# Patient Record
Sex: FEMALE | Race: WHITE | Employment: OTHER | URBAN - METROPOLITAN AREA
[De-identification: names, ages, dates, MRNs, and addresses within clinical notes are randomized per-mention and may not be internally consistent; named-entity substitution may affect disease eponyms.]

---

## 2018-01-29 PROBLEM — R07.9 CHEST PAIN: Status: ACTIVE | Noted: 2018-01-29

## 2018-02-01 PROBLEM — R07.9 CHEST PAIN: Status: ACTIVE | Noted: 2018-02-01

## 2018-02-01 PROBLEM — R07.89 OTHER CHEST PAIN: Status: ACTIVE | Noted: 2018-01-29

## 2018-02-05 ENCOUNTER — HOSPITAL ENCOUNTER (OUTPATIENT)
Dept: ENDOSCOPY | Age: 63
Discharge: HOME | End: 2018-02-05
Attending: INTERNAL MEDICINE | Admitting: INTERNAL MEDICINE

## 2018-02-05 VITALS
TEMPERATURE: 97.8 F | DIASTOLIC BLOOD PRESSURE: 62 MMHG | HEART RATE: 89 BPM | RESPIRATION RATE: 20 BRPM | OXYGEN SATURATION: 100 % | SYSTOLIC BLOOD PRESSURE: 142 MMHG

## 2018-02-05 LAB
GLUCOSE BLD-MCNC: 126 MG/DL (ref 70–99)
PERFORMED ON: ABNORMAL

## 2018-02-05 NOTE — ANESTHESIA POST-OP
Anesthesia Post-op Note    Patient: Katherine Carrasco  MRN: [id number]  YOB: 1955  Date of evaluation: 2/5/2018  Time:  11:58 AM     Procedure(s) Performed:     Last Vitals: BP (!) 142/62   Pulse 89   Temp 97.8 °F (36.6 °C)   Resp 20   SpO2 100%     Narciso Phase I:      Narciso Phase II:      Anesthesia Post Evaluation    Final anesthesia type: MAC  Patient location during evaluation: PACU  Patient participation: complete - patient participated  Level of consciousness: awake and alert  Pain score: 0  Airway patency: patent  Nausea & Vomiting: no nausea and no vomiting  Complications: no  Cardiovascular status: blood pressure returned to baseline  Respiratory status: acceptable  Hydration status: euvolemic        Stiven Pineda MD  11:58 AM

## 2018-02-05 NOTE — ANESTHESIA PRE-OP
Department of Anesthesiology  Preprocedure Note       Name:  Cristel Huynh   Age:  58 y.o.  :  1955                                          MRN:  [id number]         Date:  2018      Surgeon: Yessi Love    Procedure: colonoscopy    Medications prior to admission:   Prior to Admission medications    Medication Sig Start Date End Date Taking? Authorizing Provider   metoprolol tartrate (LOPRESSOR) 25 MG tablet Take 0.5 tablets by mouth 2 times daily 2/3/18   Lupis Tamayo MD   levalbuterol Sapphire Lopezald HFA) 45 MCG/ACT inhaler Inhale 1 puff into the lungs every 4 hours as needed for Wheezing 18  Lupis Tamayo MD   levalbuterol Sapphire Farnsworth) 1.25 MG/3ML nebulizer solution Take 3 mLs by nebulization every 4 hours as needed for Wheezing 18   Lupis Tamayo MD   insulin detemir (LEVEMIR FLEXPEN) 100 UNIT/ML injection pen Inject 110 Units into the skin nightly    Historical Provider, MD   nystatin (MYCOSTATIN) 682366 UNIT/GM cream Apply topically 2 times daily Apply topically 2 times daily.     Historical Provider, MD   insulin lispro (HUMALOG KWIKPEN) 100 UNIT/ML pen Inject 33 Units into the skin daily (before lunch) 18u am 33u lunch 27u dinner  3 units for every 25 above 200    Historical Provider, MD   lactulose encephalopathy 10 GM/15ML SOLN solution Take 20 g by mouth 3 times daily  18  Historical Provider, MD   levothyroxine (SYNTHROID) 112 MCG tablet Take 112 mcg by mouth Daily    Historical Provider, MD   OXYGEN Inhale 3 L/min into the lungs continuous    Historical Provider, MD   CPAP Machine MISC by Does not apply route    Historical Provider, MD   lamoTRIgine (LAMICTAL) 200 MG tablet Take 200 mg by mouth 2 times daily     Historical Provider, MD   tacrolimus (PROGRAF) 1 MG capsule Take 0.5 mg by mouth 2 times daily     Historical Provider, MD   insulin detemir (LEVEMIR) 100 UNIT/ML injection vial Inject 90 Units into the skin every morning     Historical Provider, MD hypertension:,                ROS comment: Denies CP, SOA   No Hx of CAD  Hx of PSVT s/p ablation last week     Neuro/Psych:   (+) neuromuscular disease:, psychiatric history:             ROS comment: Hx of Sz's (distant Hx) GI/Hepatic/Renal:   (+) GERD:, liver disease:,          ROS comment: S/P Liver Tx (2008). Endo/Other:    (+) Type I DM, using insulin, hypothyroidism::., .                 Abdominal:           Vascular: negative vascular ROS. Anesthesia Plan      MAC     ASA 4       Induction: intravenous. Anesthetic plan and risks discussed with patient.                       Tinnie Hamman, MD   2/5/2018

## 2018-02-12 NOTE — OP NOTE
[address] [place] [address]  Endoscopy Note    Patient: Blossom Ireland  : 1955  Acct#:     Procedure: Colonoscopy     Date:  2018    Surgeon:  Khadijah Israel MD    Referring Physician:  Freya Romberg    Preoperative Diagnosis:  High risk screening    Postoperative Diagnosis:  Exam is normal to hepatic flexure limited by looping of scope    Anesthesia:  Diprivan    EBL: <50 mL    Indications: This is a 58y.o. year old female who presents today with family history of colon cancer. Procedure: An informed consent was obtained from the patient after explanation of indications, benefits, possible risks and complications of the procedure. The patient was then taken to the endoscopy suite, placed in the left lateral decubitus position, and the above IV anesthesia was administered. A digital rectal examination was performed and revealed negative without mass, lesions or tenderness. The Olympus CFQ-180-AL video colonoscope was placed in the patient's rectum under digital direction and advanced to the cecum. The cecum was identified by characteristic anatomy and ballottment. The prep was fair. The ileocecal valve was identified and intubated     Visualization of the terminal ileum demonstrated normal mucosa. The scope was then withdrawn back through the transverse, descending and sigmoid colons. Carefull circumferential examination of the mucosa in these areas demonstrated normal colonic mucosa throughout. The scope was then withdrawn into the rectum and retroflexed. The retroflexed view of the anal verge and rectum demonstrates no abnormalities. The scope was straightened, the colon was decompressed and the scope was withdrawn from the patient. The patient tolerated the procedure well and was taken to the PACU in good condition.     Impression:  Normal colonoscopy to the hepatic flexure limited by looping with no evidence of neoplasia, diverticular disease or mucosal

## 2018-05-03 ENCOUNTER — HOSPITAL ENCOUNTER (OUTPATIENT)
Dept: OTHER | Age: 63
Discharge: HOME | End: 2018-05-03
Attending: INTERNAL MEDICINE | Admitting: INTERNAL MEDICINE

## 2018-05-03 DIAGNOSIS — R52 PAIN: ICD-10-CM

## 2018-05-16 PROBLEM — R07.9 CHEST PAIN: Status: ACTIVE | Noted: 2018-05-16

## 2018-05-16 PROBLEM — E03.9 HYPOTHYROID: Status: ACTIVE | Noted: 2018-05-16

## 2018-05-16 PROBLEM — K21.9 GERD (GASTROESOPHAGEAL REFLUX DISEASE): Status: ACTIVE | Noted: 2018-05-16

## 2018-10-12 ENCOUNTER — HOSPITAL ENCOUNTER (OUTPATIENT)
Age: 63
Setting detail: OBSERVATION
Discharge: NURSING, REHAB OR LONG-TERM CARE | End: 2018-10-13
Attending: EMERGENCY MEDICINE | Admitting: INTERNAL MEDICINE
Payer: MEDICARE

## 2018-10-12 ENCOUNTER — APPOINTMENT (OUTPATIENT)
Dept: GENERAL RADIOLOGY | Age: 63
End: 2018-10-12
Payer: MEDICARE

## 2018-10-12 DIAGNOSIS — M25.562 ACUTE PAIN OF LEFT KNEE: ICD-10-CM
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
## 2018-10-12 DIAGNOSIS — S80.02XA CONTUSION OF LEFT KNEE, INITIAL ENCOUNTER: ICD-10-CM

## 2018-10-12 DIAGNOSIS — R26.2 IMPAIRED AMBULATION: ICD-10-CM

## 2018-10-12 PROBLEM — R62.51 FAILURE TO THRIVE (0-17): Status: ACTIVE | Noted: 2018-10-12

## 2018-10-12 LAB
GLUCOSE BLD-MCNC: 268 MG/DL (ref 70–99)
GLUCOSE BLD-MCNC: 274 MG/DL (ref 70–99)
GLUCOSE BLD-MCNC: 307 MG/DL (ref 70–99)
PERFORMED ON: ABNORMAL

## 2018-10-12 PROCEDURE — 96372 THER/PROPH/DIAG INJ SC/IM: CPT

## 2018-10-12 PROCEDURE — 6370000000 HC RX 637 (ALT 250 FOR IP): Performed by: EMERGENCY MEDICINE

## 2018-10-12 PROCEDURE — G0378 HOSPITAL OBSERVATION PER HR: HCPCS

## 2018-10-12 PROCEDURE — 2580000003 HC RX 258: Performed by: INTERNAL MEDICINE

## 2018-10-12 PROCEDURE — 99285 EMERGENCY DEPT VISIT HI MDM: CPT

## 2018-10-12 PROCEDURE — 97165 OT EVAL LOW COMPLEX 30 MIN: CPT

## 2018-10-12 PROCEDURE — G8988 SELF CARE GOAL STATUS: HCPCS

## 2018-10-12 PROCEDURE — G8987 SELF CARE CURRENT STATUS: HCPCS

## 2018-10-12 PROCEDURE — 97530 THERAPEUTIC ACTIVITIES: CPT

## 2018-10-12 PROCEDURE — 73560 X-RAY EXAM OF KNEE 1 OR 2: CPT

## 2018-10-12 PROCEDURE — 6370000000 HC RX 637 (ALT 250 FOR IP): Performed by: NURSE PRACTITIONER

## 2018-10-12 PROCEDURE — 6360000002 HC RX W HCPCS: Performed by: INTERNAL MEDICINE

## 2018-10-12 PROCEDURE — 6370000000 HC RX 637 (ALT 250 FOR IP): Performed by: INTERNAL MEDICINE

## 2018-10-12 RX ORDER — NYSTATIN 100000 U/G
CREAM TOPICAL 2 TIMES DAILY
Status: DISCONTINUED | OUTPATIENT
Start: 2018-10-12 | End: 2018-10-13 | Stop reason: HOSPADM

## 2018-10-12 RX ORDER — DEXTROSE MONOHYDRATE 50 MG/ML
100 INJECTION, SOLUTION INTRAVENOUS PRN
Status: DISCONTINUED | OUTPATIENT
Start: 2018-10-12 | End: 2018-10-13 | Stop reason: HOSPADM

## 2018-10-12 RX ORDER — TACROLIMUS 0.5 MG/1
0.5 CAPSULE ORAL 2 TIMES DAILY
Status: DISCONTINUED | OUTPATIENT
Start: 2018-10-12 | End: 2018-10-13 | Stop reason: HOSPADM

## 2018-10-12 RX ORDER — NICOTINE POLACRILEX 4 MG
15 LOZENGE BUCCAL PRN
Status: DISCONTINUED | OUTPATIENT
Start: 2018-10-12 | End: 2018-10-13 | Stop reason: HOSPADM

## 2018-10-12 RX ORDER — ONDANSETRON 8 MG/1
8 TABLET, ORALLY DISINTEGRATING ORAL EVERY 8 HOURS PRN
Status: DISCONTINUED | OUTPATIENT
Start: 2018-10-12 | End: 2018-10-13 | Stop reason: HOSPADM

## 2018-10-12 RX ORDER — PANTOPRAZOLE SODIUM 40 MG/1
40 TABLET, DELAYED RELEASE ORAL
Status: DISCONTINUED | OUTPATIENT
Start: 2018-10-13 | End: 2018-10-13 | Stop reason: HOSPADM

## 2018-10-12 RX ORDER — FUROSEMIDE 20 MG/1
20 TABLET ORAL DAILY
Status: DISCONTINUED | OUTPATIENT
Start: 2018-10-12 | End: 2018-10-13 | Stop reason: HOSPADM

## 2018-10-12 RX ORDER — LACTULOSE 10 G/15ML
20 SOLUTION ORAL 3 TIMES DAILY
Status: DISCONTINUED | OUTPATIENT
Start: 2018-10-12 | End: 2018-10-13 | Stop reason: HOSPADM

## 2018-10-12 RX ORDER — OXYCODONE HYDROCHLORIDE 5 MG/1
5 TABLET ORAL ONCE
Status: COMPLETED | OUTPATIENT
Start: 2018-10-12 | End: 2018-10-12

## 2018-10-12 RX ORDER — HYDROCODONE BITARTRATE AND ACETAMINOPHEN 5; 325 MG/1; MG/1
1 TABLET ORAL EVERY 6 HOURS PRN
Status: DISCONTINUED | OUTPATIENT
Start: 2018-10-12 | End: 2018-10-13

## 2018-10-12 RX ORDER — OLANZAPINE 5 MG/1
2.5 TABLET ORAL NIGHTLY
Status: DISCONTINUED | OUTPATIENT
Start: 2018-10-12 | End: 2018-10-13 | Stop reason: HOSPADM

## 2018-10-12 RX ORDER — LEVOTHYROXINE SODIUM 112 UG/1
112 TABLET ORAL DAILY
Status: DISCONTINUED | OUTPATIENT
Start: 2018-10-13 | End: 2018-10-13 | Stop reason: HOSPADM

## 2018-10-12 RX ORDER — ACETAMINOPHEN 80 MG
TABLET,CHEWABLE ORAL
Status: COMPLETED
Start: 2018-10-12 | End: 2018-10-12

## 2018-10-12 RX ORDER — HYDROXYZINE PAMOATE 25 MG/1
50 CAPSULE ORAL EVERY 8 HOURS PRN
Status: DISCONTINUED | OUTPATIENT
Start: 2018-10-12 | End: 2018-10-13 | Stop reason: HOSPADM

## 2018-10-12 RX ORDER — FAMOTIDINE 20 MG/1
20 TABLET, FILM COATED ORAL 2 TIMES DAILY PRN
Status: DISCONTINUED | OUTPATIENT
Start: 2018-10-12 | End: 2018-10-13 | Stop reason: HOSPADM

## 2018-10-12 RX ORDER — OLANZAPINE 15 MG/1
15 TABLET ORAL NIGHTLY
Status: ON HOLD | COMMUNITY
End: 2020-01-01 | Stop reason: SDUPTHER

## 2018-10-12 RX ORDER — LAMOTRIGINE 100 MG/1
200 TABLET ORAL 2 TIMES DAILY
Status: DISCONTINUED | OUTPATIENT
Start: 2018-10-12 | End: 2018-10-13 | Stop reason: HOSPADM

## 2018-10-12 RX ORDER — SODIUM CHLORIDE 0.9 % (FLUSH) 0.9 %
10 SYRINGE (ML) INJECTION EVERY 12 HOURS SCHEDULED
Status: DISCONTINUED | OUTPATIENT
Start: 2018-10-12 | End: 2018-10-13 | Stop reason: HOSPADM

## 2018-10-12 RX ORDER — CYCLOBENZAPRINE HCL 10 MG
5 TABLET ORAL NIGHTLY
Status: DISCONTINUED | OUTPATIENT
Start: 2018-10-12 | End: 2018-10-13 | Stop reason: HOSPADM

## 2018-10-12 RX ORDER — SODIUM CHLORIDE 9 MG/ML
INJECTION, SOLUTION INTRAVENOUS CONTINUOUS
Status: DISCONTINUED | OUTPATIENT
Start: 2018-10-12 | End: 2018-10-13 | Stop reason: HOSPADM

## 2018-10-12 RX ORDER — PROMETHAZINE HYDROCHLORIDE 25 MG/1
25 TABLET ORAL EVERY 6 HOURS PRN
Status: DISCONTINUED | OUTPATIENT
Start: 2018-10-12 | End: 2018-10-13 | Stop reason: HOSPADM

## 2018-10-12 RX ORDER — POTASSIUM CHLORIDE 7.45 MG/ML
10 INJECTION INTRAVENOUS PRN
Status: DISCONTINUED | OUTPATIENT
Start: 2018-10-12 | End: 2018-10-13 | Stop reason: HOSPADM

## 2018-10-12 RX ORDER — SODIUM CHLORIDE 0.9 % (FLUSH) 0.9 %
10 SYRINGE (ML) INJECTION PRN
Status: DISCONTINUED | OUTPATIENT
Start: 2018-10-12 | End: 2018-10-13 | Stop reason: HOSPADM

## 2018-10-12 RX ORDER — DEXTROSE MONOHYDRATE 25 G/50ML
12.5 INJECTION, SOLUTION INTRAVENOUS PRN
Status: DISCONTINUED | OUTPATIENT
Start: 2018-10-12 | End: 2018-10-13 | Stop reason: HOSPADM

## 2018-10-12 RX ORDER — POTASSIUM CHLORIDE 20 MEQ/1
40 TABLET, EXTENDED RELEASE ORAL PRN
Status: DISCONTINUED | OUTPATIENT
Start: 2018-10-12 | End: 2018-10-13 | Stop reason: HOSPADM

## 2018-10-12 RX ORDER — ONDANSETRON 2 MG/ML
4 INJECTION INTRAMUSCULAR; INTRAVENOUS EVERY 6 HOURS PRN
Status: DISCONTINUED | OUTPATIENT
Start: 2018-10-12 | End: 2018-10-13 | Stop reason: HOSPADM

## 2018-10-12 RX ADMIN — OXYCODONE HYDROCHLORIDE 5 MG: 5 TABLET ORAL at 16:45

## 2018-10-12 RX ADMIN — INSULIN GLARGINE 120 UNITS: 100 INJECTION, SOLUTION SUBCUTANEOUS at 23:10

## 2018-10-12 RX ADMIN — RIFAXIMIN 550 MG: 550 TABLET ORAL at 22:48

## 2018-10-12 RX ADMIN — CYCLOBENZAPRINE 5 MG: 10 TABLET, FILM COATED ORAL at 22:41

## 2018-10-12 RX ADMIN — Medication: at 22:48

## 2018-10-12 RX ADMIN — OXYCODONE HYDROCHLORIDE 5 MG: 5 TABLET ORAL at 20:27

## 2018-10-12 RX ADMIN — ENOXAPARIN SODIUM 40 MG: 40 INJECTION SUBCUTANEOUS at 22:39

## 2018-10-12 RX ADMIN — Medication 10 ML: at 22:48

## 2018-10-12 RX ADMIN — TACROLIMUS 0.5 MG: 0.5 CAPSULE ORAL at 22:41

## 2018-10-12 RX ADMIN — NYSTATIN: 100000 CREAM TOPICAL at 22:47

## 2018-10-12 RX ADMIN — LACTULOSE 20 G: 20 SOLUTION ORAL at 22:39

## 2018-10-12 RX ADMIN — METOPROLOL TARTRATE 25 MG: 25 TABLET, FILM COATED ORAL at 22:41

## 2018-10-12 RX ADMIN — SODIUM CHLORIDE: 9 INJECTION, SOLUTION INTRAVENOUS at 22:49

## 2018-10-12 RX ADMIN — OXYCODONE HYDROCHLORIDE 5 MG: 5 TABLET ORAL at 12:20

## 2018-10-12 RX ADMIN — INSULIN LISPRO 2 UNITS: 100 INJECTION, SOLUTION INTRAVENOUS; SUBCUTANEOUS at 23:11

## 2018-10-12 RX ADMIN — LAMOTRIGINE 200 MG: 100 TABLET ORAL at 23:37

## 2018-10-12 RX ADMIN — OLANZAPINE 2.5 MG: 5 TABLET, FILM COATED ORAL at 22:42

## 2018-10-12 RX ADMIN — MILNACIPRAN HYDROCHLORIDE 100 MG: 50 TABLET, FILM COATED ORAL at 22:47

## 2018-10-12 ASSESSMENT — PAIN SCALES - GENERAL
PAINLEVEL_OUTOF10: 10
PAINLEVEL_OUTOF10: 10
PAINLEVEL_OUTOF10: 8
PAINLEVEL_OUTOF10: 10
PAINLEVEL_OUTOF10: 8

## 2018-10-12 ASSESSMENT — PAIN DESCRIPTION - ORIENTATION
ORIENTATION: LEFT
ORIENTATION: LEFT

## 2018-10-12 ASSESSMENT — PAIN DESCRIPTION - LOCATION
LOCATION: KNEE
LOCATION: KNEE

## 2018-10-12 NOTE — CARE COORDINATION
DISCHARGE PLANNING: The [facility] liaison called back stating that  will follow patient  tomorrow morning.

## 2018-10-12 NOTE — ED PROVIDER NOTES
I personally evaluated and examined the patient in conjunction with the APC and agree with the assessment, treatment plan and disposition of the patient has recorded by the APC. I reviewed pertinent nurse's notes, triage notes, vital signs, past medical history, family and social history, medications, and allergies. Complete review of systems was conducted by the mid-level provider and/or myself. Review of systems is negative except as documented in the history of present illness. Procedures:    1) 1) Splinting: An ace wrap was placed by the ED tech. Pt was re-examined after wrap application by myself and capillary refill is less than two seconds, patient has normal color of distal extremity and sensation is intact. Wrap was in good alignment after placement. Patient tolerated the procedure well with no complications. 24-year-old female presents to emergency department after exacerbating a chronic left knee. She apparently hyperextended her left knee. She has a stump of her right lower extremity. Lives alone. Feels like she cannot live alone. General: Patient is in no acute distress   Head: Normocephalic, atraumatic, pupils are equal and reactive to light. EOMI. Neck: Neck is supple. No JVD noted. Heart: RRR no murmurs, rubs, or gallops   Lungs: CTA BL   Abdomen: soft, non-tender, non-distended   Extremities: no lower extremity edema. Capillary refill is less than 2 seconds . Slight swelling without erythema left knee. Neurovascularly intact distally. Skin: no cyanosis or pallor; no rashes noted   Neuro: CN's 2-12 are grossly intact. No focal neurologic deficit appreciated. X-ray shows chronic osteoarthritis. She cannot tolerate living at home. Discussed with  who cut her into a rehab facility however the rehab doctor is not in until tomorrow. Therefore recommended observation. FINAL IMPRESSION     1. Fall, initial encounter    2.  Contusion of left knee, initial

## 2018-10-12 NOTE — ED NOTES
Nishant Ramesh from [facility] in room to try to place pt. States she needs evaluation from PT before she can get an order from her MD. Myra Marin with [facility] in PT states she will get someone to come down ASAP.      Ayden Pacheco RN  10/12/18 5006

## 2018-10-12 NOTE — CARE COORDINATION
DISCHARGE PLANNING:  Informed by Addy Villanueva of  the patient  who came to ED today after falling and  does not feel safe going home. She  is requesting for C/M services. The writer met with patient to assist with discharge planning needs. Patient stated that  she lives alone in her home. She  uses a manual  wheel chair and a powered chair to get around . Patient stated she is active with [facility] agency for skilled Nursing only. Patient  has right BKA . She reported  that she  does not  feel safe going home without 24 hr care. Stated she cannot put weight on the left leg because of pain from the fall and also arthritis. She stated that she cannot afford to privately  Pay for the Non-medical  [facility] aid services. Skilled [facility] does not provide 24 hr care. She stated  that she in the process of  getting into [facility] living through the [place]. Requested if she could go to a facility for Rehab . The ED provider -Dilip NEGRON was updated. The writer contacted [facility] for bed availability and possible placement . The Liaison reported that they have  beds available at the [facility] location. PT/OT orders were obtained. The  Liaison from [facility] came to ED to assess the patient. She is waiting for the response from their physician . The ED provider was updated. Will continue to follow.

## 2018-10-12 NOTE — ED PROVIDER NOTES
DIAGNOSIS/MDM:   Vitals:    Vitals:    10/12/18 1151 10/12/18 1318 10/12/18 1703   BP: (!) 131/57 123/67 120/83   Pulse: 98 98 95   Resp: 16 16 14   Temp: 99 °F (37.2 °C)     TempSrc: Oral     SpO2: 98% 100% 100%   Weight: 180 lb (81.6 kg)     Height: 5' 4\" (1.626 m)         Colt Bautista was given the following medications:  Medications   oxyCODONE (ROXICODONE) immediate release tablet 5 mg (5 mg Oral Given 10/12/18 1220)   oxyCODONE (ROXICODONE) immediate release tablet 5 mg (5 mg Oral Given 10/12/18 1645)       Colt Bautista was evaluated in the emergencydepartment with concern for Evaluation after fall. There is no head injury or LOC. Therefore I do not feel head CT imaging is warranted at this time. She does complain of left knee pain. This is acute on chronic. X-ray imaging was performed. Tricompartmental osteoarthritis is demonstrated. Known and per patient's chronic pain syndrome. There is a small joint effusion, however no significant swelling on exam.  My suspicion is low for fracture, dislocation, cellulitis, septic joint, joint effusion, arterial occlusion, vascular compromise, gout, or pseudogout. Differential include sprain, strain, occult fracture, or contusion. The patient was instructed on RICE therapy. I do feel outpatient management is wanted patient home. We were able to continue OT/PT evaluation. She is candidate for further treatment at a SNF, however it is late in the day and we are unable to obtain a physician order for this. For this reason she will be admitted. Dr. Dian Arevalo was consulted and is in agreement for admission. This plan was discussed with the patient, and my attending, No att. providers found, and both are in agreement with the plan. Please see attending note. FINAL IMPRESSION      1. Fall, initial encounter    2.  Contusion of left knee, initial encounter          DISPOSITION/PLAN   DISPOSITION Decision To Admit 10/12/2018 05:04:17 PM      (Please note that portions of this note were completed with a voice recognition program.  Efforts were made to edit the dictations but occasionally wordsare mis-transcribed.)    DELORES Garza CNP (electronically signed)        DELORES Garza CNP  10/12/18 100 DELORES Ledezma CNP  10/26/18 [id number]

## 2018-10-13 VITALS
RESPIRATION RATE: 18 BRPM | BODY MASS INDEX: 32.21 KG/M2 | OXYGEN SATURATION: 96 % | WEIGHT: 188.7 LBS | DIASTOLIC BLOOD PRESSURE: 56 MMHG | HEIGHT: 64 IN | TEMPERATURE: 98.2 F | HEART RATE: 93 BPM | SYSTOLIC BLOOD PRESSURE: 128 MMHG

## 2018-10-13 PROBLEM — M25.569 KNEE PAIN: Status: ACTIVE | Noted: 2018-10-13

## 2018-10-13 PROBLEM — D64.9 ANEMIA: Status: ACTIVE | Noted: 2018-10-13

## 2018-10-13 LAB
BASOPHILS ABSOLUTE: 0 K/UL (ref 0–0.2)
BASOPHILS RELATIVE PERCENT: 0.2 %
EOSINOPHILS ABSOLUTE: 0 K/UL (ref 0–0.6)
EOSINOPHILS RELATIVE PERCENT: 0 %
GLUCOSE BLD-MCNC: 125 MG/DL (ref 70–99)
GLUCOSE BLD-MCNC: 160 MG/DL (ref 70–99)
GLUCOSE BLD-MCNC: 193 MG/DL (ref 70–99)
GLUCOSE BLD-MCNC: 195 MG/DL (ref 70–99)
HCT VFR BLD CALC: 34.1 % (ref 36–48)
HEMOGLOBIN: 11.5 G/DL (ref 12–16)
LYMPHOCYTES ABSOLUTE: 0.6 K/UL (ref 1–5.1)
LYMPHOCYTES RELATIVE PERCENT: 18.8 %
MCH RBC QN AUTO: 31.2 PG (ref 26–34)
MCHC RBC AUTO-ENTMCNC: 33.7 G/DL (ref 31–36)
MCV RBC AUTO: 92.7 FL (ref 80–100)
MONOCYTES ABSOLUTE: 0.3 K/UL (ref 0–1.3)
MONOCYTES RELATIVE PERCENT: 10.6 %
NEUTROPHILS ABSOLUTE: 2.3 K/UL (ref 1.7–7.7)
NEUTROPHILS RELATIVE PERCENT: 70.4 %
PDW BLD-RTO: 14.5 % (ref 12.4–15.4)
PERFORMED ON: ABNORMAL
PLATELET # BLD: 72 K/UL (ref 135–450)
PLATELET SLIDE REVIEW: ABNORMAL
PMV BLD AUTO: 8.9 FL (ref 5–10.5)
RBC # BLD: 3.68 M/UL (ref 4–5.2)
RBC # BLD: NORMAL 10*6/UL
SLIDE REVIEW: ABNORMAL
WBC # BLD: 3.3 K/UL (ref 4–11)

## 2018-10-13 PROCEDURE — 85025 COMPLETE CBC W/AUTO DIFF WBC: CPT

## 2018-10-13 PROCEDURE — G8978 MOBILITY CURRENT STATUS: HCPCS

## 2018-10-13 PROCEDURE — G0378 HOSPITAL OBSERVATION PER HR: HCPCS

## 2018-10-13 PROCEDURE — 96372 THER/PROPH/DIAG INJ SC/IM: CPT

## 2018-10-13 PROCEDURE — 6370000000 HC RX 637 (ALT 250 FOR IP): Performed by: INTERNAL MEDICINE

## 2018-10-13 PROCEDURE — G8979 MOBILITY GOAL STATUS: HCPCS

## 2018-10-13 PROCEDURE — 36415 COLL VENOUS BLD VENIPUNCTURE: CPT

## 2018-10-13 PROCEDURE — 94660 CPAP INITIATION&MGMT: CPT

## 2018-10-13 PROCEDURE — 94760 N-INVAS EAR/PLS OXIMETRY 1: CPT

## 2018-10-13 PROCEDURE — 6360000002 HC RX W HCPCS: Performed by: INTERNAL MEDICINE

## 2018-10-13 PROCEDURE — G8980 MOBILITY D/C STATUS: HCPCS

## 2018-10-13 PROCEDURE — 97110 THERAPEUTIC EXERCISES: CPT

## 2018-10-13 PROCEDURE — 97161 PT EVAL LOW COMPLEX 20 MIN: CPT

## 2018-10-13 RX ORDER — OXYCODONE HYDROCHLORIDE 5 MG/1
5 TABLET ORAL EVERY 6 HOURS PRN
Status: DISCONTINUED | OUTPATIENT
Start: 2018-10-13 | End: 2018-10-13 | Stop reason: HOSPADM

## 2018-10-13 RX ADMIN — OXYCODONE HYDROCHLORIDE 5 MG: 5 TABLET ORAL at 17:11

## 2018-10-13 RX ADMIN — INSULIN LISPRO 1 UNITS: 100 INJECTION, SOLUTION INTRAVENOUS; SUBCUTANEOUS at 13:20

## 2018-10-13 RX ADMIN — LACTULOSE 20 G: 20 SOLUTION ORAL at 15:46

## 2018-10-13 RX ADMIN — RIFAXIMIN 550 MG: 550 TABLET ORAL at 09:05

## 2018-10-13 RX ADMIN — OXYCODONE HYDROCHLORIDE 5 MG: 5 TABLET ORAL at 09:05

## 2018-10-13 RX ADMIN — MILNACIPRAN HYDROCHLORIDE 100 MG: 50 TABLET, FILM COATED ORAL at 19:41

## 2018-10-13 RX ADMIN — FUROSEMIDE 20 MG: 20 TABLET ORAL at 09:05

## 2018-10-13 RX ADMIN — TACROLIMUS 0.5 MG: 0.5 CAPSULE ORAL at 09:05

## 2018-10-13 RX ADMIN — INSULIN LISPRO 1 UNITS: 100 INJECTION, SOLUTION INTRAVENOUS; SUBCUTANEOUS at 17:12

## 2018-10-13 RX ADMIN — LACTULOSE 20 G: 20 SOLUTION ORAL at 09:04

## 2018-10-13 RX ADMIN — ENOXAPARIN SODIUM 40 MG: 40 INJECTION SUBCUTANEOUS at 09:05

## 2018-10-13 RX ADMIN — MILNACIPRAN HYDROCHLORIDE 100 MG: 50 TABLET, FILM COATED ORAL at 09:16

## 2018-10-13 RX ADMIN — LAMOTRIGINE 200 MG: 100 TABLET ORAL at 09:15

## 2018-10-13 RX ADMIN — NYSTATIN: 100000 CREAM TOPICAL at 09:16

## 2018-10-13 RX ADMIN — OXYCODONE HYDROCHLORIDE 5 MG: 5 TABLET ORAL at 01:06

## 2018-10-13 RX ADMIN — LEVOTHYROXINE SODIUM 112 MCG: 112 TABLET ORAL at 07:18

## 2018-10-13 RX ADMIN — METOPROLOL TARTRATE 25 MG: 25 TABLET, FILM COATED ORAL at 09:05

## 2018-10-13 RX ADMIN — PANTOPRAZOLE SODIUM 40 MG: 40 TABLET, DELAYED RELEASE ORAL at 07:18

## 2018-10-13 RX ADMIN — MILNACIPRAN HYDROCHLORIDE 100 MG: 50 TABLET, FILM COATED ORAL at 13:20

## 2018-10-13 RX ADMIN — INSULIN GLARGINE 90 UNITS: 100 INJECTION, SOLUTION SUBCUTANEOUS at 09:16

## 2018-10-13 ASSESSMENT — ENCOUNTER SYMPTOMS
BACK PAIN: 1
CHEST TIGHTNESS: 0
CONSTIPATION: 0
ABDOMINAL DISTENTION: 0
CHOKING: 0
EYE PAIN: 0
EYE ITCHING: 0

## 2018-10-13 ASSESSMENT — PAIN DESCRIPTION - PAIN TYPE
TYPE: ACUTE PAIN
TYPE: ACUTE PAIN

## 2018-10-13 ASSESSMENT — PAIN DESCRIPTION - LOCATION
LOCATION: KNEE
LOCATION: KNEE

## 2018-10-13 ASSESSMENT — PAIN DESCRIPTION - DESCRIPTORS: DESCRIPTORS: ACHING

## 2018-10-13 ASSESSMENT — PAIN SCALES - GENERAL
PAINLEVEL_OUTOF10: 9
PAINLEVEL_OUTOF10: 10
PAINLEVEL_OUTOF10: 0
PAINLEVEL_OUTOF10: 6
PAINLEVEL_OUTOF10: 9
PAINLEVEL_OUTOF10: 8

## 2018-10-13 ASSESSMENT — PAIN DESCRIPTION - FREQUENCY: FREQUENCY: CONTINUOUS

## 2018-10-13 ASSESSMENT — PAIN DESCRIPTION - ORIENTATION
ORIENTATION: LEFT
ORIENTATION: LEFT

## 2018-10-13 NOTE — CARE COORDINATION
Discharge Planning. This worker received a call from [facility] stating that patient is accepted to [facility] but the [facility] location. However patient must bring her own milnacipran/savella prescription. The patient nurse reported that the patient stated she is almost out and that she will not bring that medication with her. [facility] is unable to accept if she does not bring the medication with her. Pt nurse was notified Social work will follow. Addendum: [facility] called they reported that they will be able to order the milnacipran medication however they will not receive it until Monday. This worker notified patients nurse to see if patient will be willing to supply at least until Monday. Waiting on confirmation  Social work will follow.      Electronically signed by ANAND Kyle LSW on 10/13/18 at 12:13 PM

## 2018-10-13 NOTE — PROGRESS NOTES
tachycardia) ([facility] [address] Thyroid mass; and UTI (lower urinary tract infection). has a past surgical history that includes Liver transplant (2008); other surgical history; Cholecystectomy; Appendectomy; hernia repair; Abdomen surgery; joint replacement; joint replacement; Leg amputation below knee; Liver transplant; Leg amputation below knee; Hysterectomy; and Sleeve Gastroplasty (1985). Restrictions  Restrictions/Precautions  Restrictions/Precautions:  (in ED bed 5, no fall risk at this time)  Position Activity Restriction  Other position/activity restrictions: 10/12: Sue Martinez is a 61 y.o. female who presents emergency Department with concern for fall. The patient has a partial dictation of her right leg. She reports that her left knee requires total knee replacement, however she is not a good candidate for this. She uses a motorized scooter. Today when a taxi came to pick her up, he parked too far away from the curb. She had to get up and hop. This caused her to fall. She is reporting pain in the left knee as a result. No head injury or LOC. She denies fever, rash, headaches, dizziness, chest pain, shortness of breath, cough, congestion, abdominal pain, nausea, vomiting, diarrhea, constipation, blood in the stool, or painful urination. No family at bedside. Vision/Hearing  Vision: Impaired  Vision Exceptions: Wears glasses at all times; Wears glasses for distance;Wears glasses for reading  Hearing: Exceptions to [facility]  Hearing Exceptions: No hearing aid     Subjective  General  Chart Reviewed: Yes  Patient assessed for rehabilitation services?: Yes  Response To Previous Treatment: Not applicable  Family / Caregiver Present: No  Diagnosis: failure to thrive  Subjective  Subjective: Pt reports she was driving power chair to [employer] but once she got there it . Transfered to one of [employer] to shop then called a cab to get home.  The  told her he couldn't get closer to her house knee as pt stated hypersensitive  Strength LLE  Strength LLE: WFL  Comment: grossly 5/5, however painful with knee flex  Tone RLE  RLE Tone: Normotonic  Tone LLE  LLE Tone: Normotonic  Motor Control  Gross Motor?: WNL     Bed mobility  Supine to Sit: Minimal assistance (pt lost R UE hold and rocked backwards as she tried to sit up which resulted in her striking the side table with her head (pt reported it was not hard and did not hurt), PT assisted at R UE to reach upright )  Sit to Supine: Stand by assistance (pt able to use B UEs to pull herself up in bed)  Scooting: Stand by assistance  Transfers  Sit to Stand: Minimal Assistance (x3, improve stance time as trials progressed, VCs for hand placement on bed to begin)  Stand to sit: Contact guard assistance (x3)  Ambulation  Ambulation?: No  WB Status: pt has not amb in 10 years, transfers to power chair      Balance  Posture: Good  Sitting - Static: Good  Sitting - Dynamic: Fair;- (progressively improved as she sat EOB)  Standing - Static: Fair  Exercises  Comments: standing at RW x 3 - 5 sec, 10 sec, 25 sec, min A for all     Assessment   Body structures, Functions, Activity limitations: Decreased functional mobility ; Decreased ADL status; Decreased strength;Decreased endurance;Decreased balance;Decreased high-level IADLs  Assessment: Pt is a 60 y/o female who complains of L knee pain following a fall. She also has a BKA on the R. Pt demos decreased LE strength and endurance, pain with L knee flexion, impaired balance, and inability to amb. Patient is goig to be discharged to SNF this date where deficits will be further addressed.    Treatment Diagnosis: decreased LE strength and endurance, pain with L knee flexion, impaired balance, and inability to amb  Prognosis: Fair  Decision Making: Low Complexity  History: see above  Exam: see above  Clinical Presentation: increased motion in L knee post standing trials  Patient Education: PT role and plan  Barriers to

## 2018-10-13 NOTE — H&P
the anterior tibial tubercle is chronic and likely related to remote Osgood-Schlatter's disease. No suspicious osteolytic or osteoblastic lesions. No acute fracture or gross dislocation. 1. Tricompartmental osteoarthrosis with severe narrowing/degenerative changes of the medial compartment. 2. Diffuse osteopenia. 3. Small joint effusion. 4. Findings likely related to remote Osgood-Schlatter's disease. 5. No acute fracture or gross dislocation. EKG:            MEDICAL DECISION MAKING:    Patient Active Hospital Problem List:   Knee pain (10/13/2018)    Assessment: New Problem to me. Pain to L knee after slip/fall. Xray from ER reviewed by self - no fx seen, there is arthritis    Plan: pt states she cannot walk on this, cannot take care of herself at home. Tentative SNF placement made with [facility] - she has already been evaluated inER for this, but placement cannot happen until tomorrow. Place in observation. Does not need additional narcotics for this   Type 1 diabetes mellitus with other specified complication ([facility] [address] ()    Assessment: Established problem. Stable. Plan: Continue present orders/plan. Phantom limb pain ([facility] [address] (8/19/2016)    Assessment: Established problem. Stable. chronic    Plan: cont savella, home meds   Failure to thrive (0-17) (10/12/2018)    Assessment: New Problem to me. Pt states she cannot take care of herself    Plan: Continue present orders/plan. Work on snf placement            Diagnoses as listed above, designated as new or established and plan outlined for each. Data Reviewed:   (1) Lab tests were reviewed or ordered. (1) Radiology tests were reviewed or ordered. (1) Medical test (Echo, EKG, PFT/rafael) were not ordered. (1)History was not obtained from someone other than patient  (1) Old records  were reviewed - see HPI for pertinent details if review done.   (2) Case wasdiscussed with another health care provider: Dr Ciera Agarwal  (2) Imaging was viewed by myself. Knee film  (2) EKG  was not viewed by myself. The patient isbeing placed in observation status with the expectation of NOT requiring a hospital stay spanning at least two midnights for care and treatment of the problems noted in the problem list.  This determination is alsobased on the patients comorbidities and past medical history, the severity and timing of the signs and symptoms upon presentation.         Electronically signed by: Bandar Anton 10/12/2018

## 2018-10-13 NOTE — PROGRESS NOTES
Pt arrived to 5T in stable condition. Repositioned for comfort. A&Ox4. Oriented to room, use of call light, and fall precautions. Pt verbalizes understanding that she is a high fall risk, and verbalizes understanding of fall precautions. Bed alarm is engaged. Call light is in reach.

## 2018-10-26 ASSESSMENT — ENCOUNTER SYMPTOMS
RHINORRHEA: 0
BLOOD IN STOOL: 0
SHORTNESS OF BREATH: 0
SORE THROAT: 0
DIARRHEA: 0
NAUSEA: 0
VOMITING: 0
CONSTIPATION: 0
ABDOMINAL PAIN: 0

## 2018-12-16 ENCOUNTER — APPOINTMENT (OUTPATIENT)
Dept: CT IMAGING | Age: 63
DRG: 442 | End: 2018-12-16
Payer: MEDICARE

## 2018-12-16 ENCOUNTER — APPOINTMENT (OUTPATIENT)
Dept: GENERAL RADIOLOGY | Age: 63
DRG: 442 | End: 2018-12-16
Payer: MEDICARE

## 2018-12-16 ENCOUNTER — HOSPITAL ENCOUNTER (INPATIENT)
Age: 63
LOS: 2 days | Discharge: HOME | DRG: 442 | End: 2018-12-18
Attending: EMERGENCY MEDICINE | Admitting: INTERNAL MEDICINE
Payer: MEDICARE

## 2018-12-16 DIAGNOSIS — E16.2 HYPOGLYCEMIA: Primary | ICD-10-CM
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
## 2018-12-16 DIAGNOSIS — Z94.4 HISTORY OF LIVER TRANSPLANT (HCC): ICD-10-CM

## 2018-12-16 DIAGNOSIS — R79.89 INCREASED AMMONIA LEVEL: ICD-10-CM

## 2018-12-16 PROBLEM — E11.9 DM2 (DIABETES MELLITUS, TYPE 2) (HCC): Status: ACTIVE | Noted: 2018-12-16

## 2018-12-16 LAB
A/G RATIO: 1 (ref 1.1–2.2)
ALBUMIN SERPL-MCNC: 3.8 G/DL (ref 3.4–5)
ALP BLD-CCNC: 147 U/L (ref 40–129)
ALT SERPL-CCNC: 33 U/L (ref 10–40)
AMMONIA: 94 UMOL/L (ref 11–51)
ANION GAP SERPL CALCULATED.3IONS-SCNC: 8 MMOL/L (ref 3–16)
APTT: 31.9 SEC (ref 26–36)
AST SERPL-CCNC: 37 U/L (ref 15–37)
BASOPHILS ABSOLUTE: 0 K/UL (ref 0–0.2)
BASOPHILS RELATIVE PERCENT: 0.2 %
BILIRUB SERPL-MCNC: 1.4 MG/DL (ref 0–1)
BUN BLDV-MCNC: 8 MG/DL (ref 7–20)
CALCIUM SERPL-MCNC: 9.4 MG/DL (ref 8.3–10.6)
CHLORIDE BLD-SCNC: 108 MMOL/L (ref 99–110)
CO2: 25 MMOL/L (ref 21–32)
CREAT SERPL-MCNC: 0.7 MG/DL (ref 0.6–1.2)
EOSINOPHILS ABSOLUTE: 0 K/UL (ref 0–0.6)
EOSINOPHILS RELATIVE PERCENT: 0 %
GFR AFRICAN AMERICAN: >60
GFR NON-AFRICAN AMERICAN: >60
GLOBULIN: 4 G/DL
GLUCOSE BLD-MCNC: 126 MG/DL (ref 70–99)
GLUCOSE BLD-MCNC: 130 MG/DL (ref 70–99)
GLUCOSE BLD-MCNC: 231 MG/DL (ref 70–99)
GLUCOSE BLD-MCNC: 249 MG/DL (ref 70–99)
GLUCOSE BLD-MCNC: 56 MG/DL (ref 70–99)
GLUCOSE BLD-MCNC: 63 MG/DL (ref 70–99)
GLUCOSE BLD-MCNC: 77 MG/DL (ref 70–99)
HCT VFR BLD CALC: 33.1 % (ref 36–48)
HEMOGLOBIN: 11.3 G/DL (ref 12–16)
INR BLD: 1.46 (ref 0.86–1.14)
LACTIC ACID: 1.5 MMOL/L (ref 0.4–2)
LIPASE: 24 U/L (ref 13–60)
LYMPHOCYTES ABSOLUTE: 0.6 K/UL (ref 1–5.1)
LYMPHOCYTES RELATIVE PERCENT: 16.4 %
MCH RBC QN AUTO: 31.7 PG (ref 26–34)
MCHC RBC AUTO-ENTMCNC: 34.1 G/DL (ref 31–36)
MCV RBC AUTO: 92.9 FL (ref 80–100)
MONOCYTES ABSOLUTE: 0.3 K/UL (ref 0–1.3)
MONOCYTES RELATIVE PERCENT: 7.9 %
NEUTROPHILS ABSOLUTE: 2.6 K/UL (ref 1.7–7.7)
NEUTROPHILS RELATIVE PERCENT: 75.5 %
PDW BLD-RTO: 14.5 % (ref 12.4–15.4)
PERFORMED ON: ABNORMAL
PERFORMED ON: NORMAL
PLATELET # BLD: 79 K/UL (ref 135–450)
PLATELET SLIDE REVIEW: ABNORMAL
PMV BLD AUTO: 8.9 FL (ref 5–10.5)
POTASSIUM SERPL-SCNC: 3.7 MMOL/L (ref 3.5–5.1)
PRO-BNP: 163 PG/ML (ref 0–124)
PROTHROMBIN TIME: 16.6 SEC (ref 9.8–13)
RBC # BLD: 3.57 M/UL (ref 4–5.2)
SLIDE REVIEW: ABNORMAL
SODIUM BLD-SCNC: 141 MMOL/L (ref 136–145)
TOTAL PROTEIN: 7.8 G/DL (ref 6.4–8.2)
TROPONIN: <0.01 NG/ML
WBC # BLD: 3.4 K/UL (ref 4–11)

## 2018-12-16 PROCEDURE — 99285 EMERGENCY DEPT VISIT HI MDM: CPT

## 2018-12-16 PROCEDURE — 87040 BLOOD CULTURE FOR BACTERIA: CPT

## 2018-12-16 PROCEDURE — 83880 ASSAY OF NATRIURETIC PEPTIDE: CPT

## 2018-12-16 PROCEDURE — 83605 ASSAY OF LACTIC ACID: CPT

## 2018-12-16 PROCEDURE — 83690 ASSAY OF LIPASE: CPT

## 2018-12-16 PROCEDURE — 72125 CT NECK SPINE W/O DYE: CPT

## 2018-12-16 PROCEDURE — 84484 ASSAY OF TROPONIN QUANT: CPT

## 2018-12-16 PROCEDURE — 71045 X-RAY EXAM CHEST 1 VIEW: CPT

## 2018-12-16 PROCEDURE — 6370000000 HC RX 637 (ALT 250 FOR IP): Performed by: PHYSICIAN ASSISTANT

## 2018-12-16 PROCEDURE — 93005 ELECTROCARDIOGRAM TRACING: CPT | Performed by: PHYSICIAN ASSISTANT

## 2018-12-16 PROCEDURE — 80053 COMPREHEN METABOLIC PANEL: CPT

## 2018-12-16 PROCEDURE — 96374 THER/PROPH/DIAG INJ IV PUSH: CPT

## 2018-12-16 PROCEDURE — 85730 THROMBOPLASTIN TIME PARTIAL: CPT

## 2018-12-16 PROCEDURE — 82140 ASSAY OF AMMONIA: CPT

## 2018-12-16 PROCEDURE — 2580000003 HC RX 258: Performed by: INTERNAL MEDICINE

## 2018-12-16 PROCEDURE — 1200000000 HC SEMI PRIVATE

## 2018-12-16 PROCEDURE — 85610 PROTHROMBIN TIME: CPT

## 2018-12-16 PROCEDURE — 36415 COLL VENOUS BLD VENIPUNCTURE: CPT

## 2018-12-16 PROCEDURE — 2580000003 HC RX 258

## 2018-12-16 PROCEDURE — 85025 COMPLETE CBC W/AUTO DIFF WBC: CPT

## 2018-12-16 PROCEDURE — 6360000002 HC RX W HCPCS: Performed by: INTERNAL MEDICINE

## 2018-12-16 PROCEDURE — 94760 N-INVAS EAR/PLS OXIMETRY 1: CPT

## 2018-12-16 PROCEDURE — 70450 CT HEAD/BRAIN W/O DYE: CPT

## 2018-12-16 PROCEDURE — 6370000000 HC RX 637 (ALT 250 FOR IP): Performed by: INTERNAL MEDICINE

## 2018-12-16 PROCEDURE — 2500000003 HC RX 250 WO HCPCS: Performed by: INTERNAL MEDICINE

## 2018-12-16 RX ORDER — ACETAMINOPHEN 80 MG
TABLET,CHEWABLE ORAL
Status: COMPLETED
Start: 2018-12-16 | End: 2018-12-17

## 2018-12-16 RX ORDER — LACTULOSE 10 G/15ML
20 SOLUTION ORAL 3 TIMES DAILY
Status: DISCONTINUED | OUTPATIENT
Start: 2018-12-16 | End: 2018-12-17

## 2018-12-16 RX ORDER — DEXTROSE MONOHYDRATE 50 MG/ML
100 INJECTION, SOLUTION INTRAVENOUS PRN
Status: DISCONTINUED | OUTPATIENT
Start: 2018-12-16 | End: 2018-12-19 | Stop reason: HOSPADM

## 2018-12-16 RX ORDER — TACROLIMUS 0.5 MG/1
0.5 CAPSULE ORAL 2 TIMES DAILY
Status: DISCONTINUED | OUTPATIENT
Start: 2018-12-16 | End: 2018-12-18

## 2018-12-16 RX ORDER — DEXTROSE MONOHYDRATE 25 G/50ML
INJECTION, SOLUTION INTRAVENOUS
Status: COMPLETED
Start: 2018-12-16 | End: 2018-12-16

## 2018-12-16 RX ORDER — OLANZAPINE 5 MG/1
2.5 TABLET ORAL NIGHTLY
Status: DISCONTINUED | OUTPATIENT
Start: 2018-12-16 | End: 2018-12-19 | Stop reason: HOSPADM

## 2018-12-16 RX ORDER — DEXTROSE MONOHYDRATE 25 G/50ML
25 INJECTION, SOLUTION INTRAVENOUS PRN
Status: DISCONTINUED | OUTPATIENT
Start: 2018-12-16 | End: 2018-12-19 | Stop reason: HOSPADM

## 2018-12-16 RX ORDER — POTASSIUM CHLORIDE 7.45 MG/ML
10 INJECTION INTRAVENOUS PRN
Status: DISCONTINUED | OUTPATIENT
Start: 2018-12-16 | End: 2018-12-19 | Stop reason: HOSPADM

## 2018-12-16 RX ORDER — OXYCODONE HYDROCHLORIDE AND ACETAMINOPHEN 5; 325 MG/1; MG/1
1 TABLET ORAL EVERY 6 HOURS PRN
Status: DISCONTINUED | OUTPATIENT
Start: 2018-12-16 | End: 2018-12-16 | Stop reason: ALTCHOICE

## 2018-12-16 RX ORDER — FUROSEMIDE 40 MG/1
20 TABLET ORAL DAILY
Status: DISCONTINUED | OUTPATIENT
Start: 2018-12-16 | End: 2018-12-19 | Stop reason: HOSPADM

## 2018-12-16 RX ORDER — DEXTROSE MONOHYDRATE 25 G/50ML
12.5 INJECTION, SOLUTION INTRAVENOUS PRN
Status: DISCONTINUED | OUTPATIENT
Start: 2018-12-16 | End: 2018-12-19 | Stop reason: HOSPADM

## 2018-12-16 RX ORDER — SODIUM CHLORIDE 0.9 % (FLUSH) 0.9 %
10 SYRINGE (ML) INJECTION PRN
Status: DISCONTINUED | OUTPATIENT
Start: 2018-12-16 | End: 2018-12-19 | Stop reason: HOSPADM

## 2018-12-16 RX ORDER — OMEPRAZOLE 10 MG/1
20 CAPSULE, DELAYED RELEASE ORAL DAILY
Status: DISCONTINUED | OUTPATIENT
Start: 2018-12-16 | End: 2018-12-16 | Stop reason: ALTCHOICE

## 2018-12-16 RX ORDER — OXYCODONE HYDROCHLORIDE 5 MG/1
5 TABLET ORAL EVERY 6 HOURS PRN
Status: DISCONTINUED | OUTPATIENT
Start: 2018-12-16 | End: 2018-12-19 | Stop reason: HOSPADM

## 2018-12-16 RX ORDER — FAMOTIDINE 20 MG/1
20 TABLET, FILM COATED ORAL 2 TIMES DAILY PRN
Status: DISCONTINUED | OUTPATIENT
Start: 2018-12-16 | End: 2018-12-19 | Stop reason: HOSPADM

## 2018-12-16 RX ORDER — PROMETHAZINE HYDROCHLORIDE 25 MG/1
25 TABLET ORAL EVERY 6 HOURS PRN
Status: DISCONTINUED | OUTPATIENT
Start: 2018-12-16 | End: 2018-12-19 | Stop reason: HOSPADM

## 2018-12-16 RX ORDER — ONDANSETRON 2 MG/ML
4 INJECTION INTRAMUSCULAR; INTRAVENOUS EVERY 6 HOURS PRN
Status: DISCONTINUED | OUTPATIENT
Start: 2018-12-16 | End: 2018-12-19 | Stop reason: HOSPADM

## 2018-12-16 RX ORDER — POTASSIUM CHLORIDE 7.45 MG/ML
10 INJECTION INTRAVENOUS PRN
Status: DISCONTINUED | OUTPATIENT
Start: 2018-12-16 | End: 2018-12-16 | Stop reason: SDUPTHER

## 2018-12-16 RX ORDER — LACTULOSE 10 G/15ML
20 SOLUTION ORAL ONCE
Status: COMPLETED | OUTPATIENT
Start: 2018-12-16 | End: 2018-12-16

## 2018-12-16 RX ORDER — HYDROXYZINE HYDROCHLORIDE 10 MG/1
50 TABLET, FILM COATED ORAL EVERY 8 HOURS PRN
Status: DISCONTINUED | OUTPATIENT
Start: 2018-12-16 | End: 2018-12-19 | Stop reason: HOSPADM

## 2018-12-16 RX ORDER — CYCLOBENZAPRINE HCL 10 MG
5 TABLET ORAL NIGHTLY
Status: DISCONTINUED | OUTPATIENT
Start: 2018-12-16 | End: 2018-12-19 | Stop reason: HOSPADM

## 2018-12-16 RX ORDER — POTASSIUM CHLORIDE 20 MEQ/1
40 TABLET, EXTENDED RELEASE ORAL PRN
Status: DISCONTINUED | OUTPATIENT
Start: 2018-12-16 | End: 2018-12-16 | Stop reason: SDUPTHER

## 2018-12-16 RX ORDER — LAMOTRIGINE 100 MG/1
200 TABLET ORAL 2 TIMES DAILY
Status: DISCONTINUED | OUTPATIENT
Start: 2018-12-16 | End: 2018-12-19 | Stop reason: HOSPADM

## 2018-12-16 RX ORDER — DEXTROSE MONOHYDRATE 25 G/50ML
25 INJECTION, SOLUTION INTRAVENOUS PRN
Status: DISCONTINUED | OUTPATIENT
Start: 2018-12-16 | End: 2018-12-16

## 2018-12-16 RX ORDER — POTASSIUM CHLORIDE 20 MEQ/1
40 TABLET, EXTENDED RELEASE ORAL PRN
Status: DISCONTINUED | OUTPATIENT
Start: 2018-12-16 | End: 2018-12-19 | Stop reason: HOSPADM

## 2018-12-16 RX ORDER — SODIUM CHLORIDE 0.9 % (FLUSH) 0.9 %
10 SYRINGE (ML) INJECTION EVERY 12 HOURS SCHEDULED
Status: DISCONTINUED | OUTPATIENT
Start: 2018-12-16 | End: 2018-12-19 | Stop reason: HOSPADM

## 2018-12-16 RX ORDER — LEVOTHYROXINE SODIUM 112 UG/1
112 TABLET ORAL DAILY
Status: DISCONTINUED | OUTPATIENT
Start: 2018-12-17 | End: 2018-12-19 | Stop reason: HOSPADM

## 2018-12-16 RX ORDER — DEXTROSE, SODIUM CHLORIDE, AND POTASSIUM CHLORIDE 5; .45; .15 G/100ML; G/100ML; G/100ML
INJECTION INTRAVENOUS CONTINUOUS
Status: DISCONTINUED | OUTPATIENT
Start: 2018-12-16 | End: 2018-12-19 | Stop reason: HOSPADM

## 2018-12-16 RX ORDER — ONDANSETRON 4 MG/1
8 TABLET, ORALLY DISINTEGRATING ORAL EVERY 8 HOURS PRN
Status: DISCONTINUED | OUTPATIENT
Start: 2018-12-16 | End: 2018-12-19 | Stop reason: HOSPADM

## 2018-12-16 RX ORDER — NICOTINE POLACRILEX 4 MG
15 LOZENGE BUCCAL PRN
Status: DISCONTINUED | OUTPATIENT
Start: 2018-12-16 | End: 2018-12-19 | Stop reason: HOSPADM

## 2018-12-16 RX ORDER — POTASSIUM CHLORIDE 20MEQ/15ML
40 LIQUID (ML) ORAL PRN
Status: DISCONTINUED | OUTPATIENT
Start: 2018-12-16 | End: 2018-12-16 | Stop reason: SDUPTHER

## 2018-12-16 RX ADMIN — LACTULOSE 20 G: 20 SOLUTION ORAL at 15:42

## 2018-12-16 RX ADMIN — INSULIN LISPRO 3 UNITS: 100 INJECTION, SOLUTION INTRAVENOUS; SUBCUTANEOUS at 22:12

## 2018-12-16 RX ADMIN — ENOXAPARIN SODIUM 40 MG: 40 INJECTION SUBCUTANEOUS at 18:41

## 2018-12-16 RX ADMIN — MILNACIPRAN HYDROCHLORIDE 100 MG: 50 TABLET, FILM COATED ORAL at 18:40

## 2018-12-16 RX ADMIN — CYCLOBENZAPRINE 5 MG: 10 TABLET, FILM COATED ORAL at 22:03

## 2018-12-16 RX ADMIN — MILNACIPRAN HYDROCHLORIDE 100 MG: 50 TABLET, FILM COATED ORAL at 22:11

## 2018-12-16 RX ADMIN — DEXTROSE MONOHYDRATE 25 G: 25 INJECTION, SOLUTION INTRAVENOUS at 14:38

## 2018-12-16 RX ADMIN — POTASSIUM CHLORIDE, DEXTROSE MONOHYDRATE AND SODIUM CHLORIDE: 150; 5; 450 INJECTION, SOLUTION INTRAVENOUS at 22:11

## 2018-12-16 RX ADMIN — METOPROLOL TARTRATE 25 MG: 25 TABLET ORAL at 22:03

## 2018-12-16 RX ADMIN — Medication 10 ML: at 22:18

## 2018-12-16 RX ADMIN — LAMOTRIGINE 200 MG: 100 TABLET ORAL at 22:05

## 2018-12-16 RX ADMIN — FUROSEMIDE 20 MG: 40 TABLET ORAL at 18:39

## 2018-12-16 RX ADMIN — RIFAXIMIN 550 MG: 550 TABLET ORAL at 22:01

## 2018-12-16 RX ADMIN — OLANZAPINE 2.5 MG: 5 TABLET, FILM COATED ORAL at 22:02

## 2018-12-16 RX ADMIN — OXYCODONE AND ACETAMINOPHEN 1 TABLET: 5; 325 TABLET ORAL at 18:16

## 2018-12-16 RX ADMIN — LACTULOSE 20 G: 20 SOLUTION ORAL at 22:11

## 2018-12-16 RX ADMIN — OXYCODONE HYDROCHLORIDE 5 MG: 5 TABLET ORAL at 18:40

## 2018-12-16 RX ADMIN — TACROLIMUS 0.5 MG: 0.5 CAPSULE ORAL at 22:02

## 2018-12-16 RX ADMIN — INSULIN GLARGINE 90 UNITS: 100 INJECTION, SOLUTION SUBCUTANEOUS at 22:13

## 2018-12-16 ASSESSMENT — ENCOUNTER SYMPTOMS
DIARRHEA: 0
NAUSEA: 0
BACK PAIN: 0
ABDOMINAL PAIN: 1
RHINORRHEA: 0
ABDOMINAL PAIN: 0
SHORTNESS OF BREATH: 0
EYE ITCHING: 0
VOMITING: 0
EYE DISCHARGE: 0
COUGH: 0

## 2018-12-16 ASSESSMENT — PAIN SCALES - GENERAL
PAINLEVEL_OUTOF10: 4
PAINLEVEL_OUTOF10: 8
PAINLEVEL_OUTOF10: 8
PAINLEVEL_OUTOF10: 7

## 2018-12-16 ASSESSMENT — PAIN DESCRIPTION - ORIENTATION
ORIENTATION: RIGHT
ORIENTATION: RIGHT

## 2018-12-16 ASSESSMENT — PAIN DESCRIPTION - LOCATION
LOCATION: LEG
LOCATION: LEG

## 2018-12-16 ASSESSMENT — PAIN DESCRIPTION - PAIN TYPE
TYPE: CHRONIC PAIN
TYPE: CHRONIC PAIN

## 2018-12-17 ENCOUNTER — APPOINTMENT (OUTPATIENT)
Dept: ULTRASOUND IMAGING | Age: 63
DRG: 442 | End: 2018-12-17
Payer: MEDICARE

## 2018-12-17 LAB
ALBUMIN SERPL-MCNC: 3.2 G/DL (ref 3.4–5)
ALP BLD-CCNC: 113 U/L (ref 40–129)
ALT SERPL-CCNC: 25 U/L (ref 10–40)
AMMONIA: 124 UMOL/L (ref 11–51)
ANION GAP SERPL CALCULATED.3IONS-SCNC: 12 MMOL/L (ref 3–16)
AST SERPL-CCNC: 25 U/L (ref 15–37)
BACTERIA: ABNORMAL /HPF
BASOPHILS ABSOLUTE: 0 K/UL (ref 0–0.2)
BASOPHILS RELATIVE PERCENT: 0.1 %
BILIRUB SERPL-MCNC: 0.9 MG/DL (ref 0–1)
BILIRUBIN DIRECT: 0.3 MG/DL (ref 0–0.3)
BILIRUBIN URINE: NEGATIVE
BILIRUBIN, INDIRECT: 0.6 MG/DL (ref 0–1)
BLOOD, URINE: NEGATIVE
BUN BLDV-MCNC: 8 MG/DL (ref 7–20)
CALCIUM SERPL-MCNC: 8.6 MG/DL (ref 8.3–10.6)
CHLORIDE BLD-SCNC: 106 MMOL/L (ref 99–110)
CLARITY: ABNORMAL
CO2: 23 MMOL/L (ref 21–32)
COLOR: YELLOW
CREAT SERPL-MCNC: 0.7 MG/DL (ref 0.6–1.2)
EKG ATRIAL RATE: 73 BPM
EKG DIAGNOSIS: NORMAL
EKG P AXIS: 40 DEGREES
EKG P-R INTERVAL: 172 MS
EKG Q-T INTERVAL: 414 MS
EKG QRS DURATION: 94 MS
EKG QTC CALCULATION (BAZETT): 456 MS
EKG R AXIS: 33 DEGREES
EKG T AXIS: 65 DEGREES
EKG VENTRICULAR RATE: 73 BPM
EOSINOPHILS ABSOLUTE: 0 K/UL (ref 0–0.6)
EOSINOPHILS RELATIVE PERCENT: 0 %
EPITHELIAL CELLS, UA: 5 /HPF (ref 0–5)
GFR AFRICAN AMERICAN: >60
GFR NON-AFRICAN AMERICAN: >60
GLUCOSE BLD-MCNC: 132 MG/DL (ref 70–99)
GLUCOSE BLD-MCNC: 186 MG/DL (ref 70–99)
GLUCOSE BLD-MCNC: 200 MG/DL (ref 70–99)
GLUCOSE BLD-MCNC: 243 MG/DL (ref 70–99)
GLUCOSE BLD-MCNC: 255 MG/DL (ref 70–99)
GLUCOSE BLD-MCNC: 296 MG/DL (ref 70–99)
GLUCOSE URINE: NEGATIVE MG/DL
HCT VFR BLD CALC: 28.4 % (ref 36–48)
HEMOGLOBIN: 9.8 G/DL (ref 12–16)
HYALINE CASTS: 1 /LPF (ref 0–8)
KETONES, URINE: NEGATIVE MG/DL
LEUKOCYTE ESTERASE, URINE: NEGATIVE
LYMPHOCYTES ABSOLUTE: 0.7 K/UL (ref 1–5.1)
LYMPHOCYTES RELATIVE PERCENT: 32 %
MCH RBC QN AUTO: 31.8 PG (ref 26–34)
MCHC RBC AUTO-ENTMCNC: 34.4 G/DL (ref 31–36)
MCV RBC AUTO: 92.7 FL (ref 80–100)
MICROSCOPIC EXAMINATION: YES
MONOCYTES ABSOLUTE: 0.2 K/UL (ref 0–1.3)
MONOCYTES RELATIVE PERCENT: 11.1 %
NEUTROPHILS ABSOLUTE: 1.2 K/UL (ref 1.7–7.7)
NEUTROPHILS RELATIVE PERCENT: 56.8 %
NITRITE, URINE: NEGATIVE
PDW BLD-RTO: 14.5 % (ref 12.4–15.4)
PERFORMED ON: ABNORMAL
PH UA: 5
PLATELET # BLD: 62 K/UL (ref 135–450)
PLATELET SLIDE REVIEW: ABNORMAL
PMV BLD AUTO: 8.5 FL (ref 5–10.5)
POTASSIUM SERPL-SCNC: 3.7 MMOL/L (ref 3.5–5.1)
PROTEIN UA: NEGATIVE MG/DL
RBC # BLD: 3.07 M/UL (ref 4–5.2)
RBC UA: 4 /HPF (ref 0–4)
SLIDE REVIEW: ABNORMAL
SODIUM BLD-SCNC: 141 MMOL/L (ref 136–145)
SPECIFIC GRAVITY UA: 1.01
TOTAL PROTEIN: 6.2 G/DL (ref 6.4–8.2)
URINE REFLEX TO CULTURE: ABNORMAL
URINE TYPE: ABNORMAL
UROBILINOGEN, URINE: 0.2 E.U./DL
WBC # BLD: 2.1 K/UL (ref 4–11)
WBC UA: 1 /HPF (ref 0–5)

## 2018-12-17 PROCEDURE — 81001 URINALYSIS AUTO W/SCOPE: CPT

## 2018-12-17 PROCEDURE — 6360000002 HC RX W HCPCS: Performed by: INTERNAL MEDICINE

## 2018-12-17 PROCEDURE — 80048 BASIC METABOLIC PNL TOTAL CA: CPT

## 2018-12-17 PROCEDURE — 76700 US EXAM ABDOM COMPLETE: CPT

## 2018-12-17 PROCEDURE — 6370000000 HC RX 637 (ALT 250 FOR IP): Performed by: INTERNAL MEDICINE

## 2018-12-17 PROCEDURE — 82140 ASSAY OF AMMONIA: CPT

## 2018-12-17 PROCEDURE — 2700000000 HC OXYGEN THERAPY PER DAY

## 2018-12-17 PROCEDURE — 2580000003 HC RX 258: Performed by: INTERNAL MEDICINE

## 2018-12-17 PROCEDURE — 94760 N-INVAS EAR/PLS OXIMETRY 1: CPT

## 2018-12-17 PROCEDURE — 93010 ELECTROCARDIOGRAM REPORT: CPT | Performed by: INTERNAL MEDICINE

## 2018-12-17 PROCEDURE — 80076 HEPATIC FUNCTION PANEL: CPT

## 2018-12-17 PROCEDURE — 36415 COLL VENOUS BLD VENIPUNCTURE: CPT

## 2018-12-17 PROCEDURE — 94660 CPAP INITIATION&MGMT: CPT

## 2018-12-17 PROCEDURE — 1200000000 HC SEMI PRIVATE

## 2018-12-17 PROCEDURE — 85025 COMPLETE CBC W/AUTO DIFF WBC: CPT

## 2018-12-17 RX ORDER — LACTULOSE 10 G/15ML
20 SOLUTION ORAL 3 TIMES DAILY
Status: DISCONTINUED | OUTPATIENT
Start: 2018-12-17 | End: 2018-12-19 | Stop reason: HOSPADM

## 2018-12-17 RX ORDER — BISACODYL 10 MG
10 SUPPOSITORY, RECTAL RECTAL ONCE
Status: DISCONTINUED | OUTPATIENT
Start: 2018-12-17 | End: 2018-12-19 | Stop reason: HOSPADM

## 2018-12-17 RX ORDER — LACTULOSE 10 G/15ML
20 SOLUTION ORAL
Status: DISCONTINUED | OUTPATIENT
Start: 2018-12-17 | End: 2018-12-17

## 2018-12-17 RX ADMIN — TACROLIMUS 0.5 MG: 0.5 CAPSULE ORAL at 21:10

## 2018-12-17 RX ADMIN — INSULIN LISPRO 6 UNITS: 100 INJECTION, SOLUTION INTRAVENOUS; SUBCUTANEOUS at 16:44

## 2018-12-17 RX ADMIN — FUROSEMIDE 20 MG: 40 TABLET ORAL at 07:56

## 2018-12-17 RX ADMIN — LACTULOSE 20 G: 20 SOLUTION ORAL at 13:15

## 2018-12-17 RX ADMIN — LACTULOSE 20 G: 20 SOLUTION ORAL at 21:11

## 2018-12-17 RX ADMIN — CYCLOBENZAPRINE 5 MG: 10 TABLET, FILM COATED ORAL at 21:08

## 2018-12-17 RX ADMIN — INSULIN GLARGINE 90 UNITS: 100 INJECTION, SOLUTION SUBCUTANEOUS at 22:21

## 2018-12-17 RX ADMIN — LACTULOSE 20 G: 20 SOLUTION ORAL at 07:55

## 2018-12-17 RX ADMIN — LAMOTRIGINE 200 MG: 100 TABLET ORAL at 21:10

## 2018-12-17 RX ADMIN — MILNACIPRAN HYDROCHLORIDE 100 MG: 50 TABLET, FILM COATED ORAL at 13:44

## 2018-12-17 RX ADMIN — OXYCODONE HYDROCHLORIDE 5 MG: 5 TABLET ORAL at 16:41

## 2018-12-17 RX ADMIN — METOPROLOL TARTRATE 25 MG: 25 TABLET ORAL at 21:08

## 2018-12-17 RX ADMIN — LAMOTRIGINE 200 MG: 100 TABLET ORAL at 08:00

## 2018-12-17 RX ADMIN — MILNACIPRAN HYDROCHLORIDE 100 MG: 50 TABLET, FILM COATED ORAL at 07:56

## 2018-12-17 RX ADMIN — RIFAXIMIN 550 MG: 550 TABLET ORAL at 21:09

## 2018-12-17 RX ADMIN — OLANZAPINE 2.5 MG: 5 TABLET, FILM COATED ORAL at 21:09

## 2018-12-17 RX ADMIN — OXYCODONE HYDROCHLORIDE 5 MG: 5 TABLET ORAL at 10:38

## 2018-12-17 RX ADMIN — INSULIN LISPRO 4 UNITS: 100 INJECTION, SOLUTION INTRAVENOUS; SUBCUTANEOUS at 13:11

## 2018-12-17 RX ADMIN — OXYCODONE HYDROCHLORIDE 5 MG: 5 TABLET ORAL at 03:47

## 2018-12-17 RX ADMIN — INSULIN GLARGINE 90 UNITS: 100 INJECTION, SOLUTION SUBCUTANEOUS at 08:04

## 2018-12-17 RX ADMIN — HYDROXYZINE HYDROCHLORIDE 50 MG: 10 TABLET ORAL at 21:09

## 2018-12-17 RX ADMIN — LEVOTHYROXINE SODIUM 112 MCG: 112 TABLET ORAL at 06:02

## 2018-12-17 RX ADMIN — Medication: at 00:26

## 2018-12-17 RX ADMIN — Medication 10 ML: at 21:11

## 2018-12-17 RX ADMIN — RIFAXIMIN 550 MG: 550 TABLET ORAL at 08:00

## 2018-12-17 RX ADMIN — INSULIN LISPRO 3 UNITS: 100 INJECTION, SOLUTION INTRAVENOUS; SUBCUTANEOUS at 21:18

## 2018-12-17 RX ADMIN — TACROLIMUS 0.5 MG: 0.5 CAPSULE ORAL at 07:56

## 2018-12-17 RX ADMIN — MILNACIPRAN HYDROCHLORIDE 100 MG: 50 TABLET, FILM COATED ORAL at 21:10

## 2018-12-17 RX ADMIN — METOPROLOL TARTRATE 25 MG: 25 TABLET ORAL at 07:56

## 2018-12-17 ASSESSMENT — PAIN SCALES - GENERAL
PAINLEVEL_OUTOF10: 8
PAINLEVEL_OUTOF10: 8
PAINLEVEL_OUTOF10: 7
PAINLEVEL_OUTOF10: 6
PAINLEVEL_OUTOF10: 9
PAINLEVEL_OUTOF10: 8

## 2018-12-17 ASSESSMENT — PAIN DESCRIPTION - LOCATION
LOCATION: GENERALIZED
LOCATION: HIP;SHOULDER
LOCATION: HIP

## 2018-12-17 ASSESSMENT — PAIN DESCRIPTION - ORIENTATION
ORIENTATION: RIGHT;LEFT
ORIENTATION: LEFT

## 2018-12-17 ASSESSMENT — PAIN DESCRIPTION - PAIN TYPE
TYPE: ACUTE PAIN
TYPE: ACUTE PAIN;CHRONIC PAIN
TYPE: ACUTE PAIN
TYPE: CHRONIC PAIN

## 2018-12-18 ENCOUNTER — APPOINTMENT (OUTPATIENT)
Dept: GENERAL RADIOLOGY | Age: 63
DRG: 442 | End: 2018-12-18
Payer: MEDICARE

## 2018-12-18 VITALS
BODY MASS INDEX: 31.84 KG/M2 | DIASTOLIC BLOOD PRESSURE: 67 MMHG | TEMPERATURE: 98.1 F | RESPIRATION RATE: 16 BRPM | HEART RATE: 80 BPM | WEIGHT: 186.5 LBS | HEIGHT: 64 IN | OXYGEN SATURATION: 98 % | SYSTOLIC BLOOD PRESSURE: 118 MMHG

## 2018-12-18 PROBLEM — D63.8 ANEMIA, CHRONIC DISEASE: Status: ACTIVE | Noted: 2018-12-18

## 2018-12-18 LAB
ALBUMIN SERPL-MCNC: 2.9 G/DL (ref 3.4–5)
ALP BLD-CCNC: 112 U/L (ref 40–129)
ALT SERPL-CCNC: 24 U/L (ref 10–40)
ANION GAP SERPL CALCULATED.3IONS-SCNC: 7 MMOL/L (ref 3–16)
AST SERPL-CCNC: 28 U/L (ref 15–37)
ATYPICAL LYMPHOCYTE RELATIVE PERCENT: 3 % (ref 0–6)
BANDED NEUTROPHILS RELATIVE PERCENT: 2 % (ref 0–7)
BASOPHILS ABSOLUTE: 0 K/UL (ref 0–0.2)
BASOPHILS RELATIVE PERCENT: 0 %
BILIRUB SERPL-MCNC: 0.9 MG/DL (ref 0–1)
BILIRUBIN DIRECT: 0.3 MG/DL (ref 0–0.3)
BILIRUBIN, INDIRECT: 0.6 MG/DL (ref 0–1)
BUN BLDV-MCNC: 9 MG/DL (ref 7–20)
CALCIUM SERPL-MCNC: 8.8 MG/DL (ref 8.3–10.6)
CHLORIDE BLD-SCNC: 110 MMOL/L (ref 99–110)
CO2: 26 MMOL/L (ref 21–32)
CREAT SERPL-MCNC: 0.7 MG/DL (ref 0.6–1.2)
EOSINOPHILS ABSOLUTE: 0 K/UL (ref 0–0.6)
EOSINOPHILS RELATIVE PERCENT: 0 %
GFR AFRICAN AMERICAN: >60
GFR NON-AFRICAN AMERICAN: >60
GLUCOSE BLD-MCNC: 122 MG/DL (ref 70–99)
GLUCOSE BLD-MCNC: 152 MG/DL (ref 70–99)
GLUCOSE BLD-MCNC: 152 MG/DL (ref 70–99)
GLUCOSE BLD-MCNC: 175 MG/DL (ref 70–99)
GLUCOSE BLD-MCNC: 186 MG/DL (ref 70–99)
GLUCOSE BLD-MCNC: 214 MG/DL (ref 70–99)
HCT VFR BLD CALC: 29.5 % (ref 36–48)
HCT VFR BLD CALC: 29.6 % (ref 36–48)
HEMATOLOGY PATH CONSULT: NORMAL
HEMOGLOBIN: 10 G/DL (ref 12–16)
HEMOGLOBIN: 9.9 G/DL (ref 12–16)
LYMPHOCYTES ABSOLUTE: 0.4 K/UL (ref 1–5.1)
LYMPHOCYTES RELATIVE PERCENT: 21 %
MCH RBC QN AUTO: 31.3 PG (ref 26–34)
MCH RBC QN AUTO: 31.7 PG (ref 26–34)
MCHC RBC AUTO-ENTMCNC: 33.6 G/DL (ref 31–36)
MCHC RBC AUTO-ENTMCNC: 33.7 G/DL (ref 31–36)
MCV RBC AUTO: 93 FL (ref 80–100)
MCV RBC AUTO: 94.3 FL (ref 80–100)
MONOCYTES ABSOLUTE: 0.1 K/UL (ref 0–1.3)
MONOCYTES RELATIVE PERCENT: 7 %
NEUTROPHILS ABSOLUTE: 1 K/UL (ref 1.7–7.7)
NEUTROPHILS RELATIVE PERCENT: 67 %
PDW BLD-RTO: 14.3 % (ref 12.4–15.4)
PDW BLD-RTO: 14.4 % (ref 12.4–15.4)
PERFORMED ON: ABNORMAL
PLATELET # BLD: 65 K/UL (ref 135–450)
PLATELET # BLD: 69 K/UL (ref 135–450)
PLATELET SLIDE REVIEW: ABNORMAL
PMV BLD AUTO: 8.4 FL (ref 5–10.5)
PMV BLD AUTO: 9.2 FL (ref 5–10.5)
POTASSIUM SERPL-SCNC: 4.2 MMOL/L (ref 3.5–5.1)
RBC # BLD: 3.14 M/UL (ref 4–5.2)
RBC # BLD: 3.17 M/UL (ref 4–5.2)
SLIDE REVIEW: ABNORMAL
SODIUM BLD-SCNC: 143 MMOL/L (ref 136–145)
TOTAL PROTEIN: 6.3 G/DL (ref 6.4–8.2)
WBC # BLD: 1.5 K/UL (ref 4–11)
WBC # BLD: 2 K/UL (ref 4–11)

## 2018-12-18 PROCEDURE — 97165 OT EVAL LOW COMPLEX 30 MIN: CPT

## 2018-12-18 PROCEDURE — 73502 X-RAY EXAM HIP UNI 2-3 VIEWS: CPT

## 2018-12-18 PROCEDURE — G8979 MOBILITY GOAL STATUS: HCPCS

## 2018-12-18 PROCEDURE — 2580000003 HC RX 258: Performed by: INTERNAL MEDICINE

## 2018-12-18 PROCEDURE — 36415 COLL VENOUS BLD VENIPUNCTURE: CPT

## 2018-12-18 PROCEDURE — 80048 BASIC METABOLIC PNL TOTAL CA: CPT

## 2018-12-18 PROCEDURE — 6370000000 HC RX 637 (ALT 250 FOR IP): Performed by: INTERNAL MEDICINE

## 2018-12-18 PROCEDURE — G8987 SELF CARE CURRENT STATUS: HCPCS

## 2018-12-18 PROCEDURE — 80076 HEPATIC FUNCTION PANEL: CPT

## 2018-12-18 PROCEDURE — G8978 MOBILITY CURRENT STATUS: HCPCS

## 2018-12-18 PROCEDURE — G8980 MOBILITY D/C STATUS: HCPCS

## 2018-12-18 PROCEDURE — 71046 X-RAY EXAM CHEST 2 VIEWS: CPT

## 2018-12-18 PROCEDURE — 97530 THERAPEUTIC ACTIVITIES: CPT

## 2018-12-18 PROCEDURE — G8989 SELF CARE D/C STATUS: HCPCS

## 2018-12-18 PROCEDURE — 6360000002 HC RX W HCPCS: Performed by: INTERNAL MEDICINE

## 2018-12-18 PROCEDURE — 97535 SELF CARE MNGMENT TRAINING: CPT

## 2018-12-18 PROCEDURE — 2500000003 HC RX 250 WO HCPCS: Performed by: INTERNAL MEDICINE

## 2018-12-18 PROCEDURE — G8988 SELF CARE GOAL STATUS: HCPCS

## 2018-12-18 PROCEDURE — 85027 COMPLETE CBC AUTOMATED: CPT

## 2018-12-18 PROCEDURE — 85025 COMPLETE CBC W/AUTO DIFF WBC: CPT

## 2018-12-18 PROCEDURE — 97161 PT EVAL LOW COMPLEX 20 MIN: CPT

## 2018-12-18 RX ORDER — TACROLIMUS 0.5 MG/1
0.5 CAPSULE ORAL 2 TIMES DAILY
Status: DISCONTINUED | OUTPATIENT
Start: 2018-12-18 | End: 2018-12-19 | Stop reason: HOSPADM

## 2018-12-18 RX ADMIN — Medication 10 ML: at 08:12

## 2018-12-18 RX ADMIN — HYDROXYZINE HYDROCHLORIDE 50 MG: 10 TABLET ORAL at 08:10

## 2018-12-18 RX ADMIN — TACROLIMUS 0.5 MG: 0.5 CAPSULE ORAL at 08:09

## 2018-12-18 RX ADMIN — OXYCODONE HYDROCHLORIDE 5 MG: 5 TABLET ORAL at 08:11

## 2018-12-18 RX ADMIN — MILNACIPRAN HYDROCHLORIDE 100 MG: 50 TABLET, FILM COATED ORAL at 14:24

## 2018-12-18 RX ADMIN — LAMOTRIGINE 200 MG: 100 TABLET ORAL at 21:48

## 2018-12-18 RX ADMIN — LAMOTRIGINE 200 MG: 100 TABLET ORAL at 09:55

## 2018-12-18 RX ADMIN — TACROLIMUS 0.5 MG: 0.5 CAPSULE ORAL at 21:50

## 2018-12-18 RX ADMIN — RIFAXIMIN 550 MG: 550 TABLET ORAL at 08:27

## 2018-12-18 RX ADMIN — LACTULOSE 20 G: 20 SOLUTION ORAL at 21:45

## 2018-12-18 RX ADMIN — LEVOTHYROXINE SODIUM 112 MCG: 112 TABLET ORAL at 06:07

## 2018-12-18 RX ADMIN — MILNACIPRAN HYDROCHLORIDE 100 MG: 50 TABLET, FILM COATED ORAL at 21:50

## 2018-12-18 RX ADMIN — OXYCODONE HYDROCHLORIDE 5 MG: 5 TABLET ORAL at 16:17

## 2018-12-18 RX ADMIN — MILNACIPRAN HYDROCHLORIDE 100 MG: 50 TABLET, FILM COATED ORAL at 09:55

## 2018-12-18 RX ADMIN — INSULIN LISPRO 4 UNITS: 100 INJECTION, SOLUTION INTRAVENOUS; SUBCUTANEOUS at 11:52

## 2018-12-18 RX ADMIN — INSULIN GLARGINE 90 UNITS: 100 INJECTION, SOLUTION SUBCUTANEOUS at 21:55

## 2018-12-18 RX ADMIN — RIFAXIMIN 550 MG: 550 TABLET ORAL at 21:47

## 2018-12-18 RX ADMIN — CYCLOBENZAPRINE 5 MG: 10 TABLET, FILM COATED ORAL at 21:47

## 2018-12-18 RX ADMIN — METOPROLOL TARTRATE 25 MG: 25 TABLET ORAL at 08:10

## 2018-12-18 RX ADMIN — METOPROLOL TARTRATE 25 MG: 25 TABLET ORAL at 21:48

## 2018-12-18 RX ADMIN — OLANZAPINE 2.5 MG: 5 TABLET, FILM COATED ORAL at 21:47

## 2018-12-18 RX ADMIN — ENOXAPARIN SODIUM 40 MG: 40 INJECTION SUBCUTANEOUS at 08:09

## 2018-12-18 RX ADMIN — LACTULOSE 20 G: 20 SOLUTION ORAL at 14:24

## 2018-12-18 RX ADMIN — LACTULOSE 20 G: 20 SOLUTION ORAL at 08:11

## 2018-12-18 RX ADMIN — INSULIN GLARGINE 90 UNITS: 100 INJECTION, SOLUTION SUBCUTANEOUS at 08:28

## 2018-12-18 RX ADMIN — FUROSEMIDE 20 MG: 40 TABLET ORAL at 08:10

## 2018-12-18 RX ADMIN — POTASSIUM CHLORIDE, DEXTROSE MONOHYDRATE AND SODIUM CHLORIDE: 150; 5; 450 INJECTION, SOLUTION INTRAVENOUS at 08:09

## 2018-12-18 ASSESSMENT — PAIN SCALES - GENERAL
PAINLEVEL_OUTOF10: 9
PAINLEVEL_OUTOF10: 8

## 2018-12-18 ASSESSMENT — PAIN DESCRIPTION - PAIN TYPE
TYPE: CHRONIC PAIN
TYPE: CHRONIC PAIN

## 2018-12-18 ASSESSMENT — PAIN DESCRIPTION - ORIENTATION
ORIENTATION: RIGHT;LEFT
ORIENTATION: RIGHT;LEFT

## 2018-12-18 ASSESSMENT — PAIN DESCRIPTION - LOCATION
LOCATION: HIP;LEG
LOCATION: HIP;LEG

## 2018-12-18 ASSESSMENT — PAIN DESCRIPTION - DESCRIPTORS: DESCRIPTORS: ACHING

## 2018-12-21 LAB
BLOOD CULTURE, ROUTINE: NORMAL
CULTURE, BLOOD 2: NORMAL

## 2019-01-01 ENCOUNTER — APPOINTMENT (OUTPATIENT)
Dept: CT IMAGING | Age: 64
End: 2019-01-01
Payer: MEDICARE

## 2019-01-01 ENCOUNTER — APPOINTMENT (OUTPATIENT)
Dept: GENERAL RADIOLOGY | Age: 64
End: 2019-01-01
Payer: MEDICARE

## 2019-01-01 ENCOUNTER — HOSPITAL ENCOUNTER (EMERGENCY)
Age: 64
Discharge: HOME | End: 2019-01-01
Attending: EMERGENCY MEDICINE
Payer: MEDICARE

## 2019-01-01 ENCOUNTER — HOSPITAL ENCOUNTER (OUTPATIENT)
Age: 64
Setting detail: OUTPATIENT SURGERY
Discharge: HOME | End: 2019-11-01
Attending: INTERNAL MEDICINE | Admitting: INTERNAL MEDICINE
Payer: MEDICARE

## 2019-01-01 ENCOUNTER — ANESTHESIA EVENT (OUTPATIENT)
Dept: ENDOSCOPY | Age: 64
End: 2019-01-01
Payer: MEDICARE

## 2019-01-01 ENCOUNTER — ANESTHESIA EVENT (OUTPATIENT)
Dept: ENDOSCOPY | Age: 64
End: 2019-01-01

## 2019-01-01 ENCOUNTER — HOSPITAL ENCOUNTER (EMERGENCY)
Age: 64
Discharge: HOME | End: 2019-12-30
Attending: EMERGENCY MEDICINE
Payer: MEDICARE

## 2019-01-01 ENCOUNTER — HOSPITAL ENCOUNTER (OUTPATIENT)
Age: 64
Setting detail: OUTPATIENT SURGERY
Discharge: HOME | End: 2019-08-16
Attending: INTERNAL MEDICINE | Admitting: INTERNAL MEDICINE
Payer: MEDICARE

## 2019-01-01 ENCOUNTER — ANESTHESIA (OUTPATIENT)
Dept: ENDOSCOPY | Age: 64
End: 2019-01-01
Payer: MEDICARE

## 2019-01-01 ENCOUNTER — ANESTHESIA (OUTPATIENT)
Dept: ENDOSCOPY | Age: 64
End: 2019-01-01

## 2019-01-01 ENCOUNTER — HOSPITAL ENCOUNTER (EMERGENCY)
Age: 64
Discharge: NURSING, REHAB OR LONG-TERM CARE | End: 2019-08-16
Attending: EMERGENCY MEDICINE
Payer: MEDICARE

## 2019-01-01 VITALS
DIASTOLIC BLOOD PRESSURE: 86 MMHG | OXYGEN SATURATION: 97 % | RESPIRATION RATE: 18 BRPM | SYSTOLIC BLOOD PRESSURE: 132 MMHG

## 2019-01-01 VITALS
SYSTOLIC BLOOD PRESSURE: 122 MMHG | RESPIRATION RATE: 19 BRPM | HEART RATE: 88 BPM | OXYGEN SATURATION: 99 % | TEMPERATURE: 98.4 F | DIASTOLIC BLOOD PRESSURE: 59 MMHG

## 2019-01-01 VITALS
RESPIRATION RATE: 18 BRPM | TEMPERATURE: 98 F | OXYGEN SATURATION: 100 % | WEIGHT: 187 LBS | DIASTOLIC BLOOD PRESSURE: 52 MMHG | HEIGHT: 66 IN | HEART RATE: 93 BPM | SYSTOLIC BLOOD PRESSURE: 116 MMHG | BODY MASS INDEX: 30.05 KG/M2

## 2019-01-01 VITALS
WEIGHT: 175 LBS | HEIGHT: 64 IN | DIASTOLIC BLOOD PRESSURE: 66 MMHG | HEART RATE: 90 BPM | OXYGEN SATURATION: 99 % | BODY MASS INDEX: 29.88 KG/M2 | RESPIRATION RATE: 16 BRPM | SYSTOLIC BLOOD PRESSURE: 110 MMHG | TEMPERATURE: 97.9 F

## 2019-01-01 VITALS
HEIGHT: 64 IN | TEMPERATURE: 98.7 F | HEART RATE: 84 BPM | WEIGHT: 195.13 LBS | DIASTOLIC BLOOD PRESSURE: 61 MMHG | OXYGEN SATURATION: 99 % | RESPIRATION RATE: 16 BRPM | BODY MASS INDEX: 33.31 KG/M2 | SYSTOLIC BLOOD PRESSURE: 117 MMHG

## 2019-01-01 VITALS
WEIGHT: 175 LBS | BODY MASS INDEX: 28.25 KG/M2 | DIASTOLIC BLOOD PRESSURE: 56 MMHG | TEMPERATURE: 98 F | OXYGEN SATURATION: 100 % | HEART RATE: 98 BPM | RESPIRATION RATE: 19 BRPM | SYSTOLIC BLOOD PRESSURE: 114 MMHG

## 2019-01-01 DIAGNOSIS — R73.9 HYPERGLYCEMIA: Primary | ICD-10-CM

## 2019-01-01 DIAGNOSIS — S22.31XA CLOSED FRACTURE OF ONE RIB OF RIGHT SIDE, INITIAL ENCOUNTER: ICD-10-CM
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
## 2019-01-01 LAB
A/G RATIO: 0.9 (ref 1.1–2.2)
A/G RATIO: 0.9 (ref 1.1–2.2)
ALBUMIN SERPL-MCNC: 3.1 G/DL (ref 3.4–5)
ALBUMIN SERPL-MCNC: 3.4 G/DL (ref 3.4–5)
ALBUMIN SERPL-MCNC: 3.8 G/DL (ref 3.4–5)
ALP BLD-CCNC: 166 U/L (ref 40–129)
ALP BLD-CCNC: 231 U/L (ref 40–129)
ALT SERPL-CCNC: 21 U/L (ref 10–40)
ALT SERPL-CCNC: 26 U/L (ref 10–40)
AMMONIA: 71 UMOL/L (ref 11–51)
AMMONIA: 85 UMOL/L (ref 11–51)
ANION GAP SERPL CALCULATED.3IONS-SCNC: 12 MMOL/L (ref 3–16)
ANION GAP SERPL CALCULATED.3IONS-SCNC: 13 MMOL/L (ref 3–16)
ANION GAP SERPL CALCULATED.3IONS-SCNC: 15 MMOL/L (ref 3–16)
AST SERPL-CCNC: 17 U/L (ref 15–37)
AST SERPL-CCNC: 23 U/L (ref 15–37)
BASE EXCESS VENOUS: -15 (ref -3–3)
BASE EXCESS VENOUS: -19 (ref -3–3)
BASE EXCESS VENOUS: -2.7 MMOL/L
BASOPHILS ABSOLUTE: 0 K/UL (ref 0–0.2)
BASOPHILS RELATIVE PERCENT: 0.1 %
BASOPHILS RELATIVE PERCENT: 0.1 %
BASOPHILS RELATIVE PERCENT: 0.2 %
BETA-HYDROXYBUTYRATE: 0.07 MMOL/L (ref 0–0.27)
BETA-HYDROXYBUTYRATE: 0.08 MMOL/L (ref 0–0.27)
BILIRUB SERPL-MCNC: 1 MG/DL (ref 0–1)
BILIRUB SERPL-MCNC: 1.2 MG/DL (ref 0–1)
BILIRUBIN DIRECT: 0.5 MG/DL (ref 0–0.3)
BILIRUBIN URINE: NEGATIVE
BILIRUBIN URINE: NEGATIVE
BILIRUBIN, INDIRECT: 0.5 MG/DL (ref 0–1)
BLOOD, URINE: NEGATIVE
BLOOD, URINE: NEGATIVE
BUN BLDV-MCNC: 15 MG/DL (ref 7–20)
BUN BLDV-MCNC: 17 MG/DL (ref 7–20)
BUN BLDV-MCNC: 19 MG/DL (ref 7–20)
CALCIUM SERPL-MCNC: 8.9 MG/DL (ref 8.3–10.6)
CALCIUM SERPL-MCNC: 9 MG/DL (ref 8.3–10.6)
CALCIUM SERPL-MCNC: 9.4 MG/DL (ref 8.3–10.6)
CARBOXYHEMOGLOBIN: 2.7 %
CHLORIDE BLD-SCNC: 101 MMOL/L (ref 99–110)
CHLORIDE BLD-SCNC: 105 MMOL/L (ref 99–110)
CHLORIDE BLD-SCNC: 106 MMOL/L (ref 99–110)
CHP ED QC CHECK: 265
CHP ED QC CHECK: NORMAL
CLARITY: CLEAR
CLARITY: CLEAR
CO2: 15 MMOL/L (ref 21–32)
CO2: 21 MMOL/L (ref 21–32)
CO2: 23 MMOL/L (ref 21–32)
COLOR: YELLOW
COLOR: YELLOW
CREAT SERPL-MCNC: 0.9 MG/DL (ref 0.6–1.2)
CREAT SERPL-MCNC: 0.9 MG/DL (ref 0.6–1.2)
CREAT SERPL-MCNC: 1 MG/DL (ref 0.6–1.2)
EKG ATRIAL RATE: 95 BPM
EKG DIAGNOSIS: NORMAL
EKG P AXIS: 31 DEGREES
EKG P-R INTERVAL: 154 MS
EKG Q-T INTERVAL: 362 MS
EKG QRS DURATION: 84 MS
EKG QTC CALCULATION (BAZETT): 454 MS
EKG R AXIS: 40 DEGREES
EKG T AXIS: 62 DEGREES
EKG VENTRICULAR RATE: 95 BPM
EOSINOPHILS ABSOLUTE: 0 K/UL (ref 0–0.6)
EOSINOPHILS RELATIVE PERCENT: 0 %
EOSINOPHILS RELATIVE PERCENT: 0 %
EOSINOPHILS RELATIVE PERCENT: 0.1 %
EPITHELIAL CELLS, UA: ABNORMAL /HPF
GFR AFRICAN AMERICAN: >60
GFR NON-AFRICAN AMERICAN: 56
GFR NON-AFRICAN AMERICAN: >60
GFR NON-AFRICAN AMERICAN: >60
GLOBULIN: 3.5 G/DL
GLOBULIN: 4.2 G/DL
GLUCOSE BLD-MCNC: 152 MG/DL (ref 70–99)
GLUCOSE BLD-MCNC: 177 MG/DL (ref 70–99)
GLUCOSE BLD-MCNC: 265 MG/DL (ref 70–99)
GLUCOSE BLD-MCNC: 280 MG/DL
GLUCOSE BLD-MCNC: 280 MG/DL (ref 70–99)
GLUCOSE BLD-MCNC: 347 MG/DL
GLUCOSE BLD-MCNC: 347 MG/DL (ref 70–99)
GLUCOSE BLD-MCNC: 353 MG/DL (ref 70–99)
GLUCOSE BLD-MCNC: 360 MG/DL (ref 70–99)
GLUCOSE BLD-MCNC: 369 MG/DL (ref 70–99)
GLUCOSE BLD-MCNC: 370 MG/DL (ref 70–99)
GLUCOSE BLD-MCNC: 388 MG/DL (ref 70–99)
GLUCOSE BLD-MCNC: 400 MG/DL (ref 70–99)
GLUCOSE BLD-MCNC: 424 MG/DL (ref 70–99)
GLUCOSE URINE: 250 MG/DL
GLUCOSE URINE: NEGATIVE MG/DL
HCO3 VENOUS: 12.5 MMOL/L (ref 23–29)
HCO3 VENOUS: 23 MMOL/L (ref 23–29)
HCO3 VENOUS: 8.8 MMOL/L (ref 23–29)
HCT VFR BLD CALC: 27.3 % (ref 36–48)
HCT VFR BLD CALC: 31.8 % (ref 36–48)
HCT VFR BLD CALC: 33 % (ref 36–48)
HEMOGLOBIN: 10.7 G/DL (ref 12–16)
HEMOGLOBIN: 11 G/DL (ref 12–16)
HEMOGLOBIN: 9.2 G/DL (ref 12–16)
KETONES, URINE: NEGATIVE MG/DL
KETONES, URINE: NEGATIVE MG/DL
LACTATE: 1.84 MMOL/L (ref 0.4–2)
LEUKOCYTE ESTERASE, URINE: ABNORMAL
LEUKOCYTE ESTERASE, URINE: NEGATIVE
LYMPHOCYTES ABSOLUTE: 0.3 K/UL (ref 1–5.1)
LYMPHOCYTES ABSOLUTE: 0.4 K/UL (ref 1–5.1)
LYMPHOCYTES ABSOLUTE: 0.6 K/UL (ref 1–5.1)
LYMPHOCYTES RELATIVE PERCENT: 11.2 %
LYMPHOCYTES RELATIVE PERCENT: 12.7 %
LYMPHOCYTES RELATIVE PERCENT: 19.6 %
MCH RBC QN AUTO: 30.6 PG (ref 26–34)
MCH RBC QN AUTO: 31.3 PG (ref 26–34)
MCH RBC QN AUTO: 32.7 PG (ref 26–34)
MCHC RBC AUTO-ENTMCNC: 33.3 G/DL (ref 31–36)
MCHC RBC AUTO-ENTMCNC: 33.7 G/DL (ref 31–36)
MCHC RBC AUTO-ENTMCNC: 33.8 G/DL (ref 31–36)
MCV RBC AUTO: 90.8 FL (ref 80–100)
MCV RBC AUTO: 93.9 FL (ref 80–100)
MCV RBC AUTO: 96.7 FL (ref 80–100)
METHEMOGLOBIN VENOUS: 1.3 %
MICROSCOPIC EXAMINATION: ABNORMAL
MICROSCOPIC EXAMINATION: YES
MONOCYTES ABSOLUTE: 0.2 K/UL (ref 0–1.3)
MONOCYTES ABSOLUTE: 0.2 K/UL (ref 0–1.3)
MONOCYTES ABSOLUTE: 0.3 K/UL (ref 0–1.3)
MONOCYTES RELATIVE PERCENT: 7.2 %
MONOCYTES RELATIVE PERCENT: 9.1 %
MONOCYTES RELATIVE PERCENT: 9.9 %
NEUTROPHILS ABSOLUTE: 2.2 K/UL (ref 1.7–7.7)
NEUTROPHILS ABSOLUTE: 2.3 K/UL (ref 1.7–7.7)
NEUTROPHILS ABSOLUTE: 2.3 K/UL (ref 1.7–7.7)
NEUTROPHILS RELATIVE PERCENT: 72.9 %
NEUTROPHILS RELATIVE PERCENT: 77.3 %
NEUTROPHILS RELATIVE PERCENT: 79.6 %
NITRITE, URINE: NEGATIVE
NITRITE, URINE: NEGATIVE
O2 CONTENT, VEN: 10 ML/DL
O2 SAT, VEN: 69 %
O2 SAT, VEN: 71 %
O2 SAT, VEN: 80 %
O2 THERAPY: ABNORMAL
OSMOLALITY: 301 MOSM/KG (ref 278–305)
PCO2, VEN: 21.5 MM HG (ref 40–50)
PCO2, VEN: 31.3 MM HG (ref 40–50)
PCO2, VEN: 46.7 MMHG (ref 40–50)
PDW BLD-RTO: 14.5 % (ref 12.4–15.4)
PDW BLD-RTO: 14.8 % (ref 12.4–15.4)
PDW BLD-RTO: 14.8 % (ref 12.4–15.4)
PERFORMED ON: ABNORMAL
PH UA: 6 (ref 5–8)
PH UA: 6 (ref 5–8)
PH VENOUS: 7.21 (ref 7.35–7.45)
PH VENOUS: 7.22 (ref 7.35–7.45)
PH VENOUS: 7.31 (ref 7.35–7.45)
PHOSPHORUS: 3.5 MG/DL (ref 2.5–4.9)
PLATELET # BLD: 64 K/UL (ref 135–450)
PLATELET # BLD: 64 K/UL (ref 135–450)
PLATELET # BLD: 67 K/UL (ref 135–450)
PMV BLD AUTO: 8.3 FL (ref 5–10.5)
PMV BLD AUTO: 8.8 FL (ref 5–10.5)
PMV BLD AUTO: 9.2 FL (ref 5–10.5)
PO2, VEN: 38 MMHG
PO2, VEN: 43 MM HG
PO2, VEN: 52 MM HG
POC SAMPLE TYPE: ABNORMAL
POC SAMPLE TYPE: ABNORMAL
POTASSIUM REFLEX MAGNESIUM: 4 MMOL/L (ref 3.5–5.1)
POTASSIUM REFLEX MAGNESIUM: 5.3 MMOL/L (ref 3.5–5.1)
POTASSIUM SERPL-SCNC: 4.4 MMOL/L (ref 3.5–5.1)
PROTEIN UA: NEGATIVE MG/DL
PROTEIN UA: NEGATIVE MG/DL
RAPID INFLUENZA  B AGN: NEGATIVE
RAPID INFLUENZA A AGN: NEGATIVE
RBC # BLD: 2.82 M/UL (ref 4–5.2)
RBC # BLD: 3.5 M/UL (ref 4–5.2)
RBC # BLD: 3.52 M/UL (ref 4–5.2)
RBC UA: ABNORMAL /HPF (ref 0–2)
SODIUM BLD-SCNC: 134 MMOL/L (ref 136–145)
SODIUM BLD-SCNC: 137 MMOL/L (ref 136–145)
SODIUM BLD-SCNC: 140 MMOL/L (ref 136–145)
SPECIFIC GRAVITY UA: 1.01 (ref 1–1.03)
SPECIFIC GRAVITY UA: 1.01 (ref 1–1.03)
TCO2 CALC VENOUS: 13 MMOL/L
TCO2 CALC VENOUS: 25 MMOL/L
TCO2 CALC VENOUS: 9 MMOL/L
TOTAL CK: 205 U/L (ref 26–192)
TOTAL PROTEIN: 6.6 G/DL (ref 6.4–8.2)
TOTAL PROTEIN: 8 G/DL (ref 6.4–8.2)
TROPONIN: <0.01 NG/ML
URINE CULTURE, ROUTINE: NORMAL
URINE REFLEX TO CULTURE: ABNORMAL
URINE REFLEX TO CULTURE: YES
URINE TYPE: ABNORMAL
URINE TYPE: ABNORMAL
UROBILINOGEN, URINE: 0.2 E.U./DL
UROBILINOGEN, URINE: 0.2 E.U./DL
WBC # BLD: 2.7 K/UL (ref 4–11)
WBC # BLD: 2.9 K/UL (ref 4–11)
WBC # BLD: 3.2 K/UL (ref 4–11)
WBC UA: ABNORMAL /HPF (ref 0–5)
YEAST: PRESENT /HPF

## 2019-01-01 PROCEDURE — 87086 URINE CULTURE/COLONY COUNT: CPT

## 2019-01-01 PROCEDURE — 72100 X-RAY EXAM L-S SPINE 2/3 VWS: CPT

## 2019-01-01 PROCEDURE — 85025 COMPLETE CBC W/AUTO DIFF WBC: CPT

## 2019-01-01 PROCEDURE — 96376 TX/PRO/DX INJ SAME DRUG ADON: CPT

## 2019-01-01 PROCEDURE — 2580000003 HC RX 258: Performed by: STUDENT IN AN ORGANIZED HEALTH CARE EDUCATION/TRAINING PROGRAM

## 2019-01-01 PROCEDURE — 82140 ASSAY OF AMMONIA: CPT

## 2019-01-01 PROCEDURE — 96360 HYDRATION IV INFUSION INIT: CPT

## 2019-01-01 PROCEDURE — 3700000001 HC ADD 15 MINUTES (ANESTHESIA): Performed by: INTERNAL MEDICINE

## 2019-01-01 PROCEDURE — 87804 INFLUENZA ASSAY W/OPTIC: CPT

## 2019-01-01 PROCEDURE — 83930 ASSAY OF BLOOD OSMOLALITY: CPT

## 2019-01-01 PROCEDURE — 82010 KETONE BODYS QUAN: CPT

## 2019-01-01 PROCEDURE — 6370000000 HC RX 637 (ALT 250 FOR IP): Performed by: STUDENT IN AN ORGANIZED HEALTH CARE EDUCATION/TRAINING PROGRAM

## 2019-01-01 PROCEDURE — 73502 X-RAY EXAM HIP UNI 2-3 VIEWS: CPT

## 2019-01-01 PROCEDURE — 2580000003 HC RX 258: Performed by: EMERGENCY MEDICINE

## 2019-01-01 PROCEDURE — 99284 EMERGENCY DEPT VISIT MOD MDM: CPT

## 2019-01-01 PROCEDURE — 71046 X-RAY EXAM CHEST 2 VIEWS: CPT

## 2019-01-01 PROCEDURE — 3609017100 HC EGD: Performed by: INTERNAL MEDICINE

## 2019-01-01 PROCEDURE — 7100000010 HC PHASE II RECOVERY - FIRST 15 MIN: Performed by: INTERNAL MEDICINE

## 2019-01-01 PROCEDURE — 81001 URINALYSIS AUTO W/SCOPE: CPT

## 2019-01-01 PROCEDURE — 80053 COMPREHEN METABOLIC PANEL: CPT

## 2019-01-01 PROCEDURE — 96361 HYDRATE IV INFUSION ADD-ON: CPT

## 2019-01-01 PROCEDURE — 99285 EMERGENCY DEPT VISIT HI MDM: CPT

## 2019-01-01 PROCEDURE — 82803 BLOOD GASES ANY COMBINATION: CPT

## 2019-01-01 PROCEDURE — 6360000002 HC RX W HCPCS: Performed by: STUDENT IN AN ORGANIZED HEALTH CARE EDUCATION/TRAINING PROGRAM

## 2019-01-01 PROCEDURE — 3700000000 HC ANESTHESIA ATTENDED CARE: Performed by: INTERNAL MEDICINE

## 2019-01-01 PROCEDURE — 93005 ELECTROCARDIOGRAM TRACING: CPT | Performed by: EMERGENCY MEDICINE

## 2019-01-01 PROCEDURE — 6370000000 HC RX 637 (ALT 250 FOR IP): Performed by: EMERGENCY MEDICINE

## 2019-01-01 PROCEDURE — 6370000000 HC RX 637 (ALT 250 FOR IP): Performed by: NURSE PRACTITIONER

## 2019-01-01 PROCEDURE — 83605 ASSAY OF LACTIC ACID: CPT

## 2019-01-01 PROCEDURE — 36415 COLL VENOUS BLD VENIPUNCTURE: CPT

## 2019-01-01 PROCEDURE — 2580000003 HC RX 258: Performed by: NURSE PRACTITIONER

## 2019-01-01 PROCEDURE — 2500000003 HC RX 250 WO HCPCS: Performed by: STUDENT IN AN ORGANIZED HEALTH CARE EDUCATION/TRAINING PROGRAM

## 2019-01-01 PROCEDURE — 80069 RENAL FUNCTION PANEL: CPT

## 2019-01-01 PROCEDURE — 81003 URINALYSIS AUTO W/O SCOPE: CPT

## 2019-01-01 PROCEDURE — 6370000000 HC RX 637 (ALT 250 FOR IP)

## 2019-01-01 PROCEDURE — 74150 CT ABDOMEN W/O CONTRAST: CPT

## 2019-01-01 PROCEDURE — 96374 THER/PROPH/DIAG INJ IV PUSH: CPT

## 2019-01-01 PROCEDURE — 82550 ASSAY OF CK (CPK): CPT

## 2019-01-01 PROCEDURE — 84484 ASSAY OF TROPONIN QUANT: CPT

## 2019-01-01 PROCEDURE — 7100000011 HC PHASE II RECOVERY - ADDTL 15 MIN: Performed by: INTERNAL MEDICINE

## 2019-01-01 RX ORDER — LIDOCAINE 4 G/G
1 PATCH TOPICAL DAILY
Status: DISCONTINUED | OUTPATIENT
Start: 2019-01-01 | End: 2019-01-01 | Stop reason: HOSPADM

## 2019-01-01 RX ORDER — 0.9 % SODIUM CHLORIDE 0.9 %
1000 INTRAVENOUS SOLUTION INTRAVENOUS ONCE
Status: COMPLETED | OUTPATIENT
Start: 2019-01-01 | End: 2019-01-01

## 2019-01-01 RX ORDER — OXYCODONE AND ACETAMINOPHEN 10; 325 MG/1; MG/1
1 TABLET ORAL EVERY 4 HOURS PRN
Status: ON HOLD | COMMUNITY
End: 2020-01-01

## 2019-01-01 RX ORDER — DEXTROSE MONOHYDRATE 25 G/50ML
12.5 INJECTION, SOLUTION INTRAVENOUS PRN
Status: DISCONTINUED | OUTPATIENT
Start: 2019-01-01 | End: 2019-01-01 | Stop reason: HOSPADM

## 2019-01-01 RX ORDER — SODIUM CHLORIDE, SODIUM LACTATE, POTASSIUM CHLORIDE, CALCIUM CHLORIDE 600; 310; 30; 20 MG/100ML; MG/100ML; MG/100ML; MG/100ML
INJECTION, SOLUTION INTRAVENOUS CONTINUOUS PRN
Status: DISCONTINUED | OUTPATIENT
Start: 2019-01-01 | End: 2019-01-01 | Stop reason: SDUPTHER

## 2019-01-01 RX ORDER — SULFAMETHOXAZOLE AND TRIMETHOPRIM 800; 160 MG/1; MG/1
1 TABLET ORAL 2 TIMES DAILY
Qty: 6 TABLET | Refills: 0 | Status: SHIPPED | OUTPATIENT
Start: 2019-01-01 | End: 2019-01-01

## 2019-01-01 RX ORDER — DEXTROSE MONOHYDRATE 50 MG/ML
100 INJECTION, SOLUTION INTRAVENOUS PRN
Status: DISCONTINUED | OUTPATIENT
Start: 2019-01-01 | End: 2019-01-01 | Stop reason: HOSPADM

## 2019-01-01 RX ORDER — LIDOCAINE 50 MG/G
1 PATCH TOPICAL DAILY
Qty: 30 PATCH | Refills: 0 | Status: SHIPPED | OUTPATIENT
Start: 2019-01-01

## 2019-01-01 RX ORDER — NICOTINE POLACRILEX 4 MG
15 LOZENGE BUCCAL PRN
Status: DISCONTINUED | OUTPATIENT
Start: 2019-01-01 | End: 2019-01-01 | Stop reason: HOSPADM

## 2019-01-01 RX ORDER — LACTULOSE 10 G/15ML
20 SOLUTION ORAL ONCE
Status: COMPLETED | OUTPATIENT
Start: 2019-01-01 | End: 2019-01-01

## 2019-01-01 RX ORDER — OXYCODONE HYDROCHLORIDE 5 MG/1
5 TABLET ORAL ONCE
Status: COMPLETED | OUTPATIENT
Start: 2019-01-01 | End: 2019-01-01

## 2019-01-01 RX ORDER — TORSEMIDE 20 MG/1
20 TABLET ORAL DAILY
Status: ON HOLD | COMMUNITY
End: 2020-01-01 | Stop reason: HOSPADM

## 2019-01-01 RX ORDER — LIDOCAINE HYDROCHLORIDE 20 MG/ML
INJECTION, SOLUTION INFILTRATION; PERINEURAL PRN
Status: DISCONTINUED | OUTPATIENT
Start: 2019-01-01 | End: 2019-01-01 | Stop reason: SDUPTHER

## 2019-01-01 RX ORDER — PROPOFOL 10 MG/ML
INJECTION, EMULSION INTRAVENOUS PRN
Status: DISCONTINUED | OUTPATIENT
Start: 2019-01-01 | End: 2019-01-01 | Stop reason: SDUPTHER

## 2019-01-01 RX ORDER — 0.9 % SODIUM CHLORIDE 0.9 %
500 INTRAVENOUS SOLUTION INTRAVENOUS ONCE
Status: COMPLETED | OUTPATIENT
Start: 2019-01-01 | End: 2019-01-01

## 2019-01-01 RX ORDER — OXYCODONE HYDROCHLORIDE 10 MG/1
10 TABLET ORAL EVERY 4 HOURS PRN
Status: ON HOLD | COMMUNITY
End: 2020-01-01 | Stop reason: SDUPTHER

## 2019-01-01 RX ADMIN — INSULIN HUMAN 10 UNITS: 100 INJECTION, SOLUTION PARENTERAL at 16:26

## 2019-01-01 RX ADMIN — SODIUM CHLORIDE 1000 ML: 9 INJECTION, SOLUTION INTRAVENOUS at 14:18

## 2019-01-01 RX ADMIN — SODIUM CHLORIDE 1000 ML: 9 INJECTION, SOLUTION INTRAVENOUS at 15:44

## 2019-01-01 RX ADMIN — SODIUM CHLORIDE, SODIUM LACTATE, POTASSIUM CHLORIDE, AND CALCIUM CHLORIDE: 600; 310; 30; 20 INJECTION, SOLUTION INTRAVENOUS at 11:53

## 2019-01-01 RX ADMIN — PROPOFOL 80 MG: 10 INJECTION, EMULSION INTRAVENOUS at 12:03

## 2019-01-01 RX ADMIN — INSULIN HUMAN 10 UNITS: 100 INJECTION, SOLUTION PARENTERAL at 15:45

## 2019-01-01 RX ADMIN — PROPOFOL 50 MG: 10 INJECTION, EMULSION INTRAVENOUS at 12:07

## 2019-01-01 RX ADMIN — LIDOCAINE HYDROCHLORIDE 50 MG: 20 INJECTION, SOLUTION INFILTRATION; PERINEURAL at 12:03

## 2019-01-01 RX ADMIN — SODIUM CHLORIDE 500 ML: 9 INJECTION, SOLUTION INTRAVENOUS at 18:05

## 2019-01-01 RX ADMIN — OXYCODONE HYDROCHLORIDE 5 MG: 5 TABLET ORAL at 16:20

## 2019-01-01 RX ADMIN — LACTULOSE 20 G: 20 SOLUTION ORAL at 16:38

## 2019-01-01 RX ADMIN — INSULIN HUMAN 8 UNITS: 100 INJECTION, SOLUTION PARENTERAL at 10:59

## 2019-01-01 RX ADMIN — INSULIN HUMAN 10 UNITS: 100 INJECTION, SOLUTION PARENTERAL at 17:53

## 2019-01-01 RX ADMIN — INSULIN HUMAN 6 UNITS: 100 INJECTION, SOLUTION PARENTERAL at 11:32

## 2019-01-01 RX ADMIN — SODIUM CHLORIDE 1000 ML: 9 INJECTION, SOLUTION INTRAVENOUS at 16:30

## 2019-01-01 ASSESSMENT — PAIN DESCRIPTION - DESCRIPTORS
DESCRIPTORS: ACHING
DESCRIPTORS: SHARP;THROBBING
DESCRIPTORS: ACHING
DESCRIPTORS: THROBBING

## 2019-01-01 ASSESSMENT — PULMONARY FUNCTION TESTS
PIF_VALUE: 0

## 2019-01-01 ASSESSMENT — PAIN DESCRIPTION - LOCATION
LOCATION: OTHER (COMMENT)
LOCATION: FOOT;LEG

## 2019-01-01 ASSESSMENT — ENCOUNTER SYMPTOMS
RESPIRATORY NEGATIVE: 1
EYES NEGATIVE: 1
GASTROINTESTINAL NEGATIVE: 1
BACK PAIN: 1

## 2019-01-01 ASSESSMENT — PAIN - FUNCTIONAL ASSESSMENT
PAIN_FUNCTIONAL_ASSESSMENT: 0-10
PAIN_FUNCTIONAL_ASSESSMENT: PREVENTS OR INTERFERES SOME ACTIVE ACTIVITIES AND ADLS
PAIN_FUNCTIONAL_ASSESSMENT: PREVENTS OR INTERFERES SOME ACTIVE ACTIVITIES AND ADLS

## 2019-01-01 ASSESSMENT — PAIN DESCRIPTION - FREQUENCY
FREQUENCY: CONTINUOUS
FREQUENCY: INTERMITTENT

## 2019-01-01 ASSESSMENT — PAIN DESCRIPTION - PAIN TYPE
TYPE: CHRONIC PAIN
TYPE: CHRONIC PAIN

## 2019-01-01 ASSESSMENT — PAIN SCALES - GENERAL
PAINLEVEL_OUTOF10: 0
PAINLEVEL_OUTOF10: 9
PAINLEVEL_OUTOF10: 9
PAINLEVEL_OUTOF10: 8

## 2019-01-01 ASSESSMENT — PAIN DESCRIPTION - ORIENTATION
ORIENTATION: LEFT;RIGHT
ORIENTATION: LEFT

## 2019-01-01 ASSESSMENT — PAIN DESCRIPTION - ONSET: ONSET: ON-GOING

## 2019-01-01 ASSESSMENT — PAIN DESCRIPTION - PROGRESSION: CLINICAL_PROGRESSION: NOT CHANGED

## 2019-01-02 ENCOUNTER — APPOINTMENT (OUTPATIENT)
Dept: GENERAL RADIOLOGY | Age: 64
End: 2019-01-02
Payer: MEDICARE

## 2019-01-02 ENCOUNTER — HOSPITAL ENCOUNTER (EMERGENCY)
Age: 64
Discharge: HOME | End: 2019-01-02
Attending: EMERGENCY MEDICINE
Payer: MEDICARE

## 2019-01-02 VITALS
HEART RATE: 78 BPM | SYSTOLIC BLOOD PRESSURE: 117 MMHG | RESPIRATION RATE: 18 BRPM | DIASTOLIC BLOOD PRESSURE: 67 MMHG | TEMPERATURE: 97.9 F | OXYGEN SATURATION: 100 %

## 2019-01-02 DIAGNOSIS — R07.81 RIB PAIN ON RIGHT SIDE: Primary | ICD-10-CM

## 2019-01-02 PROCEDURE — 6370000000 HC RX 637 (ALT 250 FOR IP): Performed by: PHYSICIAN ASSISTANT

## 2019-01-02 PROCEDURE — 71045 X-RAY EXAM CHEST 1 VIEW: CPT

## 2019-01-02 PROCEDURE — 94150 VITAL CAPACITY TEST: CPT

## 2019-01-02 PROCEDURE — 6360000002 HC RX W HCPCS: Performed by: PHYSICIAN ASSISTANT

## 2019-01-02 PROCEDURE — 94760 N-INVAS EAR/PLS OXIMETRY 1: CPT

## 2019-01-02 PROCEDURE — 94664 DEMO&/EVAL PT USE INHALER: CPT

## 2019-01-02 PROCEDURE — 96374 THER/PROPH/DIAG INJ IV PUSH: CPT

## 2019-01-02 PROCEDURE — 99284 EMERGENCY DEPT VISIT MOD MDM: CPT

## 2019-01-02 RX ORDER — OXYCODONE HYDROCHLORIDE 5 MG/1
5 TABLET ORAL EVERY 6 HOURS PRN
Qty: 8 TABLET | Refills: 0 | Status: SHIPPED | OUTPATIENT
Start: 2019-01-02 | End: 2019-01-05

## 2019-01-02 RX ORDER — MORPHINE SULFATE 4 MG/ML
4 INJECTION, SOLUTION INTRAMUSCULAR; INTRAVENOUS ONCE
Status: COMPLETED | OUTPATIENT
Start: 2019-01-02 | End: 2019-01-02

## 2019-01-02 RX ORDER — LIDOCAINE 4 G/G
1 PATCH TOPICAL DAILY
Status: DISCONTINUED | OUTPATIENT
Start: 2019-01-02 | End: 2019-01-02 | Stop reason: HOSPADM

## 2019-01-02 RX ADMIN — MORPHINE SULFATE 4 MG: 4 INJECTION, SOLUTION INTRAMUSCULAR; INTRAVENOUS at 14:02

## 2019-01-02 ASSESSMENT — PAIN SCALES - GENERAL
PAINLEVEL_OUTOF10: 8
PAINLEVEL_OUTOF10: 9
PAINLEVEL_OUTOF10: 6

## 2019-01-02 ASSESSMENT — PAIN DESCRIPTION - PROGRESSION: CLINICAL_PROGRESSION: GRADUALLY IMPROVING

## 2019-01-02 ASSESSMENT — ENCOUNTER SYMPTOMS
PHOTOPHOBIA: 0
ABDOMINAL PAIN: 0
BACK PAIN: 0
VOMITING: 0
NAUSEA: 0
SHORTNESS OF BREATH: 0
DIARRHEA: 0
EYE PAIN: 0
COUGH: 0

## 2019-01-02 ASSESSMENT — PAIN DESCRIPTION - FREQUENCY: FREQUENCY: CONTINUOUS

## 2019-01-02 ASSESSMENT — PAIN DESCRIPTION - PAIN TYPE
TYPE: ACUTE PAIN
TYPE: ACUTE PAIN

## 2019-01-02 ASSESSMENT — PAIN DESCRIPTION - DESCRIPTORS: DESCRIPTORS: ACHING

## 2019-01-02 ASSESSMENT — PAIN DESCRIPTION - ORIENTATION: ORIENTATION: RIGHT

## 2019-01-02 ASSESSMENT — PAIN DESCRIPTION - LOCATION: LOCATION: RIB CAGE

## 2019-08-16 NOTE — H&P
and Visual Impairment. Not Present- Bleeding Gums and Nasal discharge. Respiratory Not Present- Coughing up blood, Persistent Cough, Shortness of breath and Wheezing. Cardiovascular Not Present- Chest Pain and Rapid or Irregular Heart. Gastrointestinal Present- Abdominal Pain, Bloody Stool, Decreased Appetite, Diarrhea, Difficulty Swallowing, Incontinence of Stool and Nausea/Vomiting. Not Present- Black, Tarry Stool, Bloating/Excess Gas, Constipation, Heartburn, Jaundice, Straining at stool and Weight Loss. Female Genitourinary Present- Blood in Urine and Frequent Urination. Musculoskeletal Present- Joint Pain, Joint Swelling and Swollen Feet. Neurological Present- Dizziness. Not Present- Frequent Headaches, Memory Loss, Muscle Weakness, Passing Out and Seizures. Psychiatric Present- Anxiety, Depression and Nervousness. Endocrine Present- Thyroid Problems. Hematology Present- Easy Bleeding. Vitals [facility]; 7/26/2019 10:29 AM)  7/26/2019 10:29 AM  Weight: 173 lb   Height: 64 in   Weight was reported by patient. Body Surface Area: 1.84 m²   Body Mass Index: 29.7 kg/m²    Pulse: 93 (Regular)     BP: 121/62 (Sitting, Left Arm, Standard)              Physical Exam [facility]; 7/26/2019 11:01 AM)  General  Mental Status - Alert. General Appearance - Cooperative, Well groomed, Consistent with stated age, Not in acute distress. Orientation - Oriented to time, Oriented to place, Oriented to purpose and Oriented to person. Build & Nutrition - Well nourished and Well developed. Hydration - Well hydrated. Note:  Right BKA      Eye  Sclera/Conjunctiva - Bilateral - No Yellow. Chest and Lung Exam  Chest and lung exam reveals  - normal excursion with symmetric chest walls, quiet, even and easy respiratory effort with no use of accessory muscles and on auscultation, normal breath sounds, no adventitious sounds and normal vocal resonance. Cardiovascular  Auscultation  Rhythm - Regular.  Heart Sounds -

## 2019-08-16 NOTE — PROGRESS NOTES
Brittany Ledezma is a 59 y.o. female patient. No current facility-administered medications for this encounter. Allergies   Allergen Reactions    Nitrofurantoin Hives    Abilify [Aripiprazole]     Acetaminophen Other (See Comments)     Avoid due to liver    Acetaminophen Other (See Comments)     Liver transplant restriction    Aspirin     Avelox [Moxifloxacin]     Carbamazepine     Contrast [Iodides] Hives     Contrast dye    Cymbalta [Duloxetine Hcl] Other (See Comments)     Pt. Starts hallucinating     Doxycycline     Elavil [Amitriptyline] Hives    Erythromycin     Flagyl [Metronidazole]     Floxin [Ofloxacin]     Ibuprofen Other (See Comments)     Liver transplant    Iv Contrast [Iodides] Hives    Keflex [Cephalexin]      Tolerates Ceftriaxone.  Ketorolac Hives    Levaquin [Levofloxacin In D5w]     Levofloxacin Hives    Lyrica [Pregabalin]     Lyrica [Pregabalin] Other (See Comments)     hallucinations    Macrobid [Nitrofurantoin Monohyd Macro]     Motrin [Ibuprofen Micronized]     Nsaids      Liver transplant, states she can't have because of liver protocol      Nucynta Er [Tapentadol Hcl Er]     Nucynta [Tapentadol]     Penicillins Hives    Primidone     Reglan [Metoclopramide]     Tricyclic Antidepressants     Clindamycin      Other reaction(s): Yeast Infection     Active Problems:    * No active hospital problems. *  Resolved Problems:    * No resolved hospital problems. *    Blood pressure (!) 116/52, pulse 93, temperature 98 °F (36.7 °C), temperature source Oral, resp. rate 18, height 5' 6\" (1.676 m), weight 187 lb (84.8 kg), SpO2 100 %, not currently breastfeeding. Subjective:  Symptoms:  Stable. Diet:  Poor intake. Activity level: Impaired due to weakness. Pain:  She complains of pain that is severe. She reports pain is worsening. Pain is poorly controlled. Objective:  General Appearance:   In no acute distress, in pain, ill-appearing and

## 2019-08-16 NOTE — ANESTHESIA PRE PROCEDURE
transplant    Iv Contrast [Iodides] Hives    Keflex [Cephalexin]      Tolerates Ceftriaxone.      Ketorolac Hives    Levaquin [Levofloxacin In D5w]     Levofloxacin Hives    Lyrica [Pregabalin]     Lyrica [Pregabalin] Other (See Comments)     hallucinations    Macrobid [Nitrofurantoin Monohyd Macro]     Motrin [Ibuprofen Micronized]     Nsaids      Liver transplant, states she can't have because of liver protocol      Nucynta Er [Tapentadol Hcl Er]     Nucynta [Tapentadol]     Penicillins Hives    Primidone     Reglan [Metoclopramide]     Toradol [Ketorolac Tromethamine]     Tricyclic Antidepressants     Clindamycin      Other reaction(s): Yeast Infection       Problem List:    Patient Active Problem List   Diagnosis Code    Hepatic encephalopathy ([facility] [facility] K72.90    Acquired hypothyroidism E03.9    Arthritis M19.90    History of liver transplant ([facility] [facility] Z94.4    Altered mental state R41.82    Partial epilepsy with impairment of consciousness ([facility] [facility] Z23.763    Metabolic encephalopathy G49.36    Chronic pain syndrome G89.4    Primary insomnia F51.01    Recurrent major depressive disorder, in remission ([facility] [facility] F33.40    Obesity E66.9    Phantom limb pain (HCC) G54.6    Other chest pain R07.89    Hx of right BKA ([facility] [facility] Z89.511    Immunosuppressed status ([facility] [facility] D89.9    Tachycardia R00.0    Type 1 diabetes mellitus with other specified complication (HCC) P54.06    Polypharmacy Z79.899    SVT (supraventricular tachycardia) (HCC) I47.1    Hyperglycemia R73.9    Acute chest pain R07.9    Nausea R11.0    S/P catheter ablation of slow pathway Z98.890, Z86.79    Chest pain R07.9    GERD (gastroesophageal reflux disease) K21.9    Hypothyroid E03.9    Failure to thrive (0-17) R62.51    Knee pain M25.569    Anemia D64.9    Hypoglycemia E16.2    DM2 (diabetes mellitus, type 2) (HCC) E11.9    Anemia, chronic disease D63.8       Past Medical History:        Diagnosis Date    Anemia     Dental:          Pulmonary:                             ROS comment: Home O2   Cardiovascular:                Echocardiogram reviewed               ROS comment: EF=65% 2018     Neuro/Psych:   (+) seizures: well controlled,              ROS comment: Phantom limb pain  Chronic pain GI/Hepatic/Renal:   (+) liver disease: hepatic encephalopathy,          ROS comment: Liver transplant. Endo/Other:    (+) Diabetespoorly controlled, using insulin, . Abdominal:           Vascular: negative vascular ROS. Anesthesia Plan      MAC     ASA 4     (Multiple Allergies  The patients blood glucose is 388 currently. The facility where the patient lives was called. The report given by the facility to our RN here at [facility] is that the patients blood glucose is \"normally that high\" (ie over 300). Discussed with Dr. Mere Blood will attempt to lower the blood glucose with sliding scale regular insulin.)  Induction: intravenous. MIPS: Postoperative opioids intended and Prophylactic antiemetics administered. Anesthetic plan and risks discussed with patient.         Attending anesthesiologist reviewed and agrees with Julio César Dan MD   8/16/2019

## 2019-08-16 NOTE — ED PROVIDER NOTES
[address]          ATTENDING PHYSICIAN NOTE       Date of evaluation: 8/16/2019    Chief Complaint     Hyperglycemia (Pt was in ENDO for a procedure, but brought to ED for uncontrolled hyperglycemia.)    History of Present Illness     Jill Myrick is a 59 y.o. female who presents with hyperglycemia from outpatient clinic appointment. Patient reports she was in her normal state of health this morning. Does report some intermittent nausea over the last several days since leaving a hospital admission at outside hospital where she was admitted at that time for hepatic encephalopathy. Does not think the nausea is anything out of the ordinary for her given her underlying cirrhosis. Otherwise has no acute symptoms today. Was at outpatient appointment for endoscopy, and was noted to have blood sugar in high 300s. Was sent to ED for further investigation by outpatient doctors. Patient has no headache, sore throat, cough, shortness of breath, nausea, vomiting, chest pain, sputum production, abdominal pain, back pain, neck pain, focal weakness/numbness/tingling, vertigo, lightheadedness, presyncopal symptoms, or other acute abnormalities today. Denies diarrhea, and reports her stool is normal in quality considering she is taking lactulose. Does report her mouth feels dry. Review of Systems     CONST:  Per HPI, otherwise negative. ENT:  Per HPI, otherwise negative. EYES:  Per HPI, otherwise negative. RESP:  Per HPI, otherwise negative. CV:  Per HPI, otherwise negative. GI:  Per HPI, otherwise negative. :  Per HPI, otherwise negative. MSK:  Per HPI, otherwise negative. NEURO:  Per HPI, otherwise negative. HEME:  Per HPI, otherwise negative. SKIN:  Per HPI, otherwise negative. PSYCH:  Per HPI, otherwise negative.     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     Urine Type Voided    Microscopic Urinalysis   Result Value Ref Range    WBC, UA 10-20 (A) 0 - 5 /HPF    RBC, UA 0-2 0 - 2 /HPF    Epi Cells 3-5 /HPF    Yeast, UA Present (A) /HPF   POCT Venous   Result Value Ref Range    pH, Cortez 7.210 (L) 7.350 - 7.450    pCO2, Cortez 31.3 (L) 40.0 - 50.0 mm Hg    pO2, Cortez 43 Not Established mm Hg    HCO3, Venous 12.5 (L) 23.0 - 29.0 mmol/L    Base Excess, Cortez -15 (L) -3 - 3    O2 Sat, Cortez 69 Not Established %    TC02 (Calc), Cortez 13 Not Established mmol/L    Lactate 1.84 0.40 - 2.00 mmol/L    Sample Type CORTEZ     Performed on SEE BELOW    POCT Glucose   Result Value Ref Range    QC OK? 265    POCT Glucose   Result Value Ref Range    POC Glucose 265 (H) 70 - 99 mg/dl    Performed on ACCU-CHEK    POCT Venous   Result Value Ref Range    pH, Cortez 7.219 (L) 7.350 - 7.450    pCO2, Cortez 21.5 (L) 40.0 - 50.0 mm Hg    pO2, Cortez 52 Not Established mm Hg    HCO3, Venous 8.8 (L) 23.0 - 29.0 mmol/L    Base Excess, Cortez -19 (L) -3 - 3    O2 Sat, Cortez 80 Not Established %    TC02 (Calc), Cortez 9 Not Established mmol/L    Sample Type CORTEZ     Performed on SEE BELOW    EKG 12 Lead   Result Value Ref Range    Ventricular Rate 95 BPM    Atrial Rate 95 BPM    P-R Interval 154 ms    QRS Duration 84 ms    Q-T Interval 362 ms    QTc Calculation (Bazett) 454 ms    P Axis 31 degrees    R Axis 40 degrees    T Axis 62 degrees    Diagnosis       EKG performed in ER and to be interpreted by ER physician. Confirmed by MD, ER ([id number]),  French Wray ([phone]) on 8/16/2019 1:46:02 PM       ED BEDSIDE ULTRASOUND:  n/a    RECENT VITALS:  BP: (!) 114/56,Temp: 98 °F (36.7 °C), Pulse: 98, Resp: 19, SpO2: 100 %     Procedures     n/a    ED Course     Nursing Notes, Past Medical Hx, Past Surgical Hx, Social Hx,Allergies, and Family Hx were reviewed.     patient was given the following medications:  Orders Placed This Encounter   Medications    0.9 % sodium chloride bolus    glucose (GLUTOSE) 40 % oral gel 15 g  dextrose 50 % IV solution    glucagon (rDNA) injection 1 mg    dextrose 5 % solution    insulin regular (HUMULIN R;NOVOLIN R) 100 Units in sodium chloride 0.9 % 100 mL infusion    0.9 % sodium chloride bolus    insulin regular (HUMULIN R;NOVOLIN R) injection 10 Units    sulfamethoxazole-trimethoprim (BACTRIM DS) 800-160 MG per tablet     Sig: Take 1 tablet by mouth 2 times daily for 3 days     Dispense:  6 tablet     Refill:  0       CONSULTS:  n/a    MEDICAL DECISIONMAKING / ASSESSMENT / Darren Asp is a 59 y.o. female presenting with asymptomatic hyperglycemia woman outpatient clinic appointment. Patient reports she felt in her normal baseline state of health today. Blood sugar in clinic found to be in 300s. Extensive infectious metabolic work-up was performed without significant abnormalities detected. Patient had slightly low pH on venous blood gas, but no anion gap, normal lactic acid, normal renal function, and no clinical signs of severe acute process. Patient had no symptoms, normal appetite, denied diarrhea, denied vomiting. Did report some intermittent nausea over the last several days, but reports this can be normal for her. Patient had recent outside hospital admission for hepatic encephalopathy, but shows no signs of hepatic encephalopathy today. Is alert oriented x3 has a nonfocal neurological exam, has good recall of recent events including hospital admission and outpatient follow-up, and has only the slightest amount of barely detectable asterixis on clinical exam.  Patient's blood glucose improved and H slightly improved after 3 L of fluid hydration. Patient was given a small dose of subcutaneous regular insulin in the ED and instructed to immediately take her long-acting insulin upon arrival home.   Patient assured this provider that she would follow-up with her primary doctor as soon as possible, though she just saw him after her recent admission, and would return to

## 2019-12-30 NOTE — ED PROVIDER NOTES
Triage note: I evaluated this patient to initiate their ED workup in an expeditious manner. Please see notes from other ED providers regarding comprehensive evaluation including full history, physical exam, interpretation of results, and medical decision making/disposition. The patient presents with concern for elevated blood sugar over 400 for the last 2 days. Diet has been normal.  She states that her insulin is not working. She is concerned for possible urinary tract infection. No current antibiotics. No abdominal pain or dysuria. Known history of cirrhosis with liver transplant and recent liver failure. She is well-appearing. She is hemodynamically stable. Heart regular rate and rhythm. Lungs clear to auscultation. Abdomen soft obese and nontender. No jaundice or scleral icterus. Neurologically intact.      Adron Episcopal, DO  12/30/19 6155

## 2019-12-30 NOTE — ED PROVIDER NOTES
[address]  [address] [address]  Dept: [phone]  Loc: [address] ENCOUNTER        This patient was seen and evaluated per myself conjunction with ED attending Dr. Georgina Rainey. CHIEF COMPLAINT    Chief Complaint   Patient presents with    Hyperglycemia     Pt in via EMS states her blood sugar has been in the 400's for her the last 2 days, she states insulin is not working. JCARLOS Goncalves is a 59 y.o. female who presents with complaints of elevation in her blood sugars over the past 2 days. She states that she has had issues before in the past with blood glucose control. She states that recently her primary care provider has increased her vial and syringe insulins across-the-board by 10% to try to decrease her blood glucose levels. She states that over the past 2 days her blood sugars have been running in the upper 400s at the [facility] facility. She has no new associated symptoms with this. The duration has been since the onset. The context is that patient is an T2DM insulin dependent diabetic. The blood sugar measured pre-hospital was \"high\", was 424 on arrival to the ED. The patient has chronic right BKA pain and the severity is 9/10. States that she came to the ED for further evaluation and treatment. REVIEW OF SYSTEMS    Neurologic: No loss of consciousness  Cardiac: No Chest Pain  Respiratory: No new cough or difficulty breathing  GI: no new abdominal pain, no Diarrhea  : No Dysuria or Hematuria  General: No Fever  All other systems reviewed and are negative.     PAST MEDICAL & SURGICAL HISTORY    Past Medical History:   Diagnosis Date    Anemia     Arthritis     Bipolar 1 disorder ([facility] [address]     Causalgia of lower limb     Cellulitis     Chronic pain syndrome     Chronic post-operative pain     Cirrhosis of liver (HCC)     Complications of transplanted liver Carbamazepine     Contrast [Iodides] Hives     Contrast dye    Cymbalta [Duloxetine Hcl] Other (See Comments)     Pt. Starts hallucinating     Doxycycline     Elavil [Amitriptyline] Hives    Erythromycin     Flagyl [Metronidazole]     Floxin [Ofloxacin]     Ibuprofen Other (See Comments)     Liver transplant    Iv Contrast [Iodides] Hives    Keflex [Cephalexin]      Tolerates Ceftriaxone.  Ketorolac Hives    Levaquin [Levofloxacin In D5w]     Levofloxacin Hives    Lyrica [Pregabalin]     Lyrica [Pregabalin] Other (See Comments)     hallucinations    Macrobid [Nitrofurantoin Monohyd Macro]     Motrin [Ibuprofen Micronized]     Nsaids      Liver transplant, states she can't have because of liver protocol      Nucynta Er [Tapentadol Hcl Er]     Nucynta [Tapentadol]     Penicillins Hives    Primidone     Reglan [Metoclopramide]     Tricyclic Antidepressants     Clindamycin      Other reaction(s): Yeast Infection       SOCIAL & FAMILY HISTORY    Social History     Socioeconomic History    Marital status:       Spouse name: None    Number of children: None    Years of education: None    Highest education level: None   Occupational History    None   Social Needs    Financial resource strain: None    Food insecurity:     Worry: None     Inability: None    Transportation needs:     Medical: None     Non-medical: None   Tobacco Use    Smoking status: Never Smoker    Smokeless tobacco: Never Used   Substance and Sexual Activity    Alcohol use: No    Drug use: No    Sexual activity: Yes     Partners: Male   Lifestyle    Physical activity:     Days per week: None     Minutes per session: None    Stress: None   Relationships    Social connections:     Talks on phone: None     Gets together: None     Attends Cheondoism service: None     Active member of club or organization: None     Attends meetings of clubs or organizations: None     Relationship status: None    Intimate partner Basophils % 0.1 %    Neutrophils Absolute 2.2 1.7 - 7.7 K/uL    Lymphocytes Absolute 0.3 (L) 1.0 - 5.1 K/uL    Monocytes Absolute 0.2 0.0 - 1.3 K/uL    Eosinophils Absolute 0.0 0.0 - 0.6 K/uL    Basophils Absolute 0.0 0.0 - 0.2 K/uL   Comprehensive Metabolic Panel w/ Reflex to MG   Result Value Ref Range    Sodium 137 136 - 145 mmol/L    Potassium reflex Magnesium 4.0 3.5 - 5.1 mmol/L    Chloride 101 99 - 110 mmol/L    CO2 21 21 - 32 mmol/L    Anion Gap 15 3 - 16    Glucose 400 (H) 70 - 99 mg/dL    BUN 15 7 - 20 mg/dL    CREATININE 1.0 0.6 - 1.2 mg/dL    GFR Non- 56 (A) >60    GFR African American >60 >60    Calcium 9.4 8.3 - 10.6 mg/dL    Total Protein 8.0 6.4 - 8.2 g/dL    Alb 3.8 3.4 - 5.0 g/dL    Albumin/Globulin Ratio 0.9 (L) 1.1 - 2.2    Total Bilirubin 1.2 (H) 0.0 - 1.0 mg/dL    Alkaline Phosphatase 231 (H) 40 - 129 U/L    ALT 26 10 - 40 U/L    AST 23 15 - 37 U/L    Globulin 4.2 g/dL   Urinalysis Reflex to Culture   Result Value Ref Range    Color, UA YELLOW Straw/Yellow    Clarity, UA Clear Clear    Glucose, Ur 250 (A) Negative mg/dL    Bilirubin Urine Negative Negative    Ketones, Urine Negative Negative mg/dL    Specific Gravity, UA 1.009 1.005 - 1.030    Blood, Urine Negative Negative    pH, UA 6.0 5.0 - 8.0    Protein, UA Negative Negative mg/dL    Urobilinogen, Urine 0.2 <2.0 E.U./dL    Nitrite, Urine Negative Negative    Leukocyte Esterase, Urine Negative Negative    Microscopic Examination Not Indicated     Urine Type NotGiven     Urine Reflex to Culture Not Indicated    Blood Gas, Venous   Result Value Ref Range    pH, Gold 7.314 (L) 7.350 - 7.450    pCO2, Gold 46.7 40.0 - 50.0 mmHg    pO2, Gold 38 Not Established mmHg    HCO3, Venous 23 23 - 29 mmol/L    Base Excess, Gold -2.7 Not Established mmol/L    O2 Sat, Gold 71 Not Established %    Carboxyhemoglobin 2.7 %    MetHgb, Gold 1.3 <1.5 %    TC02 (Calc), Gold 25 Not Established mmol/L    O2 Content, Gold 10 Not Established mL/dL    O2 Therapy Unknown    Beta-Hydroxybutyrate   Result Value Ref Range    Beta-Hydroxybutyrate 0.08 0.00 - 0.27 mmol/L   Ammonia   Result Value Ref Range    Ammonia 71 (H) 11 - 51 umol/L   POCT Glucose   Result Value Ref Range    POC Glucose 424 (H) 70 - 99 mg/dl    Performed on ACCU-CHEK    POCT Glucose   Result Value Ref Range    Glucose 280 mg/dL   POCT Glucose   Result Value Ref Range    Glucose 347 mg/dL    QC OK? y    POCT Glucose   Result Value Ref Range    POC Glucose 347 (H) 70 - 99 mg/dl    Performed on ACCU-CHEK    POCT Glucose   Result Value Ref Range    POC Glucose 280 (H) 70 - 99 mg/dl    Performed on ACCU-CHEK        ED COURSE & MEDICAL DECISION MAKING    Pertinent Labs & Imaging studies reviewed and interpreted. (See chart for details)    Accuchek upon arrival in our ED was 424    Vitals:    12/30/19 1419 12/30/19 1500 12/30/19 1900   BP: (!) 150/79 127/76 135/79   Pulse: 99  84   Resp: 17  16   Temp: 98.4 °F (36.9 °C)  98.7 °F (37.1 °C)   TempSrc: Oral  Oral   SpO2: 100% 99% 99%   Weight: 195 lb 2 oz (88.5 kg)     Height: 5' 4\" (1.626 m)         Differential Diagnosis: Diabetic ketoacidosis, nonketotic hyperosmolar coma, dehydration, acute renal failure, electrolyte abnormalities, infection, GI emergency, cardiac emergency, pulmonary emergency, other    Patient is afebrile and nontoxic in appearance. Labs reveal no leukocytosis with a stable anemia. Metabolic panel unremarkable with no severe electrolyte derangements or ALLIE, blood glucose on metabolic panel 744. Liver function test not grossly abnormal, alk phos is only elevation at 231. Ammonia level was drawn per her request, 71. Rapid influenza negative. Urinalysis unremarkable. CXR findings as above. VBG reveals and actually improved pH at 7.314. Otherwise VBG is unremarkable. Beta-hydroxybutyrate not elevated at 0.08. EKG interpreted by ED physician.      Reevaluation after 10 units of IV insulin and IV fluids: Patient is resting comfortably. Blood glucose decreased to 347. An additional 10 units of IV insulin was ordered. Repeat blood glucose after IV insulin was administered shows a blood glucose of 280. She is not in any clinical evidence of DKA I do believe that she is stable to be discharged back to the extended care facility. She remained afebrile and hemodynamically stable throughout her entire ED course and will be discharged home in stable condition. The patient was instructed to follow up as an outpatient in 2 days. The patient was instructed to return to the ED immediately for any new or worsening symptoms. The patient verbalized understanding. FINAL IMPRESSION    1.  Type 2 diabetes mellitus with hyperglycemia, with long-term current use of insulin [facility])        PLAN  Discharge with close outpatient primary care follow-up    (Please note that this note was completed with a voice recognition program.  Every attempt was made to edit the dictations, but inevitably there remain words that are mis-transcribed.)           Olga Russell, DELORES - MIGDALIA  12/30/19 1930

## 2019-12-30 NOTE — ED NOTES
Bed: B-04  Expected date:   Expected time:   Means of arrival:   Comments:  Hyperglycemia       Luz Menendez RN  12/30/19 6785

## 2019-12-31 NOTE — ED NOTES
Someone from Patient's assisted living facility is coming to pick her up. I helped patient get dressed and into a clean brief and into a wheelchair to wait for her ride. Discharge instructions and follow up care reviewed with patient. Pt verbalized understanding. Thanked patient for choosing [facility] for her care.      Riki Villarreal RN  12/30/19 9921

## 2020-01-01 ENCOUNTER — APPOINTMENT (OUTPATIENT)
Dept: CT IMAGING | Age: 65
DRG: 442 | End: 2020-01-01
Payer: MEDICARE

## 2020-01-01 ENCOUNTER — APPOINTMENT (OUTPATIENT)
Dept: GENERAL RADIOLOGY | Age: 65
DRG: 442 | End: 2020-01-01
Payer: MEDICARE

## 2020-01-01 ENCOUNTER — APPOINTMENT (OUTPATIENT)
Dept: ULTRASOUND IMAGING | Age: 65
DRG: 442 | End: 2020-01-01
Payer: MEDICARE

## 2020-01-01 ENCOUNTER — APPOINTMENT (OUTPATIENT)
Dept: CT IMAGING | Age: 65
DRG: 441 | End: 2020-01-01
Payer: MEDICARE

## 2020-01-01 ENCOUNTER — HOSPITAL ENCOUNTER (INPATIENT)
Age: 65
LOS: 4 days | Discharge: NURSING, REHAB OR LONG-TERM CARE | DRG: 442 | End: 2020-06-13
Attending: EMERGENCY MEDICINE | Admitting: INTERNAL MEDICINE
Payer: MEDICARE

## 2020-01-01 ENCOUNTER — APPOINTMENT (OUTPATIENT)
Dept: ULTRASOUND IMAGING | Age: 65
DRG: 441 | End: 2020-01-01
Payer: MEDICARE

## 2020-01-01 ENCOUNTER — APPOINTMENT (OUTPATIENT)
Dept: GENERAL RADIOLOGY | Age: 65
End: 2020-01-01
Payer: MEDICARE

## 2020-01-01 ENCOUNTER — HOSPITAL ENCOUNTER (EMERGENCY)
Age: 65
Discharge: NURSING, REHAB OR LONG-TERM CARE | End: 2020-02-23
Attending: EMERGENCY MEDICINE
Payer: MEDICARE

## 2020-01-01 ENCOUNTER — APPOINTMENT (OUTPATIENT)
Dept: GENERAL RADIOLOGY | Age: 65
DRG: 441 | End: 2020-01-01
Payer: MEDICARE

## 2020-01-01 ENCOUNTER — HOSPITAL ENCOUNTER (INPATIENT)
Age: 65
LOS: 3 days | Discharge: NURSING, REHAB OR LONG-TERM CARE | DRG: 442 | End: 2020-03-24
Attending: EMERGENCY MEDICINE | Admitting: INTERNAL MEDICINE
Payer: MEDICARE

## 2020-01-01 ENCOUNTER — HOSPITAL ENCOUNTER (INPATIENT)
Age: 65
LOS: 4 days | Discharge: HOME | DRG: 442 | End: 2020-07-20
Attending: EMERGENCY MEDICINE | Admitting: INTERNAL MEDICINE
Payer: MEDICARE

## 2020-01-01 ENCOUNTER — HOSPITAL ENCOUNTER (INPATIENT)
Age: 65
LOS: 5 days | Discharge: NURSING, REHAB OR LONG-TERM CARE | DRG: 441 | End: 2020-01-30
Attending: EMERGENCY MEDICINE | Admitting: INTERNAL MEDICINE
Payer: MEDICARE

## 2020-01-01 ENCOUNTER — HOSPITAL ENCOUNTER (INPATIENT)
Age: 65
LOS: 8 days | Discharge: NURSING, REHAB OR LONG-TERM CARE | DRG: 441 | End: 2020-02-17
Attending: EMERGENCY MEDICINE | Admitting: INTERNAL MEDICINE
Payer: MEDICARE

## 2020-01-01 ENCOUNTER — HOSPITAL ENCOUNTER (OUTPATIENT)
Dept: ULTRASOUND IMAGING | Age: 65
Discharge: HOME | End: 2020-05-15
Payer: MEDICARE

## 2020-01-01 VITALS
WEIGHT: 222.66 LBS | TEMPERATURE: 98.6 F | HEIGHT: 65 IN | SYSTOLIC BLOOD PRESSURE: 132 MMHG | BODY MASS INDEX: 37.1 KG/M2 | HEART RATE: 94 BPM | DIASTOLIC BLOOD PRESSURE: 73 MMHG | RESPIRATION RATE: 16 BRPM | OXYGEN SATURATION: 97 %

## 2020-01-01 VITALS
HEART RATE: 90 BPM | HEIGHT: 66 IN | DIASTOLIC BLOOD PRESSURE: 64 MMHG | RESPIRATION RATE: 16 BRPM | BODY MASS INDEX: 29.34 KG/M2 | OXYGEN SATURATION: 99 % | WEIGHT: 182.54 LBS | SYSTOLIC BLOOD PRESSURE: 112 MMHG | TEMPERATURE: 98.3 F

## 2020-01-01 VITALS
WEIGHT: 161.82 LBS | RESPIRATION RATE: 16 BRPM | BODY MASS INDEX: 26.96 KG/M2 | TEMPERATURE: 98.6 F | HEIGHT: 65 IN | DIASTOLIC BLOOD PRESSURE: 65 MMHG | HEART RATE: 88 BPM | OXYGEN SATURATION: 98 % | SYSTOLIC BLOOD PRESSURE: 108 MMHG

## 2020-01-01 VITALS
HEIGHT: 64 IN | DIASTOLIC BLOOD PRESSURE: 56 MMHG | WEIGHT: 163.8 LBS | BODY MASS INDEX: 27.96 KG/M2 | RESPIRATION RATE: 16 BRPM | SYSTOLIC BLOOD PRESSURE: 107 MMHG | TEMPERATURE: 97 F | OXYGEN SATURATION: 98 % | HEART RATE: 96 BPM

## 2020-01-01 VITALS
SYSTOLIC BLOOD PRESSURE: 121 MMHG | DIASTOLIC BLOOD PRESSURE: 58 MMHG | HEIGHT: 66 IN | OXYGEN SATURATION: 98 % | RESPIRATION RATE: 16 BRPM | TEMPERATURE: 98 F | WEIGHT: 175.71 LBS | BODY MASS INDEX: 28.24 KG/M2 | HEART RATE: 92 BPM

## 2020-01-01 VITALS
HEART RATE: 92 BPM | BODY MASS INDEX: 30.34 KG/M2 | DIASTOLIC BLOOD PRESSURE: 75 MMHG | WEIGHT: 177.69 LBS | OXYGEN SATURATION: 97 % | TEMPERATURE: 97.3 F | HEIGHT: 64 IN | SYSTOLIC BLOOD PRESSURE: 132 MMHG | RESPIRATION RATE: 18 BRPM

## 2020-01-01 LAB
A/G RATIO: 0.7 (ref 1.1–2.2)
A/G RATIO: 0.8 (ref 1.1–2.2)
A/G RATIO: 0.9 (ref 1.1–2.2)
A/G RATIO: 1 (ref 1.1–2.2)
ACETAMINOPHEN LEVEL: <5 UG/ML (ref 10–30)
AFP: 3.3 UG/L
ALBUMIN SERPL-MCNC: 2.6 G/DL (ref 3.4–5)
ALBUMIN SERPL-MCNC: 2.7 G/DL (ref 3.4–5)
ALBUMIN SERPL-MCNC: 2.8 G/DL (ref 3.4–5)
ALBUMIN SERPL-MCNC: 2.8 G/DL (ref 3.4–5)
ALBUMIN SERPL-MCNC: 2.9 G/DL (ref 3.4–5)
ALBUMIN SERPL-MCNC: 3 G/DL (ref 3.4–5)
ALBUMIN SERPL-MCNC: 3.1 G/DL (ref 3.4–5)
ALBUMIN SERPL-MCNC: 3.1 G/DL (ref 3.4–5)
ALBUMIN SERPL-MCNC: 3.2 G/DL (ref 3.4–5)
ALBUMIN SERPL-MCNC: 3.4 G/DL (ref 3.4–5)
ALBUMIN SERPL-MCNC: 3.5 G/DL (ref 3.4–5)
ALBUMIN SERPL-MCNC: 3.6 G/DL (ref 3.4–5)
ALBUMIN SERPL-MCNC: 3.6 G/DL (ref 3.4–5)
ALBUMIN SERPL-MCNC: 3.8 G/DL (ref 3.4–5)
ALBUMIN SERPL-MCNC: 3.9 G/DL (ref 3.4–5)
ALBUMIN SERPL-MCNC: 4 G/DL (ref 3.4–5)
ALBUMIN SERPL-MCNC: 4.1 G/DL (ref 3.4–5)
ALBUMIN SERPL-MCNC: 4.2 G/DL (ref 3.4–5)
ALP BLD-CCNC: 169 U/L (ref 40–129)
ALP BLD-CCNC: 173 U/L (ref 40–129)
ALP BLD-CCNC: 184 U/L (ref 40–129)
ALP BLD-CCNC: 185 U/L (ref 40–129)
ALP BLD-CCNC: 190 U/L (ref 40–129)
ALP BLD-CCNC: 194 U/L (ref 40–129)
ALP BLD-CCNC: 202 U/L (ref 40–129)
ALP BLD-CCNC: 204 U/L (ref 40–129)
ALP BLD-CCNC: 204 U/L (ref 40–129)
ALP BLD-CCNC: 212 U/L (ref 40–129)
ALP BLD-CCNC: 215 U/L (ref 40–129)
ALP BLD-CCNC: 217 U/L (ref 40–129)
ALP BLD-CCNC: 218 U/L (ref 40–129)
ALP BLD-CCNC: 236 U/L (ref 40–129)
ALP BLD-CCNC: 241 U/L (ref 40–129)
ALP BLD-CCNC: 251 U/L (ref 40–129)
ALP BLD-CCNC: 257 U/L (ref 40–129)
ALP BLD-CCNC: 262 U/L (ref 40–129)
ALP BLD-CCNC: 271 U/L (ref 40–129)
ALP BLD-CCNC: 301 U/L (ref 40–129)
ALP BLD-CCNC: 313 U/L (ref 40–129)
ALT SERPL-CCNC: 16 U/L (ref 10–40)
ALT SERPL-CCNC: 19 U/L (ref 10–40)
ALT SERPL-CCNC: 19 U/L (ref 10–40)
ALT SERPL-CCNC: 20 U/L (ref 10–40)
ALT SERPL-CCNC: 21 U/L (ref 10–40)
ALT SERPL-CCNC: 21 U/L (ref 10–40)
ALT SERPL-CCNC: 22 U/L (ref 10–40)
ALT SERPL-CCNC: 22 U/L (ref 10–40)
ALT SERPL-CCNC: 24 U/L (ref 10–40)
ALT SERPL-CCNC: 25 U/L (ref 10–40)
ALT SERPL-CCNC: 25 U/L (ref 10–40)
ALT SERPL-CCNC: 26 U/L (ref 10–40)
ALT SERPL-CCNC: 28 U/L (ref 10–40)
ALT SERPL-CCNC: 28 U/L (ref 10–40)
ALT SERPL-CCNC: 29 U/L (ref 10–40)
ALT SERPL-CCNC: 30 U/L (ref 10–40)
ALT SERPL-CCNC: 31 U/L (ref 10–40)
ALT SERPL-CCNC: 35 U/L (ref 10–40)
ALT SERPL-CCNC: 36 U/L (ref 10–40)
AMMONIA: 101 UMOL/L (ref 11–51)
AMMONIA: 102 UMOL/L (ref 11–51)
AMMONIA: 108 UMOL/L (ref 11–51)
AMMONIA: 109 UMOL/L (ref 11–51)
AMMONIA: 110 UMOL/L (ref 11–51)
AMMONIA: 142 UMOL/L (ref 11–51)
AMMONIA: 144 UMOL/L (ref 11–51)
AMMONIA: 199 UMOL/L (ref 11–51)
AMMONIA: 232 UMOL/L (ref 11–51)
AMMONIA: 58 UMOL/L (ref 11–51)
AMMONIA: 60 UMOL/L (ref 11–51)
AMMONIA: 65 UMOL/L (ref 11–51)
AMMONIA: 80 UMOL/L (ref 11–51)
AMMONIA: 83 UMOL/L (ref 11–51)
AMMONIA: 84 UMOL/L (ref 11–51)
AMPHETAMINE SCREEN, URINE: ABNORMAL
AMPHETAMINE SCREEN, URINE: NORMAL
ANION GAP SERPL CALCULATED.3IONS-SCNC: 10 MMOL/L (ref 3–16)
ANION GAP SERPL CALCULATED.3IONS-SCNC: 11 MMOL/L (ref 3–16)
ANION GAP SERPL CALCULATED.3IONS-SCNC: 11 MMOL/L (ref 3–16)
ANION GAP SERPL CALCULATED.3IONS-SCNC: 12 MMOL/L (ref 3–16)
ANION GAP SERPL CALCULATED.3IONS-SCNC: 13 MMOL/L (ref 3–16)
ANION GAP SERPL CALCULATED.3IONS-SCNC: 14 MMOL/L (ref 3–16)
ANION GAP SERPL CALCULATED.3IONS-SCNC: 15 MMOL/L (ref 3–16)
ANION GAP SERPL CALCULATED.3IONS-SCNC: 16 MMOL/L (ref 3–16)
ANION GAP SERPL CALCULATED.3IONS-SCNC: 17 MMOL/L (ref 3–16)
ANION GAP SERPL CALCULATED.3IONS-SCNC: 17 MMOL/L (ref 3–16)
ANION GAP SERPL CALCULATED.3IONS-SCNC: 18 MMOL/L (ref 3–16)
ANION GAP SERPL CALCULATED.3IONS-SCNC: 7 MMOL/L (ref 3–16)
ANION GAP SERPL CALCULATED.3IONS-SCNC: 9 MMOL/L (ref 3–16)
ANION GAP SERPL CALCULATED.3IONS-SCNC: 9 MMOL/L (ref 3–16)
APPEARANCE FLUID: NORMAL
AST SERPL-CCNC: 18 U/L (ref 15–37)
AST SERPL-CCNC: 19 U/L (ref 15–37)
AST SERPL-CCNC: 20 U/L (ref 15–37)
AST SERPL-CCNC: 20 U/L (ref 15–37)
AST SERPL-CCNC: 21 U/L (ref 15–37)
AST SERPL-CCNC: 22 U/L (ref 15–37)
AST SERPL-CCNC: 27 U/L (ref 15–37)
AST SERPL-CCNC: 27 U/L (ref 15–37)
AST SERPL-CCNC: 28 U/L (ref 15–37)
AST SERPL-CCNC: 28 U/L (ref 15–37)
AST SERPL-CCNC: 29 U/L (ref 15–37)
AST SERPL-CCNC: 30 U/L (ref 15–37)
AST SERPL-CCNC: 30 U/L (ref 15–37)
AST SERPL-CCNC: 31 U/L (ref 15–37)
AST SERPL-CCNC: 31 U/L (ref 15–37)
AST SERPL-CCNC: 35 U/L (ref 15–37)
AST SERPL-CCNC: 36 U/L (ref 15–37)
AST SERPL-CCNC: 41 U/L (ref 15–37)
AST SERPL-CCNC: 56 U/L (ref 15–37)
BACTERIA: ABNORMAL /HPF
BARBITURATE SCREEN URINE: ABNORMAL
BARBITURATE SCREEN URINE: NORMAL
BASE EXCESS VENOUS: -0.7 MMOL/L
BASE EXCESS VENOUS: 1.1 MMOL/L
BASOPHILS ABSOLUTE: 0 K/UL (ref 0–0.2)
BASOPHILS ABSOLUTE: ABNORMAL
BASOPHILS RELATIVE PERCENT: 0 %
BASOPHILS RELATIVE PERCENT: 0.1 %
BASOPHILS RELATIVE PERCENT: 0.2 %
BASOPHILS RELATIVE PERCENT: 0.3 %
BASOPHILS RELATIVE PERCENT: 0.3 %
BASOPHILS RELATIVE PERCENT: 0.4 %
BASOPHILS RELATIVE PERCENT: 0.7 %
BASOPHILS RELATIVE PERCENT: 0.7 %
BENZODIAZEPINE SCREEN, URINE: ABNORMAL
BENZODIAZEPINE SCREEN, URINE: NORMAL
BETA-HYDROXYBUTYRATE: 0.14 MMOL/L (ref 0–0.27)
BILIRUB SERPL-MCNC: 0.8 MG/DL (ref 0–1)
BILIRUB SERPL-MCNC: 0.9 MG/DL (ref 0–1)
BILIRUB SERPL-MCNC: 1 MG/DL (ref 0–1)
BILIRUB SERPL-MCNC: 1.1 MG/DL (ref 0–1)
BILIRUB SERPL-MCNC: 1.2 MG/DL (ref 0–1)
BILIRUB SERPL-MCNC: 1.3 MG/DL (ref 0–1)
BILIRUB SERPL-MCNC: 1.4 MG/DL (ref 0–1)
BILIRUB SERPL-MCNC: 1.5 MG/DL (ref 0–1)
BILIRUB SERPL-MCNC: 1.6 MG/DL (ref 0–1)
BILIRUB SERPL-MCNC: 1.6 MG/DL (ref 0–1)
BILIRUB SERPL-MCNC: 1.8 MG/DL (ref 0–1)
BILIRUBIN DIRECT: 0.3 MG/DL (ref 0–0.3)
BILIRUBIN DIRECT: 0.4 MG/DL (ref 0–0.3)
BILIRUBIN DIRECT: 0.4 MG/DL (ref 0–0.3)
BILIRUBIN DIRECT: 0.5 MG/DL (ref 0–0.3)
BILIRUBIN DIRECT: 0.9 MG/DL (ref 0–0.3)
BILIRUBIN URINE: NEGATIVE
BILIRUBIN, INDIRECT: 0.5 MG/DL (ref 0–1)
BILIRUBIN, INDIRECT: 0.6 MG/DL (ref 0–1)
BILIRUBIN, INDIRECT: 0.8 MG/DL (ref 0–1)
BILIRUBIN, INDIRECT: 0.8 MG/DL (ref 0–1)
BILIRUBIN, INDIRECT: 0.9 MG/DL (ref 0–1)
BLOOD CULTURE, ROUTINE: NORMAL
BLOOD, URINE: ABNORMAL
BLOOD, URINE: NEGATIVE
BODY FLUID CULTURE, STERILE: NORMAL
BUN BLDV-MCNC: 13 MG/DL (ref 7–20)
BUN BLDV-MCNC: 15 MG/DL (ref 7–20)
BUN BLDV-MCNC: 18 MG/DL (ref 7–20)
BUN BLDV-MCNC: 19 MG/DL (ref 7–20)
BUN BLDV-MCNC: 20 MG/DL (ref 7–20)
BUN BLDV-MCNC: 22 MG/DL (ref 7–20)
BUN BLDV-MCNC: 22 MG/DL (ref 7–20)
BUN BLDV-MCNC: 24 MG/DL (ref 7–20)
BUN BLDV-MCNC: 27 MG/DL (ref 7–20)
BUN BLDV-MCNC: 27 MG/DL (ref 7–20)
BUN BLDV-MCNC: 28 MG/DL (ref 7–20)
BUN BLDV-MCNC: 34 MG/DL (ref 7–20)
BUN BLDV-MCNC: 36 MG/DL (ref 7–20)
BUN BLDV-MCNC: 6 MG/DL (ref 7–20)
BUN BLDV-MCNC: 7 MG/DL (ref 7–20)
BUN BLDV-MCNC: 7 MG/DL (ref 7–20)
BUN BLDV-MCNC: 8 MG/DL
BUN BLDV-MCNC: 8 MG/DL (ref 7–20)
BUN BLDV-MCNC: 9 MG/DL (ref 7–20)
C-REACTIVE PROTEIN: 6.2
CALCIUM SERPL-MCNC: 8.1 MG/DL (ref 8.3–10.6)
CALCIUM SERPL-MCNC: 8.3 MG/DL (ref 8.3–10.6)
CALCIUM SERPL-MCNC: 8.3 MG/DL (ref 8.3–10.6)
CALCIUM SERPL-MCNC: 8.5 MG/DL (ref 8.3–10.6)
CALCIUM SERPL-MCNC: 8.6 MG/DL (ref 8.3–10.6)
CALCIUM SERPL-MCNC: 8.8 MG/DL
CALCIUM SERPL-MCNC: 8.9 MG/DL (ref 8.3–10.6)
CALCIUM SERPL-MCNC: 9 MG/DL (ref 8.3–10.6)
CALCIUM SERPL-MCNC: 9.1 MG/DL (ref 8.3–10.6)
CALCIUM SERPL-MCNC: 9.2 MG/DL (ref 8.3–10.6)
CALCIUM SERPL-MCNC: 9.3 MG/DL (ref 8.3–10.6)
CALCIUM SERPL-MCNC: 9.3 MG/DL (ref 8.3–10.6)
CALCIUM SERPL-MCNC: 9.4 MG/DL (ref 8.3–10.6)
CALCIUM SERPL-MCNC: 9.4 MG/DL (ref 8.3–10.6)
CALCIUM SERPL-MCNC: 9.5 MG/DL (ref 8.3–10.6)
CALCIUM SERPL-MCNC: 9.5 MG/DL (ref 8.3–10.6)
CALCIUM SERPL-MCNC: 9.7 MG/DL (ref 8.3–10.6)
CALCIUM SERPL-MCNC: 9.7 MG/DL (ref 8.3–10.6)
CALCIUM SERPL-MCNC: 9.8 MG/DL (ref 8.3–10.6)
CANNABINOID SCREEN URINE: ABNORMAL
CANNABINOID SCREEN URINE: NORMAL
CARBOXYHEMOGLOBIN: 0.9 %
CARBOXYHEMOGLOBIN: 1.9 %
CELL COUNT FLUID TYPE: NORMAL
CHLORIDE BLD-SCNC: 101 MMOL/L (ref 99–110)
CHLORIDE BLD-SCNC: 101 MMOL/L (ref 99–110)
CHLORIDE BLD-SCNC: 102 MMOL/L (ref 99–110)
CHLORIDE BLD-SCNC: 102 MMOL/L (ref 99–110)
CHLORIDE BLD-SCNC: 103 MMOL/L (ref 99–110)
CHLORIDE BLD-SCNC: 103 MMOL/L (ref 99–110)
CHLORIDE BLD-SCNC: 104 MMOL/L (ref 99–110)
CHLORIDE BLD-SCNC: 105 MMOL/L (ref 99–110)
CHLORIDE BLD-SCNC: 106 MMOL/L (ref 99–110)
CHLORIDE BLD-SCNC: 107 MMOL/L (ref 99–110)
CHLORIDE BLD-SCNC: 107 MMOL/L (ref 99–110)
CHLORIDE BLD-SCNC: 108 MMOL/L (ref 99–110)
CHLORIDE BLD-SCNC: 108 MMOL/L (ref 99–110)
CHLORIDE BLD-SCNC: 109 MMOL/L (ref 99–110)
CHLORIDE BLD-SCNC: 110 MMOL/L (ref 99–110)
CHLORIDE BLD-SCNC: 110 MMOL/L (ref 99–110)
CHLORIDE BLD-SCNC: 111 MMOL/L (ref 99–110)
CHLORIDE BLD-SCNC: 111 MMOL/L (ref 99–110)
CHLORIDE BLD-SCNC: 112 MMOL/L (ref 99–110)
CHLORIDE BLD-SCNC: 113 MMOL/L
CHLORIDE BLD-SCNC: 114 MMOL/L (ref 99–110)
CHLORIDE BLD-SCNC: 94 MMOL/L (ref 99–110)
CHP ED QC CHECK: NORMAL
CHP ED QC CHECK: NORMAL
CLARITY: ABNORMAL
CLARITY: ABNORMAL
CLARITY: CLEAR
CLOT EVALUATION: NORMAL
CO2: 16 MMOL/L (ref 21–32)
CO2: 18 MMOL/L (ref 21–32)
CO2: 19 MMOL/L (ref 21–32)
CO2: 20 MMOL/L (ref 21–32)
CO2: 21 MMOL/L (ref 21–32)
CO2: 22 MMOL/L
CO2: 22 MMOL/L (ref 21–32)
CO2: 22 MMOL/L (ref 21–32)
CO2: 23 MMOL/L (ref 21–32)
CO2: 24 MMOL/L (ref 21–32)
COCAINE METABOLITE SCREEN URINE: ABNORMAL
COCAINE METABOLITE SCREEN URINE: NORMAL
COLOR FLUID: YELLOW
COLOR: ABNORMAL
COLOR: YELLOW
COMMENT UA: ABNORMAL
CREAT SERPL-MCNC: 0.6 MG/DL (ref 0.6–1.2)
CREAT SERPL-MCNC: 0.6 MG/DL (ref 0.6–1.2)
CREAT SERPL-MCNC: 0.7 MG/DL (ref 0.6–1.2)
CREAT SERPL-MCNC: 0.8 MG/DL (ref 0.6–1.2)
CREAT SERPL-MCNC: 0.9 MG/DL
CREAT SERPL-MCNC: 0.9 MG/DL (ref 0.6–1.2)
CREAT SERPL-MCNC: 1 MG/DL (ref 0.6–1.2)
CREAT SERPL-MCNC: 1.1 MG/DL (ref 0.6–1.2)
CREAT SERPL-MCNC: 1.2 MG/DL (ref 0.6–1.2)
CREAT SERPL-MCNC: 1.3 MG/DL (ref 0.6–1.2)
CREAT SERPL-MCNC: 1.4 MG/DL (ref 0.6–1.2)
CREAT SERPL-MCNC: 1.7 MG/DL (ref 0.6–1.2)
CREAT SERPL-MCNC: 1.8 MG/DL (ref 0.6–1.2)
CULTURE, BLOOD 2: NORMAL
EKG ATRIAL RATE: 100 BPM
EKG ATRIAL RATE: 101 BPM
EKG ATRIAL RATE: 86 BPM
EKG ATRIAL RATE: 93 BPM
EKG ATRIAL RATE: 94 BPM
EKG ATRIAL RATE: 96 BPM
EKG DIAGNOSIS: NORMAL
EKG P AXIS: 20 DEGREES
EKG P AXIS: 34 DEGREES
EKG P AXIS: 39 DEGREES
EKG P AXIS: 40 DEGREES
EKG P AXIS: 44 DEGREES
EKG P AXIS: 64 DEGREES
EKG P-R INTERVAL: 158 MS
EKG P-R INTERVAL: 160 MS
EKG P-R INTERVAL: 164 MS
EKG P-R INTERVAL: 166 MS
EKG P-R INTERVAL: 178 MS
EKG P-R INTERVAL: 180 MS
EKG Q-T INTERVAL: 378 MS
EKG Q-T INTERVAL: 380 MS
EKG Q-T INTERVAL: 382 MS
EKG Q-T INTERVAL: 386 MS
EKG Q-T INTERVAL: 398 MS
EKG Q-T INTERVAL: 406 MS
EKG QRS DURATION: 110 MS
EKG QRS DURATION: 74 MS
EKG QRS DURATION: 74 MS
EKG QRS DURATION: 82 MS
EKG QRS DURATION: 86 MS
EKG QRS DURATION: 94 MS
EKG QTC CALCULATION (BAZETT): 472 MS
EKG QTC CALCULATION (BAZETT): 474 MS
EKG QTC CALCULATION (BAZETT): 481 MS
EKG QTC CALCULATION (BAZETT): 485 MS
EKG QTC CALCULATION (BAZETT): 500 MS
EKG QTC CALCULATION (BAZETT): 513 MS
EKG R AXIS: 17 DEGREES
EKG R AXIS: 22 DEGREES
EKG R AXIS: 22 DEGREES
EKG R AXIS: 24 DEGREES
EKG R AXIS: 37 DEGREES
EKG R AXIS: 56 DEGREES
EKG T AXIS: 51 DEGREES
EKG T AXIS: 53 DEGREES
EKG T AXIS: 63 DEGREES
EKG T AXIS: 64 DEGREES
EKG T AXIS: 65 DEGREES
EKG T AXIS: 83 DEGREES
EKG VENTRICULAR RATE: 100 BPM
EKG VENTRICULAR RATE: 101 BPM
EKG VENTRICULAR RATE: 86 BPM
EKG VENTRICULAR RATE: 93 BPM
EKG VENTRICULAR RATE: 94 BPM
EKG VENTRICULAR RATE: 96 BPM
EOSINOPHILS ABSOLUTE: 0 K/UL (ref 0–0.6)
EOSINOPHILS ABSOLUTE: ABNORMAL
EOSINOPHILS RELATIVE PERCENT: 0 %
EOSINOPHILS RELATIVE PERCENT: 0.1 %
EOSINOPHILS RELATIVE PERCENT: 0.1 %
EPITHELIAL CELLS, UA: 1 /HPF (ref 0–5)
ESTIMATED AVERAGE GLUCOSE: 145.6 MG/DL
ESTIMATED AVERAGE GLUCOSE: 148.5 MG/DL
ESTIMATED AVERAGE GLUCOSE: 174.3 MG/DL
ETHANOL: NORMAL MG/DL (ref 0–0.08)
FLUID PATH CONSULT: YES
GFR AFRICAN AMERICAN: 34
GFR AFRICAN AMERICAN: 37
GFR AFRICAN AMERICAN: 46
GFR AFRICAN AMERICAN: 50
GFR AFRICAN AMERICAN: 55
GFR AFRICAN AMERICAN: >60
GFR CALCULATED: NORMAL
GFR NON-AFRICAN AMERICAN: 28
GFR NON-AFRICAN AMERICAN: 30
GFR NON-AFRICAN AMERICAN: 38
GFR NON-AFRICAN AMERICAN: 41
GFR NON-AFRICAN AMERICAN: 45
GFR NON-AFRICAN AMERICAN: 50
GFR NON-AFRICAN AMERICAN: 56
GFR NON-AFRICAN AMERICAN: >60
GLOBULIN: 3.7 G/DL
GLOBULIN: 3.8 G/DL
GLOBULIN: 3.9 G/DL
GLOBULIN: 3.9 G/DL
GLOBULIN: 4 G/DL
GLOBULIN: 4.1 G/DL
GLOBULIN: 4.2 G/DL
GLOBULIN: 4.5 G/DL
GLOBULIN: 4.6 G/DL
GLOBULIN: 4.7 G/DL
GLUCOSE BLD-MCNC: 102 MG/DL (ref 70–99)
GLUCOSE BLD-MCNC: 118 MG/DL (ref 70–99)
GLUCOSE BLD-MCNC: 121 MG/DL (ref 70–99)
GLUCOSE BLD-MCNC: 122 MG/DL (ref 70–99)
GLUCOSE BLD-MCNC: 123 MG/DL (ref 70–99)
GLUCOSE BLD-MCNC: 131 MG/DL (ref 70–99)
GLUCOSE BLD-MCNC: 133 MG/DL (ref 70–99)
GLUCOSE BLD-MCNC: 138 MG/DL (ref 70–99)
GLUCOSE BLD-MCNC: 138 MG/DL (ref 70–99)
GLUCOSE BLD-MCNC: 142 MG/DL (ref 70–99)
GLUCOSE BLD-MCNC: 146 MG/DL (ref 70–99)
GLUCOSE BLD-MCNC: 149 MG/DL (ref 70–99)
GLUCOSE BLD-MCNC: 150 MG/DL (ref 70–99)
GLUCOSE BLD-MCNC: 150 MG/DL (ref 70–99)
GLUCOSE BLD-MCNC: 152 MG/DL (ref 70–99)
GLUCOSE BLD-MCNC: 153 MG/DL (ref 70–99)
GLUCOSE BLD-MCNC: 155 MG/DL (ref 70–99)
GLUCOSE BLD-MCNC: 156 MG/DL (ref 70–99)
GLUCOSE BLD-MCNC: 157 MG/DL (ref 70–99)
GLUCOSE BLD-MCNC: 160 MG/DL (ref 70–99)
GLUCOSE BLD-MCNC: 162 MG/DL (ref 70–99)
GLUCOSE BLD-MCNC: 163 MG/DL (ref 70–99)
GLUCOSE BLD-MCNC: 164 MG/DL (ref 70–99)
GLUCOSE BLD-MCNC: 165 MG/DL (ref 70–99)
GLUCOSE BLD-MCNC: 165 MG/DL (ref 70–99)
GLUCOSE BLD-MCNC: 167 MG/DL (ref 70–99)
GLUCOSE BLD-MCNC: 167 MG/DL (ref 70–99)
GLUCOSE BLD-MCNC: 169 MG/DL (ref 70–99)
GLUCOSE BLD-MCNC: 169 MG/DL (ref 70–99)
GLUCOSE BLD-MCNC: 172 MG/DL (ref 70–99)
GLUCOSE BLD-MCNC: 172 MG/DL (ref 70–99)
GLUCOSE BLD-MCNC: 173 MG/DL (ref 70–99)
GLUCOSE BLD-MCNC: 174 MG/DL (ref 70–99)
GLUCOSE BLD-MCNC: 176 MG/DL (ref 70–99)
GLUCOSE BLD-MCNC: 177 MG/DL (ref 70–99)
GLUCOSE BLD-MCNC: 178 MG/DL (ref 70–99)
GLUCOSE BLD-MCNC: 178 MG/DL (ref 70–99)
GLUCOSE BLD-MCNC: 179 MG/DL (ref 70–99)
GLUCOSE BLD-MCNC: 181 MG/DL (ref 70–99)
GLUCOSE BLD-MCNC: 184 MG/DL
GLUCOSE BLD-MCNC: 185 MG/DL (ref 70–99)
GLUCOSE BLD-MCNC: 188 MG/DL (ref 70–99)
GLUCOSE BLD-MCNC: 190 MG/DL (ref 70–99)
GLUCOSE BLD-MCNC: 191 MG/DL (ref 70–99)
GLUCOSE BLD-MCNC: 192 MG/DL (ref 70–99)
GLUCOSE BLD-MCNC: 194 MG/DL (ref 70–99)
GLUCOSE BLD-MCNC: 199 MG/DL (ref 70–99)
GLUCOSE BLD-MCNC: 200 MG/DL (ref 70–99)
GLUCOSE BLD-MCNC: 202 MG/DL (ref 70–99)
GLUCOSE BLD-MCNC: 210 MG/DL (ref 70–99)
GLUCOSE BLD-MCNC: 210 MG/DL (ref 70–99)
GLUCOSE BLD-MCNC: 211 MG/DL (ref 70–99)
GLUCOSE BLD-MCNC: 211 MG/DL (ref 70–99)
GLUCOSE BLD-MCNC: 212 MG/DL (ref 70–99)
GLUCOSE BLD-MCNC: 213 MG/DL (ref 70–99)
GLUCOSE BLD-MCNC: 213 MG/DL (ref 70–99)
GLUCOSE BLD-MCNC: 215 MG/DL (ref 70–99)
GLUCOSE BLD-MCNC: 219 MG/DL (ref 70–99)
GLUCOSE BLD-MCNC: 221 MG/DL (ref 70–99)
GLUCOSE BLD-MCNC: 222 MG/DL (ref 70–99)
GLUCOSE BLD-MCNC: 227 MG/DL (ref 70–99)
GLUCOSE BLD-MCNC: 229 MG/DL (ref 70–99)
GLUCOSE BLD-MCNC: 232 MG/DL (ref 70–99)
GLUCOSE BLD-MCNC: 236 MG/DL (ref 70–99)
GLUCOSE BLD-MCNC: 236 MG/DL (ref 70–99)
GLUCOSE BLD-MCNC: 237 MG/DL (ref 70–99)
GLUCOSE BLD-MCNC: 237 MG/DL (ref 70–99)
GLUCOSE BLD-MCNC: 239 MG/DL (ref 70–99)
GLUCOSE BLD-MCNC: 239 MG/DL (ref 70–99)
GLUCOSE BLD-MCNC: 240 MG/DL (ref 70–99)
GLUCOSE BLD-MCNC: 241 MG/DL (ref 70–99)
GLUCOSE BLD-MCNC: 241 MG/DL (ref 70–99)
GLUCOSE BLD-MCNC: 242 MG/DL (ref 70–99)
GLUCOSE BLD-MCNC: 243 MG/DL (ref 70–99)
GLUCOSE BLD-MCNC: 244 MG/DL (ref 70–99)
GLUCOSE BLD-MCNC: 245 MG/DL (ref 70–99)
GLUCOSE BLD-MCNC: 245 MG/DL (ref 70–99)
GLUCOSE BLD-MCNC: 247 MG/DL (ref 70–99)
GLUCOSE BLD-MCNC: 247 MG/DL (ref 70–99)
GLUCOSE BLD-MCNC: 248 MG/DL (ref 70–99)
GLUCOSE BLD-MCNC: 248 MG/DL (ref 70–99)
GLUCOSE BLD-MCNC: 252 MG/DL (ref 70–99)
GLUCOSE BLD-MCNC: 253 MG/DL (ref 70–99)
GLUCOSE BLD-MCNC: 255 MG/DL (ref 70–99)
GLUCOSE BLD-MCNC: 259 MG/DL (ref 70–99)
GLUCOSE BLD-MCNC: 263 MG/DL (ref 70–99)
GLUCOSE BLD-MCNC: 263 MG/DL (ref 70–99)
GLUCOSE BLD-MCNC: 274 MG/DL (ref 70–99)
GLUCOSE BLD-MCNC: 281 MG/DL (ref 70–99)
GLUCOSE BLD-MCNC: 281 MG/DL (ref 70–99)
GLUCOSE BLD-MCNC: 286 MG/DL (ref 70–99)
GLUCOSE BLD-MCNC: 286 MG/DL (ref 70–99)
GLUCOSE BLD-MCNC: 290 MG/DL (ref 70–99)
GLUCOSE BLD-MCNC: 292 MG/DL (ref 70–99)
GLUCOSE BLD-MCNC: 293 MG/DL (ref 70–99)
GLUCOSE BLD-MCNC: 296 MG/DL (ref 70–99)
GLUCOSE BLD-MCNC: 299 MG/DL (ref 70–99)
GLUCOSE BLD-MCNC: 300 MG/DL (ref 70–99)
GLUCOSE BLD-MCNC: 300 MG/DL (ref 70–99)
GLUCOSE BLD-MCNC: 301 MG/DL (ref 70–99)
GLUCOSE BLD-MCNC: 304 MG/DL (ref 70–99)
GLUCOSE BLD-MCNC: 306 MG/DL (ref 70–99)
GLUCOSE BLD-MCNC: 318 MG/DL (ref 70–99)
GLUCOSE BLD-MCNC: 320 MG/DL (ref 70–99)
GLUCOSE BLD-MCNC: 320 MG/DL (ref 70–99)
GLUCOSE BLD-MCNC: 323 MG/DL (ref 70–99)
GLUCOSE BLD-MCNC: 326 MG/DL (ref 70–99)
GLUCOSE BLD-MCNC: 331 MG/DL (ref 70–99)
GLUCOSE BLD-MCNC: 375 MG/DL (ref 70–99)
GLUCOSE BLD-MCNC: 376 MG/DL (ref 70–99)
GLUCOSE BLD-MCNC: 412 MG/DL (ref 70–99)
GLUCOSE BLD-MCNC: 413 MG/DL (ref 70–99)
GLUCOSE BLD-MCNC: 500 MG/DL (ref 70–99)
GLUCOSE BLD-MCNC: 508 MG/DL (ref 70–99)
GLUCOSE BLD-MCNC: 89 MG/DL (ref 70–99)
GLUCOSE BLD-MCNC: 90 MG/DL (ref 70–99)
GLUCOSE BLD-MCNC: 91 MG/DL (ref 70–99)
GLUCOSE URINE: >=1000 MG/DL
GLUCOSE URINE: NEGATIVE MG/DL
GRAM STAIN RESULT: NORMAL
HBA1C MFR BLD: 6.7 %
HBA1C MFR BLD: 6.8 %
HBA1C MFR BLD: 7.7 %
HCO3 VENOUS: 22 MMOL/L (ref 23–29)
HCO3 VENOUS: 27 MMOL/L (ref 23–29)
HCT VFR BLD CALC: 28.2 % (ref 36–48)
HCT VFR BLD CALC: 28.8 % (ref 36–48)
HCT VFR BLD CALC: 28.8 % (ref 36–48)
HCT VFR BLD CALC: 29 % (ref 36–48)
HCT VFR BLD CALC: 29.3 % (ref 36–48)
HCT VFR BLD CALC: 29.9 % (ref 36–48)
HCT VFR BLD CALC: 30 % (ref 36–48)
HCT VFR BLD CALC: 30.5 % (ref 36–48)
HCT VFR BLD CALC: 30.5 % (ref 36–48)
HCT VFR BLD CALC: 30.8 % (ref 36–46)
HCT VFR BLD CALC: 31.2 % (ref 36–48)
HCT VFR BLD CALC: 31.3 % (ref 36–48)
HCT VFR BLD CALC: 31.4 % (ref 36–48)
HCT VFR BLD CALC: 31.9 % (ref 36–48)
HCT VFR BLD CALC: 32 % (ref 36–48)
HCT VFR BLD CALC: 32 % (ref 36–48)
HCT VFR BLD CALC: 32.3 % (ref 36–48)
HCT VFR BLD CALC: 32.3 % (ref 36–48)
HCT VFR BLD CALC: 32.4 % (ref 36–48)
HCT VFR BLD CALC: 32.6 % (ref 36–48)
HCT VFR BLD CALC: 32.9 % (ref 36–48)
HCT VFR BLD CALC: 33.1 % (ref 36–48)
HCT VFR BLD CALC: 33.9 % (ref 36–48)
HCT VFR BLD CALC: 34.3 % (ref 36–48)
HCT VFR BLD CALC: 34.3 % (ref 36–48)
HCT VFR BLD CALC: 34.6 % (ref 36–48)
HEMOGLOBIN: 10 G/DL (ref 12–16)
HEMOGLOBIN: 10.2 G/DL (ref 12–16)
HEMOGLOBIN: 10.3 G/DL (ref 12–16)
HEMOGLOBIN: 10.3 G/DL (ref 12–16)
HEMOGLOBIN: 10.4 G/DL (ref 12–16)
HEMOGLOBIN: 10.4 G/DL (ref 12–16)
HEMOGLOBIN: 10.5 G/DL (ref 12–16)
HEMOGLOBIN: 10.5 G/DL (ref 12–16)
HEMOGLOBIN: 10.7 G/DL (ref 12–16)
HEMOGLOBIN: 10.8 G/DL (ref 12–16)
HEMOGLOBIN: 10.9 G/DL (ref 12–16)
HEMOGLOBIN: 10.9 G/DL (ref 12–16)
HEMOGLOBIN: 11 G/DL (ref 12–16)
HEMOGLOBIN: 11.1 G/DL (ref 12–16)
HEMOGLOBIN: 11.2 G/DL (ref 12–16)
HEMOGLOBIN: 11.3 G/DL (ref 12–16)
HEMOGLOBIN: 11.4 G/DL (ref 12–16)
HEMOGLOBIN: 11.5 G/DL (ref 12–16)
HEMOGLOBIN: 11.6 G/DL (ref 12–16)
HEMOGLOBIN: 11.8 G/DL (ref 12–16)
HEMOGLOBIN: 9.6 G/DL (ref 12–16)
HEMOGLOBIN: 9.7 G/DL (ref 12–16)
HEMOGLOBIN: 9.8 G/DL (ref 12–16)
HEMOGLOBIN: 9.9 G/DL (ref 12–16)
HYALINE CASTS: 0 /LPF (ref 0–8)
HYALINE CASTS: 1 /LPF (ref 0–8)
HYALINE CASTS: 5 /LPF (ref 0–8)
INR BLD: 1.46 (ref 0.86–1.14)
INR BLD: 1.56 (ref 0.86–1.14)
INR BLD: 1.6 (ref 0.86–1.14)
INR BLD: 1.6 (ref 0.86–1.14)
INR BLD: 1.63 (ref 0.86–1.14)
KETONES, URINE: NEGATIVE MG/DL
L. PNEUMOPHILA SEROGP 1 UR AG: NORMAL
LACTIC ACID, SEPSIS: 1.2 MMOL/L (ref 0.4–1.9)
LACTIC ACID, SEPSIS: 1.4 MMOL/L (ref 0.4–1.9)
LACTIC ACID, SEPSIS: 1.4 MMOL/L (ref 0.4–1.9)
LACTIC ACID, SEPSIS: 1.5 MMOL/L (ref 0.4–1.9)
LACTIC ACID, SEPSIS: 1.6 MMOL/L (ref 0.4–1.9)
LACTIC ACID, SEPSIS: 1.7 MMOL/L (ref 0.4–1.9)
LACTIC ACID, SEPSIS: 2 MMOL/L (ref 0.4–1.9)
LACTIC ACID, SEPSIS: 2.4 MMOL/L (ref 0.4–1.9)
LACTIC ACID: 1.6 MMOL/L (ref 0.4–2)
LACTIC ACID: 1.9 MMOL/L (ref 0.4–2)
LACTIC ACID: 2 MMOL/L (ref 0.4–2)
LACTIC ACID: 2 MMOL/L (ref 0.4–2)
LACTIC ACID: 2.4 MMOL/L (ref 0.4–2)
LEUKOCYTE ESTERASE, URINE: ABNORMAL
LEUKOCYTE ESTERASE, URINE: ABNORMAL
LEUKOCYTE ESTERASE, URINE: NEGATIVE
LIPASE: 18 U/L (ref 13–60)
LIPASE: 27 U/L (ref 13–60)
LIPASE: 43 U/L (ref 13–60)
LIPASE: 52 U/L (ref 13–60)
LYMPHOCYTES ABSOLUTE: 0.3 K/UL (ref 1–5.1)
LYMPHOCYTES ABSOLUTE: 0.4 /ΜL
LYMPHOCYTES ABSOLUTE: 0.4 K/UL (ref 1–5.1)
LYMPHOCYTES ABSOLUTE: 0.5 K/UL (ref 1–5.1)
LYMPHOCYTES ABSOLUTE: 0.6 K/UL (ref 1–5.1)
LYMPHOCYTES ABSOLUTE: 0.7 K/UL (ref 1–5.1)
LYMPHOCYTES ABSOLUTE: 0.7 K/UL (ref 1–5.1)
LYMPHOCYTES RELATIVE PERCENT: 10.7 %
LYMPHOCYTES RELATIVE PERCENT: 11.3 %
LYMPHOCYTES RELATIVE PERCENT: 11.5 %
LYMPHOCYTES RELATIVE PERCENT: 13.4 %
LYMPHOCYTES RELATIVE PERCENT: 14 %
LYMPHOCYTES RELATIVE PERCENT: 14.6 %
LYMPHOCYTES RELATIVE PERCENT: 15.3 %
LYMPHOCYTES RELATIVE PERCENT: 15.5 %
LYMPHOCYTES RELATIVE PERCENT: 15.6 %
LYMPHOCYTES RELATIVE PERCENT: 17.2 %
LYMPHOCYTES RELATIVE PERCENT: 17.6 %
LYMPHOCYTES RELATIVE PERCENT: 18.2 %
LYMPHOCYTES RELATIVE PERCENT: 18.7 %
LYMPHOCYTES RELATIVE PERCENT: 19.6 %
LYMPHOCYTES RELATIVE PERCENT: 20.6 %
LYMPHOCYTES RELATIVE PERCENT: 22.7 %
LYMPHOCYTES RELATIVE PERCENT: 23.1 %
LYMPHOCYTES RELATIVE PERCENT: 24.1 %
LYMPHOCYTES RELATIVE PERCENT: 24.2 %
LYMPHOCYTES, BODY FLUID: 16 %
Lab: ABNORMAL
Lab: NORMAL
MACROPHAGE FLUID: 57 %
MAGNESIUM: 1.9 MG/DL (ref 1.8–2.4)
MAGNESIUM: 2 MG/DL (ref 1.8–2.4)
MAGNESIUM: 2.4 MG/DL (ref 1.8–2.4)
MCH RBC QN AUTO: 30 PG (ref 26–34)
MCH RBC QN AUTO: 30.4 PG (ref 26–34)
MCH RBC QN AUTO: 30.6 PG (ref 26–34)
MCH RBC QN AUTO: 30.7 PG (ref 26–34)
MCH RBC QN AUTO: 30.9 PG (ref 26–34)
MCH RBC QN AUTO: 31 PG (ref 26–34)
MCH RBC QN AUTO: 31.1 PG (ref 26–34)
MCH RBC QN AUTO: 31.2 PG (ref 26–34)
MCH RBC QN AUTO: 31.3 PG (ref 26–34)
MCH RBC QN AUTO: 31.4 PG (ref 26–34)
MCH RBC QN AUTO: 31.5 PG (ref 26–34)
MCH RBC QN AUTO: 31.5 PG (ref 26–34)
MCH RBC QN AUTO: 31.7 PG
MCH RBC QN AUTO: 32.1 PG (ref 26–34)
MCHC RBC AUTO-ENTMCNC: 33.1 G/DL (ref 31–36)
MCHC RBC AUTO-ENTMCNC: 33.3 G/DL (ref 31–36)
MCHC RBC AUTO-ENTMCNC: 33.3 G/DL (ref 31–36)
MCHC RBC AUTO-ENTMCNC: 33.4 G/DL (ref 31–36)
MCHC RBC AUTO-ENTMCNC: 33.5 G/DL (ref 31–36)
MCHC RBC AUTO-ENTMCNC: 33.6 G/DL (ref 31–36)
MCHC RBC AUTO-ENTMCNC: 33.6 G/DL (ref 31–36)
MCHC RBC AUTO-ENTMCNC: 33.8 G/DL
MCHC RBC AUTO-ENTMCNC: 33.8 G/DL (ref 31–36)
MCHC RBC AUTO-ENTMCNC: 33.8 G/DL (ref 31–36)
MCHC RBC AUTO-ENTMCNC: 33.9 G/DL (ref 31–36)
MCHC RBC AUTO-ENTMCNC: 34 G/DL (ref 31–36)
MCHC RBC AUTO-ENTMCNC: 34.1 G/DL (ref 31–36)
MCHC RBC AUTO-ENTMCNC: 34.2 G/DL (ref 31–36)
MCHC RBC AUTO-ENTMCNC: 34.2 G/DL (ref 31–36)
MCHC RBC AUTO-ENTMCNC: 34.3 G/DL (ref 31–36)
MCHC RBC AUTO-ENTMCNC: 34.3 G/DL (ref 31–36)
MCHC RBC AUTO-ENTMCNC: 34.4 G/DL (ref 31–36)
MCHC RBC AUTO-ENTMCNC: 34.4 G/DL (ref 31–36)
MCHC RBC AUTO-ENTMCNC: 34.5 G/DL (ref 31–36)
MCV RBC AUTO: 90.1 FL (ref 80–100)
MCV RBC AUTO: 90.5 FL (ref 80–100)
MCV RBC AUTO: 90.8 FL (ref 80–100)
MCV RBC AUTO: 90.8 FL (ref 80–100)
MCV RBC AUTO: 90.9 FL (ref 80–100)
MCV RBC AUTO: 91.1 FL (ref 80–100)
MCV RBC AUTO: 91.3 FL (ref 80–100)
MCV RBC AUTO: 91.4 FL (ref 80–100)
MCV RBC AUTO: 91.6 FL (ref 80–100)
MCV RBC AUTO: 91.6 FL (ref 80–100)
MCV RBC AUTO: 91.7 FL (ref 80–100)
MCV RBC AUTO: 91.8 FL (ref 80–100)
MCV RBC AUTO: 92 FL (ref 80–100)
MCV RBC AUTO: 92 FL (ref 80–100)
MCV RBC AUTO: 92.1 FL (ref 80–100)
MCV RBC AUTO: 92.2 FL (ref 80–100)
MCV RBC AUTO: 92.3 FL (ref 80–100)
MCV RBC AUTO: 92.4 FL (ref 80–100)
MCV RBC AUTO: 92.7 FL (ref 80–100)
MCV RBC AUTO: 92.8 FL (ref 80–100)
MCV RBC AUTO: 93.2 FL (ref 80–100)
MCV RBC AUTO: 93.4 FL (ref 80–100)
MCV RBC AUTO: 93.7 FL
MCV RBC AUTO: 94 FL (ref 80–100)
METHADONE SCREEN, URINE: ABNORMAL
METHADONE SCREEN, URINE: NORMAL
METHEMOGLOBIN VENOUS: 0.3 %
METHEMOGLOBIN VENOUS: 0.5 %
MICROSCOPIC EXAMINATION: NORMAL
MICROSCOPIC EXAMINATION: YES
MONOCYTES ABSOLUTE: 0.2 /ΜL
MONOCYTES ABSOLUTE: 0.2 K/UL (ref 0–1.3)
MONOCYTES ABSOLUTE: 0.3 K/UL (ref 0–1.3)
MONOCYTES ABSOLUTE: 0.4 K/UL (ref 0–1.3)
MONOCYTES ABSOLUTE: 0.4 K/UL (ref 0–1.3)
MONOCYTES RELATIVE PERCENT: 10.2 %
MONOCYTES RELATIVE PERCENT: 10.6 %
MONOCYTES RELATIVE PERCENT: 11 %
MONOCYTES RELATIVE PERCENT: 11.7 %
MONOCYTES RELATIVE PERCENT: 5.5 %
MONOCYTES RELATIVE PERCENT: 6.9 %
MONOCYTES RELATIVE PERCENT: 7.3 %
MONOCYTES RELATIVE PERCENT: 7.5 %
MONOCYTES RELATIVE PERCENT: 7.6 %
MONOCYTES RELATIVE PERCENT: 7.8 %
MONOCYTES RELATIVE PERCENT: 8.1 %
MONOCYTES RELATIVE PERCENT: 8.1 %
MONOCYTES RELATIVE PERCENT: 8.5 %
MONOCYTES RELATIVE PERCENT: 8.8 %
MONOCYTES RELATIVE PERCENT: 8.9 %
MONOCYTES RELATIVE PERCENT: 8.9 %
MONOCYTES RELATIVE PERCENT: 9.2 %
MONOCYTES RELATIVE PERCENT: 9.4 %
MONOCYTES RELATIVE PERCENT: 9.5 %
MONONUCLEAR UNIDENTIFIED CELLS FLUID: 2 %
NEUTROPHIL, FLUID: 25 %
NEUTROPHILS ABSOLUTE: 1.3 /ΜL
NEUTROPHILS ABSOLUTE: 1.6 K/UL (ref 1.7–7.7)
NEUTROPHILS ABSOLUTE: 1.7 K/UL (ref 1.7–7.7)
NEUTROPHILS ABSOLUTE: 1.8 K/UL (ref 1.7–7.7)
NEUTROPHILS ABSOLUTE: 1.8 K/UL (ref 1.7–7.7)
NEUTROPHILS ABSOLUTE: 2.2 K/UL (ref 1.7–7.7)
NEUTROPHILS ABSOLUTE: 2.2 K/UL (ref 1.7–7.7)
NEUTROPHILS ABSOLUTE: 2.3 K/UL (ref 1.7–7.7)
NEUTROPHILS ABSOLUTE: 2.4 K/UL (ref 1.7–7.7)
NEUTROPHILS ABSOLUTE: 2.4 K/UL (ref 1.7–7.7)
NEUTROPHILS ABSOLUTE: 2.6 K/UL (ref 1.7–7.7)
NEUTROPHILS ABSOLUTE: 2.7 K/UL (ref 1.7–7.7)
NEUTROPHILS ABSOLUTE: 2.8 K/UL (ref 1.7–7.7)
NEUTROPHILS ABSOLUTE: 2.8 K/UL (ref 1.7–7.7)
NEUTROPHILS ABSOLUTE: 3 K/UL (ref 1.7–7.7)
NEUTROPHILS ABSOLUTE: 3 K/UL (ref 1.7–7.7)
NEUTROPHILS ABSOLUTE: 3.5 K/UL (ref 1.7–7.7)
NEUTROPHILS RELATIVE PERCENT: 66.1 %
NEUTROPHILS RELATIVE PERCENT: 66.8 %
NEUTROPHILS RELATIVE PERCENT: 67.7 %
NEUTROPHILS RELATIVE PERCENT: 67.8 %
NEUTROPHILS RELATIVE PERCENT: 69.3 %
NEUTROPHILS RELATIVE PERCENT: 69.9 %
NEUTROPHILS RELATIVE PERCENT: 71.8 %
NEUTROPHILS RELATIVE PERCENT: 74.1 %
NEUTROPHILS RELATIVE PERCENT: 74.2 %
NEUTROPHILS RELATIVE PERCENT: 74.2 %
NEUTROPHILS RELATIVE PERCENT: 76.4 %
NEUTROPHILS RELATIVE PERCENT: 76.7 %
NEUTROPHILS RELATIVE PERCENT: 76.7 %
NEUTROPHILS RELATIVE PERCENT: 77.3 %
NEUTROPHILS RELATIVE PERCENT: 77.5 %
NEUTROPHILS RELATIVE PERCENT: 77.5 %
NEUTROPHILS RELATIVE PERCENT: 78.2 %
NEUTROPHILS RELATIVE PERCENT: 78.3 %
NEUTROPHILS RELATIVE PERCENT: 82 %
NITRITE, URINE: NEGATIVE
NRBC FLUID: 1 %
NUCLEATED CELLS FLUID: 374 /CUMM
NUMBER OF CELLS COUNTED FLUID: 100
O2 CONTENT, VEN: 13 ML/DL
O2 CONTENT, VEN: 14 ML/DL
O2 SAT, VEN: 87 %
O2 SAT, VEN: 89 %
O2 THERAPY: ABNORMAL
O2 THERAPY: NORMAL
OPIATE SCREEN URINE: ABNORMAL
OPIATE SCREEN URINE: NORMAL
ORGANISM: ABNORMAL
OXYCODONE URINE: NORMAL
OXYCODONE URINE: POSITIVE
PCO2, VEN: 31.2 MMHG (ref 40–50)
PCO2, VEN: 46.8 MMHG (ref 40–50)
PDW BLD-RTO: 14.7 % (ref 12.4–15.4)
PDW BLD-RTO: 14.7 % (ref 12.4–15.4)
PDW BLD-RTO: 14.8 % (ref 12.4–15.4)
PDW BLD-RTO: 14.8 % (ref 12.4–15.4)
PDW BLD-RTO: 15 % (ref 12.4–15.4)
PDW BLD-RTO: 15.1 % (ref 12.4–15.4)
PDW BLD-RTO: 15.2 % (ref 12.4–15.4)
PDW BLD-RTO: 15.3 % (ref 12.4–15.4)
PDW BLD-RTO: 15.3 % (ref 12.4–15.4)
PDW BLD-RTO: 15.4 % (ref 12.4–15.4)
PDW BLD-RTO: 15.5 % (ref 12.4–15.4)
PDW BLD-RTO: 15.5 % (ref 12.4–15.4)
PDW BLD-RTO: 15.6 % (ref 12.4–15.4)
PDW BLD-RTO: 15.7 % (ref 12.4–15.4)
PDW BLD-RTO: 15.8 % (ref 12.4–15.4)
PDW BLD-RTO: 15.8 % (ref 12.4–15.4)
PDW BLD-RTO: 15.9 % (ref 12.4–15.4)
PDW BLD-RTO: 16 % (ref 12.4–15.4)
PERFORMED ON: ABNORMAL
PERFORMED ON: NORMAL
PERFORMED ON: NORMAL
PH UA: 5
PH UA: 5 (ref 5–8)
PH UA: 5.5 (ref 5–8)
PH UA: 5.5 (ref 5–8)
PH UA: 6 (ref 5–8)
PH UA: 6 (ref 5–8)
PH UA: 6.5
PH UA: 6.5 (ref 5–8)
PH VENOUS: 7.37 (ref 7.35–7.45)
PH VENOUS: 7.46 (ref 7.35–7.45)
PHENCYCLIDINE SCREEN URINE: ABNORMAL
PHENCYCLIDINE SCREEN URINE: NORMAL
PHOSPHORUS: 1.8 MG/DL (ref 2.5–4.9)
PHOSPHORUS: 2.1 MG/DL (ref 2.5–4.9)
PHOSPHORUS: 2.5 MG/DL (ref 2.5–4.9)
PHOSPHORUS: 2.5 MG/DL (ref 2.5–4.9)
PHOSPHORUS: 2.7 MG/DL (ref 2.5–4.9)
PHOSPHORUS: 2.8 MG/DL (ref 2.5–4.9)
PHOSPHORUS: 3.2 MG/DL (ref 2.5–4.9)
PHOSPHORUS: 3.3 MG/DL (ref 2.5–4.9)
PHOSPHORUS: 3.7 MG/DL (ref 2.5–4.9)
PHOSPHORUS: 4.1 MG/DL (ref 2.5–4.9)
PHOSPHORUS: 4.4 MG/DL (ref 2.5–4.9)
PLATELET # BLD: 47 K/UL (ref 135–450)
PLATELET # BLD: 47 K/UL (ref 135–450)
PLATELET # BLD: 49 K/UL (ref 135–450)
PLATELET # BLD: 50 K/UL (ref 135–450)
PLATELET # BLD: 52 K/UL (ref 135–450)
PLATELET # BLD: 52 K/UL (ref 135–450)
PLATELET # BLD: 53 K/UL (ref 135–450)
PLATELET # BLD: 54 K/UL (ref 135–450)
PLATELET # BLD: 58 K/UL (ref 135–450)
PLATELET # BLD: 58 K/UL (ref 135–450)
PLATELET # BLD: 62 K/UL (ref 135–450)
PLATELET # BLD: 64 K/UL (ref 135–450)
PLATELET # BLD: 64 K/UL (ref 135–450)
PLATELET # BLD: 66 K/UL (ref 135–450)
PLATELET # BLD: 66 K/ΜL
PLATELET # BLD: 68 K/UL (ref 135–450)
PLATELET # BLD: 69 K/UL (ref 135–450)
PLATELET # BLD: 69 K/UL (ref 135–450)
PLATELET # BLD: 71 K/UL (ref 135–450)
PLATELET # BLD: 72 K/UL (ref 135–450)
PLATELET # BLD: 72 K/UL (ref 135–450)
PLATELET # BLD: 74 K/UL (ref 135–450)
PLATELET # BLD: 75 K/UL (ref 135–450)
PLATELET # BLD: 76 K/UL (ref 135–450)
PLATELET # BLD: 77 K/UL (ref 135–450)
PLATELET # BLD: 79 K/UL (ref 135–450)
PLATELET SLIDE REVIEW: ABNORMAL
PLATELET SLIDE REVIEW: ABNORMAL
PMV BLD AUTO: 7.8 FL (ref 5–10.5)
PMV BLD AUTO: 7.9 FL (ref 5–10.5)
PMV BLD AUTO: 8 FL (ref 5–10.5)
PMV BLD AUTO: 8 FL (ref 5–10.5)
PMV BLD AUTO: 8.1 FL (ref 5–10.5)
PMV BLD AUTO: 8.1 FL (ref 5–10.5)
PMV BLD AUTO: 8.2 FL (ref 5–10.5)
PMV BLD AUTO: 8.4 FL (ref 5–10.5)
PMV BLD AUTO: 8.4 FL (ref 5–10.5)
PMV BLD AUTO: 8.5 FL (ref 5–10.5)
PMV BLD AUTO: 8.6 FL (ref 5–10.5)
PMV BLD AUTO: 8.7 FL (ref 5–10.5)
PMV BLD AUTO: 8.9 FL
PMV BLD AUTO: 8.9 FL (ref 5–10.5)
PMV BLD AUTO: 9 FL (ref 5–10.5)
PMV BLD AUTO: 9 FL (ref 5–10.5)
PMV BLD AUTO: 9.1 FL (ref 5–10.5)
PMV BLD AUTO: 9.2 FL (ref 5–10.5)
PMV BLD AUTO: 9.2 FL (ref 5–10.5)
PMV BLD AUTO: 9.4 FL (ref 5–10.5)
PMV BLD AUTO: 9.5 FL (ref 5–10.5)
PMV BLD AUTO: 9.6 FL (ref 5–10.5)
PO2, VEN: 49 MMHG
PO2, VEN: 57 MMHG
POTASSIUM REFLEX MAGNESIUM: 3.4 MMOL/L (ref 3.5–5.1)
POTASSIUM REFLEX MAGNESIUM: 3.7 MMOL/L (ref 3.5–5.1)
POTASSIUM REFLEX MAGNESIUM: 3.8 MMOL/L (ref 3.5–5.1)
POTASSIUM REFLEX MAGNESIUM: 3.8 MMOL/L (ref 3.5–5.1)
POTASSIUM REFLEX MAGNESIUM: 4 MMOL/L (ref 3.5–5.1)
POTASSIUM REFLEX MAGNESIUM: 4.1 MMOL/L (ref 3.5–5.1)
POTASSIUM REFLEX MAGNESIUM: 4.2 MMOL/L (ref 3.5–5.1)
POTASSIUM REFLEX MAGNESIUM: 4.3 MMOL/L (ref 3.5–5.1)
POTASSIUM REFLEX MAGNESIUM: 4.5 MMOL/L (ref 3.5–5.1)
POTASSIUM SERPL-SCNC: 3 MMOL/L (ref 3.5–5.1)
POTASSIUM SERPL-SCNC: 3.2 MMOL/L (ref 3.5–5.1)
POTASSIUM SERPL-SCNC: 3.5 MMOL/L (ref 3.5–5.1)
POTASSIUM SERPL-SCNC: 3.5 MMOL/L (ref 3.5–5.1)
POTASSIUM SERPL-SCNC: 3.8 MMOL/L (ref 3.5–5.1)
POTASSIUM SERPL-SCNC: 4 MMOL/L (ref 3.5–5.1)
POTASSIUM SERPL-SCNC: 4.1 MMOL/L (ref 3.5–5.1)
POTASSIUM SERPL-SCNC: 4.1 MMOL/L (ref 3.5–5.1)
POTASSIUM SERPL-SCNC: 4.2 MMOL/L (ref 3.5–5.1)
POTASSIUM SERPL-SCNC: 4.2 MMOL/L (ref 3.5–5.1)
POTASSIUM SERPL-SCNC: 4.3 MMOL/L (ref 3.5–5.1)
POTASSIUM SERPL-SCNC: 4.9 MMOL/L
POTASSIUM SERPL-SCNC: 5.8 MMOL/L (ref 3.5–5.1)
PROCALCITONIN: 0.1 NG/ML (ref 0–0.15)
PROCALCITONIN: 0.12 NG/ML (ref 0–0.15)
PROPOXYPHENE SCREEN: ABNORMAL
PROPOXYPHENE SCREEN: NORMAL
PROTEIN UA: ABNORMAL MG/DL
PROTEIN UA: NEGATIVE MG/DL
PROTHROMBIN TIME: 17 SEC (ref 10–13.2)
PROTHROMBIN TIME: 18.2 SEC (ref 10–13.2)
PROTHROMBIN TIME: 18.6 SEC (ref 10–13.2)
PROTHROMBIN TIME: 18.6 SEC (ref 10–13.2)
PROTHROMBIN TIME: 19 SEC (ref 10–13.2)
RBC # BLD: 3.07 M/UL (ref 4–5.2)
RBC # BLD: 3.11 M/UL (ref 4–5.2)
RBC # BLD: 3.11 M/UL (ref 4–5.2)
RBC # BLD: 3.15 M/UL (ref 4–5.2)
RBC # BLD: 3.19 M/UL (ref 4–5.2)
RBC # BLD: 3.2 M/UL (ref 4–5.2)
RBC # BLD: 3.28 M/UL (ref 4–5.2)
RBC # BLD: 3.29 10^6/ΜL
RBC # BLD: 3.33 M/UL (ref 4–5.2)
RBC # BLD: 3.33 M/UL (ref 4–5.2)
RBC # BLD: 3.36 M/UL (ref 4–5.2)
RBC # BLD: 3.39 M/UL (ref 4–5.2)
RBC # BLD: 3.44 M/UL (ref 4–5.2)
RBC # BLD: 3.49 M/UL (ref 4–5.2)
RBC # BLD: 3.51 M/UL (ref 4–5.2)
RBC # BLD: 3.51 M/UL (ref 4–5.2)
RBC # BLD: 3.53 M/UL (ref 4–5.2)
RBC # BLD: 3.54 M/UL (ref 4–5.2)
RBC # BLD: 3.55 M/UL (ref 4–5.2)
RBC # BLD: 3.57 M/UL (ref 4–5.2)
RBC # BLD: 3.57 M/UL (ref 4–5.2)
RBC # BLD: 3.61 M/UL (ref 4–5.2)
RBC # BLD: 3.68 M/UL (ref 4–5.2)
RBC # BLD: 3.74 M/UL (ref 4–5.2)
RBC # BLD: 3.76 M/UL (ref 4–5.2)
RBC # BLD: 3.77 M/UL (ref 4–5.2)
RBC FLUID: 1759 /CUMM
RBC UA: 1 /HPF (ref 0–4)
RBC UA: 1 /HPF (ref 0–4)
RBC UA: ABNORMAL /HPF (ref 0–4)
REASON FOR REJECTION: NORMAL
REASON FOR REJECTION: NORMAL
REJECTED TEST: NORMAL
REJECTED TEST: NORMAL
REPORT: NORMAL
SARS-COV-2: NOT DETECTED
SEDIMENTATION RATE, ERYTHROCYTE: 51
SLIDE REVIEW: ABNORMAL
SLIDE REVIEW: ABNORMAL
SODIUM BLD-SCNC: 132 MMOL/L (ref 136–145)
SODIUM BLD-SCNC: 135 MMOL/L (ref 136–145)
SODIUM BLD-SCNC: 136 MMOL/L (ref 136–145)
SODIUM BLD-SCNC: 137 MMOL/L (ref 136–145)
SODIUM BLD-SCNC: 137 MMOL/L (ref 136–145)
SODIUM BLD-SCNC: 138 MMOL/L (ref 136–145)
SODIUM BLD-SCNC: 139 MMOL/L (ref 136–145)
SODIUM BLD-SCNC: 140 MMOL/L (ref 136–145)
SODIUM BLD-SCNC: 140 MMOL/L (ref 136–145)
SODIUM BLD-SCNC: 141 MMOL/L (ref 136–145)
SODIUM BLD-SCNC: 142 MMOL/L
SODIUM BLD-SCNC: 142 MMOL/L (ref 136–145)
SODIUM BLD-SCNC: 143 MMOL/L (ref 136–145)
SODIUM BLD-SCNC: 144 MMOL/L (ref 136–145)
SODIUM BLD-SCNC: 145 MMOL/L (ref 136–145)
SODIUM BLD-SCNC: 145 MMOL/L (ref 136–145)
SODIUM BLD-SCNC: 146 MMOL/L (ref 136–145)
SODIUM BLD-SCNC: 147 MMOL/L (ref 136–145)
SODIUM BLD-SCNC: 148 MMOL/L (ref 136–145)
SPECIFIC GRAVITY UA: 1.01 (ref 1–1.03)
SPECIFIC GRAVITY UA: 1.02 (ref 1–1.03)
STREP PNEUMONIAE ANTIGEN, URINE: NORMAL
TACROLIMUS BLOOD: 1.9 NG/ML (ref 5–20)
TACROLIMUS BLOOD: 2.2 NG/ML (ref 5–20)
TACROLIMUS BLOOD: 2.3 NG/ML (ref 5–20)
TACROLIMUS BLOOD: 3.1 NG/ML (ref 5–20)
TACROLIMUS BLOOD: 8.8 NG/ML (ref 5–20)
TCO2 CALC VENOUS: 23 MMOL/L
TCO2 CALC VENOUS: 28 MMOL/L
THIS TEST SENT TO: NORMAL
TOTAL CK: 21 U/L (ref 26–192)
TOTAL CK: 24 U/L (ref 26–192)
TOTAL CK: 27 U/L (ref 26–192)
TOTAL CK: 33 U/L (ref 26–192)
TOTAL CK: 35 U/L (ref 26–192)
TOTAL PROTEIN: 6.5 G/DL (ref 6.4–8.2)
TOTAL PROTEIN: 6.8 G/DL (ref 6.4–8.2)
TOTAL PROTEIN: 7.1 G/DL (ref 6.4–8.2)
TOTAL PROTEIN: 7.2 G/DL (ref 6.4–8.2)
TOTAL PROTEIN: 7.2 G/DL (ref 6.4–8.2)
TOTAL PROTEIN: 7.3 G/DL (ref 6.4–8.2)
TOTAL PROTEIN: 7.4 G/DL (ref 6.4–8.2)
TOTAL PROTEIN: 7.5 G/DL (ref 6.4–8.2)
TOTAL PROTEIN: 7.5 G/DL (ref 6.4–8.2)
TOTAL PROTEIN: 7.6 G/DL (ref 6.4–8.2)
TOTAL PROTEIN: 7.6 G/DL (ref 6.4–8.2)
TOTAL PROTEIN: 7.7 G/DL (ref 6.4–8.2)
TOTAL PROTEIN: 8 G/DL (ref 6.4–8.2)
TOTAL PROTEIN: 8.1 G/DL (ref 6.4–8.2)
TOTAL PROTEIN: 8.1 G/DL (ref 6.4–8.2)
TOTAL PROTEIN: 8.3 G/DL (ref 6.4–8.2)
TOTAL PROTEIN: 8.7 G/DL (ref 6.4–8.2)
TROPONIN: <0.01 NG/ML
TSH REFLEX: 1.09 UIU/ML (ref 0.27–4.2)
TSH SERPL DL<=0.05 MIU/L-ACNC: 0.16 UIU/ML (ref 0.27–4.2)
URINE CULTURE, ROUTINE: ABNORMAL
URINE CULTURE, ROUTINE: NORMAL
URINE REFLEX TO CULTURE: ABNORMAL
URINE REFLEX TO CULTURE: NORMAL
URINE REFLEX TO CULTURE: YES
URINE REFLEX TO CULTURE: YES
URINE TYPE: ABNORMAL
URINE TYPE: NORMAL
UROBILINOGEN, URINE: 0.2 E.U./DL
VANCOMYCIN TROUGH: 13.1 UG/ML (ref 10–20)
VITAMIN B-12: 810 PG/ML (ref 211–911)
VOLUME: 60 ML
WBC # BLD: 1.9 10^3/ML
WBC # BLD: 2.2 K/UL (ref 4–11)
WBC # BLD: 2.3 K/UL (ref 4–11)
WBC # BLD: 2.3 K/UL (ref 4–11)
WBC # BLD: 2.4 K/UL (ref 4–11)
WBC # BLD: 2.5 K/UL (ref 4–11)
WBC # BLD: 2.7 K/UL (ref 4–11)
WBC # BLD: 2.8 K/UL (ref 4–11)
WBC # BLD: 3 K/UL (ref 4–11)
WBC # BLD: 3 K/UL (ref 4–11)
WBC # BLD: 3.1 K/UL (ref 4–11)
WBC # BLD: 3.1 K/UL (ref 4–11)
WBC # BLD: 3.2 K/UL (ref 4–11)
WBC # BLD: 3.2 K/UL (ref 4–11)
WBC # BLD: 3.4 K/UL (ref 4–11)
WBC # BLD: 3.5 K/UL (ref 4–11)
WBC # BLD: 3.6 K/UL (ref 4–11)
WBC # BLD: 3.7 K/UL (ref 4–11)
WBC # BLD: 3.9 K/UL (ref 4–11)
WBC # BLD: 4.5 K/UL (ref 4–11)
WBC UA: 1 /HPF (ref 0–5)
WBC UA: 131 /HPF (ref 0–5)
WBC UA: 32 /HPF (ref 0–5)
YEAST: PRESENT /HPF

## 2020-01-01 PROCEDURE — 97530 THERAPEUTIC ACTIVITIES: CPT

## 2020-01-01 PROCEDURE — 99285 EMERGENCY DEPT VISIT HI MDM: CPT

## 2020-01-01 PROCEDURE — 82140 ASSAY OF AMMONIA: CPT

## 2020-01-01 PROCEDURE — C9113 INJ PANTOPRAZOLE SODIUM, VIA: HCPCS | Performed by: INTERNAL MEDICINE

## 2020-01-01 PROCEDURE — 83605 ASSAY OF LACTIC ACID: CPT

## 2020-01-01 PROCEDURE — 85025 COMPLETE CBC W/AUTO DIFF WBC: CPT

## 2020-01-01 PROCEDURE — 83690 ASSAY OF LIPASE: CPT

## 2020-01-01 PROCEDURE — 87086 URINE CULTURE/COLONY COUNT: CPT

## 2020-01-01 PROCEDURE — 6370000000 HC RX 637 (ALT 250 FOR IP): Performed by: INTERNAL MEDICINE

## 2020-01-01 PROCEDURE — 92610 EVALUATE SWALLOWING FUNCTION: CPT

## 2020-01-01 PROCEDURE — 2580000003 HC RX 258: Performed by: INTERNAL MEDICINE

## 2020-01-01 PROCEDURE — 99232 SBSQ HOSP IP/OBS MODERATE 35: CPT | Performed by: INTERNAL MEDICINE

## 2020-01-01 PROCEDURE — 81003 URINALYSIS AUTO W/O SCOPE: CPT

## 2020-01-01 PROCEDURE — 96365 THER/PROPH/DIAG IV INF INIT: CPT

## 2020-01-01 PROCEDURE — 84100 ASSAY OF PHOSPHORUS: CPT

## 2020-01-01 PROCEDURE — 80076 HEPATIC FUNCTION PANEL: CPT

## 2020-01-01 PROCEDURE — 80053 COMPREHEN METABOLIC PANEL: CPT

## 2020-01-01 PROCEDURE — 6370000000 HC RX 637 (ALT 250 FOR IP): Performed by: PHYSICIAN ASSISTANT

## 2020-01-01 PROCEDURE — 94760 N-INVAS EAR/PLS OXIMETRY 1: CPT

## 2020-01-01 PROCEDURE — 1200000000 HC SEMI PRIVATE

## 2020-01-01 PROCEDURE — 80197 ASSAY OF TACROLIMUS: CPT

## 2020-01-01 PROCEDURE — 97535 SELF CARE MNGMENT TRAINING: CPT

## 2020-01-01 PROCEDURE — 6370000000 HC RX 637 (ALT 250 FOR IP): Performed by: NURSE PRACTITIONER

## 2020-01-01 PROCEDURE — 84443 ASSAY THYROID STIM HORMONE: CPT

## 2020-01-01 PROCEDURE — 74176 CT ABD & PELVIS W/O CONTRAST: CPT

## 2020-01-01 PROCEDURE — 94660 CPAP INITIATION&MGMT: CPT

## 2020-01-01 PROCEDURE — 36415 COLL VENOUS BLD VENIPUNCTURE: CPT

## 2020-01-01 PROCEDURE — 6360000002 HC RX W HCPCS: Performed by: EMERGENCY MEDICINE

## 2020-01-01 PROCEDURE — 6360000002 HC RX W HCPCS: Performed by: INTERNAL MEDICINE

## 2020-01-01 PROCEDURE — 2709999900 US GUIDED PARACENTESIS

## 2020-01-01 PROCEDURE — 97162 PT EVAL MOD COMPLEX 30 MIN: CPT

## 2020-01-01 PROCEDURE — 83735 ASSAY OF MAGNESIUM: CPT

## 2020-01-01 PROCEDURE — 80048 BASIC METABOLIC PNL TOTAL CA: CPT

## 2020-01-01 PROCEDURE — 85027 COMPLETE CBC AUTOMATED: CPT

## 2020-01-01 PROCEDURE — 82550 ASSAY OF CK (CPK): CPT

## 2020-01-01 PROCEDURE — 70450 CT HEAD/BRAIN W/O DYE: CPT

## 2020-01-01 PROCEDURE — 80069 RENAL FUNCTION PANEL: CPT

## 2020-01-01 PROCEDURE — 97129 THER IVNTJ 1ST 15 MIN: CPT

## 2020-01-01 PROCEDURE — 76937 US GUIDE VASCULAR ACCESS: CPT

## 2020-01-01 PROCEDURE — 02HV33Z INSERTION OF INFUSION DEVICE INTO SUPERIOR VENA CAVA, PERCUTANEOUS APPROACH: ICD-10-PCS | Performed by: FAMILY MEDICINE

## 2020-01-01 PROCEDURE — 36569 INSJ PICC 5 YR+ W/O IMAGING: CPT

## 2020-01-01 PROCEDURE — 93005 ELECTROCARDIOGRAM TRACING: CPT | Performed by: PHYSICIAN ASSISTANT

## 2020-01-01 PROCEDURE — 93005 ELECTROCARDIOGRAM TRACING: CPT | Performed by: EMERGENCY MEDICINE

## 2020-01-01 PROCEDURE — G0480 DRUG TEST DEF 1-7 CLASSES: HCPCS

## 2020-01-01 PROCEDURE — 82607 VITAMIN B-12: CPT

## 2020-01-01 PROCEDURE — 96367 TX/PROPH/DG ADDL SEQ IV INF: CPT

## 2020-01-01 PROCEDURE — 97166 OT EVAL MOD COMPLEX 45 MIN: CPT

## 2020-01-01 PROCEDURE — 93010 ELECTROCARDIOGRAM REPORT: CPT | Performed by: INTERNAL MEDICINE

## 2020-01-01 PROCEDURE — 85610 PROTHROMBIN TIME: CPT

## 2020-01-01 PROCEDURE — 51702 INSERT TEMP BLADDER CATH: CPT

## 2020-01-01 PROCEDURE — 99221 1ST HOSP IP/OBS SF/LOW 40: CPT | Performed by: SURGERY

## 2020-01-01 PROCEDURE — 2580000003 HC RX 258: Performed by: NURSE PRACTITIONER

## 2020-01-01 PROCEDURE — 6360000002 HC RX W HCPCS: Performed by: NURSE PRACTITIONER

## 2020-01-01 PROCEDURE — 71045 X-RAY EXAM CHEST 1 VIEW: CPT

## 2020-01-01 PROCEDURE — 80202 ASSAY OF VANCOMYCIN: CPT

## 2020-01-01 PROCEDURE — 97163 PT EVAL HIGH COMPLEX 45 MIN: CPT

## 2020-01-01 PROCEDURE — 6370000000 HC RX 637 (ALT 250 FOR IP): Performed by: FAMILY MEDICINE

## 2020-01-01 PROCEDURE — 96374 THER/PROPH/DIAG INJ IV PUSH: CPT

## 2020-01-01 PROCEDURE — 96366 THER/PROPH/DIAG IV INF ADDON: CPT

## 2020-01-01 PROCEDURE — 74018 RADEX ABDOMEN 1 VIEW: CPT

## 2020-01-01 PROCEDURE — 6370000000 HC RX 637 (ALT 250 FOR IP): Performed by: EMERGENCY MEDICINE

## 2020-01-01 PROCEDURE — 6370000000 HC RX 637 (ALT 250 FOR IP): Performed by: HOSPITALIST

## 2020-01-01 PROCEDURE — 87449 NOS EACH ORGANISM AG IA: CPT

## 2020-01-01 PROCEDURE — 84484 ASSAY OF TROPONIN QUANT: CPT

## 2020-01-01 PROCEDURE — 73030 X-RAY EXAM OF SHOULDER: CPT

## 2020-01-01 PROCEDURE — 89051 BODY FLUID CELL COUNT: CPT

## 2020-01-01 PROCEDURE — 93976 VASCULAR STUDY: CPT

## 2020-01-01 PROCEDURE — P9612 CATHETERIZE FOR URINE SPEC: HCPCS

## 2020-01-01 PROCEDURE — 6360000002 HC RX W HCPCS: Performed by: PHYSICIAN ASSISTANT

## 2020-01-01 PROCEDURE — 76705 ECHO EXAM OF ABDOMEN: CPT

## 2020-01-01 PROCEDURE — 2500000003 HC RX 250 WO HCPCS: Performed by: INTERNAL MEDICINE

## 2020-01-01 PROCEDURE — 2580000003 HC RX 258: Performed by: PHYSICIAN ASSISTANT

## 2020-01-01 PROCEDURE — 73630 X-RAY EXAM OF FOOT: CPT

## 2020-01-01 PROCEDURE — 80307 DRUG TEST PRSMV CHEM ANLYZR: CPT

## 2020-01-01 PROCEDURE — 87186 SC STD MICRODIL/AGAR DIL: CPT

## 2020-01-01 PROCEDURE — 87077 CULTURE AEROBIC IDENTIFY: CPT

## 2020-01-01 PROCEDURE — 87040 BLOOD CULTURE FOR BACTERIA: CPT

## 2020-01-01 PROCEDURE — 99233 SBSQ HOSP IP/OBS HIGH 50: CPT | Performed by: INTERNAL MEDICINE

## 2020-01-01 PROCEDURE — 2700000000 HC OXYGEN THERAPY PER DAY

## 2020-01-01 PROCEDURE — C1751 CATH, INF, PER/CENT/MIDLINE: HCPCS

## 2020-01-01 PROCEDURE — 99223 1ST HOSP IP/OBS HIGH 75: CPT | Performed by: INTERNAL MEDICINE

## 2020-01-01 PROCEDURE — 96376 TX/PRO/DX INJ SAME DRUG ADON: CPT

## 2020-01-01 PROCEDURE — 82010 KETONE BODYS QUAN: CPT

## 2020-01-01 PROCEDURE — 81001 URINALYSIS AUTO W/SCOPE: CPT

## 2020-01-01 PROCEDURE — C1729 CATH, DRAINAGE: HCPCS

## 2020-01-01 PROCEDURE — 87205 SMEAR GRAM STAIN: CPT

## 2020-01-01 PROCEDURE — 0W9G3ZX DRAINAGE OF PERITONEAL CAVITY, PERCUTANEOUS APPROACH, DIAGNOSTIC: ICD-10-PCS | Performed by: RADIOLOGY

## 2020-01-01 PROCEDURE — 83036 HEMOGLOBIN GLYCOSYLATED A1C: CPT

## 2020-01-01 PROCEDURE — 87070 CULTURE OTHR SPECIMN AEROBIC: CPT

## 2020-01-01 PROCEDURE — 96360 HYDRATION IV INFUSION INIT: CPT

## 2020-01-01 PROCEDURE — 82803 BLOOD GASES ANY COMBINATION: CPT

## 2020-01-01 PROCEDURE — 84145 PROCALCITONIN (PCT): CPT

## 2020-01-01 PROCEDURE — 2580000003 HC RX 258: Performed by: EMERGENCY MEDICINE

## 2020-01-01 PROCEDURE — 82105 ALPHA-FETOPROTEIN SERUM: CPT

## 2020-01-01 PROCEDURE — 0W9G3ZZ DRAINAGE OF PERITONEAL CAVITY, PERCUTANEOUS APPROACH: ICD-10-PCS | Performed by: RADIOLOGY

## 2020-01-01 PROCEDURE — 96372 THER/PROPH/DIAG INJ SC/IM: CPT

## 2020-01-01 PROCEDURE — P9047 ALBUMIN (HUMAN), 25%, 50ML: HCPCS | Performed by: INTERNAL MEDICINE

## 2020-01-01 PROCEDURE — U0003 INFECTIOUS AGENT DETECTION BY NUCLEIC ACID (DNA OR RNA); SEVERE ACUTE RESPIRATORY SYNDROME CORONAVIRUS 2 (SARS-COV-2) (CORONAVIRUS DISEASE [COVID-19]), AMPLIFIED PROBE TECHNIQUE, MAKING USE OF HIGH THROUGHPUT TECHNOLOGIES AS DESCRIBED BY CMS-2020-01-R: HCPCS

## 2020-01-01 PROCEDURE — 84132 ASSAY OF SERUM POTASSIUM: CPT

## 2020-01-01 PROCEDURE — 92526 ORAL FUNCTION THERAPY: CPT

## 2020-01-01 PROCEDURE — 71046 X-RAY EXAM CHEST 2 VIEWS: CPT

## 2020-01-01 PROCEDURE — 94761 N-INVAS EAR/PLS OXIMETRY MLT: CPT

## 2020-01-01 PROCEDURE — 73560 X-RAY EXAM OF KNEE 1 OR 2: CPT

## 2020-01-01 RX ORDER — LACTULOSE 10 G/15ML
20 SOLUTION ORAL 3 TIMES DAILY
Status: DISCONTINUED | OUTPATIENT
Start: 2020-01-01 | End: 2020-01-01

## 2020-01-01 RX ORDER — INSULIN GLARGINE 100 [IU]/ML
30 INJECTION, SOLUTION SUBCUTANEOUS NIGHTLY
Status: DISCONTINUED | OUTPATIENT
Start: 2020-01-01 | End: 2020-01-01 | Stop reason: HOSPADM

## 2020-01-01 RX ORDER — FUROSEMIDE 40 MG/1
40 TABLET ORAL DAILY
Qty: 60 TABLET | Refills: 3 | Status: ON HOLD | OUTPATIENT
Start: 2020-01-01 | End: 2020-01-01 | Stop reason: HOSPADM

## 2020-01-01 RX ORDER — OLANZAPINE 10 MG/1
10 TABLET ORAL NIGHTLY
Status: DISCONTINUED | OUTPATIENT
Start: 2020-01-01 | End: 2020-01-01 | Stop reason: HOSPADM

## 2020-01-01 RX ORDER — ONDANSETRON 2 MG/ML
4 INJECTION INTRAMUSCULAR; INTRAVENOUS EVERY 6 HOURS PRN
Status: DISCONTINUED | OUTPATIENT
Start: 2020-01-01 | End: 2020-01-01 | Stop reason: HOSPADM

## 2020-01-01 RX ORDER — LEVOTHYROXINE SODIUM ANHYDROUS 100 UG/5ML
37.5 INJECTION, POWDER, LYOPHILIZED, FOR SOLUTION INTRAVENOUS DAILY
Status: DISCONTINUED | OUTPATIENT
Start: 2020-01-01 | End: 2020-01-01 | Stop reason: ALTCHOICE

## 2020-01-01 RX ORDER — SPIRONOLACTONE 100 MG/1
100 TABLET, FILM COATED ORAL DAILY
Qty: 30 TABLET | Refills: 3 | Status: ON HOLD | OUTPATIENT
Start: 2020-01-01 | End: 2020-01-01 | Stop reason: HOSPADM

## 2020-01-01 RX ORDER — LACTULOSE 10 G/15ML
20 SOLUTION ORAL EVERY 6 HOURS
Qty: 1 BOTTLE | Refills: 1 | Status: ON HOLD | OUTPATIENT
Start: 2020-01-01 | End: 2020-01-01 | Stop reason: SDUPTHER

## 2020-01-01 RX ORDER — INSULIN GLARGINE 100 [IU]/ML
35 INJECTION, SOLUTION SUBCUTANEOUS NIGHTLY
Status: DISCONTINUED | OUTPATIENT
Start: 2020-01-01 | End: 2020-01-01

## 2020-01-01 RX ORDER — FUROSEMIDE 40 MG/1
40 TABLET ORAL 2 TIMES DAILY
Status: DISCONTINUED | OUTPATIENT
Start: 2020-01-01 | End: 2020-01-01

## 2020-01-01 RX ORDER — DEXTROSE MONOHYDRATE 25 G/50ML
12.5 INJECTION, SOLUTION INTRAVENOUS PRN
Status: DISCONTINUED | OUTPATIENT
Start: 2020-01-01 | End: 2020-01-01 | Stop reason: HOSPADM

## 2020-01-01 RX ORDER — POTASSIUM CHLORIDE 7.45 MG/ML
10 INJECTION INTRAVENOUS PRN
Status: DISCONTINUED | OUTPATIENT
Start: 2020-01-01 | End: 2020-01-01 | Stop reason: HOSPADM

## 2020-01-01 RX ORDER — LAMOTRIGINE 100 MG/1
100 TABLET ORAL 2 TIMES DAILY
Status: DISCONTINUED | OUTPATIENT
Start: 2020-01-01 | End: 2020-01-01 | Stop reason: HOSPADM

## 2020-01-01 RX ORDER — LEVOTHYROXINE SODIUM 112 UG/1
112 TABLET ORAL DAILY
Status: DISCONTINUED | OUTPATIENT
Start: 2020-01-01 | End: 2020-01-01

## 2020-01-01 RX ORDER — 0.9 % SODIUM CHLORIDE 0.9 %
500 INTRAVENOUS SOLUTION INTRAVENOUS ONCE
Status: COMPLETED | OUTPATIENT
Start: 2020-01-01 | End: 2020-01-01

## 2020-01-01 RX ORDER — LACTULOSE 10 G/15ML
20 SOLUTION ORAL
Status: DISCONTINUED | OUTPATIENT
Start: 2020-01-01 | End: 2020-01-01

## 2020-01-01 RX ORDER — INSULIN GLARGINE 100 [IU]/ML
10 INJECTION, SOLUTION SUBCUTANEOUS NIGHTLY
Status: DISCONTINUED | OUTPATIENT
Start: 2020-01-01 | End: 2020-01-01

## 2020-01-01 RX ORDER — FUROSEMIDE 40 MG/1
40 TABLET ORAL DAILY
Status: CANCELLED | OUTPATIENT
Start: 2020-01-01

## 2020-01-01 RX ORDER — INSULIN GLARGINE 100 [IU]/ML
14 INJECTION, SOLUTION SUBCUTANEOUS NIGHTLY
Status: DISCONTINUED | OUTPATIENT
Start: 2020-01-01 | End: 2020-01-01 | Stop reason: HOSPADM

## 2020-01-01 RX ORDER — SPIRONOLACTONE 25 MG/1
50 TABLET ORAL DAILY
Status: DISCONTINUED | OUTPATIENT
Start: 2020-01-01 | End: 2020-01-01 | Stop reason: HOSPADM

## 2020-01-01 RX ORDER — NYSTATIN 100000 U/G
CREAM TOPICAL 2 TIMES DAILY
Status: DISCONTINUED | OUTPATIENT
Start: 2020-01-01 | End: 2020-01-01

## 2020-01-01 RX ORDER — NYSTATIN 100000 [USP'U]/G
POWDER TOPICAL 3 TIMES DAILY
COMMUNITY
End: 2020-01-01

## 2020-01-01 RX ORDER — SPIRONOLACTONE 100 MG/1
100 TABLET, FILM COATED ORAL DAILY
Status: DISCONTINUED | OUTPATIENT
Start: 2020-01-01 | End: 2020-01-01 | Stop reason: HOSPADM

## 2020-01-01 RX ORDER — SODIUM CHLORIDE 0.9 % (FLUSH) 0.9 %
10 SYRINGE (ML) INJECTION PRN
Status: DISCONTINUED | OUTPATIENT
Start: 2020-01-01 | End: 2020-01-01 | Stop reason: HOSPADM

## 2020-01-01 RX ORDER — PROMETHAZINE HYDROCHLORIDE 25 MG/1
12.5 TABLET ORAL EVERY 6 HOURS PRN
Status: DISCONTINUED | OUTPATIENT
Start: 2020-01-01 | End: 2020-01-01 | Stop reason: HOSPADM

## 2020-01-01 RX ORDER — NALOXONE HYDROCHLORIDE 0.4 MG/ML
0.4 INJECTION, SOLUTION INTRAMUSCULAR; INTRAVENOUS; SUBCUTANEOUS PRN
Status: DISCONTINUED | OUTPATIENT
Start: 2020-01-01 | End: 2020-01-01 | Stop reason: HOSPADM

## 2020-01-01 RX ORDER — SODIUM CHLORIDE 0.9 % (FLUSH) 0.9 %
10 SYRINGE (ML) INJECTION EVERY 12 HOURS SCHEDULED
Status: DISCONTINUED | OUTPATIENT
Start: 2020-01-01 | End: 2020-01-01 | Stop reason: HOSPADM

## 2020-01-01 RX ORDER — SODIUM CHLORIDE, SODIUM LACTATE, POTASSIUM CHLORIDE, CALCIUM CHLORIDE 600; 310; 30; 20 MG/100ML; MG/100ML; MG/100ML; MG/100ML
1300 INJECTION, SOLUTION INTRAVENOUS ONCE
Status: COMPLETED | OUTPATIENT
Start: 2020-01-01 | End: 2020-01-01

## 2020-01-01 RX ORDER — DEXTROSE MONOHYDRATE 50 MG/ML
100 INJECTION, SOLUTION INTRAVENOUS PRN
Status: DISCONTINUED | OUTPATIENT
Start: 2020-01-01 | End: 2020-01-01 | Stop reason: HOSPADM

## 2020-01-01 RX ORDER — OLANZAPINE 10 MG/1
10 TABLET ORAL NIGHTLY
Qty: 30 TABLET | Refills: 1 | Status: SHIPPED | OUTPATIENT
Start: 2020-01-01

## 2020-01-01 RX ORDER — LEVOTHYROXINE SODIUM 112 UG/1
112 TABLET ORAL
Status: DISCONTINUED | OUTPATIENT
Start: 2020-01-01 | End: 2020-01-01 | Stop reason: HOSPADM

## 2020-01-01 RX ORDER — LEVOTHYROXINE SODIUM 112 UG/1
112 TABLET ORAL DAILY
Status: DISCONTINUED | OUTPATIENT
Start: 2020-01-01 | End: 2020-01-01 | Stop reason: HOSPADM

## 2020-01-01 RX ORDER — NALOXONE HYDROCHLORIDE 1 MG/ML
2 INJECTION INTRAMUSCULAR; INTRAVENOUS; SUBCUTANEOUS ONCE
Status: COMPLETED | OUTPATIENT
Start: 2020-01-01 | End: 2020-01-01

## 2020-01-01 RX ORDER — DEXTROSE MONOHYDRATE 25 G/50ML
12.5 INJECTION, SOLUTION INTRAVENOUS PRN
Status: DISCONTINUED | OUTPATIENT
Start: 2020-01-01 | End: 2020-01-01

## 2020-01-01 RX ORDER — TORSEMIDE 100 MG/1
20 TABLET ORAL DAILY
Qty: 30 TABLET | Refills: 3 | Status: SHIPPED | OUTPATIENT
Start: 2020-01-01

## 2020-01-01 RX ORDER — TACROLIMUS 0.5 MG/1
0.5 CAPSULE ORAL DAILY
Status: DISCONTINUED | OUTPATIENT
Start: 2020-01-01 | End: 2020-01-01 | Stop reason: HOSPADM

## 2020-01-01 RX ORDER — INSULIN GLARGINE 100 [IU]/ML
25 INJECTION, SOLUTION SUBCUTANEOUS 2 TIMES DAILY
Qty: 1 VIAL | Refills: 3 | Status: SHIPPED | OUTPATIENT
Start: 2020-01-01 | End: 2020-01-01

## 2020-01-01 RX ORDER — SODIUM CHLORIDE 9 MG/ML
INJECTION, SOLUTION INTRAVENOUS CONTINUOUS
Status: DISCONTINUED | OUTPATIENT
Start: 2020-01-01 | End: 2020-01-01

## 2020-01-01 RX ORDER — LACTULOSE 10 G/15ML
20 SOLUTION ORAL EVERY 6 HOURS
Status: DISCONTINUED | OUTPATIENT
Start: 2020-01-01 | End: 2020-01-01

## 2020-01-01 RX ORDER — ACETAMINOPHEN 500 MG
1000 TABLET ORAL EVERY 12 HOURS PRN
Status: DISCONTINUED | OUTPATIENT
Start: 2020-01-01 | End: 2020-01-01 | Stop reason: HOSPADM

## 2020-01-01 RX ORDER — ACETAMINOPHEN 325 MG/1
650 TABLET ORAL EVERY 6 HOURS PRN
Status: DISCONTINUED | OUTPATIENT
Start: 2020-01-01 | End: 2020-01-01 | Stop reason: HOSPADM

## 2020-01-01 RX ORDER — LIDOCAINE 4 G/G
1 PATCH TOPICAL DAILY
Status: DISCONTINUED | OUTPATIENT
Start: 2020-01-01 | End: 2020-01-01 | Stop reason: HOSPADM

## 2020-01-01 RX ORDER — INSULIN GLARGINE 100 [IU]/ML
50 INJECTION, SOLUTION SUBCUTANEOUS NIGHTLY
Status: DISCONTINUED | OUTPATIENT
Start: 2020-01-01 | End: 2020-01-01 | Stop reason: HOSPADM

## 2020-01-01 RX ORDER — INSULIN GLARGINE 100 [IU]/ML
12 INJECTION, SOLUTION SUBCUTANEOUS NIGHTLY
Status: DISCONTINUED | OUTPATIENT
Start: 2020-01-01 | End: 2020-01-01

## 2020-01-01 RX ORDER — OLANZAPINE 2.5 MG/1
2.5 TABLET ORAL NIGHTLY
Status: DISCONTINUED | OUTPATIENT
Start: 2020-01-01 | End: 2020-01-01

## 2020-01-01 RX ORDER — ACETAMINOPHEN 650 MG/1
650 SUPPOSITORY RECTAL EVERY 6 HOURS PRN
Status: DISCONTINUED | OUTPATIENT
Start: 2020-01-01 | End: 2020-01-01 | Stop reason: HOSPADM

## 2020-01-01 RX ORDER — OXYCODONE AND ACETAMINOPHEN 10; 325 MG/1; MG/1
1 TABLET ORAL EVERY 4 HOURS PRN
Status: DISCONTINUED | OUTPATIENT
Start: 2020-01-01 | End: 2020-01-01 | Stop reason: HOSPADM

## 2020-01-01 RX ORDER — LEVOTHYROXINE SODIUM 20 UG/ML
50 INJECTION, SOLUTION INTRAVENOUS DAILY
Status: DISCONTINUED | OUTPATIENT
Start: 2020-01-01 | End: 2020-01-01

## 2020-01-01 RX ORDER — FUROSEMIDE 10 MG/ML
40 INJECTION INTRAMUSCULAR; INTRAVENOUS DAILY
Status: DISCONTINUED | OUTPATIENT
Start: 2020-01-01 | End: 2020-01-01

## 2020-01-01 RX ORDER — SPIRONOLACTONE 25 MG/1
50 TABLET ORAL DAILY
Status: DISCONTINUED | OUTPATIENT
Start: 2020-01-01 | End: 2020-01-01

## 2020-01-01 RX ORDER — TACROLIMUS 0.5 MG/1
0.5 CAPSULE ORAL 2 TIMES DAILY
Status: DISCONTINUED | OUTPATIENT
Start: 2020-01-01 | End: 2020-01-01 | Stop reason: HOSPADM

## 2020-01-01 RX ORDER — INSULIN GLARGINE 100 [IU]/ML
40 INJECTION, SOLUTION SUBCUTANEOUS 2 TIMES DAILY
Status: DISCONTINUED | OUTPATIENT
Start: 2020-01-01 | End: 2020-01-01

## 2020-01-01 RX ORDER — SODIUM POLYSTYRENE SULFONATE 15 G/60ML
15 SUSPENSION ORAL; RECTAL ONCE
Status: COMPLETED | OUTPATIENT
Start: 2020-01-01 | End: 2020-01-01

## 2020-01-01 RX ORDER — CASTOR OIL AND BALSAM, PERU 788; 87 MG/G; MG/G
OINTMENT TOPICAL 2 TIMES DAILY
Status: DISCONTINUED | OUTPATIENT
Start: 2020-01-01 | End: 2020-01-01 | Stop reason: HOSPADM

## 2020-01-01 RX ORDER — OXYCODONE HYDROCHLORIDE 10 MG/1
10 TABLET ORAL EVERY 6 HOURS
Status: DISCONTINUED | OUTPATIENT
Start: 2020-01-01 | End: 2020-01-01 | Stop reason: HOSPADM

## 2020-01-01 RX ORDER — LEVOTHYROXINE SODIUM 0.1 MG/1
100 TABLET ORAL DAILY
Status: DISCONTINUED | OUTPATIENT
Start: 2020-01-01 | End: 2020-01-01 | Stop reason: HOSPADM

## 2020-01-01 RX ORDER — LACTULOSE 10 G/15ML
20 SOLUTION ORAL EVERY 6 HOURS
Qty: 1 BOTTLE | Refills: 1 | Status: SHIPPED
Start: 2020-01-01

## 2020-01-01 RX ORDER — LAMOTRIGINE 100 MG/1
200 TABLET ORAL 2 TIMES DAILY
Status: DISCONTINUED | OUTPATIENT
Start: 2020-01-01 | End: 2020-01-01

## 2020-01-01 RX ORDER — DEXTROSE MONOHYDRATE 50 MG/ML
100 INJECTION, SOLUTION INTRAVENOUS PRN
Status: DISCONTINUED | OUTPATIENT
Start: 2020-01-01 | End: 2020-01-01

## 2020-01-01 RX ORDER — SPIRONOLACTONE 25 MG/1
25 TABLET ORAL DAILY
Status: DISCONTINUED | OUTPATIENT
Start: 2020-01-01 | End: 2020-01-01 | Stop reason: HOSPADM

## 2020-01-01 RX ORDER — LACTULOSE 10 G/15ML
20 SOLUTION ORAL EVERY 6 HOURS SCHEDULED
Status: DISCONTINUED | OUTPATIENT
Start: 2020-01-01 | End: 2020-01-01 | Stop reason: HOSPADM

## 2020-01-01 RX ORDER — 0.9 % SODIUM CHLORIDE 0.9 %
1000 INTRAVENOUS SOLUTION INTRAVENOUS ONCE
Status: COMPLETED | OUTPATIENT
Start: 2020-01-01 | End: 2020-01-01

## 2020-01-01 RX ORDER — FUROSEMIDE 20 MG/1
20 TABLET ORAL DAILY
Status: DISCONTINUED | OUTPATIENT
Start: 2020-01-01 | End: 2020-01-01 | Stop reason: HOSPADM

## 2020-01-01 RX ORDER — CASTOR OIL AND BALSAM, PERU 788; 87 MG/G; MG/G
OINTMENT TOPICAL
Qty: 30 G | Refills: 0 | Status: SHIPPED | OUTPATIENT
Start: 2020-01-01

## 2020-01-01 RX ORDER — NICOTINE POLACRILEX 4 MG
15 LOZENGE BUCCAL PRN
Status: DISCONTINUED | OUTPATIENT
Start: 2020-01-01 | End: 2020-01-01 | Stop reason: HOSPADM

## 2020-01-01 RX ORDER — SODIUM CHLORIDE 450 MG/100ML
INJECTION, SOLUTION INTRAVENOUS CONTINUOUS
Status: DISCONTINUED | OUTPATIENT
Start: 2020-01-01 | End: 2020-01-01

## 2020-01-01 RX ORDER — OXYCODONE HYDROCHLORIDE 10 MG/1
10 TABLET ORAL 3 TIMES DAILY PRN
Status: DISCONTINUED | OUTPATIENT
Start: 2020-01-01 | End: 2020-01-01 | Stop reason: HOSPADM

## 2020-01-01 RX ORDER — INSULIN GLARGINE 100 [IU]/ML
25 INJECTION, SOLUTION SUBCUTANEOUS 2 TIMES DAILY
Status: DISCONTINUED | OUTPATIENT
Start: 2020-01-01 | End: 2020-01-01 | Stop reason: HOSPADM

## 2020-01-01 RX ORDER — SODIUM CHLORIDE 9 MG/ML
10 INJECTION INTRAVENOUS DAILY
Status: DISCONTINUED | OUTPATIENT
Start: 2020-01-01 | End: 2020-01-01 | Stop reason: HOSPADM

## 2020-01-01 RX ORDER — FUROSEMIDE 40 MG/1
40 TABLET ORAL 2 TIMES DAILY
Status: COMPLETED | OUTPATIENT
Start: 2020-01-01 | End: 2020-01-01

## 2020-01-01 RX ORDER — SPIRONOLACTONE 25 MG/1
25 TABLET ORAL DAILY
Status: DISCONTINUED | OUTPATIENT
Start: 2020-01-01 | End: 2020-01-01

## 2020-01-01 RX ORDER — SODIUM CHLORIDE 0.9 % (FLUSH) 0.9 %
10 SYRINGE (ML) INJECTION EVERY 12 HOURS SCHEDULED
Status: DISCONTINUED | OUTPATIENT
Start: 2020-01-01 | End: 2020-01-01

## 2020-01-01 RX ORDER — SODIUM BICARBONATE 650 MG/1
650 TABLET ORAL 4 TIMES DAILY
Status: DISCONTINUED | OUTPATIENT
Start: 2020-01-01 | End: 2020-01-01 | Stop reason: HOSPADM

## 2020-01-01 RX ORDER — LACTULOSE 10 G/15ML
30 SOLUTION ORAL EVERY 6 HOURS SCHEDULED
Status: DISCONTINUED | OUTPATIENT
Start: 2020-01-01 | End: 2020-01-01 | Stop reason: HOSPADM

## 2020-01-01 RX ORDER — ONDANSETRON 2 MG/ML
4 INJECTION INTRAMUSCULAR; INTRAVENOUS EVERY 30 MIN PRN
Status: DISCONTINUED | OUTPATIENT
Start: 2020-01-01 | End: 2020-01-01 | Stop reason: HOSPADM

## 2020-01-01 RX ORDER — OXYCODONE HYDROCHLORIDE 10 MG/1
10 TABLET ORAL 4 TIMES DAILY
Status: DISCONTINUED | OUTPATIENT
Start: 2020-01-01 | End: 2020-01-01 | Stop reason: HOSPADM

## 2020-01-01 RX ORDER — OXYCODONE HYDROCHLORIDE 5 MG/1
5 TABLET ORAL EVERY 4 HOURS PRN
Status: DISCONTINUED | OUTPATIENT
Start: 2020-01-01 | End: 2020-01-01 | Stop reason: HOSPADM

## 2020-01-01 RX ORDER — LEVETIRACETAM 500 MG/1
500 TABLET ORAL 2 TIMES DAILY
Status: ON HOLD | COMMUNITY
End: 2020-01-01 | Stop reason: HOSPADM

## 2020-01-01 RX ORDER — LACTULOSE 10 G/15ML
30 SOLUTION ORAL 3 TIMES DAILY
Status: DISCONTINUED | OUTPATIENT
Start: 2020-01-01 | End: 2020-01-01

## 2020-01-01 RX ORDER — OXYCODONE HYDROCHLORIDE 10 MG/1
10 TABLET ORAL EVERY 6 HOURS PRN
Status: ON HOLD | COMMUNITY
Start: 2020-01-01 | End: 2020-01-01 | Stop reason: SDUPTHER

## 2020-01-01 RX ORDER — INSULIN GLARGINE 100 [IU]/ML
16 INJECTION, SOLUTION SUBCUTANEOUS NIGHTLY
Qty: 1 VIAL | Refills: 3 | Status: SHIPPED | OUTPATIENT
Start: 2020-01-01 | End: 2020-01-01

## 2020-01-01 RX ORDER — OXYCODONE HYDROCHLORIDE 10 MG/1
10 TABLET ORAL EVERY 6 HOURS PRN
Status: DISCONTINUED | OUTPATIENT
Start: 2020-01-01 | End: 2020-01-01

## 2020-01-01 RX ORDER — SODIUM CHLORIDE 0.9 % (FLUSH) 0.9 %
10 SYRINGE (ML) INJECTION PRN
Status: DISCONTINUED | OUTPATIENT
Start: 2020-01-01 | End: 2020-01-01

## 2020-01-01 RX ORDER — POLYETHYLENE GLYCOL 3350 17 G/17G
17 POWDER, FOR SOLUTION ORAL DAILY PRN
Status: DISCONTINUED | OUTPATIENT
Start: 2020-01-01 | End: 2020-01-01 | Stop reason: HOSPADM

## 2020-01-01 RX ORDER — PANTOPRAZOLE SODIUM 40 MG/1
40 TABLET, DELAYED RELEASE ORAL
Status: DISCONTINUED | OUTPATIENT
Start: 2020-01-01 | End: 2020-01-01 | Stop reason: HOSPADM

## 2020-01-01 RX ORDER — SPIRONOLACTONE 50 MG/1
50 TABLET, FILM COATED ORAL DAILY
Qty: 30 TABLET | Refills: 3 | Status: SHIPPED | OUTPATIENT
Start: 2020-01-01

## 2020-01-01 RX ORDER — ONDANSETRON 4 MG/1
8 TABLET, ORALLY DISINTEGRATING ORAL EVERY 8 HOURS PRN
Status: DISCONTINUED | OUTPATIENT
Start: 2020-01-01 | End: 2020-01-01 | Stop reason: HOSPADM

## 2020-01-01 RX ORDER — INSULIN GLARGINE 100 [IU]/ML
28 INJECTION, SOLUTION SUBCUTANEOUS 2 TIMES DAILY
Qty: 1 VIAL | Refills: 3 | Status: ON HOLD | OUTPATIENT
Start: 2020-01-01 | End: 2020-01-01 | Stop reason: HOSPADM

## 2020-01-01 RX ORDER — TACROLIMUS 0.5 MG/1
0.5 CAPSULE ORAL DAILY
Status: DISCONTINUED | OUTPATIENT
Start: 2020-01-01 | End: 2020-01-01

## 2020-01-01 RX ORDER — LACTULOSE 10 G/15ML
30 SOLUTION ORAL EVERY 6 HOURS
Status: DISCONTINUED | OUTPATIENT
Start: 2020-01-01 | End: 2020-01-01 | Stop reason: HOSPADM

## 2020-01-01 RX ORDER — ALBUMIN (HUMAN) 12.5 G/50ML
50 SOLUTION INTRAVENOUS ONCE
Status: COMPLETED | OUTPATIENT
Start: 2020-01-01 | End: 2020-01-01

## 2020-01-01 RX ORDER — LACTULOSE 10 G/15ML
20 SOLUTION ORAL 3 TIMES DAILY
Status: DISCONTINUED | OUTPATIENT
Start: 2020-01-01 | End: 2020-01-01 | Stop reason: HOSPADM

## 2020-01-01 RX ORDER — OXYCODONE HYDROCHLORIDE 5 MG/1
5 TABLET ORAL ONCE
Status: COMPLETED | OUTPATIENT
Start: 2020-01-01 | End: 2020-01-01

## 2020-01-01 RX ORDER — ONDANSETRON 2 MG/ML
4 INJECTION INTRAMUSCULAR; INTRAVENOUS EVERY 4 HOURS PRN
Status: DISCONTINUED | OUTPATIENT
Start: 2020-01-01 | End: 2020-01-01 | Stop reason: HOSPADM

## 2020-01-01 RX ORDER — PANTOPRAZOLE SODIUM 40 MG/1
40 TABLET, DELAYED RELEASE ORAL
Status: DISCONTINUED | OUTPATIENT
Start: 2020-01-01 | End: 2020-01-01

## 2020-01-01 RX ORDER — MAGNESIUM SULFATE 1 G/100ML
1 INJECTION INTRAVENOUS PRN
Status: DISCONTINUED | OUTPATIENT
Start: 2020-01-01 | End: 2020-01-01 | Stop reason: HOSPADM

## 2020-01-01 RX ORDER — LACTULOSE 10 G/15ML
20 SOLUTION ORAL
Qty: 1 BOTTLE | Refills: 3 | Status: ON HOLD | OUTPATIENT
Start: 2020-01-01 | End: 2020-01-01 | Stop reason: HOSPADM

## 2020-01-01 RX ORDER — OLANZAPINE 5 MG/1
5 TABLET ORAL NIGHTLY
Status: DISCONTINUED | OUTPATIENT
Start: 2020-01-01 | End: 2020-01-01

## 2020-01-01 RX ORDER — OXYCODONE HYDROCHLORIDE 10 MG/1
10 TABLET ORAL EVERY 6 HOURS PRN
Qty: 3 TABLET | Refills: 0 | Status: SHIPPED | OUTPATIENT
Start: 2020-01-01 | End: 2020-01-01

## 2020-01-01 RX ORDER — LACTULOSE 10 G/15ML
30 SOLUTION ORAL ONCE
Status: COMPLETED | OUTPATIENT
Start: 2020-01-01 | End: 2020-01-01

## 2020-01-01 RX ORDER — INSULIN GLARGINE 100 [IU]/ML
28 INJECTION, SOLUTION SUBCUTANEOUS 2 TIMES DAILY
Status: DISCONTINUED | OUTPATIENT
Start: 2020-01-01 | End: 2020-01-01

## 2020-01-01 RX ORDER — INSULIN GLARGINE 100 [IU]/ML
22 INJECTION, SOLUTION SUBCUTANEOUS NIGHTLY
Status: DISCONTINUED | OUTPATIENT
Start: 2020-01-01 | End: 2020-01-01

## 2020-01-01 RX ORDER — SULFAMETHOXAZOLE AND TRIMETHOPRIM 800; 160 MG/1; MG/1
1 TABLET ORAL EVERY 12 HOURS SCHEDULED
Status: DISCONTINUED | OUTPATIENT
Start: 2020-01-01 | End: 2020-01-01

## 2020-01-01 RX ORDER — PANTOPRAZOLE SODIUM 40 MG/10ML
40 INJECTION, POWDER, LYOPHILIZED, FOR SOLUTION INTRAVENOUS DAILY
Status: DISCONTINUED | OUTPATIENT
Start: 2020-01-01 | End: 2020-01-01 | Stop reason: HOSPADM

## 2020-01-01 RX ORDER — FUROSEMIDE 40 MG/1
40 TABLET ORAL DAILY
Status: DISCONTINUED | OUTPATIENT
Start: 2020-01-01 | End: 2020-01-01 | Stop reason: HOSPADM

## 2020-01-01 RX ORDER — THIAMINE HYDROCHLORIDE 100 MG/ML
200 INJECTION, SOLUTION INTRAMUSCULAR; INTRAVENOUS 2 TIMES DAILY
Status: DISCONTINUED | OUTPATIENT
Start: 2020-01-01 | End: 2020-01-01

## 2020-01-01 RX ORDER — LAMOTRIGINE 100 MG/1
100 TABLET ORAL 2 TIMES DAILY
Qty: 30 TABLET | Refills: 3 | Status: SHIPPED | OUTPATIENT
Start: 2020-01-01

## 2020-01-01 RX ORDER — OXYCODONE HYDROCHLORIDE 10 MG/1
10 TABLET ORAL ONCE
Status: COMPLETED | OUTPATIENT
Start: 2020-01-01 | End: 2020-01-01

## 2020-01-01 RX ORDER — SPIRONOLACTONE 25 MG/1
25 TABLET ORAL DAILY
Qty: 30 TABLET | Refills: 3 | Status: ON HOLD | OUTPATIENT
Start: 2020-01-01 | End: 2020-01-01 | Stop reason: HOSPADM

## 2020-01-01 RX ORDER — TORSEMIDE 20 MG/1
20 TABLET ORAL DAILY
Status: DISCONTINUED | OUTPATIENT
Start: 2020-01-01 | End: 2020-01-01

## 2020-01-01 RX ORDER — INSULIN GLARGINE 100 [IU]/ML
16 INJECTION, SOLUTION SUBCUTANEOUS NIGHTLY
Status: DISCONTINUED | OUTPATIENT
Start: 2020-01-01 | End: 2020-01-01

## 2020-01-01 RX ORDER — OXYCODONE HYDROCHLORIDE 10 MG/1
10 TABLET ORAL EVERY 6 HOURS PRN
Qty: 20 TABLET | Refills: 0 | Status: SHIPPED | OUTPATIENT
Start: 2020-01-01 | End: 2020-01-01

## 2020-01-01 RX ORDER — POTASSIUM CHLORIDE 20 MEQ/1
40 TABLET, EXTENDED RELEASE ORAL PRN
Status: DISCONTINUED | OUTPATIENT
Start: 2020-01-01 | End: 2020-01-01 | Stop reason: HOSPADM

## 2020-01-01 RX ORDER — SODIUM CHLORIDE, SODIUM LACTATE, POTASSIUM CHLORIDE, AND CALCIUM CHLORIDE .6; .31; .03; .02 G/100ML; G/100ML; G/100ML; G/100ML
1000 INJECTION, SOLUTION INTRAVENOUS ONCE
Status: COMPLETED | OUTPATIENT
Start: 2020-01-01 | End: 2020-01-01

## 2020-01-01 RX ORDER — NICOTINE POLACRILEX 4 MG
15 LOZENGE BUCCAL PRN
Status: DISCONTINUED | OUTPATIENT
Start: 2020-01-01 | End: 2020-01-01

## 2020-01-01 RX ORDER — INSULIN GLARGINE 100 [IU]/ML
15 INJECTION, SOLUTION SUBCUTANEOUS 2 TIMES DAILY
Status: DISCONTINUED | OUTPATIENT
Start: 2020-01-01 | End: 2020-01-01

## 2020-01-01 RX ORDER — 0.9 % SODIUM CHLORIDE 0.9 %
1500 INTRAVENOUS SOLUTION INTRAVENOUS ONCE
Status: COMPLETED | OUTPATIENT
Start: 2020-01-01 | End: 2020-01-01

## 2020-01-01 RX ORDER — INSULIN GLARGINE 100 [IU]/ML
20 INJECTION, SOLUTION SUBCUTANEOUS 2 TIMES DAILY
Status: DISCONTINUED | OUTPATIENT
Start: 2020-01-01 | End: 2020-01-01 | Stop reason: HOSPADM

## 2020-01-01 RX ORDER — LIDOCAINE HYDROCHLORIDE 10 MG/ML
5 INJECTION, SOLUTION EPIDURAL; INFILTRATION; INTRACAUDAL; PERINEURAL ONCE
Status: COMPLETED | OUTPATIENT
Start: 2020-01-01 | End: 2020-01-01

## 2020-01-01 RX ADMIN — INSULIN GLARGINE 25 UNITS: 100 INJECTION, SOLUTION SUBCUTANEOUS at 08:15

## 2020-01-01 RX ADMIN — MILNACIPRAN HYDROCHLORIDE 100 MG: 50 TABLET, FILM COATED ORAL at 13:17

## 2020-01-01 RX ADMIN — OLANZAPINE 10 MG: 10 TABLET, FILM COATED ORAL at 20:33

## 2020-01-01 RX ADMIN — LACTULOSE 30 G: 20 SOLUTION ORAL at 03:36

## 2020-01-01 RX ADMIN — LAMOTRIGINE 100 MG: 100 TABLET ORAL at 08:04

## 2020-01-01 RX ADMIN — ONDANSETRON 4 MG: 2 INJECTION INTRAMUSCULAR; INTRAVENOUS at 10:04

## 2020-01-01 RX ADMIN — LACTULOSE 20 G: 20 SOLUTION ORAL at 03:47

## 2020-01-01 RX ADMIN — SODIUM CHLORIDE, PRESERVATIVE FREE 10 ML: 5 INJECTION INTRAVENOUS at 20:36

## 2020-01-01 RX ADMIN — INSULIN LISPRO 4 UNITS: 100 INJECTION, SOLUTION INTRAVENOUS; SUBCUTANEOUS at 11:54

## 2020-01-01 RX ADMIN — RIFAXIMIN 550 MG: 550 TABLET ORAL at 21:05

## 2020-01-01 RX ADMIN — MEROPENEM 500 MG: 500 INJECTION, POWDER, FOR SOLUTION INTRAVENOUS at 09:33

## 2020-01-01 RX ADMIN — INSULIN LISPRO 12 UNITS: 100 INJECTION, SOLUTION INTRAVENOUS; SUBCUTANEOUS at 08:21

## 2020-01-01 RX ADMIN — SODIUM BICARBONATE 650 MG: 650 TABLET ORAL at 17:10

## 2020-01-01 RX ADMIN — SODIUM BICARBONATE 650 MG: 650 TABLET ORAL at 20:18

## 2020-01-01 RX ADMIN — SODIUM CHLORIDE, PRESERVATIVE FREE 10 ML: 5 INJECTION INTRAVENOUS at 10:12

## 2020-01-01 RX ADMIN — LAMOTRIGINE 100 MG: 100 TABLET ORAL at 08:33

## 2020-01-01 RX ADMIN — LACTULOSE 30 G: 20 SOLUTION ORAL at 08:54

## 2020-01-01 RX ADMIN — LACTULOSE 20 G: 20 SOLUTION ORAL at 05:11

## 2020-01-01 RX ADMIN — INSULIN LISPRO 4 UNITS: 100 INJECTION, SOLUTION INTRAVENOUS; SUBCUTANEOUS at 21:02

## 2020-01-01 RX ADMIN — LACTULOSE 20 G: 20 SOLUTION ORAL at 12:42

## 2020-01-01 RX ADMIN — LACTULOSE 20 G: 20 SOLUTION ORAL at 14:00

## 2020-01-01 RX ADMIN — INSULIN LISPRO 2 UNITS: 100 INJECTION, SOLUTION INTRAVENOUS; SUBCUTANEOUS at 17:01

## 2020-01-01 RX ADMIN — SODIUM CHLORIDE, PRESERVATIVE FREE 10 ML: 5 INJECTION INTRAVENOUS at 08:55

## 2020-01-01 RX ADMIN — MILNACIPRAN HYDROCHLORIDE 100 MG: 50 TABLET, FILM COATED ORAL at 22:50

## 2020-01-01 RX ADMIN — Medication 10 ML: at 08:59

## 2020-01-01 RX ADMIN — Medication 15 G: at 21:30

## 2020-01-01 RX ADMIN — MEROPENEM 500 MG: 500 INJECTION, POWDER, FOR SOLUTION INTRAVENOUS at 08:51

## 2020-01-01 RX ADMIN — RIFAXIMIN 550 MG: 550 TABLET ORAL at 09:26

## 2020-01-01 RX ADMIN — LEVOTHYROXINE SODIUM 100 MCG: 0.1 TABLET ORAL at 13:32

## 2020-01-01 RX ADMIN — MICONAZOLE NITRATE: 20 POWDER TOPICAL at 22:13

## 2020-01-01 RX ADMIN — OXYCODONE HYDROCHLORIDE 10 MG: 10 TABLET ORAL at 23:17

## 2020-01-01 RX ADMIN — SODIUM CHLORIDE, PRESERVATIVE FREE 10 ML: 5 INJECTION INTRAVENOUS at 22:02

## 2020-01-01 RX ADMIN — OLANZAPINE 10 MG: 10 TABLET, FILM COATED ORAL at 20:18

## 2020-01-01 RX ADMIN — INSULIN GLARGINE 15 UNITS: 100 INJECTION, SOLUTION SUBCUTANEOUS at 08:15

## 2020-01-01 RX ADMIN — PANTOPRAZOLE SODIUM 40 MG: 40 INJECTION, POWDER, FOR SOLUTION INTRAVENOUS at 08:50

## 2020-01-01 RX ADMIN — INSULIN GLARGINE 15 UNITS: 100 INJECTION, SOLUTION SUBCUTANEOUS at 20:56

## 2020-01-01 RX ADMIN — CEFTRIAXONE 2 G: 2 INJECTION, POWDER, FOR SOLUTION INTRAMUSCULAR; INTRAVENOUS at 23:13

## 2020-01-01 RX ADMIN — LACTULOSE 30 G: 20 SOLUTION ORAL at 13:38

## 2020-01-01 RX ADMIN — LACTULOSE 30 G: 20 SOLUTION ORAL at 15:03

## 2020-01-01 RX ADMIN — LIDOCAINE HYDROCHLORIDE 5 ML: 10 INJECTION, SOLUTION EPIDURAL; INFILTRATION; INTRACAUDAL; PERINEURAL at 10:25

## 2020-01-01 RX ADMIN — RIFAXIMIN 550 MG: 550 TABLET ORAL at 08:51

## 2020-01-01 RX ADMIN — INSULIN LISPRO 12 UNITS: 100 INJECTION, SOLUTION INTRAVENOUS; SUBCUTANEOUS at 11:54

## 2020-01-01 RX ADMIN — MEROPENEM 500 MG: 500 INJECTION, POWDER, FOR SOLUTION INTRAVENOUS at 17:31

## 2020-01-01 RX ADMIN — OXYCODONE HYDROCHLORIDE 10 MG: 10 TABLET ORAL at 20:43

## 2020-01-01 RX ADMIN — LACTULOSE 30 G: 20 SOLUTION ORAL at 13:09

## 2020-01-01 RX ADMIN — OXYCODONE HYDROCHLORIDE 10 MG: 10 TABLET ORAL at 03:27

## 2020-01-01 RX ADMIN — LACTULOSE 20 G: 20 SOLUTION ORAL at 11:56

## 2020-01-01 RX ADMIN — INSULIN LISPRO 2 UNITS: 100 INJECTION, SOLUTION INTRAVENOUS; SUBCUTANEOUS at 08:55

## 2020-01-01 RX ADMIN — INSULIN LISPRO 3 UNITS: 100 INJECTION, SOLUTION INTRAVENOUS; SUBCUTANEOUS at 08:51

## 2020-01-01 RX ADMIN — SODIUM CHLORIDE: 4.5 INJECTION, SOLUTION INTRAVENOUS at 06:26

## 2020-01-01 RX ADMIN — MEROPENEM 500 MG: 500 INJECTION, POWDER, FOR SOLUTION INTRAVENOUS at 11:03

## 2020-01-01 RX ADMIN — INSULIN LISPRO 8 UNITS: 100 INJECTION, SOLUTION INTRAVENOUS; SUBCUTANEOUS at 13:17

## 2020-01-01 RX ADMIN — PANTOPRAZOLE SODIUM 40 MG: 40 TABLET, DELAYED RELEASE ORAL at 06:34

## 2020-01-01 RX ADMIN — OXYCODONE HYDROCHLORIDE 10 MG: 10 TABLET ORAL at 08:19

## 2020-01-01 RX ADMIN — INSULIN LISPRO 4 UNITS: 100 INJECTION, SOLUTION INTRAVENOUS; SUBCUTANEOUS at 17:35

## 2020-01-01 RX ADMIN — OXYCODONE 5 MG: 5 TABLET ORAL at 13:35

## 2020-01-01 RX ADMIN — MILNACIPRAN HYDROCHLORIDE 100 MG: 50 TABLET, FILM COATED ORAL at 09:31

## 2020-01-01 RX ADMIN — OXYCODONE 5 MG: 5 TABLET ORAL at 20:15

## 2020-01-01 RX ADMIN — OLANZAPINE 10 MG: 10 TABLET, FILM COATED ORAL at 20:58

## 2020-01-01 RX ADMIN — RIFAXIMIN 550 MG: 550 TABLET ORAL at 08:57

## 2020-01-01 RX ADMIN — INSULIN LISPRO 2 UNITS: 100 INJECTION, SOLUTION INTRAVENOUS; SUBCUTANEOUS at 12:02

## 2020-01-01 RX ADMIN — LEVOTHYROXINE SODIUM 112 MCG: 112 TABLET ORAL at 06:26

## 2020-01-01 RX ADMIN — INSULIN LISPRO 1 UNITS: 100 INJECTION, SOLUTION INTRAVENOUS; SUBCUTANEOUS at 08:24

## 2020-01-01 RX ADMIN — LEVOTHYROXINE SODIUM 112 MCG: 112 TABLET ORAL at 05:18

## 2020-01-01 RX ADMIN — SPIRONOLACTONE 100 MG: 100 TABLET ORAL at 08:54

## 2020-01-01 RX ADMIN — POTASSIUM BICARBONATE 40 MEQ: 782 TABLET, EFFERVESCENT ORAL at 08:57

## 2020-01-01 RX ADMIN — TACROLIMUS 0.5 MG: 0.5 CAPSULE ORAL at 09:34

## 2020-01-01 RX ADMIN — LAMOTRIGINE 100 MG: 100 TABLET ORAL at 22:50

## 2020-01-01 RX ADMIN — FUROSEMIDE 20 MG: 20 TABLET ORAL at 15:03

## 2020-01-01 RX ADMIN — SODIUM CHLORIDE, PRESERVATIVE FREE 10 ML: 5 INJECTION INTRAVENOUS at 21:41

## 2020-01-01 RX ADMIN — OXYCODONE HYDROCHLORIDE 10 MG: 10 TABLET ORAL at 15:02

## 2020-01-01 RX ADMIN — SPIRONOLACTONE 25 MG: 25 TABLET ORAL at 08:56

## 2020-01-01 RX ADMIN — INSULIN GLARGINE 28 UNITS: 100 INJECTION, SOLUTION SUBCUTANEOUS at 23:47

## 2020-01-01 RX ADMIN — INSULIN LISPRO 1 UNITS: 100 INJECTION, SOLUTION INTRAVENOUS; SUBCUTANEOUS at 22:21

## 2020-01-01 RX ADMIN — ENOXAPARIN SODIUM 40 MG: 40 INJECTION SUBCUTANEOUS at 22:50

## 2020-01-01 RX ADMIN — SODIUM CHLORIDE, PRESERVATIVE FREE 10 ML: 5 INJECTION INTRAVENOUS at 09:07

## 2020-01-01 RX ADMIN — RIFAXIMIN 550 MG: 550 TABLET ORAL at 22:50

## 2020-01-01 RX ADMIN — INSULIN LISPRO 12 UNITS: 100 INJECTION, SOLUTION INTRAVENOUS; SUBCUTANEOUS at 16:23

## 2020-01-01 RX ADMIN — LACTULOSE 20 G: 20 SOLUTION ORAL at 11:23

## 2020-01-01 RX ADMIN — MILNACIPRAN HYDROCHLORIDE 100 MG: 50 TABLET, FILM COATED ORAL at 21:07

## 2020-01-01 RX ADMIN — RIFAXIMIN 550 MG: 550 TABLET ORAL at 22:13

## 2020-01-01 RX ADMIN — SODIUM BICARBONATE 650 MG: 650 TABLET ORAL at 17:44

## 2020-01-01 RX ADMIN — SODIUM BICARBONATE 650 MG: 650 TABLET ORAL at 17:31

## 2020-01-01 RX ADMIN — LEVOTHYROXINE SODIUM 112 MCG: 112 TABLET ORAL at 06:08

## 2020-01-01 RX ADMIN — INSULIN LISPRO 1 UNITS: 100 INJECTION, SOLUTION INTRAVENOUS; SUBCUTANEOUS at 12:26

## 2020-01-01 RX ADMIN — OXYCODONE HYDROCHLORIDE 10 MG: 10 TABLET ORAL at 03:36

## 2020-01-01 RX ADMIN — TACROLIMUS 0.5 MG: 0.5 CAPSULE ORAL at 08:33

## 2020-01-01 RX ADMIN — LEVOTHYROXINE SODIUM 112 MCG: 100 TABLET ORAL at 06:13

## 2020-01-01 RX ADMIN — LACTULOSE 20 G: 20 SOLUTION ORAL at 09:24

## 2020-01-01 RX ADMIN — INSULIN GLARGINE 30 UNITS: 100 INJECTION, SOLUTION SUBCUTANEOUS at 20:34

## 2020-01-01 RX ADMIN — LACTULOSE 20 G: 20 SOLUTION ORAL at 06:43

## 2020-01-01 RX ADMIN — INSULIN LISPRO 2 UNITS: 100 INJECTION, SOLUTION INTRAVENOUS; SUBCUTANEOUS at 17:12

## 2020-01-01 RX ADMIN — SODIUM CHLORIDE: 9 INJECTION, SOLUTION INTRAVENOUS at 06:27

## 2020-01-01 RX ADMIN — SODIUM CHLORIDE, PRESERVATIVE FREE 10 ML: 5 INJECTION INTRAVENOUS at 09:04

## 2020-01-01 RX ADMIN — PANTOPRAZOLE SODIUM 40 MG: 40 TABLET, DELAYED RELEASE ORAL at 05:18

## 2020-01-01 RX ADMIN — SODIUM BICARBONATE 650 MG: 650 TABLET ORAL at 12:04

## 2020-01-01 RX ADMIN — SULFAMETHOXAZOLE AND TRIMETHOPRIM 1 TABLET: 800; 160 TABLET ORAL at 11:23

## 2020-01-01 RX ADMIN — INSULIN GLARGINE 25 UNITS: 100 INJECTION, SOLUTION SUBCUTANEOUS at 08:59

## 2020-01-01 RX ADMIN — INSULIN LISPRO 2 UNITS: 100 INJECTION, SOLUTION INTRAVENOUS; SUBCUTANEOUS at 17:37

## 2020-01-01 RX ADMIN — RIFAXIMIN 550 MG: 550 TABLET ORAL at 20:43

## 2020-01-01 RX ADMIN — CEFTRIAXONE 2 G: 2 INJECTION, POWDER, FOR SOLUTION INTRAMUSCULAR; INTRAVENOUS at 17:04

## 2020-01-01 RX ADMIN — INSULIN GLARGINE 20 UNITS: 100 INJECTION, SOLUTION SUBCUTANEOUS at 23:15

## 2020-01-01 RX ADMIN — RIFAXIMIN 550 MG: 550 TABLET ORAL at 08:14

## 2020-01-01 RX ADMIN — LACTULOSE 20 G: 20 SOLUTION ORAL at 17:30

## 2020-01-01 RX ADMIN — LACTULOSE 30 G: 20 SOLUTION ORAL at 13:28

## 2020-01-01 RX ADMIN — SODIUM BICARBONATE 650 MG: 650 TABLET ORAL at 20:36

## 2020-01-01 RX ADMIN — PANTOPRAZOLE SODIUM 40 MG: 40 TABLET, DELAYED RELEASE ORAL at 05:40

## 2020-01-01 RX ADMIN — LACTULOSE 20 G: 20 SOLUTION ORAL at 20:57

## 2020-01-01 RX ADMIN — SODIUM CHLORIDE, PRESERVATIVE FREE 10 ML: 5 INJECTION INTRAVENOUS at 22:22

## 2020-01-01 RX ADMIN — LACTULOSE 20 G: 20 SOLUTION ORAL at 13:35

## 2020-01-01 RX ADMIN — NALOXONE HYDROCHLORIDE 2 MG: 1 INJECTION PARENTERAL at 23:28

## 2020-01-01 RX ADMIN — INSULIN GLARGINE 15 UNITS: 100 INJECTION, SOLUTION SUBCUTANEOUS at 14:23

## 2020-01-01 RX ADMIN — TACROLIMUS 0.5 MG: 0.5 CAPSULE ORAL at 09:07

## 2020-01-01 RX ADMIN — ANORECTAL OINTMENT 3 EACH: 15.7; .44; 24; 20.6 OINTMENT TOPICAL at 09:15

## 2020-01-01 RX ADMIN — INSULIN LISPRO 2 UNITS: 100 INJECTION, SOLUTION INTRAVENOUS; SUBCUTANEOUS at 16:56

## 2020-01-01 RX ADMIN — OXYCODONE HYDROCHLORIDE 10 MG: 10 TABLET ORAL at 15:16

## 2020-01-01 RX ADMIN — LEVOTHYROXINE SODIUM 112 MCG: 100 TABLET ORAL at 06:05

## 2020-01-01 RX ADMIN — TACROLIMUS 0.5 MG: 0.5 CAPSULE ORAL at 21:01

## 2020-01-01 RX ADMIN — SODIUM CHLORIDE 1000 ML: 9 INJECTION, SOLUTION INTRAVENOUS at 18:16

## 2020-01-01 RX ADMIN — ONDANSETRON 4 MG: 2 INJECTION INTRAMUSCULAR; INTRAVENOUS at 09:31

## 2020-01-01 RX ADMIN — Medication 10 ML: at 08:50

## 2020-01-01 RX ADMIN — OXYCODONE HYDROCHLORIDE 10 MG: 10 TABLET ORAL at 13:32

## 2020-01-01 RX ADMIN — SODIUM CHLORIDE, POTASSIUM CHLORIDE, SODIUM LACTATE AND CALCIUM CHLORIDE 1300 ML: 600; 310; 30; 20 INJECTION, SOLUTION INTRAVENOUS at 18:35

## 2020-01-01 RX ADMIN — TACROLIMUS 0.5 MG: 0.5 CAPSULE ORAL at 15:17

## 2020-01-01 RX ADMIN — SPIRONOLACTONE 100 MG: 100 TABLET ORAL at 09:04

## 2020-01-01 RX ADMIN — INSULIN LISPRO 16 UNITS: 100 INJECTION, SOLUTION INTRAVENOUS; SUBCUTANEOUS at 17:25

## 2020-01-01 RX ADMIN — Medication 10 ML: at 09:23

## 2020-01-01 RX ADMIN — INSULIN LISPRO 8 UNITS: 100 INJECTION, SOLUTION INTRAVENOUS; SUBCUTANEOUS at 11:56

## 2020-01-01 RX ADMIN — MEROPENEM 500 MG: 500 INJECTION, POWDER, FOR SOLUTION INTRAVENOUS at 03:12

## 2020-01-01 RX ADMIN — SODIUM CHLORIDE, PRESERVATIVE FREE 10 ML: 5 INJECTION INTRAVENOUS at 09:34

## 2020-01-01 RX ADMIN — LEVOTHYROXINE SODIUM 112 MCG: 100 TABLET ORAL at 05:45

## 2020-01-01 RX ADMIN — INSULIN LISPRO 1 UNITS: 100 INJECTION, SOLUTION INTRAVENOUS; SUBCUTANEOUS at 17:02

## 2020-01-01 RX ADMIN — SPIRONOLACTONE 25 MG: 25 TABLET ORAL at 08:33

## 2020-01-01 RX ADMIN — INSULIN GLARGINE 15 UNITS: 100 INJECTION, SOLUTION SUBCUTANEOUS at 21:05

## 2020-01-01 RX ADMIN — LAMOTRIGINE 100 MG: 100 TABLET ORAL at 08:57

## 2020-01-01 RX ADMIN — LACTULOSE 20 G: 20 SOLUTION ORAL at 17:08

## 2020-01-01 RX ADMIN — RIFAXIMIN 550 MG: 550 TABLET ORAL at 08:33

## 2020-01-01 RX ADMIN — INSULIN LISPRO 5 UNITS: 100 INJECTION, SOLUTION INTRAVENOUS; SUBCUTANEOUS at 08:07

## 2020-01-01 RX ADMIN — LAMOTRIGINE 100 MG: 100 TABLET ORAL at 08:50

## 2020-01-01 RX ADMIN — LACTULOSE 20 G: 20 SOLUTION ORAL at 15:07

## 2020-01-01 RX ADMIN — RIFAXIMIN 550 MG: 550 TABLET ORAL at 08:50

## 2020-01-01 RX ADMIN — INSULIN GLARGINE 15 UNITS: 100 INJECTION, SOLUTION SUBCUTANEOUS at 20:22

## 2020-01-01 RX ADMIN — RIFAXIMIN 550 MG: 550 TABLET ORAL at 21:37

## 2020-01-01 RX ADMIN — RIFAXIMIN 550 MG: 550 TABLET ORAL at 22:47

## 2020-01-01 RX ADMIN — SODIUM CHLORIDE, PRESERVATIVE FREE 10 ML: 5 INJECTION INTRAVENOUS at 21:02

## 2020-01-01 RX ADMIN — LACTULOSE: 10 SOLUTION ORAL at 23:28

## 2020-01-01 RX ADMIN — SODIUM BICARBONATE 650 MG: 650 TABLET ORAL at 12:42

## 2020-01-01 RX ADMIN — SODIUM CHLORIDE, PRESERVATIVE FREE 10 ML: 5 INJECTION INTRAVENOUS at 22:11

## 2020-01-01 RX ADMIN — OXYCODONE 5 MG: 5 TABLET ORAL at 09:11

## 2020-01-01 RX ADMIN — SODIUM CHLORIDE, PRESERVATIVE FREE 10 ML: 5 INJECTION INTRAVENOUS at 08:14

## 2020-01-01 RX ADMIN — INSULIN LISPRO 10 UNITS: 100 INJECTION, SOLUTION INTRAVENOUS; SUBCUTANEOUS at 08:47

## 2020-01-01 RX ADMIN — TACROLIMUS 0.5 MG: 0.5 CAPSULE ORAL at 20:43

## 2020-01-01 RX ADMIN — PANTOPRAZOLE SODIUM 40 MG: 40 INJECTION, POWDER, FOR SOLUTION INTRAVENOUS at 09:31

## 2020-01-01 RX ADMIN — RIFAXIMIN 550 MG: 550 TABLET ORAL at 21:21

## 2020-01-01 RX ADMIN — LACTULOSE 30 G: 20 SOLUTION ORAL at 03:20

## 2020-01-01 RX ADMIN — SODIUM CHLORIDE, PRESERVATIVE FREE 10 ML: 5 INJECTION INTRAVENOUS at 09:10

## 2020-01-01 RX ADMIN — INSULIN LISPRO 2 UNITS: 100 INJECTION, SOLUTION INTRAVENOUS; SUBCUTANEOUS at 09:08

## 2020-01-01 RX ADMIN — OXYCODONE HYDROCHLORIDE 10 MG: 10 TABLET ORAL at 20:57

## 2020-01-01 RX ADMIN — OXYCODONE HYDROCHLORIDE 10 MG: 10 TABLET ORAL at 05:18

## 2020-01-01 RX ADMIN — LACTULOSE 20 G: 20 SOLUTION ORAL at 12:36

## 2020-01-01 RX ADMIN — RIFAXIMIN 550 MG: 550 TABLET ORAL at 11:23

## 2020-01-01 RX ADMIN — MEROPENEM 500 MG: 500 INJECTION, POWDER, FOR SOLUTION INTRAVENOUS at 00:38

## 2020-01-01 RX ADMIN — OXYCODONE HYDROCHLORIDE 10 MG: 10 TABLET ORAL at 13:38

## 2020-01-01 RX ADMIN — INSULIN LISPRO 5 UNITS: 100 INJECTION, SOLUTION INTRAVENOUS; SUBCUTANEOUS at 12:04

## 2020-01-01 RX ADMIN — INSULIN GLARGINE 15 UNITS: 100 INJECTION, SOLUTION SUBCUTANEOUS at 20:36

## 2020-01-01 RX ADMIN — LEVOTHYROXINE SODIUM 100 MCG: 0.1 TABLET ORAL at 05:18

## 2020-01-01 RX ADMIN — PANTOPRAZOLE SODIUM 40 MG: 40 INJECTION, POWDER, FOR SOLUTION INTRAVENOUS at 08:14

## 2020-01-01 RX ADMIN — SODIUM CHLORIDE, PRESERVATIVE FREE 10 ML: 5 INJECTION INTRAVENOUS at 22:13

## 2020-01-01 RX ADMIN — LACTULOSE 30 G: 20 SOLUTION ORAL at 03:27

## 2020-01-01 RX ADMIN — OXYCODONE HYDROCHLORIDE 10 MG: 10 TABLET ORAL at 08:44

## 2020-01-01 RX ADMIN — INSULIN GLARGINE 40 UNITS: 100 INJECTION, SOLUTION SUBCUTANEOUS at 23:37

## 2020-01-01 RX ADMIN — OXYCODONE HYDROCHLORIDE 10 MG: 10 TABLET ORAL at 22:22

## 2020-01-01 RX ADMIN — SPIRONOLACTONE 50 MG: 25 TABLET ORAL at 13:32

## 2020-01-01 RX ADMIN — INSULIN LISPRO 2 UNITS: 100 INJECTION, SOLUTION INTRAVENOUS; SUBCUTANEOUS at 12:55

## 2020-01-01 RX ADMIN — OXYCODONE HYDROCHLORIDE 10 MG: 10 TABLET ORAL at 21:21

## 2020-01-01 RX ADMIN — MEROPENEM 500 MG: 500 INJECTION, POWDER, FOR SOLUTION INTRAVENOUS at 02:00

## 2020-01-01 RX ADMIN — LAMOTRIGINE 100 MG: 100 TABLET ORAL at 22:12

## 2020-01-01 RX ADMIN — SPIRONOLACTONE 100 MG: 100 TABLET ORAL at 08:27

## 2020-01-01 RX ADMIN — SPIRONOLACTONE 100 MG: 100 TABLET ORAL at 17:44

## 2020-01-01 RX ADMIN — INSULIN LISPRO 2 UNITS: 100 INJECTION, SOLUTION INTRAVENOUS; SUBCUTANEOUS at 17:44

## 2020-01-01 RX ADMIN — OLANZAPINE 10 MG: 10 TABLET, FILM COATED ORAL at 21:02

## 2020-01-01 RX ADMIN — LACTULOSE 20 G: 20 SOLUTION ORAL at 23:50

## 2020-01-01 RX ADMIN — SODIUM BICARBONATE 650 MG: 650 TABLET ORAL at 08:57

## 2020-01-01 RX ADMIN — SODIUM CHLORIDE, PRESERVATIVE FREE 10 ML: 5 INJECTION INTRAVENOUS at 20:34

## 2020-01-01 RX ADMIN — RIFAXIMIN 550 MG: 550 TABLET ORAL at 23:23

## 2020-01-01 RX ADMIN — INSULIN LISPRO 4 UNITS: 100 INJECTION, SOLUTION INTRAVENOUS; SUBCUTANEOUS at 09:30

## 2020-01-01 RX ADMIN — LAMOTRIGINE 100 MG: 100 TABLET ORAL at 09:34

## 2020-01-01 RX ADMIN — INSULIN LISPRO 4 UNITS: 100 INJECTION, SOLUTION INTRAVENOUS; SUBCUTANEOUS at 13:33

## 2020-01-01 RX ADMIN — LACTULOSE: 10 SOLUTION ORAL at 08:45

## 2020-01-01 RX ADMIN — INSULIN LISPRO 2 UNITS: 100 INJECTION, SOLUTION INTRAVENOUS; SUBCUTANEOUS at 09:02

## 2020-01-01 RX ADMIN — RIFAXIMIN 550 MG: 550 TABLET ORAL at 20:36

## 2020-01-01 RX ADMIN — INSULIN LISPRO 1 UNITS: 100 INJECTION, SOLUTION INTRAVENOUS; SUBCUTANEOUS at 09:33

## 2020-01-01 RX ADMIN — LAMOTRIGINE 100 MG: 100 TABLET ORAL at 20:33

## 2020-01-01 RX ADMIN — INSULIN LISPRO 6 UNITS: 100 INJECTION, SOLUTION INTRAVENOUS; SUBCUTANEOUS at 13:59

## 2020-01-01 RX ADMIN — OXYCODONE HYDROCHLORIDE 10 MG: 10 TABLET ORAL at 11:23

## 2020-01-01 RX ADMIN — MICONAZOLE NITRATE: 20 POWDER TOPICAL at 08:51

## 2020-01-01 RX ADMIN — CEFTRIAXONE 2 G: 2 INJECTION, POWDER, FOR SOLUTION INTRAMUSCULAR; INTRAVENOUS at 16:21

## 2020-01-01 RX ADMIN — MILNACIPRAN HYDROCHLORIDE 100 MG: 50 TABLET, FILM COATED ORAL at 15:16

## 2020-01-01 RX ADMIN — INSULIN LISPRO 5 UNITS: 100 INJECTION, SOLUTION INTRAVENOUS; SUBCUTANEOUS at 08:59

## 2020-01-01 RX ADMIN — INSULIN LISPRO 6 UNITS: 100 INJECTION, SOLUTION INTRAVENOUS; SUBCUTANEOUS at 12:04

## 2020-01-01 RX ADMIN — PANTOPRAZOLE SODIUM 40 MG: 40 INJECTION, POWDER, FOR SOLUTION INTRAVENOUS at 09:30

## 2020-01-01 RX ADMIN — SODIUM BICARBONATE 650 MG: 650 TABLET ORAL at 09:08

## 2020-01-01 RX ADMIN — LACTULOSE 20 G: 20 SOLUTION ORAL at 14:32

## 2020-01-01 RX ADMIN — PANTOPRAZOLE SODIUM 40 MG: 40 TABLET, DELAYED RELEASE ORAL at 06:43

## 2020-01-01 RX ADMIN — PANTOPRAZOLE SODIUM 40 MG: 40 TABLET, DELAYED RELEASE ORAL at 06:28

## 2020-01-01 RX ADMIN — LACTULOSE 30 G: 20 SOLUTION ORAL at 21:21

## 2020-01-01 RX ADMIN — SODIUM CHLORIDE: 4.5 INJECTION, SOLUTION INTRAVENOUS at 11:23

## 2020-01-01 RX ADMIN — LACTULOSE 20 G: 20 SOLUTION ORAL at 17:38

## 2020-01-01 RX ADMIN — INSULIN LISPRO 2 UNITS: 100 INJECTION, SOLUTION INTRAVENOUS; SUBCUTANEOUS at 21:41

## 2020-01-01 RX ADMIN — Medication 10 ML: at 10:14

## 2020-01-01 RX ADMIN — OXYCODONE HYDROCHLORIDE 10 MG: 10 TABLET ORAL at 15:03

## 2020-01-01 RX ADMIN — INSULIN LISPRO 2 UNITS: 100 INJECTION, SOLUTION INTRAVENOUS; SUBCUTANEOUS at 21:22

## 2020-01-01 RX ADMIN — LACTULOSE 20 G: 20 SOLUTION ORAL at 14:15

## 2020-01-01 RX ADMIN — LEVOTHYROXINE SODIUM 112 MCG: 100 TABLET ORAL at 06:47

## 2020-01-01 RX ADMIN — LACTULOSE 30 G: 20 SOLUTION ORAL at 13:32

## 2020-01-01 RX ADMIN — PANTOPRAZOLE SODIUM 40 MG: 40 INJECTION, POWDER, FOR SOLUTION INTRAVENOUS at 09:13

## 2020-01-01 RX ADMIN — LACTULOSE 20 G: 20 SOLUTION ORAL at 14:50

## 2020-01-01 RX ADMIN — RIFAXIMIN 550 MG: 550 TABLET ORAL at 20:58

## 2020-01-01 RX ADMIN — TACROLIMUS 0.5 MG: 0.5 CAPSULE ORAL at 23:17

## 2020-01-01 RX ADMIN — THIAMINE HYDROCHLORIDE 200 MG: 100 INJECTION, SOLUTION INTRAMUSCULAR; INTRAVENOUS at 22:11

## 2020-01-01 RX ADMIN — TACROLIMUS 0.5 MG: 0.5 CAPSULE ORAL at 08:56

## 2020-01-01 RX ADMIN — RIFAXIMIN 550 MG: 550 TABLET ORAL at 08:54

## 2020-01-01 RX ADMIN — LACTULOSE 20 G: 20 SOLUTION ORAL at 16:20

## 2020-01-01 RX ADMIN — LAMOTRIGINE 100 MG: 100 TABLET ORAL at 22:13

## 2020-01-01 RX ADMIN — SODIUM CHLORIDE, PRESERVATIVE FREE 10 ML: 5 INJECTION INTRAVENOUS at 08:13

## 2020-01-01 RX ADMIN — INSULIN LISPRO 3 UNITS: 100 INJECTION, SOLUTION INTRAVENOUS; SUBCUTANEOUS at 12:19

## 2020-01-01 RX ADMIN — MEROPENEM 500 MG: 500 INJECTION, POWDER, FOR SOLUTION INTRAVENOUS at 09:04

## 2020-01-01 RX ADMIN — SODIUM BICARBONATE 650 MG: 650 TABLET ORAL at 11:56

## 2020-01-01 RX ADMIN — OXYCODONE 5 MG: 5 TABLET ORAL at 18:50

## 2020-01-01 RX ADMIN — LACTULOSE 20 G: 20 SOLUTION ORAL at 12:12

## 2020-01-01 RX ADMIN — RIFAXIMIN 550 MG: 550 TABLET ORAL at 23:18

## 2020-01-01 RX ADMIN — LACTULOSE 20 G: 20 SOLUTION ORAL at 09:33

## 2020-01-01 RX ADMIN — THIAMINE HYDROCHLORIDE 200 MG: 100 INJECTION, SOLUTION INTRAMUSCULAR; INTRAVENOUS at 10:33

## 2020-01-01 RX ADMIN — Medication 10 ML: at 09:22

## 2020-01-01 RX ADMIN — ENOXAPARIN SODIUM 40 MG: 40 INJECTION SUBCUTANEOUS at 20:43

## 2020-01-01 RX ADMIN — PROMETHAZINE HYDROCHLORIDE 12.5 MG: 25 TABLET ORAL at 18:00

## 2020-01-01 RX ADMIN — INSULIN GLARGINE 50 UNITS: 100 INJECTION, SOLUTION SUBCUTANEOUS at 20:44

## 2020-01-01 RX ADMIN — SODIUM CHLORIDE, PRESERVATIVE FREE 10 ML: 5 INJECTION INTRAVENOUS at 08:41

## 2020-01-01 RX ADMIN — PANTOPRAZOLE SODIUM 40 MG: 40 INJECTION, POWDER, FOR SOLUTION INTRAVENOUS at 08:05

## 2020-01-01 RX ADMIN — INSULIN GLARGINE 15 UNITS: 100 INJECTION, SOLUTION SUBCUTANEOUS at 09:33

## 2020-01-01 RX ADMIN — LACTULOSE 20 G: 20 SOLUTION ORAL at 02:24

## 2020-01-01 RX ADMIN — OXYCODONE HYDROCHLORIDE 10 MG: 10 TABLET ORAL at 08:28

## 2020-01-01 RX ADMIN — SODIUM CHLORIDE, PRESERVATIVE FREE 10 ML: 5 INJECTION INTRAVENOUS at 09:00

## 2020-01-01 RX ADMIN — THIAMINE HYDROCHLORIDE 200 MG: 100 INJECTION, SOLUTION INTRAMUSCULAR; INTRAVENOUS at 20:57

## 2020-01-01 RX ADMIN — LACTULOSE 20 G: 20 SOLUTION ORAL at 20:33

## 2020-01-01 RX ADMIN — OXYCODONE HYDROCHLORIDE 10 MG: 10 TABLET ORAL at 20:33

## 2020-01-01 RX ADMIN — OXYCODONE HYDROCHLORIDE 10 MG: 10 TABLET ORAL at 03:21

## 2020-01-01 RX ADMIN — INSULIN LISPRO 2 UNITS: 100 INJECTION, SOLUTION INTRAVENOUS; SUBCUTANEOUS at 17:06

## 2020-01-01 RX ADMIN — SPIRONOLACTONE 25 MG: 25 TABLET ORAL at 08:57

## 2020-01-01 RX ADMIN — LAMOTRIGINE 100 MG: 100 TABLET ORAL at 09:15

## 2020-01-01 RX ADMIN — LEVOTHYROXINE SODIUM ANHYDROUS 37.5 MCG: 100 INJECTION, POWDER, LYOPHILIZED, FOR SOLUTION INTRAVENOUS at 06:35

## 2020-01-01 RX ADMIN — LAMOTRIGINE 100 MG: 100 TABLET ORAL at 09:06

## 2020-01-01 RX ADMIN — FUROSEMIDE 40 MG: 40 TABLET ORAL at 09:31

## 2020-01-01 RX ADMIN — TACROLIMUS 0.5 MG: 0.5 CAPSULE ORAL at 22:19

## 2020-01-01 RX ADMIN — FUROSEMIDE 40 MG: 10 INJECTION, SOLUTION INTRAMUSCULAR; INTRAVENOUS at 09:13

## 2020-01-01 RX ADMIN — LACTULOSE 20 G: 20 SOLUTION ORAL at 09:20

## 2020-01-01 RX ADMIN — OLANZAPINE 10 MG: 10 TABLET, FILM COATED ORAL at 20:36

## 2020-01-01 RX ADMIN — INSULIN GLARGINE 25 UNITS: 100 INJECTION, SOLUTION SUBCUTANEOUS at 20:34

## 2020-01-01 RX ADMIN — SODIUM CHLORIDE, PRESERVATIVE FREE 10 ML: 5 INJECTION INTRAVENOUS at 21:04

## 2020-01-01 RX ADMIN — LACTULOSE 20 G: 20 SOLUTION ORAL at 20:58

## 2020-01-01 RX ADMIN — TACROLIMUS 0.5 MG: 0.5 CAPSULE ORAL at 22:58

## 2020-01-01 RX ADMIN — MILNACIPRAN HYDROCHLORIDE 100 MG: 50 TABLET, FILM COATED ORAL at 15:03

## 2020-01-01 RX ADMIN — SODIUM CHLORIDE, PRESERVATIVE FREE 10 ML: 5 INJECTION INTRAVENOUS at 09:13

## 2020-01-01 RX ADMIN — INSULIN GLARGINE 15 UNITS: 100 INJECTION, SOLUTION SUBCUTANEOUS at 08:50

## 2020-01-01 RX ADMIN — PANTOPRAZOLE SODIUM 40 MG: 40 TABLET, DELAYED RELEASE ORAL at 05:21

## 2020-01-01 RX ADMIN — LACTULOSE 30 G: 20 SOLUTION ORAL at 22:21

## 2020-01-01 RX ADMIN — OXYCODONE HYDROCHLORIDE 10 MG: 10 TABLET ORAL at 17:49

## 2020-01-01 RX ADMIN — OXYCODONE 5 MG: 5 TABLET ORAL at 13:39

## 2020-01-01 RX ADMIN — LEVOTHYROXINE SODIUM 100 MCG: 0.1 TABLET ORAL at 05:42

## 2020-01-01 RX ADMIN — INSULIN LISPRO 2 UNITS: 100 INJECTION, SOLUTION INTRAVENOUS; SUBCUTANEOUS at 17:21

## 2020-01-01 RX ADMIN — FUROSEMIDE 20 MG: 20 TABLET ORAL at 08:56

## 2020-01-01 RX ADMIN — MILNACIPRAN HYDROCHLORIDE 100 MG: 50 TABLET, FILM COATED ORAL at 09:15

## 2020-01-01 RX ADMIN — LEVOTHYROXINE SODIUM 112 MCG: 112 TABLET ORAL at 07:18

## 2020-01-01 RX ADMIN — TACROLIMUS 0.5 MG: 0.5 CAPSULE ORAL at 21:42

## 2020-01-01 RX ADMIN — TACROLIMUS 0.5 MG: 0.5 CAPSULE ORAL at 09:08

## 2020-01-01 RX ADMIN — SODIUM CHLORIDE, PRESERVATIVE FREE 10 ML: 5 INJECTION INTRAVENOUS at 21:34

## 2020-01-01 RX ADMIN — MEROPENEM 500 MG: 500 INJECTION, POWDER, FOR SOLUTION INTRAVENOUS at 17:38

## 2020-01-01 RX ADMIN — LACTULOSE: 10 SOLUTION ORAL at 06:35

## 2020-01-01 RX ADMIN — LACTULOSE 20 G: 20 SOLUTION ORAL at 14:23

## 2020-01-01 RX ADMIN — LACTULOSE 30 G: 20 SOLUTION ORAL at 15:16

## 2020-01-01 RX ADMIN — ENOXAPARIN SODIUM 40 MG: 40 INJECTION SUBCUTANEOUS at 20:33

## 2020-01-01 RX ADMIN — RIFAXIMIN 550 MG: 550 TABLET ORAL at 09:08

## 2020-01-01 RX ADMIN — INSULIN GLARGINE 40 UNITS: 100 INJECTION, SOLUTION SUBCUTANEOUS at 10:20

## 2020-01-01 RX ADMIN — VANCOMYCIN HYDROCHLORIDE 1000 MG: 1 INJECTION, POWDER, LYOPHILIZED, FOR SOLUTION INTRAVENOUS at 13:15

## 2020-01-01 RX ADMIN — TACROLIMUS 0.5 MG: 0.5 CAPSULE ORAL at 08:28

## 2020-01-01 RX ADMIN — INSULIN LISPRO 2 UNITS: 100 INJECTION, SOLUTION INTRAVENOUS; SUBCUTANEOUS at 09:14

## 2020-01-01 RX ADMIN — PANTOPRAZOLE SODIUM 40 MG: 40 TABLET, DELAYED RELEASE ORAL at 07:05

## 2020-01-01 RX ADMIN — INSULIN LISPRO 2 UNITS: 100 INJECTION, SOLUTION INTRAVENOUS; SUBCUTANEOUS at 08:18

## 2020-01-01 RX ADMIN — ERTAPENEM SODIUM 1000 MG: 1 INJECTION, POWDER, LYOPHILIZED, FOR SOLUTION INTRAMUSCULAR; INTRAVENOUS at 15:05

## 2020-01-01 RX ADMIN — LACTULOSE 20 G: 20 SOLUTION ORAL at 06:06

## 2020-01-01 RX ADMIN — RIFAXIMIN 550 MG: 550 TABLET ORAL at 20:57

## 2020-01-01 RX ADMIN — INSULIN LISPRO 3 UNITS: 100 INJECTION, SOLUTION INTRAVENOUS; SUBCUTANEOUS at 12:36

## 2020-01-01 RX ADMIN — TACROLIMUS 0.5 MG: 0.5 CAPSULE ORAL at 09:26

## 2020-01-01 RX ADMIN — SODIUM BICARBONATE 650 MG: 650 TABLET ORAL at 09:06

## 2020-01-01 RX ADMIN — SODIUM BICARBONATE 650 MG: 650 TABLET ORAL at 17:08

## 2020-01-01 RX ADMIN — SODIUM CHLORIDE, PRESERVATIVE FREE 10 ML: 5 INJECTION INTRAVENOUS at 21:08

## 2020-01-01 RX ADMIN — MILNACIPRAN HYDROCHLORIDE 100 MG: 50 TABLET, FILM COATED ORAL at 21:42

## 2020-01-01 RX ADMIN — MILNACIPRAN HYDROCHLORIDE 100 MG: 50 TABLET, FILM COATED ORAL at 14:15

## 2020-01-01 RX ADMIN — LACTULOSE 20 G: 20 SOLUTION ORAL at 11:00

## 2020-01-01 RX ADMIN — LACTULOSE 20 G: 20 SOLUTION ORAL at 22:12

## 2020-01-01 RX ADMIN — TACROLIMUS 0.5 MG: 0.5 CAPSULE ORAL at 08:06

## 2020-01-01 RX ADMIN — ACETAMINOPHEN 650 MG: 325 TABLET ORAL at 21:20

## 2020-01-01 RX ADMIN — INSULIN LISPRO 10 UNITS: 100 INJECTION, SOLUTION INTRAVENOUS; SUBCUTANEOUS at 16:20

## 2020-01-01 RX ADMIN — FUROSEMIDE 40 MG: 40 TABLET ORAL at 09:06

## 2020-01-01 RX ADMIN — PANTOPRAZOLE SODIUM 40 MG: 40 TABLET, DELAYED RELEASE ORAL at 09:27

## 2020-01-01 RX ADMIN — Medication 10 ML: at 09:07

## 2020-01-01 RX ADMIN — OLANZAPINE 10 MG: 10 TABLET, FILM COATED ORAL at 21:05

## 2020-01-01 RX ADMIN — SODIUM CHLORIDE, PRESERVATIVE FREE 10 ML: 5 INJECTION INTRAVENOUS at 09:18

## 2020-01-01 RX ADMIN — OXYCODONE HYDROCHLORIDE 10 MG: 10 TABLET ORAL at 16:56

## 2020-01-01 RX ADMIN — MICONAZOLE NITRATE: 20 POWDER TOPICAL at 08:45

## 2020-01-01 RX ADMIN — LACTULOSE 20 G: 20 SOLUTION ORAL at 08:33

## 2020-01-01 RX ADMIN — INSULIN HUMAN 15 UNITS: 100 INJECTION, SOLUTION PARENTERAL at 18:32

## 2020-01-01 RX ADMIN — RIFAXIMIN 550 MG: 550 TABLET ORAL at 08:56

## 2020-01-01 RX ADMIN — SPIRONOLACTONE 25 MG: 25 TABLET ORAL at 08:18

## 2020-01-01 RX ADMIN — MILNACIPRAN HYDROCHLORIDE 100 MG: 50 TABLET, FILM COATED ORAL at 08:36

## 2020-01-01 RX ADMIN — CASTOR OIL AND BALSAM, PERU: 788; 87 OINTMENT TOPICAL at 13:17

## 2020-01-01 RX ADMIN — LACTULOSE 20 G: 20 SOLUTION ORAL at 09:31

## 2020-01-01 RX ADMIN — LACTULOSE 30 G: 20 SOLUTION ORAL at 20:35

## 2020-01-01 RX ADMIN — PANTOPRAZOLE SODIUM 40 MG: 40 TABLET, DELAYED RELEASE ORAL at 05:58

## 2020-01-01 RX ADMIN — LACTULOSE 20 G: 20 SOLUTION ORAL at 20:43

## 2020-01-01 RX ADMIN — SODIUM CHLORIDE, PRESERVATIVE FREE 10 ML: 5 INJECTION INTRAVENOUS at 09:32

## 2020-01-01 RX ADMIN — INSULIN GLARGINE 20 UNITS: 100 INJECTION, SOLUTION SUBCUTANEOUS at 20:36

## 2020-01-01 RX ADMIN — INSULIN GLARGINE 15 UNITS: 100 INJECTION, SOLUTION SUBCUTANEOUS at 22:11

## 2020-01-01 RX ADMIN — LACTULOSE 30 G: 20 SOLUTION ORAL at 08:55

## 2020-01-01 RX ADMIN — MILNACIPRAN HYDROCHLORIDE 100 MG: 50 TABLET, FILM COATED ORAL at 13:52

## 2020-01-01 RX ADMIN — MILNACIPRAN HYDROCHLORIDE 100 MG: 50 TABLET, FILM COATED ORAL at 08:16

## 2020-01-01 RX ADMIN — INSULIN LISPRO 2 UNITS: 100 INJECTION, SOLUTION INTRAVENOUS; SUBCUTANEOUS at 21:05

## 2020-01-01 RX ADMIN — INSULIN LISPRO 5 UNITS: 100 INJECTION, SOLUTION INTRAVENOUS; SUBCUTANEOUS at 17:11

## 2020-01-01 RX ADMIN — LACTULOSE 30 G: 20 SOLUTION ORAL at 23:18

## 2020-01-01 RX ADMIN — SODIUM CHLORIDE 1000 ML: 9 INJECTION, SOLUTION INTRAVENOUS at 12:35

## 2020-01-01 RX ADMIN — SODIUM BICARBONATE 650 MG: 650 TABLET ORAL at 20:58

## 2020-01-01 RX ADMIN — LEVOTHYROXINE SODIUM 112 MCG: 112 TABLET ORAL at 05:40

## 2020-01-01 RX ADMIN — RIFAXIMIN 550 MG: 550 TABLET ORAL at 09:31

## 2020-01-01 RX ADMIN — MEROPENEM 500 MG: 500 INJECTION, POWDER, FOR SOLUTION INTRAVENOUS at 06:46

## 2020-01-01 RX ADMIN — SODIUM CHLORIDE, POTASSIUM CHLORIDE, SODIUM LACTATE AND CALCIUM CHLORIDE 1000 ML: 600; 310; 30; 20 INJECTION, SOLUTION INTRAVENOUS at 15:42

## 2020-01-01 RX ADMIN — LAMOTRIGINE 100 MG: 100 TABLET ORAL at 20:58

## 2020-01-01 RX ADMIN — INSULIN LISPRO 4 UNITS: 100 INJECTION, SOLUTION INTRAVENOUS; SUBCUTANEOUS at 08:44

## 2020-01-01 RX ADMIN — TACROLIMUS 0.5 MG: 0.5 CAPSULE ORAL at 21:39

## 2020-01-01 RX ADMIN — MEROPENEM 500 MG: 500 INJECTION, POWDER, FOR SOLUTION INTRAVENOUS at 17:08

## 2020-01-01 RX ADMIN — MICONAZOLE NITRATE: 20 POWDER TOPICAL at 09:27

## 2020-01-01 RX ADMIN — INSULIN LISPRO 1 UNITS: 100 INJECTION, SOLUTION INTRAVENOUS; SUBCUTANEOUS at 11:56

## 2020-01-01 RX ADMIN — LACTULOSE 20 G: 20 SOLUTION ORAL at 02:23

## 2020-01-01 RX ADMIN — TACROLIMUS 0.5 MG: 0.5 CAPSULE ORAL at 21:07

## 2020-01-01 RX ADMIN — INSULIN LISPRO 16 UNITS: 100 INJECTION, SOLUTION INTRAVENOUS; SUBCUTANEOUS at 12:00

## 2020-01-01 RX ADMIN — LAMOTRIGINE 100 MG: 100 TABLET ORAL at 20:43

## 2020-01-01 RX ADMIN — RIFAXIMIN 550 MG: 550 TABLET ORAL at 09:15

## 2020-01-01 RX ADMIN — LAMOTRIGINE 100 MG: 100 TABLET ORAL at 21:05

## 2020-01-01 RX ADMIN — LAMOTRIGINE 100 MG: 100 TABLET ORAL at 08:18

## 2020-01-01 RX ADMIN — INSULIN GLARGINE 20 UNITS: 100 INJECTION, SOLUTION SUBCUTANEOUS at 09:05

## 2020-01-01 RX ADMIN — LAMOTRIGINE 100 MG: 100 TABLET ORAL at 09:08

## 2020-01-01 RX ADMIN — LACTULOSE 30 G: 20 SOLUTION ORAL at 08:16

## 2020-01-01 RX ADMIN — INSULIN LISPRO 1 UNITS: 100 INJECTION, SOLUTION INTRAVENOUS; SUBCUTANEOUS at 22:51

## 2020-01-01 RX ADMIN — THIAMINE HYDROCHLORIDE 200 MG: 100 INJECTION, SOLUTION INTRAMUSCULAR; INTRAVENOUS at 09:25

## 2020-01-01 RX ADMIN — INSULIN LISPRO 1 UNITS: 100 INJECTION, SOLUTION INTRAVENOUS; SUBCUTANEOUS at 08:50

## 2020-01-01 RX ADMIN — FUROSEMIDE 40 MG: 40 TABLET ORAL at 11:56

## 2020-01-01 RX ADMIN — LEVOTHYROXINE SODIUM 112 MCG: 112 TABLET ORAL at 05:58

## 2020-01-01 RX ADMIN — OLANZAPINE 5 MG: 5 TABLET, FILM COATED ORAL at 23:18

## 2020-01-01 RX ADMIN — INSULIN LISPRO 1 UNITS: 100 INJECTION, SOLUTION INTRAVENOUS; SUBCUTANEOUS at 21:20

## 2020-01-01 RX ADMIN — INSULIN LISPRO 3 UNITS: 100 INJECTION, SOLUTION INTRAVENOUS; SUBCUTANEOUS at 12:27

## 2020-01-01 RX ADMIN — FUROSEMIDE 40 MG: 40 TABLET ORAL at 17:08

## 2020-01-01 RX ADMIN — ERTAPENEM SODIUM 1000 MG: 1 INJECTION, POWDER, LYOPHILIZED, FOR SOLUTION INTRAMUSCULAR; INTRAVENOUS at 09:33

## 2020-01-01 RX ADMIN — SPIRONOLACTONE 50 MG: 25 TABLET ORAL at 09:15

## 2020-01-01 RX ADMIN — THIAMINE HYDROCHLORIDE 200 MG: 100 INJECTION, SOLUTION INTRAMUSCULAR; INTRAVENOUS at 10:26

## 2020-01-01 RX ADMIN — INSULIN LISPRO 2 UNITS: 100 INJECTION, SOLUTION INTRAVENOUS; SUBCUTANEOUS at 20:44

## 2020-01-01 RX ADMIN — PANTOPRAZOLE SODIUM 40 MG: 40 INJECTION, POWDER, FOR SOLUTION INTRAVENOUS at 09:08

## 2020-01-01 RX ADMIN — INSULIN GLARGINE 20 UNITS: 100 INJECTION, SOLUTION SUBCUTANEOUS at 22:21

## 2020-01-01 RX ADMIN — OXYCODONE HYDROCHLORIDE 10 MG: 10 TABLET ORAL at 08:55

## 2020-01-01 RX ADMIN — INSULIN GLARGINE 22 UNITS: 100 INJECTION, SOLUTION SUBCUTANEOUS at 20:44

## 2020-01-01 RX ADMIN — INSULIN GLARGINE 40 UNITS: 100 INJECTION, SOLUTION SUBCUTANEOUS at 20:57

## 2020-01-01 RX ADMIN — INSULIN GLARGINE 30 UNITS: 100 INJECTION, SOLUTION SUBCUTANEOUS at 21:20

## 2020-01-01 RX ADMIN — PROMETHAZINE HYDROCHLORIDE 12.5 MG: 25 TABLET ORAL at 09:15

## 2020-01-01 RX ADMIN — MILNACIPRAN HYDROCHLORIDE 100 MG: 50 TABLET, FILM COATED ORAL at 21:05

## 2020-01-01 RX ADMIN — OXYCODONE HYDROCHLORIDE 10 MG: 10 TABLET ORAL at 14:50

## 2020-01-01 RX ADMIN — PANTOPRAZOLE SODIUM 40 MG: 40 INJECTION, POWDER, FOR SOLUTION INTRAVENOUS at 09:22

## 2020-01-01 RX ADMIN — SODIUM CHLORIDE 500 ML: 9 INJECTION, SOLUTION INTRAVENOUS at 20:10

## 2020-01-01 RX ADMIN — LAMOTRIGINE 100 MG: 100 TABLET ORAL at 09:27

## 2020-01-01 RX ADMIN — INSULIN LISPRO 4 UNITS: 100 INJECTION, SOLUTION INTRAVENOUS; SUBCUTANEOUS at 11:46

## 2020-01-01 RX ADMIN — OLANZAPINE 10 MG: 10 TABLET, FILM COATED ORAL at 20:43

## 2020-01-01 RX ADMIN — Medication 10 ML: at 08:14

## 2020-01-01 RX ADMIN — LACTULOSE 30 G: 20 SOLUTION ORAL at 08:19

## 2020-01-01 RX ADMIN — INSULIN LISPRO 5 UNITS: 100 INJECTION, SOLUTION INTRAVENOUS; SUBCUTANEOUS at 12:19

## 2020-01-01 RX ADMIN — INSULIN LISPRO 1 UNITS: 100 INJECTION, SOLUTION INTRAVENOUS; SUBCUTANEOUS at 16:34

## 2020-01-01 RX ADMIN — LACTULOSE 20 G: 20 SOLUTION ORAL at 08:50

## 2020-01-01 RX ADMIN — OXYCODONE HYDROCHLORIDE 10 MG: 10 TABLET ORAL at 17:41

## 2020-01-01 RX ADMIN — INSULIN LISPRO 1 UNITS: 100 INJECTION, SOLUTION INTRAVENOUS; SUBCUTANEOUS at 09:11

## 2020-01-01 RX ADMIN — OLANZAPINE 10 MG: 10 TABLET, FILM COATED ORAL at 20:57

## 2020-01-01 RX ADMIN — LACTULOSE 20 G: 20 SOLUTION ORAL at 21:05

## 2020-01-01 RX ADMIN — PANTOPRAZOLE SODIUM 40 MG: 40 TABLET, DELAYED RELEASE ORAL at 08:51

## 2020-01-01 RX ADMIN — OLANZAPINE 5 MG: 5 TABLET, FILM COATED ORAL at 22:22

## 2020-01-01 RX ADMIN — LAMOTRIGINE 100 MG: 100 TABLET ORAL at 08:13

## 2020-01-01 RX ADMIN — INSULIN LISPRO 2 UNITS: 100 INJECTION, SOLUTION INTRAVENOUS; SUBCUTANEOUS at 08:57

## 2020-01-01 RX ADMIN — MILNACIPRAN HYDROCHLORIDE 100 MG: 50 TABLET, FILM COATED ORAL at 09:30

## 2020-01-01 RX ADMIN — RIFAXIMIN 550 MG: 550 TABLET ORAL at 20:17

## 2020-01-01 RX ADMIN — RIFAXIMIN 550 MG: 550 TABLET ORAL at 08:18

## 2020-01-01 RX ADMIN — OXYCODONE HYDROCHLORIDE 10 MG: 10 TABLET ORAL at 13:09

## 2020-01-01 RX ADMIN — MEROPENEM 500 MG: 500 INJECTION, POWDER, FOR SOLUTION INTRAVENOUS at 06:06

## 2020-01-01 RX ADMIN — LACTULOSE 20 G: 20 SOLUTION ORAL at 04:34

## 2020-01-01 RX ADMIN — SODIUM CHLORIDE, PRESERVATIVE FREE 10 ML: 5 INJECTION INTRAVENOUS at 09:23

## 2020-01-01 RX ADMIN — SODIUM CHLORIDE, PRESERVATIVE FREE 10 ML: 5 INJECTION INTRAVENOUS at 10:31

## 2020-01-01 RX ADMIN — LACTULOSE 20 G: 20 SOLUTION ORAL at 23:49

## 2020-01-01 RX ADMIN — INSULIN LISPRO 4 UNITS: 100 INJECTION, SOLUTION INTRAVENOUS; SUBCUTANEOUS at 08:29

## 2020-01-01 RX ADMIN — INSULIN LISPRO 3 UNITS: 100 INJECTION, SOLUTION INTRAVENOUS; SUBCUTANEOUS at 17:10

## 2020-01-01 RX ADMIN — TACROLIMUS 0.5 MG: 0.5 CAPSULE ORAL at 08:15

## 2020-01-01 RX ADMIN — INSULIN GLARGINE 12 UNITS: 100 INJECTION, SOLUTION SUBCUTANEOUS at 22:51

## 2020-01-01 RX ADMIN — RIFAXIMIN 550 MG: 550 TABLET ORAL at 20:33

## 2020-01-01 RX ADMIN — MEROPENEM 500 MG: 500 INJECTION, POWDER, FOR SOLUTION INTRAVENOUS at 12:33

## 2020-01-01 RX ADMIN — LACTULOSE 20 G: 20 SOLUTION ORAL at 08:05

## 2020-01-01 RX ADMIN — SODIUM CHLORIDE, PRESERVATIVE FREE 10 ML: 5 INJECTION INTRAVENOUS at 08:57

## 2020-01-01 RX ADMIN — INSULIN LISPRO 2 UNITS: 100 INJECTION, SOLUTION INTRAVENOUS; SUBCUTANEOUS at 09:33

## 2020-01-01 RX ADMIN — INSULIN LISPRO 1 UNITS: 100 INJECTION, SOLUTION INTRAVENOUS; SUBCUTANEOUS at 20:34

## 2020-01-01 RX ADMIN — LACTULOSE 30 G: 20 SOLUTION ORAL at 20:32

## 2020-01-01 RX ADMIN — Medication 10 ML: at 08:13

## 2020-01-01 RX ADMIN — PANTOPRAZOLE SODIUM 40 MG: 40 TABLET, DELAYED RELEASE ORAL at 06:26

## 2020-01-01 RX ADMIN — LACTULOSE 20 G: 20 SOLUTION ORAL at 09:05

## 2020-01-01 RX ADMIN — INSULIN LISPRO 12 UNITS: 100 INJECTION, SOLUTION INTRAVENOUS; SUBCUTANEOUS at 17:20

## 2020-01-01 RX ADMIN — SPIRONOLACTONE 50 MG: 25 TABLET ORAL at 08:19

## 2020-01-01 RX ADMIN — INSULIN LISPRO 2 UNITS: 100 INJECTION, SOLUTION INTRAVENOUS; SUBCUTANEOUS at 12:20

## 2020-01-01 RX ADMIN — LEVOTHYROXINE SODIUM 112 MCG: 112 TABLET ORAL at 06:43

## 2020-01-01 RX ADMIN — ONDANSETRON 4 MG: 2 INJECTION INTRAMUSCULAR; INTRAVENOUS at 21:42

## 2020-01-01 RX ADMIN — INSULIN LISPRO 2 UNITS: 100 INJECTION, SOLUTION INTRAVENOUS; SUBCUTANEOUS at 16:22

## 2020-01-01 RX ADMIN — INSULIN GLARGINE 35 UNITS: 100 INJECTION, SOLUTION SUBCUTANEOUS at 21:02

## 2020-01-01 RX ADMIN — RIFAXIMIN 550 MG: 550 TABLET ORAL at 22:11

## 2020-01-01 RX ADMIN — SODIUM BICARBONATE 650 MG: 650 TABLET ORAL at 20:33

## 2020-01-01 RX ADMIN — OXYCODONE HYDROCHLORIDE 10 MG: 10 TABLET ORAL at 15:31

## 2020-01-01 RX ADMIN — LAMOTRIGINE 100 MG: 100 TABLET ORAL at 20:36

## 2020-01-01 RX ADMIN — OLANZAPINE 10 MG: 10 TABLET, FILM COATED ORAL at 20:34

## 2020-01-01 RX ADMIN — RIFAXIMIN 550 MG: 550 TABLET ORAL at 09:34

## 2020-01-01 RX ADMIN — RIFAXIMIN 550 MG: 550 TABLET ORAL at 21:02

## 2020-01-01 RX ADMIN — MILNACIPRAN HYDROCHLORIDE 100 MG: 50 TABLET, FILM COATED ORAL at 13:33

## 2020-01-01 RX ADMIN — OXYCODONE HYDROCHLORIDE 10 MG: 10 TABLET ORAL at 12:32

## 2020-01-01 RX ADMIN — LACTULOSE 30 G: 20 SOLUTION ORAL at 20:43

## 2020-01-01 RX ADMIN — INSULIN LISPRO 8 UNITS: 100 INJECTION, SOLUTION INTRAVENOUS; SUBCUTANEOUS at 11:53

## 2020-01-01 RX ADMIN — INSULIN LISPRO 1 UNITS: 100 INJECTION, SOLUTION INTRAVENOUS; SUBCUTANEOUS at 20:36

## 2020-01-01 RX ADMIN — LACTULOSE 30 G: 20 SOLUTION ORAL at 09:05

## 2020-01-01 RX ADMIN — LAMOTRIGINE 100 MG: 100 TABLET ORAL at 08:17

## 2020-01-01 RX ADMIN — INSULIN GLARGINE 40 UNITS: 100 INJECTION, SOLUTION SUBCUTANEOUS at 11:29

## 2020-01-01 RX ADMIN — LAMOTRIGINE 100 MG: 100 TABLET ORAL at 08:56

## 2020-01-01 RX ADMIN — TACROLIMUS 0.5 MG: 0.5 CAPSULE ORAL at 09:31

## 2020-01-01 RX ADMIN — RIFAXIMIN 550 MG: 550 TABLET ORAL at 20:56

## 2020-01-01 RX ADMIN — MILNACIPRAN HYDROCHLORIDE 100 MG: 50 TABLET, FILM COATED ORAL at 08:56

## 2020-01-01 RX ADMIN — Medication 10 ML: at 09:34

## 2020-01-01 RX ADMIN — OXYCODONE HYDROCHLORIDE 10 MG: 10 TABLET ORAL at 17:04

## 2020-01-01 RX ADMIN — LACTULOSE 20 G: 20 SOLUTION ORAL at 22:50

## 2020-01-01 RX ADMIN — LEVOTHYROXINE SODIUM 112 MCG: 112 TABLET ORAL at 06:34

## 2020-01-01 RX ADMIN — Medication 10 ML: at 09:01

## 2020-01-01 RX ADMIN — THIAMINE HYDROCHLORIDE 200 MG: 100 INJECTION, SOLUTION INTRAMUSCULAR; INTRAVENOUS at 00:02

## 2020-01-01 RX ADMIN — VANCOMYCIN HYDROCHLORIDE 1000 MG: 1 INJECTION, POWDER, LYOPHILIZED, FOR SOLUTION INTRAVENOUS at 09:15

## 2020-01-01 RX ADMIN — INSULIN LISPRO 2 UNITS: 100 INJECTION, SOLUTION INTRAVENOUS; SUBCUTANEOUS at 17:18

## 2020-01-01 RX ADMIN — INSULIN GLARGINE 40 UNITS: 100 INJECTION, SOLUTION SUBCUTANEOUS at 08:28

## 2020-01-01 RX ADMIN — TACROLIMUS 0.5 MG: 0.5 CAPSULE ORAL at 21:21

## 2020-01-01 RX ADMIN — OXYCODONE HYDROCHLORIDE 10 MG: 10 TABLET ORAL at 10:18

## 2020-01-01 RX ADMIN — CEFTRIAXONE 2 G: 2 INJECTION, POWDER, FOR SOLUTION INTRAMUSCULAR; INTRAVENOUS at 16:28

## 2020-01-01 RX ADMIN — LACTULOSE 20 G: 20 SOLUTION ORAL at 20:17

## 2020-01-01 RX ADMIN — LACTULOSE 20 G: 20 SOLUTION ORAL at 05:44

## 2020-01-01 RX ADMIN — INSULIN LISPRO 4 UNITS: 100 INJECTION, SOLUTION INTRAVENOUS; SUBCUTANEOUS at 17:25

## 2020-01-01 RX ADMIN — INSULIN GLARGINE 35 UNITS: 100 INJECTION, SOLUTION SUBCUTANEOUS at 21:41

## 2020-01-01 RX ADMIN — SODIUM CHLORIDE, PRESERVATIVE FREE 10 ML: 5 INJECTION INTRAVENOUS at 08:58

## 2020-01-01 RX ADMIN — TACROLIMUS 0.5 MG: 0.5 CAPSULE ORAL at 09:15

## 2020-01-01 RX ADMIN — CEFTRIAXONE 2 G: 2 INJECTION, POWDER, FOR SOLUTION INTRAMUSCULAR; INTRAVENOUS at 15:07

## 2020-01-01 RX ADMIN — CEFTRIAXONE 2 G: 2 INJECTION, POWDER, FOR SOLUTION INTRAMUSCULAR; INTRAVENOUS at 23:10

## 2020-01-01 RX ADMIN — ONDANSETRON 4 MG: 2 INJECTION INTRAMUSCULAR; INTRAVENOUS at 15:41

## 2020-01-01 RX ADMIN — ONDANSETRON 4 MG: 2 INJECTION INTRAMUSCULAR; INTRAVENOUS at 10:46

## 2020-01-01 RX ADMIN — Medication 10 ML: at 09:09

## 2020-01-01 RX ADMIN — SODIUM BICARBONATE 650 MG: 650 TABLET ORAL at 12:18

## 2020-01-01 RX ADMIN — OXYCODONE HYDROCHLORIDE 10 MG: 10 TABLET ORAL at 20:32

## 2020-01-01 RX ADMIN — OXYCODONE 5 MG: 5 TABLET ORAL at 21:08

## 2020-01-01 RX ADMIN — SODIUM BICARBONATE 650 MG: 650 TABLET ORAL at 17:30

## 2020-01-01 RX ADMIN — INSULIN LISPRO 3 UNITS: 100 INJECTION, SOLUTION INTRAVENOUS; SUBCUTANEOUS at 08:07

## 2020-01-01 RX ADMIN — LACTULOSE 20 G: 20 SOLUTION ORAL at 17:12

## 2020-01-01 RX ADMIN — SODIUM CHLORIDE, PRESERVATIVE FREE 10 ML: 5 INJECTION INTRAVENOUS at 22:16

## 2020-01-01 RX ADMIN — INSULIN GLARGINE 28 UNITS: 100 INJECTION, SOLUTION SUBCUTANEOUS at 08:51

## 2020-01-01 RX ADMIN — INSULIN GLARGINE 15 UNITS: 100 INJECTION, SOLUTION SUBCUTANEOUS at 20:58

## 2020-01-01 RX ADMIN — OLANZAPINE 10 MG: 10 TABLET, FILM COATED ORAL at 21:21

## 2020-01-01 RX ADMIN — INSULIN GLARGINE 50 UNITS: 100 INJECTION, SOLUTION SUBCUTANEOUS at 21:04

## 2020-01-01 RX ADMIN — MILNACIPRAN HYDROCHLORIDE 100 MG: 50 TABLET, FILM COATED ORAL at 21:01

## 2020-01-01 RX ADMIN — RIFAXIMIN 550 MG: 550 TABLET ORAL at 09:04

## 2020-01-01 RX ADMIN — LACTULOSE 20 G: 20 SOLUTION ORAL at 21:04

## 2020-01-01 RX ADMIN — OXYCODONE HYDROCHLORIDE 10 MG: 10 TABLET ORAL at 08:54

## 2020-01-01 RX ADMIN — INSULIN LISPRO 6 UNITS: 100 INJECTION, SOLUTION INTRAVENOUS; SUBCUTANEOUS at 08:30

## 2020-01-01 RX ADMIN — ANORECTAL OINTMENT 1 EACH: 15.7; .44; 24; 20.6 OINTMENT TOPICAL at 09:30

## 2020-01-01 RX ADMIN — LACTULOSE 20 G: 20 SOLUTION ORAL at 13:32

## 2020-01-01 RX ADMIN — SODIUM BICARBONATE 650 MG: 650 TABLET ORAL at 12:36

## 2020-01-01 RX ADMIN — INSULIN LISPRO 1 UNITS: 100 INJECTION, SOLUTION INTRAVENOUS; SUBCUTANEOUS at 17:11

## 2020-01-01 RX ADMIN — TACROLIMUS 0.5 MG: 0.5 CAPSULE ORAL at 21:05

## 2020-01-01 RX ADMIN — INSULIN LISPRO 10 UNITS: 100 INJECTION, SOLUTION INTRAVENOUS; SUBCUTANEOUS at 12:27

## 2020-01-01 RX ADMIN — PANTOPRAZOLE SODIUM 40 MG: 40 INJECTION, POWDER, FOR SOLUTION INTRAVENOUS at 09:15

## 2020-01-01 RX ADMIN — TACROLIMUS 0.5 MG: 0.5 CAPSULE ORAL at 20:34

## 2020-01-01 RX ADMIN — MEROPENEM 500 MG: 500 INJECTION, POWDER, FOR SOLUTION INTRAVENOUS at 09:08

## 2020-01-01 RX ADMIN — LACTULOSE 20 G: 20 SOLUTION ORAL at 07:05

## 2020-01-01 RX ADMIN — FUROSEMIDE 20 MG: 20 TABLET ORAL at 08:19

## 2020-01-01 RX ADMIN — SODIUM BICARBONATE 650 MG: 650 TABLET ORAL at 08:04

## 2020-01-01 RX ADMIN — LAMOTRIGINE 100 MG: 100 TABLET ORAL at 20:32

## 2020-01-01 RX ADMIN — RIFAXIMIN 550 MG: 550 TABLET ORAL at 08:04

## 2020-01-01 RX ADMIN — LAMOTRIGINE 100 MG: 100 TABLET ORAL at 20:56

## 2020-01-01 RX ADMIN — FUROSEMIDE 40 MG: 40 TABLET ORAL at 08:04

## 2020-01-01 RX ADMIN — SODIUM CHLORIDE: 9 INJECTION, SOLUTION INTRAVENOUS at 14:23

## 2020-01-01 RX ADMIN — SODIUM CHLORIDE, PRESERVATIVE FREE 10 ML: 5 INJECTION INTRAVENOUS at 22:49

## 2020-01-01 RX ADMIN — INSULIN LISPRO 1 UNITS: 100 INJECTION, SOLUTION INTRAVENOUS; SUBCUTANEOUS at 17:31

## 2020-01-01 RX ADMIN — INSULIN GLARGINE 20 UNITS: 100 INJECTION, SOLUTION SUBCUTANEOUS at 09:07

## 2020-01-01 RX ADMIN — MILNACIPRAN HYDROCHLORIDE 100 MG: 50 TABLET, FILM COATED ORAL at 21:20

## 2020-01-01 RX ADMIN — OXYCODONE HYDROCHLORIDE 10 MG: 10 TABLET ORAL at 09:04

## 2020-01-01 RX ADMIN — LEVOTHYROXINE SODIUM 112 MCG: 100 TABLET ORAL at 06:43

## 2020-01-01 RX ADMIN — MEROPENEM 500 MG: 500 INJECTION, POWDER, FOR SOLUTION INTRAVENOUS at 02:23

## 2020-01-01 RX ADMIN — LEVOTHYROXINE SODIUM 112 MCG: 100 TABLET ORAL at 06:32

## 2020-01-01 RX ADMIN — INSULIN HUMAN 10 UNITS: 100 INJECTION, SOLUTION PARENTERAL at 17:06

## 2020-01-01 RX ADMIN — ALBUMIN (HUMAN) 50 G: 0.25 INJECTION, SOLUTION INTRAVENOUS at 23:14

## 2020-01-01 RX ADMIN — LAMOTRIGINE 100 MG: 100 TABLET ORAL at 20:44

## 2020-01-01 RX ADMIN — TACROLIMUS 0.5 MG: 0.5 CAPSULE ORAL at 11:23

## 2020-01-01 RX ADMIN — INSULIN LISPRO 4 UNITS: 100 INJECTION, SOLUTION INTRAVENOUS; SUBCUTANEOUS at 12:29

## 2020-01-01 RX ADMIN — FUROSEMIDE 40 MG: 10 INJECTION, SOLUTION INTRAMUSCULAR; INTRAVENOUS at 09:31

## 2020-01-01 RX ADMIN — RIFAXIMIN 550 MG: 550 TABLET ORAL at 09:22

## 2020-01-01 RX ADMIN — MEROPENEM 500 MG: 500 INJECTION, POWDER, FOR SOLUTION INTRAVENOUS at 23:50

## 2020-01-01 RX ADMIN — INSULIN LISPRO 12 UNITS: 100 INJECTION, SOLUTION INTRAVENOUS; SUBCUTANEOUS at 12:22

## 2020-01-01 RX ADMIN — INSULIN LISPRO 16 UNITS: 100 INJECTION, SOLUTION INTRAVENOUS; SUBCUTANEOUS at 08:37

## 2020-01-01 RX ADMIN — SPIRONOLACTONE 100 MG: 100 TABLET ORAL at 10:19

## 2020-01-01 RX ADMIN — INSULIN LISPRO 16 UNITS: 100 INJECTION, SOLUTION INTRAVENOUS; SUBCUTANEOUS at 17:02

## 2020-01-01 RX ADMIN — INSULIN LISPRO 2 UNITS: 100 INJECTION, SOLUTION INTRAVENOUS; SUBCUTANEOUS at 08:38

## 2020-01-01 RX ADMIN — MICONAZOLE NITRATE: 20 POWDER TOPICAL at 21:23

## 2020-01-01 RX ADMIN — RIFAXIMIN 550 MG: 550 TABLET ORAL at 10:18

## 2020-01-01 RX ADMIN — INSULIN GLARGINE 15 UNITS: 100 INJECTION, SOLUTION SUBCUTANEOUS at 09:11

## 2020-01-01 RX ADMIN — MEROPENEM 1 G: 1 INJECTION, POWDER, FOR SOLUTION INTRAVENOUS at 17:47

## 2020-01-01 RX ADMIN — SODIUM CHLORIDE 1500 ML: 9 INJECTION, SOLUTION INTRAVENOUS at 23:13

## 2020-01-01 RX ADMIN — OXYCODONE HYDROCHLORIDE AND ACETAMINOPHEN 1 TABLET: 10; 325 TABLET ORAL at 06:56

## 2020-01-01 RX ADMIN — SPIRONOLACTONE 25 MG: 25 TABLET ORAL at 09:27

## 2020-01-01 RX ADMIN — RIFAXIMIN 550 MG: 550 TABLET ORAL at 08:28

## 2020-01-01 RX ADMIN — LEVOTHYROXINE SODIUM 112 MCG: 112 TABLET ORAL at 05:21

## 2020-01-01 RX ADMIN — OLANZAPINE 10 MG: 10 TABLET, FILM COATED ORAL at 22:12

## 2020-01-01 RX ADMIN — TACROLIMUS 0.5 MG: 0.5 CAPSULE ORAL at 10:18

## 2020-01-01 RX ADMIN — INSULIN GLARGINE 12 UNITS: 100 INJECTION, SOLUTION SUBCUTANEOUS at 21:21

## 2020-01-01 RX ADMIN — INSULIN LISPRO 2 UNITS: 100 INJECTION, SOLUTION INTRAVENOUS; SUBCUTANEOUS at 11:56

## 2020-01-01 RX ADMIN — INSULIN LISPRO 6 UNITS: 100 INJECTION, SOLUTION INTRAVENOUS; SUBCUTANEOUS at 16:28

## 2020-01-01 RX ADMIN — SODIUM CHLORIDE, PRESERVATIVE FREE 10 ML: 5 INJECTION INTRAVENOUS at 09:20

## 2020-01-01 RX ADMIN — INSULIN HUMAN 10 UNITS: 100 INJECTION, SOLUTION PARENTERAL at 15:43

## 2020-01-01 RX ADMIN — LACTULOSE 20 G: 20 SOLUTION ORAL at 08:14

## 2020-01-01 RX ADMIN — SPIRONOLACTONE 25 MG: 25 TABLET ORAL at 08:51

## 2020-01-01 RX ADMIN — OXYCODONE HYDROCHLORIDE AND ACETAMINOPHEN 1 TABLET: 10; 325 TABLET ORAL at 18:42

## 2020-01-01 RX ADMIN — TACROLIMUS 0.5 MG: 0.5 CAPSULE ORAL at 08:17

## 2020-01-01 RX ADMIN — OXYCODONE HYDROCHLORIDE 10 MG: 10 TABLET ORAL at 16:28

## 2020-01-01 RX ADMIN — RIFAXIMIN 550 MG: 550 TABLET ORAL at 09:30

## 2020-01-01 RX ADMIN — PANTOPRAZOLE SODIUM 40 MG: 40 INJECTION, POWDER, FOR SOLUTION INTRAVENOUS at 09:34

## 2020-01-01 RX ADMIN — LACTULOSE 20 G: 20 SOLUTION ORAL at 23:17

## 2020-01-01 RX ADMIN — ANORECTAL OINTMENT 1 EACH: 15.7; .44; 24; 20.6 OINTMENT TOPICAL at 13:33

## 2020-01-01 RX ADMIN — ONDANSETRON 4 MG: 2 INJECTION INTRAMUSCULAR; INTRAVENOUS at 16:20

## 2020-01-01 RX ADMIN — SODIUM CHLORIDE: 4.5 INJECTION, SOLUTION INTRAVENOUS at 06:20

## 2020-01-01 RX ADMIN — RIFAXIMIN 550 MG: 550 TABLET ORAL at 20:44

## 2020-01-01 RX ADMIN — SODIUM CHLORIDE, PRESERVATIVE FREE 10 ML: 5 INJECTION INTRAVENOUS at 22:17

## 2020-01-01 RX ADMIN — LACTULOSE 20 G: 20 SOLUTION ORAL at 06:08

## 2020-01-01 RX ADMIN — LACTULOSE 20 G: 20 SOLUTION ORAL at 12:04

## 2020-01-01 RX ADMIN — LACTULOSE 20 G: 20 SOLUTION ORAL at 11:37

## 2020-01-01 RX ADMIN — WATER: 100 IRRIGANT IRRIGATION at 00:36

## 2020-01-01 RX ADMIN — INSULIN LISPRO 12 UNITS: 100 INJECTION, SOLUTION INTRAVENOUS; SUBCUTANEOUS at 08:55

## 2020-01-01 RX ADMIN — TACROLIMUS 0.5 MG: 0.5 CAPSULE ORAL at 09:04

## 2020-01-01 RX ADMIN — OLANZAPINE 10 MG: 10 TABLET, FILM COATED ORAL at 22:50

## 2020-01-01 RX ADMIN — MEROPENEM 500 MG: 500 INJECTION, POWDER, FOR SOLUTION INTRAVENOUS at 22:20

## 2020-01-01 RX ADMIN — OXYCODONE HYDROCHLORIDE 10 MG: 10 TABLET ORAL at 17:42

## 2020-01-01 RX ADMIN — LEVOTHYROXINE SODIUM ANHYDROUS 37.5 MCG: 100 INJECTION, POWDER, LYOPHILIZED, FOR SOLUTION INTRAVENOUS at 06:27

## 2020-01-01 RX ADMIN — LEVOTHYROXINE SODIUM 112 MCG: 112 TABLET ORAL at 07:05

## 2020-01-01 RX ADMIN — SODIUM CHLORIDE 500 ML: 9 INJECTION, SOLUTION INTRAVENOUS at 17:42

## 2020-01-01 RX ADMIN — LACTULOSE 30 G: 20 SOLUTION ORAL at 08:28

## 2020-01-01 RX ADMIN — OXYCODONE HYDROCHLORIDE 10 MG: 10 TABLET ORAL at 20:44

## 2020-01-01 RX ADMIN — MILNACIPRAN HYDROCHLORIDE 100 MG: 50 TABLET, FILM COATED ORAL at 20:43

## 2020-01-01 RX ADMIN — INSULIN LISPRO 2 UNITS: 100 INJECTION, SOLUTION INTRAVENOUS; SUBCUTANEOUS at 12:42

## 2020-01-01 RX ADMIN — OLANZAPINE 5 MG: 5 TABLET, FILM COATED ORAL at 21:05

## 2020-01-01 RX ADMIN — SPIRONOLACTONE 25 MG: 25 TABLET ORAL at 08:04

## 2020-01-01 RX ADMIN — LAMOTRIGINE 100 MG: 100 TABLET ORAL at 21:21

## 2020-01-01 RX ADMIN — LAMOTRIGINE 100 MG: 100 TABLET ORAL at 09:31

## 2020-01-01 RX ADMIN — SPIRONOLACTONE 50 MG: 25 TABLET ORAL at 09:31

## 2020-01-01 RX ADMIN — SODIUM CHLORIDE, PRESERVATIVE FREE 10 ML: 5 INJECTION INTRAVENOUS at 21:13

## 2020-01-01 RX ADMIN — LAMOTRIGINE 100 MG: 100 TABLET ORAL at 08:51

## 2020-01-01 RX ADMIN — LACTULOSE 20 G: 20 SOLUTION ORAL at 13:17

## 2020-01-01 RX ADMIN — PANTOPRAZOLE SODIUM 40 MG: 40 INJECTION, POWDER, FOR SOLUTION INTRAVENOUS at 08:58

## 2020-01-01 RX ADMIN — LACTULOSE 20 G: 20 SOLUTION ORAL at 17:42

## 2020-01-01 RX ADMIN — TACROLIMUS 0.5 MG: 0.5 CAPSULE ORAL at 22:21

## 2020-01-01 RX ADMIN — TACROLIMUS 0.5 MG: 0.5 CAPSULE ORAL at 08:36

## 2020-01-01 RX ADMIN — RIFAXIMIN 550 MG: 550 TABLET ORAL at 20:32

## 2020-01-01 RX ADMIN — INSULIN LISPRO 6 UNITS: 100 INJECTION, SOLUTION INTRAVENOUS; SUBCUTANEOUS at 17:16

## 2020-01-01 RX ADMIN — VANCOMYCIN HYDROCHLORIDE 1500 MG: 10 INJECTION, POWDER, LYOPHILIZED, FOR SOLUTION INTRAVENOUS at 18:31

## 2020-01-01 RX ADMIN — Medication 10 ML: at 09:08

## 2020-01-01 RX ADMIN — SODIUM CHLORIDE, PRESERVATIVE FREE 10 ML: 5 INJECTION INTRAVENOUS at 20:59

## 2020-01-01 RX ADMIN — LAMOTRIGINE 100 MG: 100 TABLET ORAL at 14:23

## 2020-01-01 RX ADMIN — LACTULOSE 20 G: 20 SOLUTION ORAL at 09:08

## 2020-01-01 RX ADMIN — PANTOPRAZOLE SODIUM 40 MG: 40 INJECTION, POWDER, FOR SOLUTION INTRAVENOUS at 23:23

## 2020-01-01 RX ADMIN — LEVOTHYROXINE SODIUM 112 MCG: 112 TABLET ORAL at 06:28

## 2020-01-01 RX ADMIN — SODIUM CHLORIDE, PRESERVATIVE FREE 10 ML: 5 INJECTION INTRAVENOUS at 08:33

## 2020-01-01 RX ADMIN — INSULIN LISPRO 4 UNITS: 100 INJECTION, SOLUTION INTRAVENOUS; SUBCUTANEOUS at 12:12

## 2020-01-01 RX ADMIN — PANTOPRAZOLE SODIUM 40 MG: 40 INJECTION, POWDER, FOR SOLUTION INTRAVENOUS at 08:57

## 2020-01-01 RX ADMIN — INSULIN LISPRO 2 UNITS: 100 INJECTION, SOLUTION INTRAVENOUS; SUBCUTANEOUS at 09:05

## 2020-01-01 RX ADMIN — TACROLIMUS 0.5 MG: 0.5 CAPSULE ORAL at 08:51

## 2020-01-01 RX ADMIN — LACTULOSE 30 G: 20 SOLUTION ORAL at 15:02

## 2020-01-01 RX ADMIN — RIFAXIMIN 550 MG: 550 TABLET ORAL at 08:17

## 2020-01-01 RX ADMIN — INSULIN GLARGINE 15 UNITS: 100 INJECTION, SOLUTION SUBCUTANEOUS at 08:58

## 2020-01-01 RX ADMIN — FUROSEMIDE 40 MG: 40 TABLET ORAL at 17:44

## 2020-01-01 RX ADMIN — RIFAXIMIN 550 MG: 550 TABLET ORAL at 09:06

## 2020-01-01 RX ADMIN — FUROSEMIDE 40 MG: 40 TABLET ORAL at 08:57

## 2020-01-01 RX ADMIN — THIAMINE HYDROCHLORIDE 200 MG: 100 INJECTION, SOLUTION INTRAMUSCULAR; INTRAVENOUS at 00:00

## 2020-01-01 RX ADMIN — CEFTRIAXONE 2 G: 2 INJECTION, POWDER, FOR SOLUTION INTRAMUSCULAR; INTRAVENOUS at 21:05

## 2020-01-01 RX ADMIN — OXYCODONE HYDROCHLORIDE 10 MG: 10 TABLET ORAL at 21:36

## 2020-01-01 RX ADMIN — INSULIN LISPRO 5 UNITS: 100 INJECTION, SOLUTION INTRAVENOUS; SUBCUTANEOUS at 17:35

## 2020-01-01 RX ADMIN — TACROLIMUS 0.5 MG: 0.5 CAPSULE ORAL at 09:06

## 2020-01-01 RX ADMIN — SPIRONOLACTONE 100 MG: 100 TABLET ORAL at 11:23

## 2020-01-01 RX ADMIN — MEROPENEM 500 MG: 500 INJECTION, POWDER, FOR SOLUTION INTRAVENOUS at 14:32

## 2020-01-01 RX ADMIN — SODIUM CHLORIDE, PRESERVATIVE FREE 10 ML: 5 INJECTION INTRAVENOUS at 10:20

## 2020-01-01 RX ADMIN — LAMOTRIGINE 100 MG: 100 TABLET ORAL at 20:17

## 2020-01-01 RX ADMIN — MEROPENEM 500 MG: 500 INJECTION, POWDER, FOR SOLUTION INTRAVENOUS at 17:44

## 2020-01-01 RX ADMIN — OXYCODONE 5 MG: 5 TABLET ORAL at 09:33

## 2020-01-01 RX ADMIN — ENOXAPARIN SODIUM 40 MG: 40 INJECTION SUBCUTANEOUS at 21:20

## 2020-01-01 RX ADMIN — LACTULOSE 20 G: 20 SOLUTION ORAL at 21:02

## 2020-01-01 RX ADMIN — BISACODYL 5 MG: 5 TABLET, COATED ORAL at 17:30

## 2020-01-01 RX ADMIN — OXYCODONE HYDROCHLORIDE 10 MG: 10 TABLET ORAL at 08:17

## 2020-01-01 ASSESSMENT — PAIN DESCRIPTION - PAIN TYPE
TYPE: CHRONIC PAIN
TYPE: ACUTE PAIN
TYPE: CHRONIC PAIN
TYPE: CHRONIC PAIN
TYPE: ACUTE PAIN;CHRONIC PAIN;PHANTOM PAIN
TYPE: CHRONIC PAIN
TYPE: ACUTE PAIN
TYPE: PHANTOM PAIN
TYPE: ACUTE PAIN
TYPE: PHANTOM PAIN
TYPE: CHRONIC PAIN
TYPE: PHANTOM PAIN
TYPE: PHANTOM PAIN
TYPE: ACUTE PAIN
TYPE: PHANTOM PAIN
TYPE: PHANTOM PAIN
TYPE: CHRONIC PAIN
TYPE: PHANTOM PAIN
TYPE: ACUTE PAIN
TYPE: ACUTE PAIN
TYPE: PHANTOM PAIN
TYPE: ACUTE PAIN;CHRONIC PAIN;PHANTOM PAIN
TYPE: PHANTOM PAIN
TYPE: ACUTE PAIN;CHRONIC PAIN
TYPE: CHRONIC PAIN
TYPE: ACUTE PAIN
TYPE: PHANTOM PAIN
TYPE: PHANTOM PAIN;ACUTE PAIN
TYPE: PHANTOM PAIN
TYPE: CHRONIC PAIN
TYPE: ACUTE PAIN
TYPE: ACUTE PAIN
TYPE: CHRONIC PAIN
TYPE: ACUTE PAIN
TYPE: PHANTOM PAIN
TYPE: ACUTE PAIN
TYPE: ACUTE PAIN
TYPE: PHANTOM PAIN
TYPE: CHRONIC PAIN
TYPE: ACUTE PAIN
TYPE: ACUTE PAIN
TYPE: CHRONIC PAIN
TYPE: ACUTE PAIN
TYPE: PHANTOM PAIN
TYPE: PHANTOM PAIN
TYPE: ACUTE PAIN
TYPE: ACUTE PAIN;CHRONIC PAIN
TYPE: ACUTE PAIN
TYPE: PHANTOM PAIN

## 2020-01-01 ASSESSMENT — PAIN SCALES - WONG BAKER
WONGBAKER_NUMERICALRESPONSE: 0
WONGBAKER_NUMERICALRESPONSE: 2

## 2020-01-01 ASSESSMENT — PAIN DESCRIPTION - ORIENTATION
ORIENTATION: LEFT
ORIENTATION: RIGHT
ORIENTATION: LEFT
ORIENTATION: RIGHT
ORIENTATION: LEFT;RIGHT
ORIENTATION: LEFT
ORIENTATION: RIGHT
ORIENTATION: MID
ORIENTATION: RIGHT
ORIENTATION: RIGHT
ORIENTATION: MID
ORIENTATION: MID
ORIENTATION: LEFT
ORIENTATION: RIGHT;LEFT;UPPER;LOWER
ORIENTATION: LEFT;RIGHT
ORIENTATION: LEFT
ORIENTATION: RIGHT
ORIENTATION: RIGHT
ORIENTATION: MID
ORIENTATION: LOWER
ORIENTATION: RIGHT
ORIENTATION: RIGHT
ORIENTATION: MID
ORIENTATION: RIGHT
ORIENTATION: RIGHT
ORIENTATION: LEFT
ORIENTATION: MID
ORIENTATION: LEFT
ORIENTATION: RIGHT
ORIENTATION: LEFT
ORIENTATION: RIGHT;LEFT

## 2020-01-01 ASSESSMENT — PAIN SCALES - PAIN ASSESSMENT IN ADVANCED DEMENTIA (PAINAD)
BREATHING: 0
TOTALSCORE: 0
NEGVOCALIZATION: 0
FACIALEXPRESSION: 0
NEGVOCALIZATION: 0
CONSOLABILITY: 0
BODYLANGUAGE: 0
NEGVOCALIZATION: 0
BODYLANGUAGE: 0
CONSOLABILITY: 0
BREATHING: 0
TOTALSCORE: 0
BREATHING: 0
TOTALSCORE: 0
FACIALEXPRESSION: 0
FACIALEXPRESSION: 0
BREATHING: 0
FACIALEXPRESSION: 0
NEGVOCALIZATION: 0
FACIALEXPRESSION: 0
NEGVOCALIZATION: 0
BODYLANGUAGE: 0
TOTALSCORE: 0
NEGVOCALIZATION: 0
FACIALEXPRESSION: 0
BODYLANGUAGE: 0
BODYLANGUAGE: 0
NEGVOCALIZATION: 0
FACIALEXPRESSION: 0
TOTALSCORE: 0
CONSOLABILITY: 0
CONSOLABILITY: 0
FACIALEXPRESSION: 0
TOTALSCORE: 0
FACIALEXPRESSION: 0
BREATHING: 0
BODYLANGUAGE: 0
NEGVOCALIZATION: 0
BODYLANGUAGE: 0
TOTALSCORE: 0
BREATHING: 0
FACIALEXPRESSION: 0
NEGVOCALIZATION: 0
CONSOLABILITY: 0
BODYLANGUAGE: 0
NEGVOCALIZATION: 0
BODYLANGUAGE: 0
BREATHING: 0
TOTALSCORE: 0
FACIALEXPRESSION: 0
TOTALSCORE: 0
BODYLANGUAGE: 0
BODYLANGUAGE: 0
BREATHING: 0
NEGVOCALIZATION: 0
FACIALEXPRESSION: 0
BREATHING: 0
BODYLANGUAGE: 0
BREATHING: 0
NEGVOCALIZATION: 0
CONSOLABILITY: 0
CONSOLABILITY: 0
BREATHING: 0
CONSOLABILITY: 0
TOTALSCORE: 0
NEGVOCALIZATION: 0
CONSOLABILITY: 0
TOTALSCORE: 0
FACIALEXPRESSION: 0
BREATHING: 0
BREATHING: 0
BODYLANGUAGE: 0
TOTALSCORE: 0
TOTALSCORE: 0
CONSOLABILITY: 0
CONSOLABILITY: 0

## 2020-01-01 ASSESSMENT — PAIN DESCRIPTION - LOCATION
LOCATION: LEG
LOCATION: KNEE;SHOULDER
LOCATION: FOOT
LOCATION: GENERALIZED
LOCATION: LEG
LOCATION: COCCYX
LOCATION: BUTTOCKS;LEG
LOCATION: BACK
LOCATION: GENERALIZED;BACK
LOCATION: COCCYX
LOCATION: GENERALIZED
LOCATION: KNEE;SHOULDER
LOCATION: SHOULDER
LOCATION: KNEE
LOCATION: GENERALIZED
LOCATION: ABDOMEN
LOCATION: LEG
LOCATION: ABDOMEN;OTHER (COMMENT)
LOCATION: COCCYX
LOCATION: LEG
LOCATION: KNEE
LOCATION: BUTTOCKS;GENERALIZED
LOCATION: GENERALIZED
LOCATION: GENERALIZED
LOCATION: GENERALIZED;LEG
LOCATION: GENERALIZED
LOCATION: LEG
LOCATION: LEG
LOCATION: COCCYX
LOCATION: BACK
LOCATION: LEG
LOCATION: LEG
LOCATION: GENERALIZED
LOCATION: BUTTOCKS
LOCATION: LEG
LOCATION: SHOULDER
LOCATION: GENERALIZED
LOCATION: LEG
LOCATION: ABDOMEN
LOCATION: FOOT
LOCATION: FOOT
LOCATION: KNEE

## 2020-01-01 ASSESSMENT — PAIN DESCRIPTION - ONSET
ONSET: ON-GOING

## 2020-01-01 ASSESSMENT — PAIN - FUNCTIONAL ASSESSMENT
PAIN_FUNCTIONAL_ASSESSMENT: PREVENTS OR INTERFERES SOME ACTIVE ACTIVITIES AND ADLS
PAIN_FUNCTIONAL_ASSESSMENT: ACTIVITIES ARE NOT PREVENTED
PAIN_FUNCTIONAL_ASSESSMENT: ACTIVITIES ARE NOT PREVENTED
PAIN_FUNCTIONAL_ASSESSMENT: PREVENTS OR INTERFERES SOME ACTIVE ACTIVITIES AND ADLS
PAIN_FUNCTIONAL_ASSESSMENT: PREVENTS OR INTERFERES WITH ALL ACTIVE AND SOME PASSIVE ACTIVITIES
PAIN_FUNCTIONAL_ASSESSMENT: PREVENTS OR INTERFERES SOME ACTIVE ACTIVITIES AND ADLS
PAIN_FUNCTIONAL_ASSESSMENT: ACTIVITIES ARE NOT PREVENTED
PAIN_FUNCTIONAL_ASSESSMENT: PREVENTS OR INTERFERES WITH ALL ACTIVE AND SOME PASSIVE ACTIVITIES
PAIN_FUNCTIONAL_ASSESSMENT: PREVENTS OR INTERFERES SOME ACTIVE ACTIVITIES AND ADLS
PAIN_FUNCTIONAL_ASSESSMENT: ACTIVITIES ARE NOT PREVENTED
PAIN_FUNCTIONAL_ASSESSMENT: PREVENTS OR INTERFERES SOME ACTIVE ACTIVITIES AND ADLS

## 2020-01-01 ASSESSMENT — PAIN DESCRIPTION - DESCRIPTORS
DESCRIPTORS: ACHING;CONSTANT
DESCRIPTORS: ACHING
DESCRIPTORS: SORE;ACHING
DESCRIPTORS: ACHING
DESCRIPTORS: THROBBING
DESCRIPTORS: ACHING

## 2020-01-01 ASSESSMENT — PAIN SCALES - GENERAL
PAINLEVEL_OUTOF10: 0
PAINLEVEL_OUTOF10: 4
PAINLEVEL_OUTOF10: 0
PAINLEVEL_OUTOF10: 0
PAINLEVEL_OUTOF10: 3
PAINLEVEL_OUTOF10: 6
PAINLEVEL_OUTOF10: 0
PAINLEVEL_OUTOF10: 6
PAINLEVEL_OUTOF10: 8
PAINLEVEL_OUTOF10: 5
PAINLEVEL_OUTOF10: 5
PAINLEVEL_OUTOF10: 7
PAINLEVEL_OUTOF10: 9
PAINLEVEL_OUTOF10: 0
PAINLEVEL_OUTOF10: 7
PAINLEVEL_OUTOF10: 10
PAINLEVEL_OUTOF10: 8
PAINLEVEL_OUTOF10: 8
PAINLEVEL_OUTOF10: 9
PAINLEVEL_OUTOF10: 4
PAINLEVEL_OUTOF10: 7
PAINLEVEL_OUTOF10: 9
PAINLEVEL_OUTOF10: 9
PAINLEVEL_OUTOF10: 8
PAINLEVEL_OUTOF10: 4
PAINLEVEL_OUTOF10: 9
PAINLEVEL_OUTOF10: 7
PAINLEVEL_OUTOF10: 10
PAINLEVEL_OUTOF10: 8
PAINLEVEL_OUTOF10: 0
PAINLEVEL_OUTOF10: 5
PAINLEVEL_OUTOF10: 9
PAINLEVEL_OUTOF10: 6
PAINLEVEL_OUTOF10: 0
PAINLEVEL_OUTOF10: 0
PAINLEVEL_OUTOF10: 7
PAINLEVEL_OUTOF10: 4
PAINLEVEL_OUTOF10: 9
PAINLEVEL_OUTOF10: 8
PAINLEVEL_OUTOF10: 0
PAINLEVEL_OUTOF10: 0
PAINLEVEL_OUTOF10: 4
PAINLEVEL_OUTOF10: 0
PAINLEVEL_OUTOF10: 8
PAINLEVEL_OUTOF10: 0
PAINLEVEL_OUTOF10: 0
PAINLEVEL_OUTOF10: 9
PAINLEVEL_OUTOF10: 8
PAINLEVEL_OUTOF10: 4
PAINLEVEL_OUTOF10: 4
PAINLEVEL_OUTOF10: 9
PAINLEVEL_OUTOF10: 8
PAINLEVEL_OUTOF10: 0
PAINLEVEL_OUTOF10: 7
PAINLEVEL_OUTOF10: 9
PAINLEVEL_OUTOF10: 6
PAINLEVEL_OUTOF10: 7
PAINLEVEL_OUTOF10: 10
PAINLEVEL_OUTOF10: 0
PAINLEVEL_OUTOF10: 4
PAINLEVEL_OUTOF10: 8
PAINLEVEL_OUTOF10: 0
PAINLEVEL_OUTOF10: 8
PAINLEVEL_OUTOF10: 0
PAINLEVEL_OUTOF10: 0
PAINLEVEL_OUTOF10: 10
PAINLEVEL_OUTOF10: 0
PAINLEVEL_OUTOF10: 8
PAINLEVEL_OUTOF10: 0
PAINLEVEL_OUTOF10: 7
PAINLEVEL_OUTOF10: 6
PAINLEVEL_OUTOF10: 7
PAINLEVEL_OUTOF10: 8
PAINLEVEL_OUTOF10: 7
PAINLEVEL_OUTOF10: 4
PAINLEVEL_OUTOF10: 0
PAINLEVEL_OUTOF10: 0
PAINLEVEL_OUTOF10: 9
PAINLEVEL_OUTOF10: 0
PAINLEVEL_OUTOF10: 8
PAINLEVEL_OUTOF10: 9
PAINLEVEL_OUTOF10: 4
PAINLEVEL_OUTOF10: 9
PAINLEVEL_OUTOF10: 7
PAINLEVEL_OUTOF10: 0
PAINLEVEL_OUTOF10: 0
PAINLEVEL_OUTOF10: 7
PAINLEVEL_OUTOF10: 6
PAINLEVEL_OUTOF10: 9
PAINLEVEL_OUTOF10: 8
PAINLEVEL_OUTOF10: 5
PAINLEVEL_OUTOF10: 4
PAINLEVEL_OUTOF10: 7
PAINLEVEL_OUTOF10: 6
PAINLEVEL_OUTOF10: 0
PAINLEVEL_OUTOF10: 3

## 2020-01-01 ASSESSMENT — PAIN DESCRIPTION - PROGRESSION
CLINICAL_PROGRESSION: NOT CHANGED
CLINICAL_PROGRESSION: GRADUALLY IMPROVING
CLINICAL_PROGRESSION: NOT CHANGED
CLINICAL_PROGRESSION: GRADUALLY WORSENING
CLINICAL_PROGRESSION: NOT CHANGED
CLINICAL_PROGRESSION: GRADUALLY IMPROVING
CLINICAL_PROGRESSION: NOT CHANGED
CLINICAL_PROGRESSION: GRADUALLY WORSENING
CLINICAL_PROGRESSION: NOT CHANGED
CLINICAL_PROGRESSION: GRADUALLY WORSENING

## 2020-01-01 ASSESSMENT — PAIN DESCRIPTION - FREQUENCY
FREQUENCY: CONTINUOUS
FREQUENCY: INTERMITTENT
FREQUENCY: CONTINUOUS

## 2020-01-01 ASSESSMENT — ENCOUNTER SYMPTOMS
COLOR CHANGE: 0
SHORTNESS OF BREATH: 0
NAUSEA: 0
VOMITING: 0
CONSTIPATION: 0
DIARRHEA: 0
ABDOMINAL DISTENTION: 1
ABDOMINAL PAIN: 1
ABDOMINAL PAIN: 1
COUGH: 0

## 2020-01-25 PROBLEM — K76.82 ACUTE HEPATIC ENCEPHALOPATHY: Status: ACTIVE | Noted: 2020-01-01

## 2020-01-25 NOTE — ED NOTES
Pt sleeping on stretcher, in NAD. Respirations non-labored. ST on monitor with HR of 101. VSS. Call light in reach.       Myron Lombard, RN  01/25/20 7375

## 2020-01-25 NOTE — ED NOTES
Pt resting in bed at this time. Call light remains in reach instructed pt how to use, and encouraged pt to call if needed assistance, no distress noted. RR even and unlabored, skin warm and dry. No needs at this time. Will continue to monitor closely.     Pt placed in droplet precautions due to WBC decreased       Christian Marin RN  01/25/20 1404

## 2020-01-25 NOTE — ED NOTES
Bed: A-16  Expected date: 1/25/20  Expected time: 11:00 AM  Means of arrival: [facility]  Comments:  11S n/v, diabetic, liver failure     Kirsten Conn RN  01/25/20 3450

## 2020-01-25 NOTE — PROGRESS NOTES
4 Eyes Skin Assessment     The patient is being assess for  Admission    I agree that 2 RN's have performed a thorough Head to Toe Skin Assessment on the patient. ALL assessment sites listed below have been assessed. Areas assessed by both nurses: yes  [x]   Head, Face, and Ears   [x]   Shoulders, Back, and Chest  [x]   Arms, Elbows, and Hands   [x]   Coccyx, Sacrum, and IschIum  [x]   Legs, Feet, and Heels        Does the Patient have Skin Breakdown?   Yes LDA WOUND CARE was Initiated documentation include the Camila-wound, Wound Assessment, Measurements, Dressing Treatment, Drainage, and Color\",         Davon Prevention initiated:  Yes   Wound Care Orders initiated:  Yes      [address]  nurse consulted for Pressure Injury (Stage 3,4, Unstageable, DTI, NWPT, and Complex wounds), New and Established Ostomies:  No      Nurse 1 eSignature: Electronically signed by Hannah Lu RN on 1/25/20 at 5:21 PM    **SHARE this note so that the co-signing nurse is able to place an eSignature**    Nurse 2 eSignature: Electronically signed by Elie Noyola RN on 1/25/20 at 5:25 PM

## 2020-01-25 NOTE — ED PROVIDER NOTES
hallucinations    Macrobid [Nitrofurantoin Wister Grumman Macro]     Motrin [Ibuprofen Micronized]     Nsaids      Liver transplant, states she can't have because of liver protocol      Nucynta Er [Tapentadol Hcl Er]     Nucynta [Tapentadol]     Penicillins Hives    Primidone     Reglan [Metoclopramide]     Tricyclic Antidepressants     Clindamycin      Other reaction(s): Yeast Infection       Tetanus vaccination status reviewed: tetanus re-vaccination not indicated. PHYSICAL EXAM  VITAL SIGNS: /83   Pulse 102   Temp 98.6 °F (37 °C) (Oral)   Resp 12   SpO2 96%   Constitutional: Well-developed, well-nourished, appears confused and dehydrated otherwise normal, nontoxic, activity: Lying on the cart, appears confused but is not ill-appearing. HENT: Normocephalic, Atraumatic, Bilateral external ears normal, TM's were normal, Mucus membranes are dry and oropharynx is patent and clear, No oral exudates, Nose normal.  Eyes: PERRLA, EOMI, Conjunctiva normal, No discharge. No scleral icterus. Neck: Normal range of motion, No tenderness, Supple. Lymphatic: No lymphadenopathy noted. Cardiovascular: Mildly tachycardic heart rate, Normal rhythm, no murmurs, no gallops, no rubs. Thorax & Lungs: Normal breath sounds, no respiratory distress, no wheezing, no rales, no rhonchi  Abdomen: Soft, moderate diffuse tender with mild guarding diffusely, No hepatosplenomegaly, No masses, No pulsatile masses, No distension, normal bowel sounds  Skin: Warm, Dry, No erythema, No rash. Extremities: No edema, No tenderness, No cyanosis, No clubbing. Right BKA but no other amputations, capillary refill less than 2 seconds. Neurologic: Alert & oriented x 1, CN II through XII reveal a mild right facial droop but otherwise normal, normal motor function, normal sensory function, no focal deficits noted, no clonus, no tremors.   Psychiatric: Affect normal, Mood normal.      LABORATORY  Labs Reviewed   CBC WITH AUTO DIFFERENTIAL - [facility]  [address] [place], [place] [address]   Phone ([phone] EKG  EKG Interpretation    Interpreted by emergency department physician  Time read: 1141    Rhythm: Sinus  Ventricular Rate: 100  QRS Axis: 56  Ectopy: None  Conduction: Normal sinus rhythm with prolonged QT interval  ST Segments: normal  T Waves: normal  Q Waves: None    Other findings: None    Compared to EKG on: 8/16/2019 and is unchanged except for prolonged QT interval    Clinical Impression: Sinus rhythm with a prolonged QT interval but no other acute changes. Lorie 149      RADIOLOGY/PROCEDURES  I personally reviewed the images for this case. CT HEAD WO CONTRAST   Preliminary Result   No acute intracranial abnormality. COURSE & MEDICAL DECISION MAKING  Pertinent Labs, EKG, & Imaging studies reviewed. (See chart for details)    Vitals:    01/25/20 1117 01/25/20 1145   BP:  136/83   Pulse: 98 102   Resp: 18 12   Temp:  98.6 °F (37 °C)   TempSrc:  Oral   SpO2: 100% 96%       Medications   0.9 % sodium chloride bolus (1,000 mLs Intravenous New Bag 1/25/20 1235)       New Prescriptions    No medications on file       SEP-1 CORE MEASURE DATA  Exclusion criteria: the patient is NOT to be included for sepsis due to: Infection is not suspected    Patient remained stable in the ED. her laboratory work revealed a ammonia of 199. Her white count was low at 2.8. She was mildly anemic. Total bili was 1.2. Alk phos was 215. Otherwise her liver functions were normal.  Hospitalist was notified about the admission at 1300 hrs. They agreed to meet the patient to hospital for further evaluation treatment. The patient's blood pressure was found to be elevated according to CMS/Medicare and the Affordable Care Act/ObamaCare criteria.  Elevated blood pressure could occur because of pain or anxiety or other reasons and does not mean that they need to have their blood pressure treated or medications otherwise adjusted. However, this could also be a sign that they will need to have their blood pressure treated or medications changed. The patient was instructed to follow up closely with their personal physician to have their blood pressure rechecked. The patient was instructed to take a list of recent blood pressure readings to their next visit with their personal physician. (This chart has been completed using [address]. Although attempts have been made to ensure accuracy, words and/or phrases may not be transcribed as intended.)    Patient refused pain medicines at the time of his exam.    IMPRESSION(S):  1. Dehydration    2. Hepatic encephalopathy ([facility] [address]    3. Disorientation        ?   Recheck Times: [address] Time: 35 minutes       Lucy Root DO  01/25/20 0231

## 2020-01-25 NOTE — H&P
Hospital Medicine History & Physical      PCP: Virgen Franco III, DO    Date of Admission: 1/25/2020    Date of Service: Pt seen/examined on 1/25/2020 and Admitted to Inpatient. Chief Complaint:  Altered mental status      History Of Present Illness: The patient is a 59 y.o. female with hx chronic liver disease s/p liver transplantation in 2008, recurrent admission for hepatic encephalopathy, Bipolar 1 Disorder, osteomyelitis with R BKA, COPD, type 2 DM, seizure disorder, obstructive sleep apnea on CPAP who presents to [facility] with altered mental status. Patient herself states that she is more confused than usual. She is frustrated because she takes lactulose \"all the time\". Alert and oriented to name only, but not place or time. States she has been confused since yesterday. Unable to obtain any further history due to altered mentation. Upon review of chart, it seems the patient has a history of recurrent hepatic encephalopathy. In the ED, labs were significant for an ammonia level of 199 (baseline seems to be ~90). CBC showed pancytopenia consistent with the patient's baseline. Urine drug screen was positive for oxycodone which is a prescribed medication. Urinalysis was negative. CT Head without contrast was negative.     Past Medical History:        Diagnosis Date    Anemia     Arthritis     Bipolar 1 disorder ([address]     Causalgia of lower limb     Cellulitis     Chronic pain syndrome     Chronic post-operative pain     Cirrhosis of liver (HCC)     Complications of transplanted liver     Convulsion (HCC)     COPD (chronic obstructive pulmonary disease) (HCC)     Depression     Dermatophytosis of nail     Diabetes mellitus (HCC)     Diarrhea     Dry eyes     Fungal nail infection     Generalized abdominal pain     Generalized (PERCOCET)  MG per tablet Take 1 tablet by mouth every 4 hours as needed for Pain. Historical Provider, MD   oxyCODONE 5 MG capsule Take 10 mg by mouth 4 times daily. Historical Provider, MD   lidocaine (LIDODERM) 5 % Place 1 patch onto the skin daily 12 hours on, 12 hours off. 1/1/19   Angel Dong MD   OLANZapine (ZYPREXA) 2.5 MG tablet Take 2.5 mg by mouth nightly    Historical Provider, MD   lactulose [facility]) 10 GM/15ML solution Take 30 mLs by mouth 3 times daily 5/25/18   Mel Unger MD   rifaximin [facility]ymen) 550 MG tablet Take 1 tablet by mouth 2 times daily 5/25/18   Mel Unger MD   omeprazole (PRILOSEC) 10 MG delayed release capsule Take 20 mg by mouth daily     Historical Provider, MD   hydrOXYzine (ATARAX) 10 MG tablet Take 50 mg by mouth every 8 hours as needed for Itching     Historical Provider, MD   nystatin (MYCOSTATIN) 395257 UNIT/GM cream Apply topically 2 times daily Apply topically 2 times daily.     Historical Provider, MD   insulin lispro (HUMALOG KWIKPEN) 100 UNIT/ML pen Inject 2-12 Units into the skin 3 times daily (before meals) 12 unit max sliding scale    Historical Provider, MD   lactulose encephalopathy 10 GM/15ML SOLN solution Take 20 g by mouth 3 times daily  1/29/18  Historical Provider, MD   levothyroxine (SYNTHROID) 112 MCG tablet Take 112 mcg by mouth Daily    Historical Provider, MD   CPAP Machine MISC by Does not apply route    Historical Provider, MD   lamoTRIgine (LAMICTAL) 200 MG tablet Take 200 mg by mouth 2 times daily     Historical Provider, MD   tacrolimus (PROGRAF) 1 MG capsule Take 0.5 mg by mouth 2 times daily     Historical Provider, MD   ondansetron (ZOFRAN-ODT) 8 MG disintegrating tablet Take 8 mg by mouth every 8 hours as needed for Nausea or Vomiting    Historical Provider, MD   milnacipran HCl (SAVELLA) 100 MG TABS Take 100 mg by mouth three times daily     Historical Provider, MD   levETIRAcetam (KEPPRA) 750 MG tablet Take 500 mg by non-displaced  Abdomen: +Distended, non-tender, without rigidity or guarding and positive bowel sounds all four quadrants. Extremities: R leg BKA, no clubbing, cyanosis, or edema bilaterally. Full range of motion without deformity and normal gait intact. Skin: Skin color, texture, turgor normal.  No rashes or lesions. Neurologic: Alert and oriented to person only, neurovascularly intact with sensory/motor intact upper extremities/lower extremities, bilaterally. Cranial nerves: II-XII intact, grossly non-focal.  Mental status: Alert, oriented to person only  Capillary Refill: Acceptable  < 3 seconds  Peripheral Pulses: +3 Easily felt, not easily obliterated with pressure      CT HEAD WO CONTRAST   Preliminary Result   No acute intracranial abnormality. CBC   Recent Labs     01/25/20  1139   WBC 2.8*   HGB 10.8*   HCT 31.9*   PLT 66*      RENAL  Recent Labs     01/25/20  1139      K 4.3      CO2 20*   BUN 18   CREATININE 0.8     LFT'S  Recent Labs     01/25/20  1139   AST 30   ALT 30   BILITOT 1.2*   ALKPHOS 215*     COAG  No results for input(s): INR in the last 72 hours. CARDIAC ENZYMES  No results for input(s): CKTOTAL, CKMB, CKMBINDEX, TROPONINI in the last 72 hours.     U/A:    Lab Results   Component Value Date    COLORU YELLOW 01/25/2020    WBCUA 10-20 08/16/2019    RBCUA 0-2 08/16/2019    BACTERIA 4+ 12/17/2018    CLARITYU Clear 01/25/2020    SPECGRAV 1.007 01/25/2020    LEUKOCYTESUR Negative 01/25/2020    BLOODU Negative 01/25/2020    GLUCOSEU Negative 01/25/2020    GLUCOSEU NEGATIVE 07/11/2010    AMORPHOUS Rare 01/30/2015       ABG  No results found for: IGK4MZN, BEART, P5XPTRUO, PHART, THGBART, APB2DCY, PO2ART, DDH5XVC        Active Hospital Problems    Diagnosis Date Noted    Acute hepatic encephalopathy [K72.00] 01/25/2020         PHYSICIANS CERTIFICATION:    I certify that Jamshid Rod is expected to be hospitalized for more than 2 midnights based on the following

## 2020-01-26 NOTE — CONSULTS
GASTROENTEROLOGY INPATIENT CONSULTATION      IDENTIFYING DATA/REASON FOR CONSULTATION   PATIENT:  Thaddeus Christian  MRN:  [id number]  ADMIT DATE: 1/25/2020  TIME OF EVALUATION: 1/26/2020 8:14 AM  HOSPITAL STAY:   LOS: 1 day     REASON FOR CONSULTATION:      HISTORY OF PRESENT ILLNESS   Thaddeus Christian is a 59 y.o. female who has a past history notable for Cirrhosis 2/2 [facility] s/p OLT, BP, COPD, Right BKA, Hepatic Encephalopathy who presents with confusion. We have been consulted regarding hepatic encephalopathy. Patient follows with Dr. Arya Kovacs. She has a history of OLT at  2/2 to [facility]. She has had recurrent cirrhosis. She has ascites and history of hepatic encephalopathy. She was transferred from SNF with increased confusion. Patient not able to provide any further history. PAST MEDICAL, SURGICAL, FAMILY, and SOCIAL HISTORY     Past Medical History:   Diagnosis Date    Anemia     Arthritis     Bipolar 1 disorder ([address]     Causalgia of lower limb     Cellulitis     Chronic pain syndrome     Chronic post-operative pain     Cirrhosis of liver (HCC)     Complications of transplanted liver     Convulsion ([address]     COPD (chronic obstructive pulmonary disease) (HCC)     Depression     Dermatophytosis of nail     Diabetes mellitus (HCC)     Diarrhea     Dry eyes     Fungal nail infection     Generalized abdominal pain     Generalized osteoarthritis     GERD (gastroesophageal reflux disease)     Hepatic encephalopathy (HCC)     Hyperlipidemia     Hypertension     ? borderline    Hypothyroidism     Hypoxemia     Immune system disorder ([address]     Liver disease     liver transplant 2008    Liver transplanted ([address]     Localization-related epilepsy ([address]     Low back pain     Lumbar post-laminectomy syndrome     Morbid obesity (HCC)     Neuropathy, diabetic ([address]     On home oxygen therapy     4 liters cont.  cpap  at sleep/ night     Other disorders of kidney and ureter     Peripheral neuropathy  Phantom limb (HCC)     Pneumonia     Polyneuritis     Restrictive lung disease     Seizures (HCC)     Sleep apnea     cpap machine- d/t CO2 problem     SVT (supraventricular tachycardia) (HCC)     Thyroid mass     UTI (lower urinary tract infection)      Past Surgical History:   Procedure Laterality Date    ABDOMEN SURGERY      multiple    APPENDECTOMY      CHOLECYSTECTOMY      HERNIA REPAIR      multiple     HYSTERECTOMY      JOINT REPLACEMENT      right    JOINT REPLACEMENT      right after fall out of bed    LEG AMPUTATION BELOW KNEE      right    LEG AMPUTATION BELOW KNEE      [facility]      allergy to pain pump machine, had removed d/t huge seroma     SLEEVE GASTROPLASTY  1985    UPPER GASTROINTESTINAL ENDOSCOPY N/A 11/1/2019    ESOPHAGOGASTRODUODENOSCOPY WITH MAC ANESTHESIA performed by Heather Valles MD at [facility] ENDOSCOPY     Family History   Problem Relation Age of Onset    Cancer Father      Social History     Socioeconomic History    Marital status:       Spouse name: Not on file    Number of children: Not on file    Years of education: Not on file    Highest education level: Not on file   Occupational History    Not on file   Social Needs    Financial resource strain: Not on file    Food insecurity:     Worry: Not on file     Inability: Not on file    Transportation needs:     Medical: Not on file     Non-medical: Not on file   Tobacco Use    Smoking status: Never Smoker    Smokeless tobacco: Never Used   Substance and Sexual Activity    Alcohol use: No    Drug use: No    Sexual activity: Yes     Partners: Male   Lifestyle    Physical activity:     Days per week: Not on file     Minutes per session: Not on file    Stress: Not on file   Relationships    Social connections:     Talks on phone: Not on file     Gets together: Not on file     Attends [employer] service: Not on file     Active member of club or organization: Not on file     Attends meetings of clubs or organizations: Not on file     Relationship status: Not on file    Intimate partner violence:     Fear of current or ex partner: Not on file     Emotionally abused: Not on file     Physically abused: Not on file     Forced sexual activity: Not on file   Other Topics Concern    Not on file   Social History Narrative    ** Merged History Encounter **            MEDICATIONS   SCHEDULED:  insulin lispro, 0-12 Units, TID WC  insulin lispro, 0-6 Units, Nightly  lamoTRIgine, 200 mg, BID  levothyroxine, 112 mcg, QAM AC  milnacipran HCl, 100 mg, TID  nystatin, , BID  OLANZapine, 2.5 mg, Nightly  pantoprazole, 40 mg, QAM AC  rifaximin, 550 mg, BID  tacrolimus, 0.5 mg, BID  oxyCODONE HCl, 10 mg, 4x Daily  torsemide, 20 mg, Daily  sodium chloride flush, 10 mL, 2 times per day  spironolactone, 100 mg, Daily  insulin glargine, 40 Units, BID      FLUIDS/DRIPS:     dextrose       PRNs: lactulose, 20 g, Q2H PRN  ondansetron, 8 mg, Q8H PRN  oxyCODONE-acetaminophen, 1 tablet, Q4H PRN  sodium chloride flush, 10 mL, PRN  magnesium hydroxide, 30 mL, Daily PRN  ondansetron, 4 mg, Q6H PRN  glucose, 15 g, PRN  dextrose, 12.5 g, PRN  glucagon (rDNA), 1 mg, PRN  dextrose, 100 mL/hr, PRN      ALLERGIES:  She [unfilled]    REVIEW OF SYSTEMS   A full 12 pt ROS is negative other than noted in HPI    PHYSICAL EXAM   [unfilled]   I/O last 3 completed shifts:   In: 240 [P.O.:240]  Out: 1150 [Urine:1150]  Oxygen Therapy:  @[id number])@  @[id number])@  @[id number])@    Physical Exam:  Gen: Resting in bed, NAD, Confused, A+O x 1   CV: RRR no MRG   Pul: CTAB   Abd: Good bowel sounds throughout, no scars, soft, moderate distension, no tenderness, no masses, no HSM   Ext: R BKA   Neuro: Not able to assess asterixis   Skin: No jaundice, spider angiomas, christensen erythema    LABS AND IMAGING     Recent Results Color, UA YELLOW Straw/Yellow    Clarity, UA Clear Clear    Glucose, Ur Negative Negative mg/dL    Bilirubin Urine Negative Negative    Ketones, Urine Negative Negative mg/dL    Specific Gravity, UA 1.007 1.005 - 1.030    Blood, Urine Negative Negative    pH, UA 6.5 5.0 - 8.0    Protein, UA Negative Negative mg/dL    Urobilinogen, Urine 0.2 <2.0 E.U./dL    Nitrite, Urine Negative Negative    Leukocyte Esterase, Urine Negative Negative    Microscopic Examination Not Indicated     Urine Type NotGiven     Urine Reflex to Culture Not Indicated    Urine Drug Screen    Collection Time: 01/25/20 12:50 PM   Result Value Ref Range    Amphetamine Screen, Urine Neg Negative <1000ng/mL    Barbiturate Screen, Ur Neg Negative <200 ng/mL    Benzodiazepine Screen, Urine Neg Negative <200 ng/mL    Cannabinoid Scrn, Ur Neg Negative <50 ng/mL    Cocaine Metabolite Screen, Urine Neg Negative <300 ng/mL    Opiate Scrn, Ur Neg Negative <300 ng/mL    PCP Screen, Urine Neg Negative <25 ng/mL    Methadone Screen, Urine Neg Negative <300 ng/mL    Propoxyphene Scrn, Ur Neg Negative <300 ng/mL    Oxycodone Urine POSITIVE (A) Negative <100 ng/ml    pH, UA 6.5     Drug Screen Comment: see below    POCT Glucose    Collection Time: 01/25/20  5:28 PM   Result Value Ref Range    POC Glucose 169 (H) 70 - 99 mg/dl    Performed on ACCU-CHEK    POCT Glucose    Collection Time: 01/25/20  9:44 PM   Result Value Ref Range    POC Glucose 165 (H) 70 - 99 mg/dl    Performed on ACCU-CHEK    POCT Glucose    Collection Time: 01/26/20  1:49 AM   Result Value Ref Range    POC Glucose 157 (H) 70 - 99 mg/dl    Performed on ACCU-CHEK    POCT Glucose    Collection Time: 01/26/20  7:34 AM   Result Value Ref Range    POC Glucose 169 (H) 70 - 99 mg/dl    Performed on ACCU-CHEK      Other Labs      Imaging      ASSESSMENT AND RECOMMENDATIONS   Tammie Randall is a 59 y.o. female who has a past history notable for Cirrhosis 2/2 [facility] s/p OLT, BP, COPD, Right BKA, Hepatic Encephalopathy who presents with confusion. We have been consulted regarding hepatic encephalopathy. IMPRESSION:    1. Cirrhosis   2. Hepatic Encephalopathy   3. Ascites     RECOMMENDATIONS:   -Concern for Hepatic Encephalopathy. Continue Lactulose/Rifaximin. Titrate to 3-4 BM per day. Tox screen negative. CT head negative. H/H stable. No evidence of GI bleeding contributing. Will order Paracentesis to evaluate for SBP. Cell counts/cultures ordered. Cover with Bactrim for now. Patient has allergies to penicillins and flouroquinolones   -Continue Tacrolimus at current doses   -EGD 11/19-No varices  -HCC surveillance-CT non-contrast 1/19 no masses. AFP pending. If you have any questions or need any further information, please feel free to contact our consult team.  Thank you for allowing us to participate in the care of Malou Esme.     Ellen Rabago MD  [facility]

## 2020-01-26 NOTE — PROGRESS NOTES
 sodium chloride flush  10 mL Intravenous 2 times per day    spironolactone  100 mg Oral Daily    insulin glargine  40 Units Subcutaneous BID     PRN Meds: lactulose, ondansetron, oxyCODONE-acetaminophen, sodium chloride flush, magnesium hydroxide, ondansetron, glucose, dextrose, glucagon (rDNA), dextrose      Intake/Output Summary (Last 24 hours) at 1/26/2020 1007  Last data filed at 1/26/2020 8787  Gross per 24 hour   Intake 240 ml   Output 1150 ml   Net -910 ml       Exam:    BP (!) 153/88   Pulse 106   Temp 97.3 °F (36.3 °C) (Axillary)   Resp 16   Wt 181 lb 10.5 oz (82.4 kg)   SpO2 96%   BMI 31.18 kg/m²     General appearance: No apparent distress appears stated age and confused. HEENT Normal cephalic, atraumatic without obvious deformity. Pupils equal, round, and reactive to light. Extra ocular muscles intact. Conjunctivae/corneas clear. Neck: Supple, No jugular venous distention/bruits. Trachea midline without thyromegaly or adenopathy with full range of motion. Lungs: Clear to auscultation, bilaterally without Rales/Wheezes/Rhonchi with good respiratory effort. Heart: Regular rate and rhythm with Normal S1/S2 without murmurs, rubs or gallops, point of maximum impulse non-displaced  Abdomen: +Distended, non-tender, without rigidity or guarding and positive bowel sounds all four quadrants. Extremities: R leg BKA, no clubbing, cyanosis, or edema bilaterally. Full range of motion without deformity and normal gait intact. Skin: Skin color, texture, turgor normal.  No rashes or lesions. Neurologic: Alert and oriented to person only, neurovascularly intact with sensory/motor intact upper extremities/lower extremities, bilaterally.   Cranial nerves: II-XII intact, grossly non-focal.  Mental status: Alert, oriented to person only  Capillary Refill: Acceptable  < 3 seconds  Peripheral Pulses: +3 Easily felt, not easily obliterated with pressure       Labs:   Recent Labs     01/25/20  1139

## 2020-01-26 NOTE — PLAN OF CARE
Discharge planning remains ongoing. Pt free from falls this shift. Fall precautions in place at all times. Call light always within reach. Pt able and agreeable to contact for safety appropriately. Pt able to express presence/absence of pain and rate pain appropriately using numerical scale. Pain/discomfort being managed with PRN analgesics per MD orders (see MAR). Pain assessed every shift and after interventions.

## 2020-01-26 NOTE — FLOWSHEET NOTE
Pt has been asleep entire shift. C-pap on at this time. Will wake up when spoken to and then falls back to sleep. Does not appear to be in any pain or distress. Abdomen large with ascites. Skin jaundiced. Chisholm draining dark yellow urine. Had episode of junctional tach overnight and was asymptomatic. NP aware with no new orders. Cont with altered mental status. All fall precautions in place:  Bed low, side rails up x3, bed alarm on. Call light in reach.

## 2020-01-27 NOTE — CARE COORDINATION
INITIAL CASE MANAGEMENT ASSESSMENT    Reviewed chart, met with patient to assess possible discharge needs. Explained Case Management role/services. Living Situation: spoke w/ João An [phone]. Pt has been a resident in a private care home @ [address] [facility] [address] [address]. This home provides meals and med reminders. It is staffed w/ [facility] and has no skilled services. Pt will not be able to return to this living situation in her current state. ADLs: had been independent prior to this illness     DME: CPAP    PT/OT Recs: PT AMPA 10  OT [facility] 8     Active Services: none     Transportation: the home had provided transport     Medications: confirmed Medicare as primary and [address] as supplement    PCP: Ramon Craven      HD/PD: n/a    PLAN/COMMENTS: Bard Yip tells me that pt currently not capable of making her own decisions. Is estranged from only family which is her son. In old [facility] notes emergent contact is beau Montoya cell [phone]. Called and left generic message to return my call    SW/CM provided contact information for patient or family to call with any questions. SW/CM will follow and assist as needed.   Electronically signed by Adam Bean RN on 1/27/2020 at 11:45 AM

## 2020-01-27 NOTE — PROGRESS NOTES
disease, Liver transplanted ([facility] [address] Localization-related epilepsy ([address] Low back pain, Lumbar post-laminectomy syndrome, Morbid obesity ([address] Neuropathy, diabetic ([address] On home oxygen therapy, Other disorders of kidney and ureter, Peripheral neuropathy, Phantom limb ([address] Pneumonia, Polyneuritis, Restrictive lung disease, Seizures ([address] Sleep apnea, SVT (supraventricular tachycardia) ([address] Thyroid mass, and UTI (lower urinary tract infection). Past Surgical History:  has a past surgical history that includes Liver transplant (01/03/2008); other surgical history; Cholecystectomy; Appendectomy; hernia repair; Abdomen surgery; joint replacement; joint replacement; Leg amputation below knee; Liver transplant; Leg amputation below knee; Hysterectomy; Sleeve Gastroplasty (1985); and Upper gastrointestinal endoscopy (N/A, 11/1/2019). Restrictions  Restrictions/Precautions: Fall Risk             Vision/Hearing  Vision: (Unable to formally assess due to cognition)  Hearing: Within functional limits    SUBJECTIVE:  Chart Reviewed: Yes  Patient assessed for rehabilitation services?: Yes  Additional Pertinent Hx: Malou Victoria is a 61 y.o. F admit 01/25/20 with hepatic encephalopathy -- history of recurrent admissions for hepatic encephalopathy s/p remote liver transplant in 2008. Referring Practitioner: Jane Colón MD  Diagnosis: Decompensated liver cirrhosis s/p liver transplantation, hepatic encephalopathy, pancotopenia, hypernatremia     Subjective: Pt supine in bed sleeping with CPAP on; wakes up to name. Has difficulty attending to questions and is largely non-verbal (responds sometimes with one-word answers after long delays). Does not respond to questions about pain but did whince at one point.    Patient Currently in Pain: No  Pain Assessment: 0-10  Pain Level: 3  Patient's Stated Pain Goal: 4  Pain Type: Acute pain  Pain Location: Generalized  Pain Descriptors: Aching     Cognition  Exceptions Social/Functional History  Social/Functional History  Type of Home: Facility(? assisted living apartment)  Home Equipment: (old therapy notes from 2018 indicate that patient had an electric wheelchair)  Additional Comments: Pt unable to give any prior level of function information due to her cognition no 01/27/20. Old therapy notes indicate that patient was transferring independently to an electric wheelchair and ind with her wheelchair (non-ambulatory since her R BKA). OBJECTIVE:  OBSERVATIONS  Observation: Pt with wounds over sacrum, L ischium as well as in inguinal folds. ROM  RLE PROM: Exceptions  RLE General PROM: below knee ambulation     LLE PROM: [facility]       STRENGTH  Strength RLE: Exception  Comment: below knee ambulation     Strength LLE: WFL  Comment: not formally examined at this time but [facility] for transfers and bed mobility       Bed mobility  Supine to Sit: Dependent/Total  Sit to Supine: Contact guard assistance       Transfers  Sit to Stand: Minimal Assistance, Moderate Assistance(Mod Assist first trial, Min Assist second trial, BUE support on bed)  Stand to sit: Minimal Assistance  Bed to Chair: Unable to assess    Ambulation  Ambulation  Ambulation?: No(Pt non-ambulated)    Wheelchair Mobility  Wheelchair Activities  Propulsion: No(not assessed at this time)     Balance  Balance  Sitting - Static: Fair, +(CGA/Min Assist sitting midline with foot on floor and UE support on R on bed rail)  Standing - Static: Poor(Min/Mod Assist standing at edge of bed with left leg up against bed)        Treatment included transfer training and patient education. This note also serves as a D/C Summary in the event that this patient is discharged prior to the next therapy session. Please refer to last PT note for goal status, discharge recommendations and functional status.        ASSESSMENT:   Assessment: Chito Cartwright is a 61 y.o. F admit 01/25/20 with hepatic encephalopathy -- history of recurrent

## 2020-01-27 NOTE — PROGRESS NOTES
INPATIENT CONSULTATION:    IDENTIFYING DATA/REASON FOR CONSULTATION   PATIENT:  Emmy Bansal  MRN:  [id number]  ADMIT DATE: 1/25/2020  TIME OF EVALUATION: 1/27/2020 1:03 PM  HOSPITAL STAY:   LOS: 2 days   CONSULTING PHYSICIAN: Guerrero Anton MD   REASON FOR CONSULTATION: cirrhosis, hepatic encephalopathy    Subjective:    Patient seen in follow up for hepatic encephalopathy. She remains confused, unable to states her name or where she is. She had 1 bowel movement recorded yesterday. MEDICATIONS   SCHEDULED:  lactulose, 20 g, TID  cefTRIAXone (ROCEPHIN) IV, 2 g, Q24H  insulin lispro, 0-12 Units, TID WC  insulin lispro, 0-6 Units, Nightly  levothyroxine, 112 mcg, QAM AC  pantoprazole, 40 mg, QAM AC  rifaximin, 550 mg, BID  tacrolimus, 0.5 mg, BID  oxyCODONE HCl, 10 mg, 4x Daily  sodium chloride flush, 10 mL, 2 times per day  spironolactone, 100 mg, Daily  insulin glargine, 40 Units, BID      FLUIDS/DRIPS:     sodium chloride 50 mL/hr at 01/27/20 0620    dextrose       PRNs: ondansetron, 8 mg, Q8H PRN  oxyCODONE-acetaminophen, 1 tablet, Q4H PRN  sodium chloride flush, 10 mL, PRN  magnesium hydroxide, 30 mL, Daily PRN  ondansetron, 4 mg, Q6H PRN  glucose, 15 g, PRN  dextrose, 12.5 g, PRN  glucagon (rDNA), 1 mg, PRN  dextrose, 100 mL/hr, PRN      ALLERGIES:    Allergies   Allergen Reactions    Nitrofurantoin Hives    Abilify [Aripiprazole]     Acetaminophen Other (See Comments)     Avoid due to liver    Acetaminophen Other (See Comments)     Liver transplant restriction    Aspirin     Avelox [Moxifloxacin]     Carbamazepine     Contrast [Iodides] Hives     Contrast dye    Cymbalta [Duloxetine Hcl] Other (See Comments)     Pt.  Starts hallucinating     Doxycycline     Elavil [Amitriptyline] Hives    Erythromycin     Flagyl [Metronidazole]     Floxin [Ofloxacin]     Ibuprofen Other (See Comments)     Liver transplant    Iv Contrast [Iodides] Hives    Keflex [Cephalexin]      Tolerates results for input(s): LIPASE, AMYLASE in the last 72 hours. Recent Labs     01/27/20  0749   PROTIME 19.0*   INR 1.63*         Imaging  US GUIDED PARACENTESIS   Final Result   Successful ultrasound guided paracentesis. CT HEAD WO CONTRAST   Final Result   No acute intracranial abnormality. Endoscopy      ASSESSMENT AND RECOMMENDATIONS   Josse Storey is a 59 y.o. 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 Ammonia level elevated. Tox screen negative. CT head negative. H/H stable. No evidence of GI bleeding contributing    1. Hepatic encephalopathy:  Remains confused. 1BM recorded yesterday. She has been receiving rifaximin BID. Lactulose started today   2. Hx of AIH, s/p liver transplant 2008. Subsequently with cirrhosis likely from FLORES. Decompensated by hepatic encephalopathy and diuretic refractory ascites. Follows with Dr. Sherryle Leech as an outpt. On lactulose TID and rifaximin BID as an outpt. Undergoes biweekly paracentesis. On Prograf      RECOMMENDATIONS:    Continue Lactulose 20 gm TID with goal of 3 BMs daily. Continue rifaximin 550 mg BID  Underwent paracentesis today with 150cc removed. Cell count and cultures pending. Follow up results      If you have any questions or need any further information, please feel free to contact us [phone]. Thank you for allowing us to participate in the care of Josse Storey. The note was completed using Dragon voice recognition transcription. Every effort was made to ensure accuracy; however, inadvertent transcription errors may be present despite my best efforts to edit errors.     Faviola CARRANZA

## 2020-01-27 NOTE — PLAN OF CARE
Problem: Nutrition  Goal: Optimal nutrition therapy  Outcome: Ongoing     Nutrition Problem: Inadequate oral intake  Intervention: Food and/or Nutrient Delivery: Continue NPO  Nutritional Goals: tolerate most appropriate form of nutrition per MD

## 2020-01-27 NOTE — PROGRESS NOTES
Hospitalist Progress Note      PCP: Dasia French III, DO    Date of Admission: 1/25/2020    Chief Complaint: altered mental status    Hospital Course: The patient is a 59 y.o. female with hx chronic liver disease s/p liver transplantation in 2008, recurrent admission for hepatic encephalopathy, Bipolar 1 Disorder, osteomyelitis with R BKA, COPD, type 2 DM, seizure disorder, obstructive sleep apnea on CPAP who presents to [facility] with altered mental status. Patient herself states that she is more confused than usual. She is frustrated because she takes lactulose \"all the time\". Alert and oriented to name only, but not place or time. States she has been confused since yesterday. Unable to obtain any further history due to altered mentation. Upon review of chart, it seems the patient has a history of recurrent hepatic encephalopathy.     In the ED, labs were significant for an ammonia level of 199 (baseline seems to be ~90). CBC showed pancytopenia consistent with the patient's baseline. Urine drug screen was positive for oxycodone which is a prescribed medication. Urinalysis was negative. CT Head without contrast was negative. Subjective:      Pt alert. Answers in 1-2 word phrases. Unable to carry a conversation. Disoriented - says I dont know to most orientation questions.           Medications:  Reviewed    Infusion Medications    sodium chloride 50 mL/hr at 01/27/20 0620    dextrose       Scheduled Medications    lactulose  20 g Oral TID    cefTRIAXone (ROCEPHIN) IV  2 g Intravenous Q24H    insulin lispro  0-12 Units Subcutaneous TID WC    insulin lispro  0-6 Units Subcutaneous Nightly    levothyroxine  112 mcg Oral QAM AC    pantoprazole  40 mg Oral QAM AC    rifaximin  550 mg Oral BID    tacrolimus  0.5 mg Oral BID    oxyCODONE HCl  10 mg Oral 4x Daily    sodium chloride flush  10 mL Intravenous 2 times per day    spironolactone  100 mg Oral Daily    insulin glargine  40 Units Subcutaneous BID     PRN Meds: ondansetron, oxyCODONE-acetaminophen, sodium chloride flush, magnesium hydroxide, ondansetron, glucose, dextrose, glucagon (rDNA), dextrose      Intake/Output Summary (Last 24 hours) at 1/27/2020 1345  Last data filed at 1/27/2020 1103  Gross per 24 hour   Intake 1277 ml   Output 1200 ml   Net 77 ml       Exam:    BP (!) 108/55   Pulse 103   Temp 98.1 °F (36.7 °C) (Oral)   Resp 16   Ht 5' 4\" (1.626 m)   Wt 181 lb 10.5 oz (82.4 kg)   SpO2 98%   BMI 31.18 kg/m²     General appearance: No apparent distress appears stated age and confused. HEENT Normal cephalic, atraumatic without obvious deformity. Pupils equal, round, and reactive to light. Extra ocular muscles intact. Conjunctivae/corneas clear. Neck: Supple, No jugular venous distention/bruits. Trachea midline without thyromegaly or adenopathy with full range of motion. Lungs: Clear to auscultation, bilaterally without Rales/Wheezes/Rhonchi with good respiratory effort. Heart: Regular rate and rhythm with Normal S1/S2 without murmurs, rubs or gallops, point of maximum impulse non-displaced  Abdomen: +Distended, non-tender, without rigidity or guarding and positive bowel sounds all four quadrants. Extremities: R leg BKA, no clubbing, cyanosis, or edema bilaterally. Full range of motion. Skin: Skin color, texture, turgor normal.  No rashes or lesions. Neurologic: Alert and oriented to person only, neurovascularly intact with sensory/motor intact upper extremities/lower extremities, bilaterally.   Cranial nerves: II-XII intact, grossly non-focal.  Mental status: Alert, oriented to person only  Capillary Refill: Acceptable  < 3 seconds  Peripheral Pulses: +3 Easily felt, not easily obliterated with pressure       Labs:   Recent Labs     01/25/20  1139 01/26/20  0916 01/27/20  0737   WBC 2.8* 3.1* 3.7*   HGB 10.8* 10.9* 11.5*   HCT 31.9* 32.6* 34.3*   PLT 66* 72* 69*     Recent Labs     01/25/20  1139 01/26/20  0916 01/27/20  0737    147* 145   K 4.3 3.8 3.8    112* 110   CO2 20* 19* 19*   BUN 18 19 19   CREATININE 0.8 0.9 1.0   CALCIUM 9.0 9.2 9.1     Recent Labs     01/25/20  1139 01/26/20  0916 01/27/20  0737   AST 30 30 28   ALT 30 30 30   BILITOT 1.2* 1.5* 1.3*   ALKPHOS 215* 217* 204*     Recent Labs     01/27/20  0749   INR 1.63*     No results for input(s): Verneta June in the last 72 hours. Urinalysis:      Lab Results   Component Value Date    NITRU Negative 01/25/2020    WBCUA 10-20 08/16/2019    BACTERIA 4+ 12/17/2018    RBCUA 0-2 08/16/2019    BLOODU Negative 01/25/2020    SPECGRAV 1.007 01/25/2020    GLUCOSEU Negative 01/25/2020    GLUCOSEU NEGATIVE 07/11/2010       Radiology:  US GUIDED PARACENTESIS   Final Result   Successful ultrasound guided paracentesis. CT HEAD WO CONTRAST   Final Result   No acute intracranial abnormality.                  Assessment/Plan:    Active Hospital Problems    Diagnosis Date Noted    Acute hepatic encephalopathy [K72.00] 01/25/2020       Acute hepatic encephalopathy - ammonia of 199 on presentation.   - lactulose schedule TID and titrate to 2-6 BM daily  - continue PO Rifaximin BID  - GI consulted, recs appreciated  - SBP Ppx.   - Paracentesis today - neutrophil count 94 - not consistent with SBP, albeit she is on IV rocpehin.           Decompensated liver cirrhosis s/p liver transplantation  - GI consulted, recs appreciated  - continue immunosuppressant medications  - check Prograf level  - PO aldactone; holding torsemide for now since patient has poor PO intake  -checking AFP        Hypernatremia   -0.45 NS @ 50 cc/hr       Pancytopenia - likely as a result of chronic liver disease  -continue to monitor   -trend CBC daily       Bipolar 1 Disorder  -resume zyprexa dose decreased to 5 nightly.        Seizure Disorder  -continue home meds  -check lamotrigine level       Type 2 DM - stable  -continue Lantus 40 Units BID  -medium dose SSI  -hypoglycemia

## 2020-01-28 NOTE — PROGRESS NOTES
Hospitalist Progress Note      PCP: Tremaine Self III, DO    Date of Admission: 1/25/2020    Chief Complaint: altered mental status    Hospital Course: The patient is a 59 y.o. female with hx chronic liver disease s/p liver transplantation in 2008, recurrent admission for hepatic encephalopathy, Bipolar 1 Disorder, osteomyelitis with R BKA, COPD, type 2 DM, seizure disorder, obstructive sleep apnea on CPAP who presents to [facility] with altered mental status. Patient herself states that she is more confused than usual. She is frustrated because she takes lactulose \"all the time\". Alert and oriented to name only, but not place or time. States she has been confused since yesterday. Unable to obtain any further history due to altered mentation. Upon review of chart, it seems the patient has a history of recurrent hepatic encephalopathy.     In the ED, labs were significant for an ammonia level of 199 (baseline seems to be ~90). CBC showed pancytopenia consistent with the patient's baseline. Urine drug screen was positive for oxycodone which is a prescribed medication. Urinalysis was negative. CT Head without contrast was negative. Subjective:      Mental status appears better today. I wonder if this is all hepatic encephalopathy, or in part due to Bipolar d/o.          Medications:  Reviewed    Infusion Medications    dextrose       Scheduled Medications    insulin glargine  20 Units Subcutaneous BID    OLANZapine  5 mg Oral Nightly    lactulose  30 g Oral TID    cefTRIAXone (ROCEPHIN) IV  2 g Intravenous Q24H    insulin lispro  0-12 Units Subcutaneous TID WC    insulin lispro  0-6 Units Subcutaneous Nightly    levothyroxine  112 mcg Oral QAM AC    pantoprazole  40 mg Oral QAM AC    rifaximin  550 mg Oral BID    tacrolimus  0.5 mg Oral BID    oxyCODONE HCl  10 mg Oral 4x Daily    sodium chloride flush  10 mL Intravenous 2 times per day    spironolactone  100 mg Oral Daily     PRN 142   K 3.8 3.8 4.0   * 110 109   CO2 19* 19* 19*   BUN 19 19 19   CREATININE 0.9 1.0 1.0   CALCIUM 9.2 9.1 9.0     Recent Labs     01/26/20  0916 01/27/20  0737 01/28/20  0727   AST 30 28 31   ALT 30 30 31   BILITOT 1.5* 1.3* 1.2*   ALKPHOS 217* 204* 204*     Recent Labs     01/27/20  0749   INR 1.63*     No results for input(s): CKTOTAL, TROPONINI in the last 72 hours. Urinalysis:      Lab Results   Component Value Date    NITRU Negative 01/25/2020    WBCUA 10-20 08/16/2019    BACTERIA 4+ 12/17/2018    RBCUA 0-2 08/16/2019    BLOODU Negative 01/25/2020    SPECGRAV 1.007 01/25/2020    GLUCOSEU Negative 01/25/2020    GLUCOSEU NEGATIVE 07/11/2010       Radiology:  VL AORTA/IVC/ILIAC/BYPASS GRAFT LIMITED         US ABDOMEN LIMITED Specify organ? LIVER, SPLEEN, GALLBLADDER   Final Result   Limited study with heterogeneous appearance of the liver. No definite focal   lesion noted on limited imaging. If there is elevated clinical concern MRI   would be more sensitive. US GUIDED PARACENTESIS   Final Result   Successful ultrasound guided paracentesis. CT HEAD WO CONTRAST   Final Result   No acute intracranial abnormality. Assessment/Plan:    Active Hospital Problems    Diagnosis Date Noted    Acute hepatic encephalopathy [K72.00] 01/25/2020       Acute hepatic encephalopathy - ammonia of 199 on presentation.   - lactulose schedule TID and titrate to 2-6 BM daily  - continue PO Rifaximin BID  - GI consulted, recs appreciated  - SBP Ppx.   - Paracentesis  1/27 - neutrophil count 94 - not consistent with SBP, albeit she is on IV rocpehin.           Decompensated liver cirrhosis s/p Hx of liver transplantation  - GI consulted, recs appreciated  - continue immunosuppressant medications  - check Prograf level  - PO aldactone; holding torsemide for now since patient has poor PO intake  -checking AFP - normal 3.3        Hypernatremia - resolved.  Stop IVF       Pancytopenia - likely as a

## 2020-01-28 NOTE — CARE COORDINATION
Discussed w/ CM director. She asked that SNF referrals be sent based on location of private care home.  Facility director had suggested [facility] yesterday as it would be near enough for staff to continue to interact w/ pt as she has no family  Referral sent  Pt may require guardian  Electronically signed by Geo Monique RN on 1/28/2020 at 11:50 AM

## 2020-01-28 NOTE — PROGRESS NOTES
osteoarthritis, GERD (gastroesophageal reflux disease), Hepatic encephalopathy ([address] Hyperlipidemia, Hypertension, Hypothyroidism, Hypoxemia, Immune system disorder ([address] Liver disease, Liver transplanted ([address] Localization-related epilepsy ([address] Low back pain, Lumbar post-laminectomy syndrome, Morbid obesity ([address] Neuropathy, diabetic ([address] On home oxygen therapy, Other disorders of kidney and ureter, Peripheral neuropathy, Phantom limb ([address] Pneumonia, Polyneuritis, Restrictive lung disease, Seizures ([address] Sleep apnea, SVT (supraventricular tachycardia) ([address] Thyroid mass, and UTI (lower urinary tract infection). PSgHx:   Past Surgical History:   Procedure Laterality Date    ABDOMEN SURGERY      multiple    APPENDECTOMY      CHOLECYSTECTOMY      HERNIA REPAIR      multiple     HYSTERECTOMY      JOINT REPLACEMENT      right    JOINT REPLACEMENT      right after fall out of bed    LEG AMPUTATION BELOW KNEE      right    LEG AMPUTATION BELOW KNEE      LIVER TRANSPLANT  01/03/2008    from [address] [place] [address] Sachin Londono      allergy to pain pump machine, had removed d/t huge seroma     SLEEVE GASTROPLASTY  1985    UPPER GASTROINTESTINAL ENDOSCOPY N/A 11/1/2019    ESOPHAGOGASTRODUODENOSCOPY WITH MAC ANESTHESIA performed by Eligio Brown MD at [address] [place] [address] / Functional History  Social/Functional History  Type of Home: Facility(? assisted living apartment)  Home Equipment: (old therapy notes from 2018 indicate that patient had an electric wheelchair)  Additional Comments: Pt unable to give any prior level of function information due to her cognition no 01/27/20. Old therapy notes indicate that patient was transferring independently to an electric wheelchair and ind with her wheelchair (non-ambulatory since her R BKA). Restrictions  Restrictions/Precautions: Fall Risk    SUBJECTIVE: Pt supine in bed awake.  Pt pleasant and agreeable to get up to a chair. Pt oriented to self alone but follows verbal cues well. OBJECTIVE:    OBSERVATIONS  Observation: Pt with wounds over sacrum, L ischium as well as in inguinal folds. Bed mobility  Supine to Sit: Stand by assistance(impulsive)  Sit to Supine: Unable to assess  Scooting: Unable to assess    Transfers  Sit to Stand: Contact guard assistance(to bed rail x multiple trials)  Stand to sit: Contact guard assistance  Bed to Chair: Contact guard assistance(bed>chair to patient's left)    Ambulation  Ambulation  Ambulation?: No    W/C Mobility  Wheelchair Activities  Propulsion: No(not assessed at this time)    Other Activities:  Comment: Pt assisted with brief change standing up at bed rail -- see GRANADOS note. Neuro/balance  Balance  Sitting - Static: Good(SBA sitting on edge of bed)  Standing - Static: Good, -(CGA standing at recliner chair to bed rail; CGA for stand-pivot transfer)      Safety Devices: Type of devices: All fall risk precautions in place, Call light within reach, Nurse notified, Patient at risk for falls, Chair alarm in place, Left in chair      ASSESSMENT:   Roberta Sorensen is a 61 y.o. F admit 01/25/20 with hepatic encephalopathy -- history of recurrent admissions for hepatic encephalopathy s/p remote liver transplant in 2008. Prior to admit pt was living in an assisted living facility; and was transferring to an electric w/c independently, moving around her apartment in w/c independently. Today, she performed bed mobility with SBA and she transferred to standing with CGA as well as performing stand-pivot transfer with CGA. She has made dramatic improvements simply with cognitive improvement, but still is a bit weak and not yet independent with function (largely due to impulsiveness). Recommend ongoing skilled PT 3-5x/wk pending patient's progress while in the hospital.    Roberta Sorensen scored a 16/24 on the AM-PAC short mobility form.  Initial research suggests that an AM-PAC score of 18 or greater may be associated with a discharge to patient's home setting. However in this initial research, cut off scores do not perfectly predict discharge location: 25% of patients with a score of 18 or greater actually went to a rehab facility and 20% of people with a score less than 18 actually went home. Based on my clinical judgement I recommend that the patient have 3-5 sessions per week of Physical Therapy at d/c to increase the patients independence. Goals:  Time Frame for Short term goals: Upon D/C - all goals ongoing 01/28/20 except as noted below  Short term goal 1: Sup<>sit with CGA - MET 1/28 - Advance to sup<>sit Ind   Short term goal 2: Bed<>chair with squat-pivot transfer SBA   Short term goal 3: Pt assessed for w/c mobility as appropriate (does she use a motorized w/c?)            Safety devices:   Type of devices: All fall risk precautions in place, Call light within reach, Nurse notified, Patient at risk for falls, Chair alarm in place, Left in chair        PLAN:  Times per week: 3-5x/wk while in the hospital;    Current Treatment Recommendations: Functional Mobility Training    If patient is discharged prior to next treatment, this note will serve as the discharge summary.     Therapy Time    Individual Concurrent Group Co-treatment   Time In        0932   Time Out      4551   Minutes        23    Timed Code Treatment Minutes: 23 Minutes      Carlitos Anderson, PT

## 2020-01-28 NOTE — PLAN OF CARE
Problem: Discharge Planning:  Goal: Discharged to appropriate level of care  Description  Discharged to appropriate level of care  Outcome: Ongoing     Problem: Falls - Risk of:  Goal: Will remain free from falls  Description  Will remain free from falls  Outcome: Ongoing  Goal: Absence of physical injury  Description  Absence of physical injury  Outcome: Ongoing     Problem: Pain:  Goal: Control of acute pain  Description  Control of acute pain  Outcome: Ongoing     Problem: Risk for Impaired Skin Integrity  Goal: Tissue integrity - skin and mucous membranes  Description  Structural intactness and normal physiological function of skin and  mucous membranes.   Outcome: Ongoing     Problem: Nutrition  Goal: Optimal nutrition therapy  1/28/2020 0542 by Gigi Mendieta RN  Outcome: Ongoing  1/27/2020 1549 by Charlie Vega RD, LD  Outcome: Ongoing

## 2020-01-28 NOTE — PLAN OF CARE
Pt is still experiencing some confusion. I have had to explain several times what we are trying to accomplish during this stay. Pt had one very large bowel movement. Problem: Discharge Planning:  Goal: Discharged to appropriate level of care  Description  Discharged to appropriate level of care  1/28/2020 1541 by Shahnaz Carrizales RN  Outcome: Ongoing  1/28/2020 0542 by Sharmin Johnson RN  Outcome: Ongoing     Problem: Falls - Risk of:  Goal: Will remain free from falls  Description  Will remain free from falls  1/28/2020 1541 by Shahnaz Carrizales RN  Outcome: Ongoing  1/28/2020 0542 by Sharmin Johnson RN  Outcome: Ongoing  Goal: Absence of physical injury  Description  Absence of physical injury  1/28/2020 1541 by Shahnaz Carrizales RN  Outcome: Ongoing  1/28/2020 0542 by Sharmin Johnson RN  Outcome: Ongoing     Problem: Pain:  Goal: Control of acute pain  Description  Control of acute pain  1/28/2020 1541 by Shahnaz Carrizales RN  Outcome: Ongoing  1/28/2020 0542 by Sharmin Johnson RN  Outcome: Ongoing     Problem: Risk for Impaired Skin Integrity  Goal: Tissue integrity - skin and mucous membranes  Description  Structural intactness and normal physiological function of skin and  mucous membranes.   1/28/2020 1541 by Shahnaz Carrizales RN  Outcome: Ongoing  1/28/2020 0542 by Sharmin Johnson RN  Outcome: Ongoing     Problem: Nutrition  Goal: Optimal nutrition therapy  1/28/2020 1541 by Shahnaz Carrizales RN  Outcome: Ongoing  1/28/2020 0542 by Sharmin Johnson RN  Outcome: Ongoing

## 2020-01-28 NOTE — PROGRESS NOTES
INPATIENT CONSULTATION:    IDENTIFYING DATA/REASON FOR CONSULTATION   PATIENT:  Marcin Barbosa  MRN:  [id number]  ADMIT DATE: 1/25/2020  TIME OF EVALUATION: 1/28/2020 12:16 PM  HOSPITAL STAY:   LOS: 3 days   CONSULTING PHYSICIAN: Pancho Ng MD   REASON FOR CONSULTATION: cirrhosis, hepatic encephalopathy    Subjective:    Patient seen in follow up this morning for hepatic encephalopathy. She appeared more awake. She answered questions appropriately however remains disoriented to place and year. She was able to state her name after asked multiple times    MEDICATIONS   SCHEDULED:  insulin glargine, 20 Units, BID  OLANZapine, 5 mg, Nightly  lactulose, 30 g, TID  cefTRIAXone (ROCEPHIN) IV, 2 g, Q24H  insulin lispro, 0-12 Units, TID WC  insulin lispro, 0-6 Units, Nightly  levothyroxine, 112 mcg, QAM AC  pantoprazole, 40 mg, QAM AC  rifaximin, 550 mg, BID  tacrolimus, 0.5 mg, BID  oxyCODONE HCl, 10 mg, 4x Daily  sodium chloride flush, 10 mL, 2 times per day  spironolactone, 100 mg, Daily      FLUIDS/DRIPS:     dextrose       PRNs: ondansetron, 8 mg, Q8H PRN  oxyCODONE-acetaminophen, 1 tablet, Q4H PRN  sodium chloride flush, 10 mL, PRN  magnesium hydroxide, 30 mL, Daily PRN  ondansetron, 4 mg, Q6H PRN  glucose, 15 g, PRN  dextrose, 12.5 g, PRN  glucagon (rDNA), 1 mg, PRN  dextrose, 100 mL/hr, PRN      ALLERGIES:    Allergies   Allergen Reactions    Nitrofurantoin Hives    Abilify [Aripiprazole]     Acetaminophen Other (See Comments)     Avoid due to liver    Acetaminophen Other (See Comments)     Liver transplant restriction    Aspirin     Avelox [Moxifloxacin]     Carbamazepine     Contrast [Iodides] Hives     Contrast dye    Cymbalta [Duloxetine Hcl] Other (See Comments)     Pt.  Starts hallucinating     Doxycycline     Elavil [Amitriptyline] Hives    Erythromycin     Flagyl [Metronidazole]     Floxin [Ofloxacin]     Ibuprofen Other (See Comments)     Liver transplant    Iv Contrast [Iodides] Hives    Keflex [Cephalexin]      Tolerates Ceftriaxone.  Ketorolac Hives    Levaquin [Levofloxacin In D5w]     Levofloxacin Hives    Lyrica [Pregabalin]     Lyrica [Pregabalin] Other (See Comments)     hallucinations    Macrobid [Nitrofurantoin Monohyd Macro]     Motrin [Ibuprofen Micronized]     Nsaids      Liver transplant, states she can't have because of liver protocol      Nucynta Er [Tapentadol Hcl Er]     Nucynta [Tapentadol]     Penicillins Hives    Primidone     Reglan [Metoclopramide]     Tricyclic Antidepressants     Clindamycin      Other reaction(s): Yeast Infection         PHYSICAL EXAM     Vitals:    01/28/20 0452 01/28/20 0455 01/28/20 0936 01/28/20 0948   BP:  (!) 144/86  128/74   Pulse:  93  104   Resp:  17 16 18   Temp:  97.3 °F (36.3 °C)  98.7 °F (37.1 °C)   TempSrc:  Axillary  Oral   SpO2:  98% 96% 100%   Weight: 176 lb 5.9 oz (80 kg)      Height:           I/O last 3 completed shifts: In: 1075.8 [I.V.:1075.8]  Out: 1000 [Urine:850; Other:150]    Physical Exam:  General appearance: alert, cooperative  Eyes: Anicteric  Head: Normocephalic, without obvious abnormality  Lungs: clear to auscultation bilaterally, on CPAP  Heart: regular rate and rhythm  Abdomen: soft, non-distended, non-tender. Bowel sounds normal. No masses,  no organomegaly.    Extremities: atraumatic, no cyanosis or edema  Skin: warm and dry, no jaundice  Neuro: alert, disoriented    LABS AND IMAGING   Laboratory   Recent Labs     01/26/20  0916 01/27/20  0737 01/28/20  0727   WBC 3.1* 3.7* 2.7*   HGB 10.9* 11.5* 11.4*   HCT 32.6* 34.3* 34.3*   MCV 92.0 90.8 91.3   PLT 72* 69* 58*     Recent Labs     01/26/20  0916 01/27/20  0737 01/28/20  0727   * 145 142   K 3.8 3.8 4.0   * 110 109   CO2 19* 19* 19*   BUN 19 19 19   CREATININE 0.9 1.0 1.0     Recent Labs     01/26/20  0916 01/27/20  0737 01/28/20  0727   AST 30 28 31   ALT 30 30 31   BILITOT 1.5* 1.3* 1.2*   ALKPHOS 217* 204* 204*     No results for input(s): LIPASE, AMYLASE in the last 72 hours. Recent Labs     01/27/20  0749   PROTIME 19.0*   INR 1.63*         Imaging  VL AORTA/IVC/ILIAC/BYPASS GRAFT LIMITED         US ABDOMEN LIMITED Specify organ? LIVER, SPLEEN, GALLBLADDER   Final Result   Limited study with heterogeneous appearance of the liver. No definite focal   lesion noted on limited imaging. If there is elevated clinical concern MRI   would be more sensitive. US GUIDED PARACENTESIS   Final Result   Successful ultrasound guided paracentesis. CT HEAD WO CONTRAST   Final Result   No acute intracranial abnormality. Endoscopy      ASSESSMENT AND RECOMMENDATIONS   Ilia Mejia is a 59 y.o. 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 Ammonia level elevated. Tox screen negative. CT head negative. H/H stable. No evidence of GI bleeding contributing    1. Hepatic encephalopathy:  Remains confused. No BMs recorded yesterday. She has been receiving rifaximin BID and Lactulose started yesterday. diagnostic para yesterday neg for SBP  2. Cirrhosis. Hx of AIH, s/p liver transplant 2008, Recurrent cirrhosis presumably from [place]. Decompensated by hepatic encephalopathy and diuretic refractory ascites. Follows with Dr. Tabitha Shahid as an outpt. On lactulose TID and rifaximin BID as an outpt. Also on torsemide/aldactone and undergoes biweekly paracentesis. On Prograf. Prograf level pending. US with no definite focal liver lesion. RECOMMENDATIONS:    Continue Lactulose 20 gm TID with goal of 3 BMs daily. Continue rifaximin 550 mg BID. Monitor stool output. Follow up on prograf level  Continue aldactone 100 mg daily   Ok from GI standpoint to stop antibiotics. If you have any questions or need any further information, please feel free to contact us [phone].   Thank you for allowing us to participate in the care of

## 2020-01-28 NOTE — PROGRESS NOTES
Occupational Therapy  Facility/Department: [address] MED SURG  Daily Treatment Note   Let this serve as discharge note if patient is discharged prior to next OT session    NAME: Blanca Mondragon  : 1955  MRN: [id number]    Date of Service: 2020    Discharge Recommendations:  3-5 sessions per week    AM-PAC  Blanca Mondragon scored a 15/24 on the AM-PAC ADL Inpatient form. Current research shows that an AM-PAC score of 17 or less is typically not associated with a discharge to the patient's home setting. Based on the patients AM-PAC score and their current ADL deficits, it is recommended that the patient have 3-5 sessions per week of Occupational Therapy at d/c to increase the patients independence. Assessment   Performance deficits / Impairments: Decreased endurance;Decreased ADL status; Decreased cognition;Decreased strength  Assessment: Patient is limited by decreased cognition, decreased activity tolerance, and is below baseline of being independent from w/c level. Cont per POC. Prognosis: Fair  OT Education: OT Role;Transfer Training;Plan of Care;Orientation  REQUIRES OT FOLLOW UP: Yes  Activity Tolerance: Patient Tolerated treatment well;Treatment limited secondary to decreased cognition;Patient limited by fatigue  Safety Devices in place: Yes  Type of devices: Call light within reach; Chair alarm in place; Left in chair;Gait belt;Nurse notified(DANIEL Salinas notified )       Patient Diagnosis(es): The primary encounter diagnosis was Dehydration. Diagnoses of Hepatic encephalopathy ([address] and Disorientation were also pertinent to this visit.       has a past medical history of Anemia, Arthritis, Bipolar 1 disorder ([address] Causalgia of lower limb, Cellulitis, Chronic pain syndrome, Chronic post-operative pain, Cirrhosis of liver (HCC), Complications of transplanted liver, Convulsion ([address] COPD (chronic obstructive pulmonary disease) ([address] Depression, Dermatophytosis of nail, Diabetes mellitus ([address] Diarrhea, Dry eyes, Fungal nail infection, Generalized abdominal pain, Generalized osteoarthritis, GERD (gastroesophageal reflux disease), Hepatic encephalopathy ([address] Hyperlipidemia, Hypertension, Hypothyroidism, Hypoxemia, Immune system disorder ([address] Liver disease, Liver transplanted ([address] Localization-related epilepsy ([address] Low back pain, Lumbar post-laminectomy syndrome, Morbid obesity ([address] Neuropathy, diabetic ([address] On home oxygen therapy, Other disorders of kidney and ureter, Peripheral neuropathy, Phantom limb ([address] Pneumonia, Polyneuritis, Restrictive lung disease, Seizures ([address] Sleep apnea, SVT (supraventricular tachycardia) ([address] Thyroid mass, and UTI (lower urinary tract infection). has a past surgical history that includes Liver transplant (01/03/2008); other surgical history; Cholecystectomy; Appendectomy; hernia repair; Abdomen surgery; joint replacement; joint replacement; Leg amputation below knee; Liver transplant; Leg amputation below knee; Hysterectomy; Sleeve Gastroplasty (1985); and Upper gastrointestinal endoscopy (N/A, 11/1/2019). Restrictions  Restrictions/Precautions: Fall Risk    Subjective  Chart Reviewed: Yes  Patient assessed for rehabilitation services?: Yes  Additional Pertinent Hx: hx chronic liver disease s/p liver transplantation in 2008, recurrent admission for hepatic encephalopathy, Bipolar 1 Disorder, osteomyelitis with R BKA, COPD, type 2 DM, seizure disorder, obstructive sleep apnea on CPAP  Response to previous treatment: Patient with no complaints from previous session  Family / Caregiver Present: No  Referring Practitioner: Jolene Feliz MD  Diagnosis: AMS   Subjective: Patient met b/s as co-treat with PT, pleasant and agreeable to OOB     Orientation  Orientation Level: Oriented to person;Disoriented to place; Disoriented to time;Disoriented to situation    Objective    ADL  Grooming: Setup(brush hair )  Toileting: Dependent/Total(catheter urine, changed depends, although was not soiled, dependent for depends change )    Balance  Sitting Balance: Stand by assistance  Standing Balance: Contact guard assistance (multiple stands at bed rail, can only maintain briefly)    Bed Mobility: SBA with HOB up, use of HR     Functional Mobility  Functional Mobility Comments: Patient is non-ambulatory at baseline     Transfers  Stand Pivot Transfers: Contact guard assistance(bed-recliner with CGA )  Sit to stand: Contact guard assistance  Stand to sit: Contact guard assistance  Transfer Comments: multiple sit-stands recliner to bedrail with CGA     Cognition  Overall Cognitive Status: Exceptions  Attention Span: Attends with cues to redirect  Memory: Decreased short term memory;Decreased recall of biographical Information;Decreased long term memory  Safety Judgement: Decreased awareness of need for assistance;Decreased awareness of need for safety  Insights: Decreased awareness of deficits  Cognition Comment: impulsive, requires redirection     Plan  Times per week: 3-5x  Times per day: Daily  Current Treatment Recommendations: Endurance Training, Strengthening, Patient/Caregiver Education & Training, Self-Care / ADL, Cognitive Reorientation, Safety Education & Training    Goals  Short term goals  Time Frame for Short term goals: by discharge: status as of 1/28/20: goals ongoing except where indicated below   Short term goal 1: Complete functional transfers with min assistance: MET advance goal to Pt will complete fxl transfers with Modified Independent   Short term goal 2: Complete dynamic seated ADL at EOB with SBA  Short term goal 3: Complete static standing for 10-20 seconds in preparation for toileting hygiene with min assistance for balance: MET advance goal to Pt will complete standing x 3 minutes Modified Independent        Therapy Time   Individual Concurrent Group Co-treatment   Time In       0932   Time Out       0950   Minutes       18   Timed Code Treatment Minutes: 18 [id number] [facility], [address] [place] [address]

## 2020-01-29 NOTE — DISCHARGE INSTR - COC
Continuity of Care Form    Patient Name: Zari Hollis   :  1955  MRN:  [id number]    Admit date:  2020  Discharge date:  2020    Code Status Order: Full Code   Advance Directives:     Admitting Physician:  Hannah Herrera MD  PCP: Jeannine Hickman DO    Discharging Nurse: [facility] Unit/Room#: V3G-5331/3911-38  Discharging Unit Phone Number: [phone]    Emergency Contact:   Extended Emergency Contact Information  Primary Emergency Contact: [address] Phone: [phone]  Mobile Phone: [phone]  Relation: Brother/Sister    Past Surgical History:  Past Surgical History:   Procedure Laterality Date    ABDOMEN SURGERY      multiple    APPENDECTOMY      CHOLECYSTECTOMY      HERNIA REPAIR      multiple     HYSTERECTOMY      JOINT REPLACEMENT      right    JOINT REPLACEMENT      right after fall out of bed    LEG AMPUTATION BELOW KNEE      right    LEG AMPUTATION BELOW KNEE      LIVER TRANSPLANT  2008    from [address] [place] [address] [place]      allergy to pain pump machine, had removed d/t huge seroma     SLEEVE GASTROPLASTY  1985    UPPER GASTROINTESTINAL ENDOSCOPY N/A 2019    ESOPHAGOGASTRODUODENOSCOPY WITH MAC ANESTHESIA performed by Obinna Cason MD at [facility] ENDOSCOPY       Immunization History:   Immunization History   Administered Date(s) Administered    Pneumococcal Polysaccharide ([id number]) 2012       Active Problems:  Patient Active Problem List   Diagnosis Code    Hepatic encephalopathy ([facility] K72.90    Acquired hypothyroidism E03.9    Arthritis M19.90    History of liver transplant ([facility] Z94.4    Altered mental state R41.82    Partial epilepsy with impairment of consciousness ([address] L59.327    Metabolic encephalopathy J45.19    Chronic pain syndrome G89.4    Primary insomnia F51.01    Recurrent major depressive disorder, in remission ([address] F33.40    Obesity E66.9  Phantom limb pain (HCC) G54.6    Other chest pain R07.89    Hx of right BKA ([address] Z89.511    Immunosuppressed status (HCC) D89.9    Tachycardia R00.0    Type 1 diabetes mellitus with other specified complication (HCC) D52.11    Polypharmacy Z79.899    SVT (supraventricular tachycardia) ([facility] I47.1    Hyperglycemia R73.9    Acute chest pain R07.9    Nausea R11.0    S/P catheter ablation of slow pathway Z98.890, Z86.79    Chest pain R07.9    GERD (gastroesophageal reflux disease) K21.9    Hypothyroid E03.9    Failure to thrive (0-17) R62.51    Knee pain M25.569    Anemia D64.9    Hypoglycemia E16.2    DM2 (diabetes mellitus, type 2) ([facility]) E11.9    Anemia, chronic disease D63.8    Acute hepatic encephalopathy K72.00       Isolation/Infection:   Isolation          No Isolation        Patient Infection Status     None to display          Nurse Assessment:  Last Vital Signs: /65   Pulse 95   Temp 98 °F (36.7 °C)   Resp 18   Ht 5' 4\" (1.626 m)   Wt 176 lb 5.9 oz (80 kg)   SpO2 98%   BMI 30.27 kg/m²     Last documented pain score (0-10 scale): Pain Level: 7  Last Weight:   Wt Readings from Last 1 Encounters:   01/28/20 176 lb 5.9 oz (80 kg)     Mental Status:  Alert & oriented x4 but was disoriented to situation and time yesterday    IV Access:  - None    Nursing Mobility/ADLs:  Walking   Assisted  Transfer  Assisted  Bathing  Assisted  Dressing  Assisted  Toileting  Assisted  Feeding  Independent  Med Admin  Assisted  Med Delivery   whole    Wound Care Documentation and Therapy:  Incision 02/02/18 Groin Right (Active)   Number of days: 726        Elimination:  Continence:   · Bowel:  Yes  · Bladder: Yes  Urinary Catheter: None   Colostomy/Ileostomy/Ileal Conduit: No       Date of Last BM: 1/30/2020    Intake/Output Summary (Last 24 hours) at 1/29/2020 1229  Last data filed at 1/29/2020 0904  Gross per 24 hour   Intake 1330 ml   Output 2825 ml   Net -1495 ml     I/O last 3 completed shifts: In: 1080 [P.O.:1080]  Out: 3350 [Urine:3350]    Safety Concerns: At Risk for Falls    Impairments/Disabilities:      Amputation - Right below the knee    Nutrition Therapy:  Current Nutrition Therapy:   - Oral Diet:  Carb Control 4 carbs/meal (1800kcals/day)    Routes of Feeding: Oral  Liquids: Thin Liquids  Daily Fluid Restriction: no  Last Modified Barium Swallow with Video (Video Swallowing Test): not done    Treatments at the Time of Hospital Discharge:   Respiratory Treatments: none  Oxygen Therapy:  is not on home oxygen therapy. Ventilator:    - No ventilator support    Rehab Therapies: Physical Therapy and Occupational Therapy  Weight Bearing Status/Restrictions: No weight bearing restirctions  Other Medical Equipment (for information only, NOT a DME order):  CPAP machine at night  Other Treatments: ***    Patient's personal belongings (please select all that are sent with patient):  Dentures ***, ***    RN SIGNATURE:  Electronically signed by Zane Ochoa RN on 1/30/20 at 12:17 PM    CASE MANAGEMENT/SOCIAL WORK SECTION    Inpatient Status Date: 1/29/2020    Readmission Risk Assessment Score:    Discharging to Facility/ Agency   · Name: [facility] Alonzo  · Address: [place] [address]  · Phone: [phone]  · Fax: [phone]    / signature: Electronically signed by Jacques Miller RN on 1/29/20 at 12:31 PM      PHYSICIAN SECTION    Prognosis: Fair    Condition at Discharge: Stable    Rehab Potential (if transferring to Rehab): Good    Recommended Labs or Other Treatments After Discharge:     ECF short term with PTOT    Physician Certification: I certify the above information and transfer of Bolivar Guzman  is necessary for the continuing treatment of the diagnosis listed and that she requires [facility] for less 30 days.      Update Admission H&P: No change in H&P    PHYSICIAN SIGNATURE:  Electronically signed by Tha Santana MD on 1/30/20 at 10:26 AM

## 2020-01-29 NOTE — PROGRESS NOTES
INPATIENT CONSULTATION:    IDENTIFYING DATA/REASON FOR CONSULTATION   PATIENT:  Josse Storey  MRN:  [id number]  ADMIT DATE: 1/25/2020  TIME OF EVALUATION: 1/29/2020 2:48 PM  HOSPITAL STAY:   LOS: 4 days   CONSULTING PHYSICIAN: Linus Lujan MD   REASON FOR CONSULTATION: cirrhosis, hepatic encephalopathy    Subjective:    Patient seen and examined this morning. She reports remarkable improvement in her symptoms. She denies any confusion at this time. She had 3 bowel movements yesterday. She is seen sitting up in the chair, eating breakfast.  She denies any complaints. She does admit to constipation prior to admission. MEDICATIONS   SCHEDULED:  OLANZapine, 10 mg, Nightly  insulin glargine, 20 Units, BID  lactulose, 30 g, TID  cefTRIAXone (ROCEPHIN) IV, 2 g, Q24H  insulin lispro, 0-12 Units, TID WC  insulin lispro, 0-6 Units, Nightly  levothyroxine, 112 mcg, QAM AC  pantoprazole, 40 mg, QAM AC  rifaximin, 550 mg, BID  tacrolimus, 0.5 mg, BID  oxyCODONE HCl, 10 mg, 4x Daily  sodium chloride flush, 10 mL, 2 times per day  spironolactone, 100 mg, Daily      FLUIDS/DRIPS:     dextrose       PRNs: ondansetron, 8 mg, Q8H PRN  oxyCODONE-acetaminophen, 1 tablet, Q4H PRN  sodium chloride flush, 10 mL, PRN  magnesium hydroxide, 30 mL, Daily PRN  ondansetron, 4 mg, Q6H PRN  glucose, 15 g, PRN  dextrose, 12.5 g, PRN  glucagon (rDNA), 1 mg, PRN  dextrose, 100 mL/hr, PRN      ALLERGIES:    Allergies   Allergen Reactions    Nitrofurantoin Hives    Abilify [Aripiprazole]     Acetaminophen Other (See Comments)     Avoid due to liver    Acetaminophen Other (See Comments)     Liver transplant restriction    Aspirin     Avelox [Moxifloxacin]     Carbamazepine     Contrast [Iodides] Hives     Contrast dye    Cymbalta [Duloxetine Hcl] Other (See Comments)     Pt.  Starts hallucinating     Doxycycline     Elavil [Amitriptyline] Hives    Erythromycin     Flagyl [Metronidazole]     Floxin [Ofloxacin]     01/29/20  0706   AST 28 31 41*   ALT 30 31 36   BILITOT 1.3* 1.2* 1.0   ALKPHOS 204* 204* 202*     No results for input(s): LIPASE, AMYLASE in the last 72 hours. Recent Labs     01/27/20  0749   PROTIME 19.0*   INR 1.63*         Imaging  VL AORTA/IVC/ILIAC/BYPASS GRAFT LIMITED   Final Result      US ABDOMEN LIMITED Specify organ? LIVER, SPLEEN, GALLBLADDER   Final Result   Limited study with heterogeneous appearance of the liver. No definite focal   lesion noted on limited imaging. If there is elevated clinical concern MRI   would be more sensitive. US GUIDED PARACENTESIS   Final Result   Successful ultrasound guided paracentesis. CT HEAD WO CONTRAST   Final Result   No acute intracranial abnormality. Endoscopy      ASSESSMENT AND RECOMMENDATIONS   Ilia Mejia is a 59 y.o. 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 Ammonia level elevated. Tox screen negative. CT head negative. H/H stable. No evidence of GI bleeding contributing    1. Hepatic encephalopathy: Resolved, likely 2/2 constipation with inadequate bowel movements. No source of infection, no SBP, no GI bleeding. Suspect non-compliance with lactulose. Continue lactulose at 30 gm qid. Prior outpatient evaluations have described constipation, thought to be opioid induced. Would recommend taper and cessation of opioid therapy. Continue lactulose with goal of 3-4 BM daily. Continue rifaximin. 2. 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 Titrate lactulose to 3-4 Bm/day. - HCC: RUQ US with doppler 1/27/20 negative. - Ascites: Diuretic refractory, on torsemide/aldactone as outpatient, requires intermittent LVP. Ascitic fluid negative for SBP. Ok to stop antibiotics. - Transplant candidacy: History of OLT in 2008, on prograf ([place]acro 8.8 1/28/20).    - EV: EGD 11/1/19 - No

## 2020-01-29 NOTE — PLAN OF CARE
physical injury  Description  Absence of physical injury  1/29/2020 1124 by Jayme Barry RN  Outcome: Ongoing     Problem: Mood - Altered:  Goal: Mood stable  Description  Mood stable  Outcome: Ongoing     Problem: Mood - Altered:  Goal: Absence of abusive behavior  Description  Absence of abusive behavior  Outcome: Ongoing     Problem: Mood - Altered:  Goal: Verbalizations of feeling emotionally comfortable while being cared for will increase  Description  Verbalizations of feeling emotionally comfortable while being cared for will increase  Outcome: Ongoing     Problem: Psychomotor Activity - Altered:  Goal: Absence of psychomotor disturbance signs and symptoms  Description  Absence of psychomotor disturbance signs and symptoms  Outcome: Ongoing     Problem: Sensory Perception - Impaired:  Goal: Demonstrations of improved sensory functioning will increase  Description  Demonstrations of improved sensory functioning will increase  Outcome: Ongoing     Problem: Sensory Perception - Impaired:  Goal: Decrease in sensory misperception frequency  Description  Decrease in sensory misperception frequency  Outcome: Ongoing     Problem: Sensory Perception - Impaired:  Goal: Able to refrain from responding to false sensory perceptions  Description  Able to refrain from responding to false sensory perceptions  Outcome: Ongoing     Problem: Sensory Perception - Impaired:  Goal: Demonstrates accurate environmental perceptions  Description  Demonstrates accurate environmental perceptions  Outcome: Ongoing     Problem: Sensory Perception - Impaired:  Goal: Able to distinguish between reality-based and nonreality-based thinking  Description  Able to distinguish between reality-based and nonreality-based thinking  Outcome: Ongoing     Problem: Sensory Perception - Impaired:  Goal: Able to interrupt nonreality-based thinking  Description  Able to interrupt nonreality-based thinking  Outcome: Ongoing     Problem: Sleep Pattern Disturbance:  Goal: Appears well-rested  Description  Appears well-rested  Outcome: Ongoing

## 2020-01-29 NOTE — PLAN OF CARE
Pt with 4 large BM's this shift. Pt continually takes off her CPAP machine, RN and respiratory therapist have educated patient on need for CPAP. Problem: Discharge Planning:  Goal: Discharged to appropriate level of care  Description  Discharged to appropriate level of care  1/29/2020 0449 by Tenzin Reid RN  Outcome: Ongoing     Problem: Falls - Risk of:  Goal: Will remain free from falls  Description  Will remain free from falls  1/29/2020 0449 by Tenzin Reid RN  Outcome: Ongoing     Problem: Falls - Risk of:  Goal: Absence of physical injury  Description  Absence of physical injury  1/29/2020 0449 by Tenzin Reid RN  Outcome: Ongoing     Problem: Pain:  Goal: Control of acute pain  Description  Control of acute pain  1/29/2020 0449 by Tenzin Reid RN  Outcome: Ongoing     Problem: Risk for Impaired Skin Integrity  Goal: Tissue integrity - skin and mucous membranes  Description  Structural intactness and normal physiological function of skin and  mucous membranes.   1/29/2020 0449 by Tenzin Reid RN  Outcome: Ongoing     Problem: Nutrition  Goal: Optimal nutrition therapy  1/29/2020 0449 by Tenzin Reid RN  Outcome: Ongoing

## 2020-01-29 NOTE — PROGRESS NOTES
Occupational Therapy  Facility/Department: [address] MED SURG  Daily Treatment Note  Let this serve as discharge note if patient is discharged prior to next OT session    NAME: Zari Hollis  : 1955  MRN: [id number]    Date of Service: 2020    Discharge Recommendations:  3-5 sessions per week    AM-PAC  Zari Hollis scored a 17/24 on the AM-PAC ADL Inpatient form. Current research shows that an AM-PAC score of 17 or less is typically not associated with a discharge to the patient's home setting. Based on the patients AM-PAC score and their current ADL deficits, it is recommended that the patient have 3-5 sessions per week of Occupational Therapy at d/c to increase the patients independence. Assessment   Performance deficits / Impairments: Decreased endurance;Decreased ADL status; Decreased cognition;Decreased strength  Assessment: Patient is making steady progress with functional tasks, yet remains limited by cognition. This is improving from initial eval as well, yet is not safe to be home alone at this time due to cognition is not back to baseline. Patient completed ADLs with SBA, yet cues for sequencing, problem solving, and safety and would not be safe to perform on her own. Cont per POC. Prognosis: Fair;Good  OT Education: OT Role;Transfer Training;Plan of Care;Orientation  REQUIRES OT FOLLOW UP: Yes  Activity Tolerance: Patient Tolerated treatment well;Treatment limited secondary to decreased cognition  Safety Devices in place: Yes  Type of devices: Call light within reach; Chair alarm in place; Left in chair;Gait belt;Nurse notified(RN [place] notified )       Patient Diagnosis(es): The primary encounter diagnosis was Dehydration. Diagnoses of Hepatic encephalopathy ([address] and Disorientation were also pertinent to this visit.       has a past medical history of Anemia, Arthritis, Bipolar 1 disorder ([address] Causalgia of lower limb, Cellulitis, Chronic pain syndrome, Chronic post-operative pain, Cirrhosis of liver ([address] Complications of transplanted liver, Convulsion ([address] COPD (chronic obstructive pulmonary disease) ([address] Depression, Dermatophytosis of nail, Diabetes mellitus ([address] Diarrhea, Dry eyes, Fungal nail infection, Generalized abdominal pain, Generalized osteoarthritis, GERD (gastroesophageal reflux disease), Hepatic encephalopathy ([address] Hyperlipidemia, Hypertension, Hypothyroidism, Hypoxemia, Immune system disorder ([address] Liver disease, Liver transplanted ([address] Localization-related epilepsy ([address] Low back pain, Lumbar post-laminectomy syndrome, Morbid obesity ([address] Neuropathy, diabetic ([address] On home oxygen therapy, Other disorders of kidney and ureter, Peripheral neuropathy, Phantom limb ([address] Pneumonia, Polyneuritis, Restrictive lung disease, Seizures ([address] Sleep apnea, SVT (supraventricular tachycardia) ([address] Thyroid mass, and UTI (lower urinary tract infection). has a past surgical history that includes Liver transplant (01/03/2008); other surgical history; Cholecystectomy; Appendectomy; hernia repair; Abdomen surgery; joint replacement; joint replacement; Leg amputation below knee; Liver transplant; Leg amputation below knee; Hysterectomy; Sleeve Gastroplasty (1985); and Upper gastrointestinal endoscopy (N/A, 11/1/2019).     Restrictions  Restrictions/Precautions: Fall Risk    Subjective  Chart Reviewed: Yes  Patient assessed for rehabilitation services?: Yes  Additional Pertinent Hx: hx chronic liver disease s/p liver transplantation in 2008, recurrent admission for hepatic encephalopathy, Bipolar 1 Disorder, osteomyelitis with R BKA, COPD, type 2 DM, seizure disorder, obstructive sleep apnea on CPAP  Response to previous treatment: Patient with no complaints from previous session  Family / Caregiver Present: No  Referring Practitioner: Alonso Bautista MD  Diagnosis: AMS   Subjective: Patient met b/s, lying supine, pleasant and agreeable to OT treat, would like to take a

## 2020-01-29 NOTE — PROGRESS NOTES
Physical Therapy    Daily Treatment Note    Patient Name: Milton Munguia  Medical Record Number: [id number]  Room Number: D1B-6461/9646-99    ASSESSMENT: Milton Munguia is a 61 y.o. F admit 01/25/20 with hepatic encephalopathy -- history of recurrent admissions for hepatic encephalopathy s/p remote liver transplant in 2008. Prior to admit pt was living in an assisted living facility; and was transferring to an electric w/c independently, moving around her apartment in w/c independently. Today, she performed bed mobility with SBA and performed stand-pivot transfer with CGA. She is still a bit weak and not yet independent with function (largely due to impulsiveness); she was able to propel in a wheelchair with some assistance due to fatigue. Recommend ongoing skilled PT 3-5x/wk pending patient's progress while in the hospital.    Discharge Recommendations: 3-5 sessions per week, Patient would benefit from continued therapy after discharge  Equipment Needs: Equipment Needed: No    Admission Date: 1/25/2020 11:11 AM    Additional Pertinent Hx: Milton Munguia is a 61 y.o. F admit 01/25/20 with hepatic encephalopathy -- history of recurrent admissions for hepatic encephalopathy s/p remote liver transplant in 2008.   Diagnosis: Decompensated liver cirrhosis s/p liver transplantation, hepatic encephalopathy, pancotopenia, hypernatremia  Restrictions/Precautions: Fall Risk       PMHx:  has a past medical history of Anemia, Arthritis, Bipolar 1 disorder ([address] Causalgia of lower limb, Cellulitis, Chronic pain syndrome, Chronic post-operative pain, Cirrhosis of liver (HCC), Complications of transplanted liver, Convulsion ([address] COPD (chronic obstructive pulmonary disease) ([address] Depression, Dermatophytosis of nail, Diabetes mellitus ([address] Diarrhea, Dry eyes, Fungal nail infection, Generalized abdominal pain, Generalized osteoarthritis, GERD (gastroesophageal reflux disease), Hepatic encephalopathy ([address] Hyperlipidemia,

## 2020-01-29 NOTE — PROGRESS NOTES
Hospitalist Progress Note      PCP: Sonia Sanders III, DO    Date of Admission: 1/25/2020    Chief Complaint: altered mental status    Hospital Course: The patient is a 59 y.o. female with hx chronic liver disease s/p liver transplantation in 2008, recurrent admission for hepatic encephalopathy, Bipolar 1 Disorder, osteomyelitis with R BKA, COPD, type 2 DM, seizure disorder, obstructive sleep apnea on CPAP who presents to [facility] with altered mental status. Patient herself states that she is more confused than usual. She is frustrated because she takes lactulose \"all the time\". Alert and oriented to name only, but not place or time. States she has been confused since yesterday. Unable to obtain any further history due to altered mentation. Upon review of chart, it seems the patient has a history of recurrent hepatic encephalopathy.     In the ED, labs were significant for an ammonia level of 199 (baseline seems to be ~90). CBC showed pancytopenia consistent with the patient's baseline. Urine drug screen was positive for oxycodone which is a prescribed medication. Urinalysis was negative. CT Head without contrast was negative. Subjective:    Doing much better today - AAOx4. Had several large BM. She insists that she was compliant with her lactulose.      She is able to converse full sentences and able to reason very well today             Medications:  Reviewed    Infusion Medications    dextrose       Scheduled Medications    OLANZapine  10 mg Oral Nightly    insulin glargine  20 Units Subcutaneous BID    lactulose  30 g Oral TID    cefTRIAXone (ROCEPHIN) IV  2 g Intravenous Q24H    insulin lispro  0-12 Units Subcutaneous TID WC    insulin lispro  0-6 Units Subcutaneous Nightly    levothyroxine  112 mcg Oral QAM AC    pantoprazole  40 mg Oral QAM AC    rifaximin  550 mg Oral BID    tacrolimus  0.5 mg Oral BID    oxyCODONE HCl  10 mg Oral 4x Daily    sodium chloride flush  10 mL Intravenous 2 times per day    spironolactone  100 mg Oral Daily     PRN Meds: ondansetron, oxyCODONE-acetaminophen, sodium chloride flush, magnesium hydroxide, ondansetron, glucose, dextrose, glucagon (rDNA), dextrose      Intake/Output Summary (Last 24 hours) at 1/29/2020 1517  Last data filed at 1/29/2020 9983  Gross per 24 hour   Intake 1330 ml   Output 2825 ml   Net -1495 ml       Exam:    /65   Pulse 95   Temp 98 °F (36.7 °C)   Resp 18   Ht 5' 4\" (1.626 m)   Wt 176 lb 5.9 oz (80 kg)   SpO2 98%   BMI 30.27 kg/m²     General appearance: No apparent distress appears stated age and confused. HEENT Normal cephalic, atraumatic without obvious deformity. Pupils equal, round, and reactive to light. Extra ocular muscles intact. Conjunctivae/corneas clear. Neck: Supple, No jugular venous distention/bruits. Trachea midline without thyromegaly or adenopathy with full range of motion. Lungs: Clear to auscultation, bilaterally without Rales/Wheezes/Rhonchi with good respiratory effort. Heart: Regular rate and rhythm with Normal S1/S2 without murmurs, rubs or gallops, point of maximum impulse non-displaced  Abdomen: +Distended, non-tender, without rigidity or guarding and positive bowel sounds all four quadrants. Extremities: R leg BKA, no clubbing, cyanosis, or edema bilaterally. Full range of motion. Skin: Skin color, texture, turgor normal.  No rashes or lesions. Neurologic: Alert and oriented x4 neurovascularly intact with sensory/motor intact upper extremities/lower extremities, bilaterally.   Cranial nerves: II-XII intact, grossly non-focal.  Mental status: Alert, oriented x4 today  Capillary Refill: Acceptable  < 3 seconds  Peripheral Pulses: +3 Easily felt, not easily obliterated with pressure       Labs:   Recent Labs     01/27/20  0737 01/28/20  0727 01/29/20  0706   WBC 3.7* 2.7* 2.7*   HGB 11.5* 11.4* 10.7*   HCT 34.3* 34.3* 31.2*   PLT 69* 58* 49*     Recent Labs     01/27/20  0737 01/28/20  0727 01/29/20  0706    142 140   K 3.8 4.0 4.0    109 107   CO2 19* 19* 22   BUN 19 19 13   CREATININE 1.0 1.0 0.9   CALCIUM 9.1 9.0 9.0     Recent Labs     01/27/20  0737 01/28/20  0727 01/29/20  0706   AST 28 31 41*   ALT 30 31 36   BILITOT 1.3* 1.2* 1.0   ALKPHOS 204* 204* 202*     Recent Labs     01/27/20  0749   INR 1.63*     No results for input(s): Alen Landa in the last 72 hours. Urinalysis:      Lab Results   Component Value Date    NITRU Negative 01/25/2020    WBCUA 10-20 08/16/2019    BACTERIA 4+ 12/17/2018    RBCUA 0-2 08/16/2019    BLOODU Negative 01/25/2020    SPECGRAV 1.007 01/25/2020    GLUCOSEU Negative 01/25/2020    GLUCOSEU NEGATIVE 07/11/2010       Radiology:  VL AORTA/IVC/ILIAC/BYPASS GRAFT LIMITED   Final Result      US ABDOMEN LIMITED Specify organ? LIVER, SPLEEN, GALLBLADDER   Final Result   Limited study with heterogeneous appearance of the liver. No definite focal   lesion noted on limited imaging. If there is elevated clinical concern MRI   would be more sensitive. US GUIDED PARACENTESIS   Final Result   Successful ultrasound guided paracentesis. CT HEAD WO CONTRAST   Final Result   No acute intracranial abnormality.                  Assessment/Plan:    Active Hospital Problems    Diagnosis Date Noted    Acute hepatic encephalopathy [K72.00] 01/25/2020       Acute hepatic encephalopathy - ammonia of 199 on presentation.   - lactulose schedule TID and titrate to 2-6 BM daily  - continue PO Rifaximin BID  - GI consulted, recs appreciated  - SBP Ppx.   - Paracentesis  1/27 - neutrophil count 94 - not consistent with SBP, albeit she is on IV rocpehin.    - mental status much better today.          Decompensated liver cirrhosis s/p Hx of liver transplantation - improved  - GI consulted, recs appreciated  - continue immunosuppressant medications  - check Prograf level - 8.8 normal  - PO aldactone; holding torsemide for now since patient has poor PO intake  -checking AFP - normal 3.3        Hypernatremia - resolved. Stop IVF       Pancytopenia - likely as a result of chronic liver disease  -continue to monitor   -trend CBC daily       Bipolar 1 Disorder  -resumed zyprexa dose decreased to 5 nightly - increased to 10 nightly. Baseline 15mg - may be too high for her.        Seizure Disorder  -continue home meds       Type 2 DM - stable  -continue Lantus - dose decreased to 20BID  -medium dose SSI  -hypoglycemia protocol  -POCT glucose checks  -advance diet to carb control as able       Obstructive sleep apnea  -CPAP nightly       Phantom limb pain s/p Right BKA remotely. -continue home meds          DVT Prophylaxis: SCDs, hold PPX due to thrombocytopenia  Diet: DIET CARB CONTROL; Carb Control: 4 carb choices (60 gms)/meal  Code Status: Full Code    PT/OT Eval Status: ordered    Dispo - continue care. Showing much improvement today and asking to return home on discharge - if continues well - plan d/c home tomorrow.  Preston Carrington MD

## 2020-01-30 NOTE — DISCHARGE SUMMARY
Hospital Medicine Discharge Summary    Patient ID: Rohith Abts      Patient's PCP: Belen Jimenez DO    Admit Date: 1/25/2020     Discharge Date:   1/30/2020    Admitting Physician: Jael Bañuelos MD     Discharge Physician: Brittany Wiggins MD     Discharge Diagnoses: Active Hospital Problems    Diagnosis    Acute hepatic encephalopathy [K72.00]       The patient was seen and examined on day of discharge and this discharge summary is in conjunction with any daily progress note from day of discharge. Hospital Course: The patient is a 60 y. o. female with hx chronic liver disease s/p liver transplantation in 2008, recurrent admission for hepatic encephalopathy, Bipolar 1 Disorder, osteomyelitis with R BKA, COPD, type 2 DM, seizure disorder, obstructive sleep apnea on CPAP who presents to [facility] with altered mental status. Patient herself states that she is more confused than usual. She is frustrated because she takes lactulose \"all the time\". Alert and oriented to name only, but not place or time. States she has been confused since yesterday. Unable to obtain any further history due to altered mentation. Upon review of chart, it seems the patient has a history of recurrent hepatic encephalopathy.     In the ED, labs were significant for an ammonia level of 199 (baseline seems to be ~90). CBC showed pancytopenia consistent with the patient's baseline. Urine drug screen was positive for oxycodone which is a prescribed medication. Urinalysis was negative. CT Head without contrast was negative.            Acute hepatic encephalopathy - ammonia of 199 on presentation.   - lactulose schedule TID to 4x daily - and titrate to 2-6 BM daily  - continue PO Rifaximin BID  - GI consulted, recs appreciated  - SBP Ppx.   - Paracentesis 1/27 - neutrophil count 94 - not consistent with SBP, albeit she is on IV rocpehin.    - mental status back to baseline now.             Decompensated liver oriented x4 neurovascularly intact with sensory/motor intact upper extremities/lower extremities, bilaterally.  Cranial nerves: II-XII intact, grossly non-focal.  Mental status: Alert, oriented x4 today  Capillary Refill: Acceptable  < 3 seconds  Peripheral Pulses: +3 Easily felt, not easily obliterated with pressure         Labs: For convenience and continuity at follow-up the following most recent labs are provided:      CBC:    Lab Results   Component Value Date    WBC 2.4 01/30/2020    HGB 11.0 01/30/2020    HCT 32.0 01/30/2020    PLT 47 01/30/2020       Renal:    Lab Results   Component Value Date     01/30/2020    K 4.2 01/30/2020     01/30/2020    CO2 21 01/30/2020    BUN 13 01/30/2020    CREATININE 0.9 01/30/2020    CALCIUM 8.9 01/30/2020    PHOS 3.5 01/01/2019         Significant Diagnostic Studies    Radiology:   VL AORTA/IVC/ILIAC/BYPASS GRAFT LIMITED   Final Result      US ABDOMEN LIMITED Specify organ? LIVER, SPLEEN, GALLBLADDER   Final Result   Limited study with heterogeneous appearance of the liver. No definite focal   lesion noted on limited imaging. If there is elevated clinical concern MRI   would be more sensitive. US GUIDED PARACENTESIS   Final Result   Successful ultrasound guided paracentesis. CT HEAD WO CONTRAST   Final Result   No acute intracranial abnormality. Consults:     IP CONSULT TO GI  IP CONSULT TO DIETITIAN  IP CONSULT TO SOCIAL WORK    Disposition:  ECF      Condition at Discharge: Stable    Discharge Instructions/Follow-up:  PCP, GI Dr Carlyn Lang 1 week.      Code Status:  Full Code     Activity: activity as tolerated    Diet: no salt      Discharge Medications:     Current Discharge Medication List           Details   spironolactone (ALDACTONE) 100 MG tablet Take 1 tablet by mouth daily  Qty: 30 tablet, Refills: 3              Details   oxyCODONE HCl (OXY-IR) 10 MG immediate release tablet Take 1 tablet by mouth every 6 hours as needed for III, DO for the opportunity to be involved in this patient's care. If you have any questions or concerns please feel free to contact me at [phone].

## 2020-01-30 NOTE — PLAN OF CARE
Problem: Falls - Risk of:  Goal: Will remain free from falls  Description  Will remain free from falls  1/29/2020 2123 by Gaye Kay RN  Outcome: Ongoing  1/29/2020 1124 by Farrah Kwok RN  Outcome: Ongoing     Problem: Falls - Risk of:  Goal: Absence of physical injury  Description  Absence of physical injury  1/29/2020 2123 by Gaye Kay RN  Outcome: Ongoing  1/29/2020 1124 by Farrah Kwok RN  Outcome: Ongoing     Patient uses call light appropriately to express needs. Bed to lowest position with door open and call light in reach. All fall precautions implemented at this time. Bed alarm on for safety. Siderails up x2. Non skid footwear in place. Seen at intervals to ensure safety. All needs attended. Problem: Pain:  Goal: Control of acute pain  Description  Control of acute pain  1/29/2020 2123 by Gaye Kay RN  Outcome: Ongoing  1/29/2020 1124 by Farrah Kwok RN  Outcome: Ongoing     Pain/discomfort being managed with PRN analgesics per MD orders. Pt able to express presence and absence of pain and rate pain appropriately using numerical scale. Non-pharmacologic comfort measures implemented. Rest and comfort promoted. Problem: Risk for Impaired Skin Integrity  Goal: Tissue integrity - skin and mucous membranes  Description  Structural intactness and normal physiological function of skin and  mucous membranes. 1/29/2020 2123 by Gaye Kay RN  Outcome: Ongoing  1/29/2020 1124 by Farrah Kwok RN  Outcome: Ongoing    Skin assessment performed each shift per protocol. Skin care protocol in place. Pt turned and repositioned every two hours and prn with pillow support. Specialty mattress in place.

## 2020-01-30 NOTE — PLAN OF CARE
Problem: Discharge Planning:  Goal: Discharged to appropriate level of care  Description  Discharged to appropriate level of care  Outcome: Ongoing  Goal: Ability to perform activities of daily living will improve  Description  Ability to perform activities of daily living will improve  Outcome: Ongoing  Goal: Participates in care planning  Description  Participates in care planning  Outcome: Ongoing     Problem: Falls - Risk of:  Goal: Will remain free from falls  Description  Will remain free from falls  1/30/2020 1047 by Paola Sapp RN  Outcome: Ongoing  1/29/2020 2123 by Chuck Fontana RN  Outcome: Ongoing  Goal: Absence of physical injury  Description  Absence of physical injury  1/30/2020 1047 by Paola Sapp RN  Outcome: Ongoing  1/29/2020 2123 by Chuck Fontana RN  Outcome: Ongoing   Pt will remain free from falls by using appropriate safety awareness/judgement and alert the RN/PCA when help is needed. Problem: Pain:  Goal: Control of acute pain  Description  Control of acute pain  1/30/2020 1047 by Paola Sapp RN  Outcome: Ongoing  1/29/2020 2123 by Chuck Fontana RN  Outcome: Ongoing   Will continue to use the pain scale (0-10) to measure pt's pain and monitor their needs. Will assess patients pain with assessments and interactions. Pt will notify RN of any changes in pain and where. Will continue to perform therapeutic comfort measures and give pain medications as prescribed/needed. Problem: Risk for Impaired Skin Integrity  Goal: Tissue integrity - skin and mucous membranes  Description  Structural intactness and normal physiological function of skin and  mucous membranes. 1/30/2020 1047 by Paola Sapp RN  Outcome: Ongoing  1/29/2020 2123 by Chuck Fontana RN  Outcome: Ongoing   Pt will continue daily activities as tolerated and alert RN to any pain and skin changes.  RN will continue to assess skin condition, note and changes, and take preventative measures to prevent skin breakdown such as movement, foam/silicone dressings on bony prominences, and making sure pt has adequate nutrition.       Problem: Nutrition  Goal: Optimal nutrition therapy  Outcome: Ongoing     Problem: Confusion - Acute:  Goal: Absence of continued neurological deterioration signs and symptoms  Description  Absence of continued neurological deterioration signs and symptoms  Outcome: Ongoing  Goal: Mental status will be restored to baseline  Description  Mental status will be restored to baseline  Outcome: Ongoing     Problem: Injury - Risk of, Physical Injury:  Goal: Will remain free from falls  Description  Will remain free from falls  1/30/2020 1047 by Bill Curiel RN  Outcome: Ongoing  1/29/2020 2123 by Juyd Wu RN  Outcome: Ongoing  Goal: Absence of physical injury  Description  Absence of physical injury  1/30/2020 1047 by Bill Curiel RN  Outcome: Ongoing  1/29/2020 2123 by Judy Wu RN  Outcome: Ongoing     Problem: Mood - Altered:  Goal: Mood stable  Description  Mood stable  Outcome: Ongoing  Goal: Absence of abusive behavior  Description  Absence of abusive behavior  Outcome: Ongoing  Goal: Verbalizations of feeling emotionally comfortable while being cared for will increase  Description  Verbalizations of feeling emotionally comfortable while being cared for will increase  Outcome: Ongoing     Problem: Psychomotor Activity - Altered:  Goal: Absence of psychomotor disturbance signs and symptoms  Description  Absence of psychomotor disturbance signs and symptoms  Outcome: Ongoing     Problem: Sensory Perception - Impaired:  Goal: Demonstrations of improved sensory functioning will increase  Description  Demonstrations of improved sensory functioning will increase  Outcome: Ongoing  Goal: Decrease in sensory misperception frequency  Description  Decrease in sensory misperception frequency  Outcome: Ongoing  Goal: Able to refrain from responding to false sensory perceptions  Description  Able to refrain from

## 2020-01-30 NOTE — PROGRESS NOTES
)  Additional Comments: bartolo tray present and patient would like to eat breakfast once OOB     Balance  Sitting Balance: Stand by assistance(seated EOB )  Standing Balance: Contact guard assistance(patient can only tolerate brief stands at bedrail, patient completes 2 stands at bedrail, 1st time x 15 seconds, 2nd time x 10 seconds )    Transfers  Stand Pivot Transfers: Contact guard assistance(bed-recliner stand-pivot with CGA )  Sit to stand: Contact guard assistance  Stand to sit: Contact guard assistance    Functional Mobility  Functional Mobility Comments: Patient is non-ambulatory at baseline    Bed mobility  Supine to Sit: Stand by assistance(HOB up, use of HR )  Scooting: Stand by assistance(bed and chair, cues for midline positioning once in recliner )    Cognition  Cognition Comment: patient reports she was fearful yesterday and called 911, patient reports the  arrived and she discovered it was all a misunderstanding, patient requires redirection and has difficulty with recalling recent events accurately     Plan  Times per week: 3-5x  Times per day: Daily  Current Treatment Recommendations: Endurance Training, Strengthening, Patient/Caregiver Education & Training, Self-Care / ADL, Cognitive Reorientation, Safety Education & Training    Goals  Short term goals  Time Frame for Short term goals: by discharge: status as of 1/30/20: goals ongoing except where indicated below   Short term goal 1: Complete functional transfers with min assistance: MET advance goal to Pt will complete fxl transfers with Modified Independent   Short term goal 2: Complete dynamic seated ADL at EOB with SBA: MET advance goal to Independent for dynamic seated ADL   Short term goal 3: Complete static standing for 10-20 seconds in preparation for toileting hygiene with min assistance for balance: MET advance goal to Pt will complete standing x 3 minutes with Modified Independent         Therapy Time   Individual Concurrent

## 2020-02-09 NOTE — ED NOTES
Attempted IV x 3 without success, spoke with provider during triage and recommended central line.       Santos Clemente RN  02/09/20 9640

## 2020-02-09 NOTE — ED NOTES
Bed: B-09  Expected date: 2/9/20  Expected time: 5:59 PM  Means of arrival: [place] [facility]  Comments:  64F abd pain, elevated ammonia level, from [address]  02/09/20 6200

## 2020-02-09 NOTE — ED PROVIDER NOTES
[facility]      Pt Name: Luis Kelley  MRN: [id number]  [place] 1955  Date of evaluation: 2/9/2020  Provider: TERE Finley    This patient was seen and evaluated by the attending physician Primo Ojeda MD.    [address] [place] [address]       Chief Complaint   Patient presents with    Altered Mental Status     elevated ammonia, increased confusion, abd pain       CRITICAL CARE TIME   I performed a total Critical Care time of  31 minutes, excluding separately reportable procedures. There was a high probability of clinically significant/life threatening deterioration in the patient's condition which required my urgent intervention. Not limited to multiple reexaminations, discussions with attending physician and consultants. HISTORY OF PRESENT ILLNESS  (Location/Symptom, Timing/Onset, Context/Setting, Quality, Duration, Modifying Factors, Severity.)   Luis Kelley is a 59 y.o. female who presents to the emergency department via EMS from the [facility] where she resides. They found her to be with increased confusion and an elevated ammonia level. Per nursing report it did not increase her lactulose but send her in for evaluation as she is also having increased abdominal pain. She complains of left lower quadrant pain. She is oriented to person but otherwise not place time and otherwise disoriented. She has history of liver transplant 2008. She takes Prograf. She is had a right BKA. She has type 2 diabetes. Seizure disorder on Lamictal and bipolar disorder. She was recently admitted to the hospital and has history of recurrent hepatic encephalopathy. Nursing Notes were reviewed and I agree. REVIEW OF SYSTEMS    (2-9 systems for level 4, 10 or more for level 5)     Review of Systems   Constitutional: Negative for fever. Gastrointestinal: Positive for abdominal pain. Negative for vomiting. Psychiatric/Behavioral: Positive for confusion. Unable to perform complete review of systems due to the patient's altered mental status. PAST MEDICAL HISTORY         Diagnosis Date    Anemia     Arthritis     Bipolar 1 disorder ([address]     Causalgia of lower limb     Cellulitis     Chronic pain syndrome     Chronic post-operative pain     Cirrhosis of liver (HCC)     Complications of transplanted liver     Convulsion ([address]     COPD (chronic obstructive pulmonary disease) (HCC)     Depression     Dermatophytosis of nail     Diabetes mellitus (HCC)     Diarrhea     Dry eyes     Fungal nail infection     Generalized abdominal pain     Generalized osteoarthritis     GERD (gastroesophageal reflux disease)     Hepatic encephalopathy (HCC)     Hyperlipidemia     Hypertension     ? borderline    Hypothyroidism     Hypoxemia     Immune system disorder ([address]     Liver disease     liver transplant 2008    Liver transplanted ([address]     Localization-related epilepsy ([address]     Low back pain     Lumbar post-laminectomy syndrome     Morbid obesity (HCC)     Neuropathy, diabetic ([address]     On home oxygen therapy     4 liters cont.  cpap  at sleep/ night     Other disorders of kidney and ureter     Peripheral neuropathy     Phantom limb (HCC)     Pneumonia     Polyneuritis     Restrictive lung disease     Seizures (HCC)     Sleep apnea     cpap machine- d/t CO2 problem     SVT (supraventricular tachycardia) (HCC)     Thyroid mass     UTI (lower urinary tract infection)        SURGICAL HISTORY           Procedure Laterality Date    ABDOMEN SURGERY      multiple    APPENDECTOMY      CHOLECYSTECTOMY      HERNIA REPAIR      multiple     HYSTERECTOMY      JOINT REPLACEMENT      right    JOINT REPLACEMENT      right after fall out of bed    LEG AMPUTATION BELOW KNEE      right    LEG AMPUTATION BELOW KNEE      LIVER TRANSPLANT  01/03/2008    from [address] [place] [address] TRANSPLANT      OTHER SURGICAL HISTORY      allergy to pain pump machine, had removed d/t huge seroma     SLEEVE GASTROPLASTY  1985    UPPER GASTROINTESTINAL ENDOSCOPY N/A 11/1/2019    ESOPHAGOGASTRODUODENOSCOPY WITH MAC ANESTHESIA performed by Audrey Faustin MD at [address] [place] [address]       Previous Medications    CPAP MACHINE MISC    by Does not apply route    INSULIN DEGLUDEC (TRESIBA) 100 UNIT/ML SOLN    Inject 55 Units into the skin 2 times daily (before meals)    INSULIN LISPRO (HUMALOG KWIKPEN) 100 UNIT/ML PEN    Inject 2-12 Units into the skin 3 times daily (before meals) 12 unit max sliding scale    LACTULOSE (CHRONULAC) 10 GM/15ML SOLUTION    Take 30 mLs by mouth every 6 hours while awake 4 times a day. Titrate to around 4 BM daily. LAMOTRIGINE (LAMICTAL) 200 MG TABLET    Take 200 mg by mouth 2 times daily     LEVOTHYROXINE (SYNTHROID) 112 MCG TABLET    Take 112 mcg by mouth Daily    LIDOCAINE (LIDODERM) 5 %    Place 1 patch onto the skin daily 12 hours on, 12 hours off. MILNACIPRAN HCL (SAVELLA) 100 MG TABS    Take 100 mg by mouth three times daily     MULTIPLE VITAMINS-MINERALS (CENTRUM CARB ASSIST PO)    Take 1 tablet by mouth daily     NYSTATIN (MYCOSTATIN) 179934 UNIT/GM CREAM    Apply topically 2 times daily Apply topically 2 times daily.     OLANZAPINE (ZYPREXA) 10 MG TABLET    Take 1 tablet by mouth nightly    OMEPRAZOLE (PRILOSEC) 10 MG DELAYED RELEASE CAPSULE    Take 20 mg by mouth daily     ONDANSETRON (ZOFRAN-ODT) 8 MG DISINTEGRATING TABLET    Take 8 mg by mouth every 8 hours as needed for Nausea or Vomiting    RIFAXIMIN (XIFAXAN) 550 MG TABLET    Take 1 tablet by mouth 2 times daily    SPIRONOLACTONE (ALDACTONE) 100 MG TABLET    Take 1 tablet by mouth daily    TACROLIMUS (PROGRAF) 0.5 MG CAPSULE    Take 0.5 mg by mouth daily     TORSEMIDE (DEMADEX) 20 MG TABLET    Take 20 mg by mouth daily        ALLERGIES     Nitrofurantoin; Abilify [aripiprazole]; interpretation:    Rhythm: sinus rhythm   No evidence of acute ischemia or injury. RADIOLOGY:   Non-plain film images such as CT, Ultrasound and MRI are read by the radiologist. Plain radiographic images are visualized and preliminarily interpreted by TERE Parmar with the below findings:    Reviewed radiologist dictation. Interpretation per the Radiologist below, if available at the time of this note:    CT Head WO Contrast   Final Result   No acute intracranial abnormality. XR CHEST PORTABLE   Final Result   No acute process.       Bibasilar hypoaeration               LABS:  Labs Reviewed   CBC WITH AUTO DIFFERENTIAL - Abnormal; Notable for the following components:       Result Value    RBC 3.57 (*)     Hemoglobin 11.1 (*)     Hematocrit 32.9 (*)     RDW 15.5 (*)     Platelets 71 (*)     Lymphocytes Absolute 0.7 (*)     All other components within normal limits    Narrative:     Performed at:  [address] [facility]   Phone [phone]   COMPREHENSIVE METABOLIC PANEL - Abnormal; Notable for the following components:    CO2 18 (*)     Glucose 190 (*)     BUN 36 (*)     CREATININE 1.7 (*)     GFR Non- 30 (*)     GFR  37 (*)     Total Protein 8.7 (*)     Albumin/Globulin Ratio 0.9 (*)     Total Bilirubin 1.6 (*)     Alkaline Phosphatase 212 (*)     AST 56 (*)     All other components within normal limits    Narrative:     Performed at:  [address] [facility]   Phone [phone]   AMMONIA - Abnormal; Notable for the following components:    Ammonia 60 (*)     All other components within normal limits    Narrative:     Performed at:  [address] [facility]   Phone [phone]   URINE RT REFLEX TO CULTURE - Abnormal; Notable for the following components:    Clarity, UA All other labs were within normal range or not returned as of this dictation. EMERGENCY DEPARTMENT COURSE and DIFFERENTIAL DIAGNOSIS/MDM:   Vitals:    Vitals:    02/09/20 1946 02/09/20 2001 02/09/20 2016 02/09/20 2031   BP: 120/67 123/70 123/65 128/75   Pulse: 92 92 91 94   Resp: 15 16 18 17   Temp:       TempSrc:       SpO2:         Patient presents for altered mental status from the nursing home. She has history of recurrent hepatic encephalopathy. She has evidence of UTI and will be given Rocephin. Her ammonia level appears to be baseline 90 and is 60 today. hyperkalemia and was given Kayexalate per order set recommendations. She has an ALLIE and evidence of UTI. Given 500 cc of normal saline we will consult the hospitalist for admission for altered mental status. CONSULTS:  IP CONSULT TO HOSPITALIST    PROCEDURES:  None    FINAL IMPRESSION      1. Altered mental status, unspecified altered mental status type    2. Hepatic encephalopathy ([address]    3. ALLIE (acute kidney injury) [facility])          DISPOSITION/PLAN   DISPOSITION Decision To Admit 02/09/2020 08:40:38 PM      PATIENT REFERRED TO:  No follow-up provider specified.     DISCHARGE MEDICATIONS:  New Prescriptions    No medications on file       (Please note that portions of this note were completed with a voice recognition program.  Efforts were made to edit the dictations but occasionally words are mis-transcribed.)    Ivory Goodwin, [address], [address]  02/09/20 2117

## 2020-02-10 PROBLEM — E11.9 DM2 (DIABETES MELLITUS, TYPE 2) (HCC): Chronic | Status: ACTIVE | Noted: 2018-12-16

## 2020-02-10 NOTE — PROGRESS NOTES
Patient alert to self, and somewhat place. Patient is agitated and throwing items off of breakfast tray across the room. Larissa states \"I don't want to be here. I want my  and my clothes. \" Patient denies n/v, diarrhea, SOB, pain at this time. Chisholm cath in place draining yellow urine to gravity. Patient is incontinent of stool, brief is CDI at this time. Bed in lowest position, bed alarm on, call light within reach. Will continue to monitor pt needs.

## 2020-02-10 NOTE — PROGRESS NOTES
Patient admitted to [phone] from ED. Patient oriented to self and possible place. Patient able to tell her name and birthday and states she is at [facility], when asked the month and year patient states \"[place] [facility]. \" Patient had BM in route, patient cleaned up and fresh gown applied. Patient oriented to room, call light, tv, telephone and floor policies and procedures. VSS. . Chisholm catheter patent and draining suresh cloudy urine. 4 eyes complete: coccyx blanchable (sacral border applied), scattered bruising, R BKA (old), left 2nd toe purple/bruised. Patient resting in bed with call light in reach, 3 side rails up and bed alarm on for safety. Will continue to monitor.

## 2020-02-10 NOTE — PROGRESS NOTES
Pharmacist called this nurse to clarify Narcan order. Dr. Madelyn Ramos sitting at nurse's station, stated to Dr Madelyn Ramos that pharmacy called to clarify Narcan order, Dr Madelyn Ramos stated for \"somnolence. \" Pharmacist made aware of reasoning. Narcan given per order, will continue to monitor.

## 2020-02-10 NOTE — CARE COORDINATION
Pt is a long term resident of [place] [address]. Bed is held for pt to return. No pre cert is needed for pt to return.     Electronically signed by RAVI Cole on 2/10/2020 at 12:01 PM

## 2020-02-10 NOTE — PROGRESS NOTES
Hospitalist Progress Note      PCP: Cee Rosas III, DO    Date of Admission: 2/9/2020    Chief Complaint: [address] Course: The patient is a 60 y. o. female with hx chronic liver disease s/p liver transplantation in 2008, recurrent admission for hepatic encephalopathy, Bipolar 1 Disorder, osteomyelitis with remote R BKA, COPD, type 2 DM, seizure disorder, obstructive sleep apnea on CPAP who presents to [facility] with altered mental status    Subjective: mental status not back to baseline. Oriented to person and place today        Medications:  Reviewed    Infusion Medications    dextrose       Scheduled Medications    lactulose  20 g Oral TID    OLANZapine  5 mg Oral Nightly    rifaximin  550 mg Oral BID    sodium chloride flush  10 mL Intravenous 2 times per day    cefTRIAXone (ROCEPHIN) IV  2 g Intravenous Q24H    sodium chloride (PF)  10 mL Intravenous Daily    levothyroxine  37.5 mcg Intravenous Daily    pantoprazole  40 mg Intravenous Daily    And    sodium chloride (PF)  10 mL Intravenous Daily    insulin lispro  0-6 Units Subcutaneous TID WC    thiamine (VITAMIN B1) IVPB  200 mg Intravenous BID     PRN Meds: sodium chloride flush, acetaminophen, glucose, dextrose, glucagon (rDNA), dextrose      Intake/Output Summary (Last 24 hours) at 2/10/2020 1323  Last data filed at 2/10/2020 1048  Gross per 24 hour   Intake 740 ml   Output 900 ml   Net -160 ml       Physical Exam Performed:    /66   Pulse 88   Temp 97.9 °F (36.6 °C) (Oral)   Resp 18   Wt 164 lb 3.9 oz (74.5 kg)   SpO2 100%   BMI 28.19 kg/m²     General appearance: No apparent distress appears stated age and confused. HEENT Normal cephalic, atraumatic without obvious deformity.  Pupils equal, round, and reactive to light.  Extra ocular muscles intact.  Conjunctivae/corneas clear.   Neck: Supple, No jugular venous distention/bruits.  Trachea midline without thyromegaly or adenopathy with full range of motion. Lungs: Clear to auscultation, bilaterally without Rales/Wheezes/Rhonchi with good respiratory effort. Heart: Regular rate and rhythm with Normal S1/S2 without murmurs, rubs or gallops, point of maximum impulse non-displaced  Abdomen: +Distended, non-tender, without rigidity or guarding and positive bowel sounds all four quadrants. Extremities: R leg BKA, no clubbing, cyanosis, or edema bilaterally.  Full range of motion. Skin: Skin color, texture, turgor normal.  No rashes or lesions. Neurologic: Alert and oriented x4 neurovascularly intact with sensory/motor intact upper extremities/lower extremities, bilaterally.  Cranial nerves: II-XII intact, grossly non-focal.  Mental status: Alert, oriented x4 today  Capillary Refill: Acceptable  < 3 seconds  Peripheral Pulses: +3 Easily felt, not easily obliterated with pressure        Labs:   Recent Labs     02/09/20  1820 02/10/20  0540   WBC 4.5 2.3*   HGB 11.1* 10.3*   HCT 32.9* 30.0*   PLT 71* 52*     Recent Labs     02/09/20 1820 02/09/20  1911 02/10/20  0540     --  146*   K  --  5.8* 4.3     --  109   CO2 18*  --  19*   BUN 36*  --  34*   CREATININE 1.7*  --  1.8*   CALCIUM 9.8  --  9.5   PHOS  --   --  4.4     Recent Labs     02/09/20  1820 02/10/20  0540   AST 56* 22   ALT 29 19   BILIDIR  --  0.4*   BILITOT 1.6* 1.2*   ALKPHOS 212* 169*     Recent Labs     02/10/20  0057   INR 1.60*     Recent Labs     02/09/20  1820 02/10/20  0540   CKTOTAL  --  27   TROPONINI <0.01  --        Urinalysis:      Lab Results   Component Value Date    NITRU Negative 02/09/2020    WBCUA 131 02/09/2020    BACTERIA 3+ 02/09/2020    RBCUA 1 02/09/2020    BLOODU Negative 02/09/2020    SPECGRAV 1.014 02/09/2020    GLUCOSEU Negative 02/09/2020    GLUCOSEU NEGATIVE 07/11/2010       Radiology:  CT Head WO Contrast   Final Result   No acute intracranial abnormality. XR CHEST PORTABLE   Final Result   No acute process.       Bibasilar hypoaeration

## 2020-02-10 NOTE — H&P
History and Physical    Admit Date: 2/9/2020    Patient's PCP: Dr. Keny Govea DO    Chief complaint:   Chief Complaint   Patient presents with    Altered Mental Status     elevated ammonia, increased confusion, abd pain       HISTORY OF PRESENT ILLNESS: Obtained from medical staff and EMR because of patient's abnormal mental status  This is a 59 y.o. female presenting with altered mental status, as observed by nursing home staff, 1 day,  reportedly sudden onset, progressively worsening, associated with abdominal pain. Further details are not available due to patient's abnormal mental status. Past Medical / Surgical History:    Past Medical History:   Diagnosis Date    Anemia     Arthritis     Bipolar 1 disorder ([address]     Causalgia of lower limb     Cellulitis     Chronic pain syndrome     Chronic post-operative pain     Cirrhosis of liver (HCC)     Complications of transplanted liver     Convulsion (HCC)     COPD (chronic obstructive pulmonary disease) (HCC)     Depression     Dermatophytosis of nail     Diabetes mellitus (HCC)     Diarrhea     Dry eyes     Fungal nail infection     Generalized abdominal pain     Generalized osteoarthritis     GERD (gastroesophageal reflux disease)     Hepatic encephalopathy (HCC)     Hyperlipidemia     Hypertension     ? borderline    Hypothyroidism     Hypoxemia     Immune system disorder ([address]     Liver disease     liver transplant 2008    Liver transplanted ([address]     Localization-related epilepsy ([address]     Low back pain     Lumbar post-laminectomy syndrome     Morbid obesity (HCC)     Neuropathy, diabetic ([address]     On home oxygen therapy     4 liters cont.  cpap  at sleep/ night     Other disorders of kidney and ureter     Peripheral neuropathy     Phantom limb (HCC)     Pneumonia     Polyneuritis     Restrictive lung disease     Seizures (HCC)     Sleep apnea     cpap machine- d/t CO2 problem     SVT (supraventricular tachycardia) ([address]     Thyroid mass     UTI (lower urinary tract infection)        Past Surgical History:   Procedure Laterality Date    ABDOMEN SURGERY      multiple    APPENDECTOMY      CHOLECYSTECTOMY      HERNIA REPAIR      multiple     HYSTERECTOMY      JOINT REPLACEMENT      right    JOINT REPLACEMENT      right after fall out of bed    LEG AMPUTATION BELOW KNEE      right    LEG AMPUTATION BELOW KNEE      LIVER TRANSPLANT  01/03/2008    from [facility] [address]      allergy to pain pump machine, had removed d/t huge seroma     SLEEVE GASTROPLASTY  1985    UPPER GASTROINTESTINAL ENDOSCOPY N/A 11/1/2019    ESOPHAGOGASTRODUODENOSCOPY WITH MAC ANESTHESIA performed by Jaida Bartholomew MD at [facility] ENDOSCOPY       Medications Prior to Admission:    No current facility-administered medications on file prior to encounter. Current Outpatient Medications on File Prior to Encounter   Medication Sig Dispense Refill    OLANZapine (ZYPREXA) 10 MG tablet Take 1 tablet by mouth nightly 30 tablet 1    spironolactone (ALDACTONE) 100 MG tablet Take 1 tablet by mouth daily 30 tablet 3    lactulose (CHRONULAC) 10 GM/15ML solution Take 30 mLs by mouth every 6 hours while awake 4 times a day. Titrate to around 4 BM daily. 1 Bottle 3    Insulin Degludec (TRESIBA) 100 UNIT/ML SOLN Inject 55 Units into the skin 2 times daily (before meals)      torsemide (DEMADEX) 20 MG tablet Take 20 mg by mouth daily       lidocaine (LIDODERM) 5 % Place 1 patch onto the skin daily 12 hours on, 12 hours off. 30 patch 0    rifaximin (XIFAXAN) 550 MG tablet Take 1 tablet by mouth 2 times daily 60 tablet 1    omeprazole (PRILOSEC) 10 MG delayed release capsule Take 20 mg by mouth daily       nystatin (MYCOSTATIN) 308275 UNIT/GM cream Apply topically 2 times daily Apply topically 2 times daily.       insulin lispro (HUMALOG KWIKPEN) 100 UNIT/ML pen Inject 2-12 Units into the skin 3 times daily (before meals) 12 unit max sliding scale      [DISCONTINUED] lactulose encephalopathy 10 GM/15ML SOLN solution Take 20 g by mouth 3 times daily      levothyroxine (SYNTHROID) 112 MCG tablet Take 112 mcg by mouth Daily      CPAP Machine MISC by Does not apply route      lamoTRIgine (LAMICTAL) 200 MG tablet Take 200 mg by mouth 2 times daily       tacrolimus (PROGRAF) 0.5 MG capsule Take 0.5 mg by mouth daily       ondansetron (ZOFRAN-ODT) 8 MG disintegrating tablet Take 8 mg by mouth every 8 hours as needed for Nausea or Vomiting      milnacipran HCl (SAVELLA) 100 MG TABS Take 100 mg by mouth three times daily       [DISCONTINUED] levETIRAcetam (KEPPRA) 750 MG tablet Take 500 mg by mouth 2 times daily       Multiple Vitamins-Minerals (CENTRUM CARB ASSIST PO) Take 1 tablet by mouth daily          Allergies:  Nitrofurantoin; Abilify [aripiprazole]; Acetaminophen; Acetaminophen; Aspirin; Avelox [moxifloxacin]; Carbamazepine; Contrast [iodides]; Cymbalta [duloxetine hcl]; Doxycycline; Elavil [amitriptyline]; Erythromycin; Flagyl [metronidazole]; Floxin [ofloxacin]; Ibuprofen; Iv contrast [iodides]; Keflex [cephalexin]; Ketorolac; Levaquin [levofloxacin in d5w]; Levofloxacin; Lyrica [pregabalin]; Lyrica [pregabalin]; Macrobid [nitrofurantoin monohyd macro]; Motrin [ibuprofen micronized]; Nsaids; Nucynta er [tapentadol hcl er]; Nucynta [tapentadol]; Penicillins; Primidone; Reglan [metoclopramide]; Tricyclic antidepressants; and Clindamycin    Social History:   TOBACCO:   reports that she has never smoked. She has never used smokeless tobacco.     ETOH:   reports no history of alcohol use.       Family History:       Problem Relation Age of Onset    Cancer Father        ROS: As stated in the HPI, full 12 point review of system cannot be obtained due to patient's abnormal mental status    PHYSICAL EXAM:  BP (!) 144/79   Pulse 88   Temp 98.3 °F (36.8 °C) (Oral)   Resp 17   SpO2 97%     Recent

## 2020-02-10 NOTE — PLAN OF CARE
Problem: Falls - Risk of:  Goal: Will remain free from falls  Description  Will remain free from falls  Outcome: Ongoing     Problem: Pain Control  Goal: Maintain pain level at or below patient's acceptable level (or 5 if patient is unable to determine acceptable level)  Outcome: Ongoing     Problem: GI  Goal: No bowel complications  Outcome: Ongoing     Problem:   Goal: No urinary complication  Outcome: Ongoing     Problem: Skin Integrity/Risk  Goal: No skin breakdown during hospitalization  Outcome: Ongoing

## 2020-02-10 NOTE — PROGRESS NOTES
4 Eyes Skin Assessment     The patient is being assess for  Admission    I agree that 2 RN's have performed a thorough Head to Toe Skin Assessment on the patient. ALL assessment sites listed below have been assessed. Areas assessed by both nurses:   [x]   Head, Face, and Ears   [x]   Shoulders, Back, and Chest  [x]   Arms, Elbows, and Hands   [x]   Coccyx, Sacrum, and IschIum- red  [x]   Legs, Feet, and Heels- old R BKA, left 2nd toe purple/bruised        Does the Patient have Skin Breakdown?   No         Davon Prevention initiated:  Yes   Wound Care Orders initiated:  NA      [facility] nurse consulted for Pressure Injury (Stage 3,4, Unstageable, DTI, NWPT, and Complex wounds), New and Established Ostomies:  NA      Nurse 1 eSignature: Electronically signed by Trevon Person RN on 2/10/20 at 5:25 AM    **SHARE this note so that the co-signing nurse is able to place an eSignature**    Nurse 2 eSignature: Electronically signed by Marcelina Guzman RN on 2/10/20 at 5:26 AM

## 2020-02-11 NOTE — PROGRESS NOTES
Hospitalist Progress Note      PCP: Sami Nelosn III, DO    Date of Admission: 2/9/2020    Chief Complaint: [address] Course: The patient is a 60 y. o. female with hx chronic liver disease s/p liver transplantation in 2008, recurrent admission for hepatic encephalopathy, Bipolar 1 Disorder, osteomyelitis with remote R BKA, COPD, type 2 DM, seizure disorder, obstructive sleep apnea on CPAP who presents to [facility] with altered mental status    Subjective:       Mental status improving. Urine culture with GNR prelim. Medications:  Reviewed    Infusion Medications    dextrose       Scheduled Medications    OLANZapine  10 mg Oral Nightly    [START ON 2/12/2020] levothyroxine  112 mcg Oral Daily    lactulose  20 g Oral TID    tacrolimus  0.5 mg Oral Daily    lamoTRIgine  100 mg Oral BID    insulin glargine  15 Units Subcutaneous BID    rifaximin  550 mg Oral BID    sodium chloride flush  10 mL Intravenous 2 times per day    cefTRIAXone (ROCEPHIN) IV  2 g Intravenous Q24H    sodium chloride (PF)  10 mL Intravenous Daily    pantoprazole  40 mg Intravenous Daily    And    sodium chloride (PF)  10 mL Intravenous Daily    insulin lispro  0-6 Units Subcutaneous TID WC    thiamine (VITAMIN B1) IVPB  200 mg Intravenous BID     PRN Meds: sodium chloride flush, acetaminophen, glucose, dextrose, glucagon (rDNA), dextrose      Intake/Output Summary (Last 24 hours) at 2/11/2020 1736  Last data filed at 2/11/2020 1648  Gross per 24 hour   Intake 1632 ml   Output 3600 ml   Net -1968 ml       Physical Exam Performed:    /62   Pulse 94   Temp 98.2 °F (36.8 °C) (Oral)   Resp 15   Wt 164 lb 10.9 oz (74.7 kg)   SpO2 97%   BMI 28.27 kg/m²     General appearance: No apparent distress appears stated age and confused.   HEENT Normal cephalic, atraumatic without obvious deformity.  Pupils equal, round, and reactive to light.  Extra ocular muscles intact.  Conjunctivae/corneas clear.  Neck: Supple, No jugular venous distention/bruits.  Trachea midline without thyromegaly or adenopathy with full range of motion. Lungs: Clear to auscultation, bilaterally without Rales/Wheezes/Rhonchi with good respiratory effort. Heart: Regular rate and rhythm with Normal S1/S2 without murmurs, rubs or gallops, point of maximum impulse non-displaced  Abdomen: +Distended, non-tender, without rigidity or guarding and positive bowel sounds all four quadrants. Extremities: R leg BKA, no clubbing, cyanosis, or edema bilaterally.  Full range of motion. Skin: Skin color, texture, turgor normal.  No rashes or lesions.   Neurologic: Alert and oriented x4 neurovascularly intact with sensory/motor intact upper extremities/lower extremities, bilaterally.  Cranial nerves: II-XII intact, grossly non-focal.  Mental status: Alert, oriented x4 today  Capillary Refill: Acceptable  < 3 seconds  Peripheral Pulses: +3 Easily felt, not easily obliterated with pressure        Labs:   Recent Labs     02/09/20  1820 02/10/20  0540 02/11/20  0537   WBC 4.5 2.3* 2.5*   HGB 11.1* 10.3* 10.5*   HCT 32.9* 30.0* 30.5*   PLT 71* 52* 52*     Recent Labs     02/09/20  1820 02/09/20  1911 02/10/20  0540 02/11/20  0537     --  146* 144   K  --  5.8* 4.3 4.1     --  109 110   CO2 18*  --  19* 20*   BUN 36*  --  34* 22*   CREATININE 1.7*  --  1.8* 1.4*   CALCIUM 9.8  --  9.5 9.5   PHOS  --   --  4.4 3.2     Recent Labs     02/09/20  1820 02/10/20  0540 02/11/20  0537   AST 56* 22 22   ALT 29 19 20   BILIDIR  --  0.4* 0.4*   BILITOT 1.6* 1.2* 0.9   ALKPHOS 212* 169* 173*     Recent Labs     02/10/20  0057   INR 1.60*     Recent Labs     02/09/20  1820 02/10/20  0540 02/11/20  0537   CKTOTAL  --  27 33   TROPONINI <0.01  --   --        Urinalysis:      Lab Results   Component Value Date    NITRU Negative 02/09/2020    WBCUA 131 02/09/2020    BACTERIA 3+ 02/09/2020    RBCUA 1 02/09/2020    BLOODU Negative 02/09/2020    SPECGRAV

## 2020-02-11 NOTE — PROGRESS NOTES
Pharmacy Medication Reconciliation Note     List of medications patient is currently taking is complete. Source of information:   1. Reviewed med list from [address]      Allergies   Allergen Reactions    Nitrofurantoin Hives    Abilify [Aripiprazole]     Acetaminophen Other (See Comments)     Avoid due to liver    Acetaminophen Other (See Comments)     Liver transplant restriction    Aspirin     Avelox [Moxifloxacin]     Carbamazepine     Contrast [Iodides] Hives     Contrast dye    Cymbalta [Duloxetine Hcl] Other (See Comments)     Pt. Starts hallucinating     Doxycycline     Elavil [Amitriptyline] Hives    Erythromycin     Flagyl [Metronidazole]     Floxin [Ofloxacin]     Ibuprofen Other (See Comments)     Liver transplant    Iv Contrast [Iodides] Hives    Keflex [Cephalexin]      Tolerates Ceftriaxone.  Ketorolac Hives    Levaquin [Levofloxacin In D5w]     Levofloxacin Hives    Lyrica [Pregabalin]     Lyrica [Pregabalin] Other (See Comments)     hallucinations    Macrobid [Nitrofurantoin Monohyd Macro]     Motrin [Ibuprofen Micronized]     Nsaids      Liver transplant, states she can't have because of liver protocol      Nucynta Er [Tapentadol Hcl Er]     Nucynta [Tapentadol]     Penicillins Hives    Primidone     Reglan [Metoclopramide]     Tricyclic Antidepressants     Clindamycin      Other reaction(s): Yeast Infection       Notes regarding home medications:   1. Added Keppra 500 mg BID  2. Removed [place].  She's now getting LELAND Katz [facility]  2/11/2020  10:31 AM

## 2020-02-11 NOTE — PROGRESS NOTES
Occupational Therapy   Occupational Therapy Initial Assessment  Date: 2020   Patient Name: Meryl Griggs  MRN: [id number]     : 1955    Date of Service: 2020    Discharge Recommendations:  Patient would benefit from continued therapy after discharge, ECF with OT     Meryl Griggs scored a 12/24 on the AM-PAC ADL Inpatient form. Current research shows that an AM-PAC score of 17 or less is typically not associated with a discharge to the patient's home setting. Based on the patients AM-PAC score and their current ADL deficits, it is recommended that the patient have 3-5 sessions per week of Occupational Therapy at d/c to increase the patients independence. Assessment   Performance deficits / Impairments: Decreased functional mobility ; Decreased ADL status; Decreased endurance;Decreased balance;Decreased cognition;Decreased safe awareness;Decreased strength  Assessment: Pt is a 59 y.o. female admitted with Acute Metabolic Encephalopathy. Pt with h/o chronic liver disease s/p liver transplantation in , recurrent admission for hepatic encephalopathy, Bipolar 1 Disorder, osteomyelitis with remote R BKA. PTA, pt LTC resident at [facility]. Pt currently limited in self-care by the above deficits, today requiring mod A x2 for fxl transfer using lift equipment (debbie zacarias), and anticipate pt would require overall max A for ADLs. Pt confused, but able to follow commands to participate. Pt would benefit from ongoing OT services to address the above limitations and maximize pt's safety and autonomy of life.    Prognosis: Fair  Decision Making: Medium Complexity  History: hx chronic liver disease s/p liver transplantation in , recurrent admission for hepatic encephalopathy, Bipolar 1 Disorder, osteomyelitis with remote R BKA, COPD, type 2 DM, seizure disorder, obstructive sleep apnea on CPAP  Exam: decreased ADL status, strength, balance/fxl transfers, endurance, cognition  Assistance / Modification: anticipate overall max A for ADLs  OT Education: OT Role;Plan of Care;Transfer Training;Orientation  Barriers to Learning: cognition  REQUIRES OT FOLLOW UP: Yes  Activity Tolerance  Activity Tolerance: Treatment limited secondary to decreased cognition  Safety Devices  Safety Devices in place: Yes  Type of devices: Call light within reach; Chair alarm in place;Gait belt;Patient at risk for falls;Nurse notified; Left in chair(RN Jacki Sena notified)           Patient Diagnosis(es): The primary encounter diagnosis was Altered mental status, unspecified altered mental status type. Diagnoses of Hepatic encephalopathy ([address] ALLIE (acute kidney injury) ([address] and Urinary tract infection without hematuria, site unspecified were also pertinent to this visit. has a past medical history of Anemia, Arthritis, Bipolar 1 disorder ([address] Causalgia of lower limb, Cellulitis, Chronic pain syndrome, Chronic post-operative pain, Cirrhosis of liver (HCC), Complications of transplanted liver, Convulsion ([address] COPD (chronic obstructive pulmonary disease) ([address] Depression, Dermatophytosis of nail, Diabetes mellitus ([address] Diarrhea, Dry eyes, Fungal nail infection, Generalized abdominal pain, Generalized osteoarthritis, GERD (gastroesophageal reflux disease), Hepatic encephalopathy ([address] Hyperlipidemia, Hypertension, Hypothyroidism, Hypoxemia, Immune system disorder ([address] Liver disease, Liver transplanted ([address] Localization-related epilepsy ([address] Low back pain, Lumbar post-laminectomy syndrome, Morbid obesity ([address] Neuropathy, diabetic ([address] On home oxygen therapy, Other disorders of kidney and ureter, Peripheral neuropathy, Phantom limb ([address] Pneumonia, Polyneuritis, Restrictive lung disease, Seizures ([address] Sleep apnea, SVT (supraventricular tachycardia) ([address] Thyroid mass, and UTI (lower urinary tract infection).    has a past surgical history that includes Liver transplant (01/03/2008); other surgical history; Cholecystectomy; Appendectomy; hernia repair; Abdomen surgery; joint replacement; joint replacement; Leg amputation below knee; Liver transplant; Leg amputation below knee; Hysterectomy; Sleeve Gastroplasty (1985); and Upper gastrointestinal endoscopy (N/A, 11/1/2019). Restrictions  Restrictions/Precautions  Restrictions/Precautions: Fall Risk  Position Activity Restriction  Other position/activity restrictions: h/o of R BKA    Subjective   General  Chart Reviewed: Yes  Additional Pertinent Hx: Per Shekhar King MD's note 2/10: \"The patient is a 59 y.o. female with hx chronic liver disease s/p liver transplantation in 2008, recurrent admission for hepatic encephalopathy, Bipolar 1 Disorder, osteomyelitis with remote R BKA, COPD, type 2 DM, seizure disorder, obstructive sleep apnea on CPAP who [facility] to [facility] with altered mental status\" Pt admitted with Acute Metabolic Encephalopathy-possible hepatic. Family / Caregiver Present: No  Referring Practitioner: Shekhar King MD  Diagnosis: Acute Metabolic Encephalopathy-possible hepatic  Subjective  Subjective: Pt met b/s for OT eval/cotx with PT. Pt found to be leaning over in bed with lunch spilled down front gown. Pt agreeable to participate in therapy and allowin PT/OT to assist with clean-up. Pt repeatedly stating that she is going to leave and go to [place].       Social/Functional History  Social/Functional History  Type of Home: Facility(LTC, [facility])  Home Equipment: Wheelchair-electric  ADL Assistance: Needs assistance(the pt reports she is indep in showering, dressing and toileting but doubtful if this is accurate)  Homemaking Responsibilities: No  Ambulation Assistance: Needs assistance(non-ambulatory since her BKA at least 5 years ago; uses elec w/c)  Transfer Assistance: Independent(the pt reports she is indep with pivot transfer to the w/c; doubtful if this is true)  Active : No  Additional

## 2020-02-11 NOTE — PLAN OF CARE
Problem: Falls - Risk of:  Goal: Will remain free from falls  Description  Will remain free from falls  2/11/2020 0127 by Symone Singh RN  Outcome: Ongoing    Problem: Pain Control  Goal: Maintain pain level at or below patient's acceptable level (or 5 if patient is unable to determine acceptable level)  2/11/2020 0127 by Symone Singh RN  Outcome: Ongoing     Problem: GI  Goal: Bowel movement at least every other day  2/11/2020 0127 by Symone Singh RN  Outcome: Ongoing     Problem:   Goal: No urinary complication  8/42/7125 [id number] by Symone Singh RN  Outcome: Ongoing

## 2020-02-11 NOTE — PROGRESS NOTES
11/1/2019). Restrictions  Restrictions/Precautions  Restrictions/Precautions: Fall Risk  Position Activity Restriction  Other position/activity restrictions: h/o of R BKA  Vision/Hearing  Vision: Impaired  Vision Exceptions: (needs galsses for reading but doesn't have any)  Hearing: Within functional limits     Subjective  General  Chart Reviewed: Yes  Additional Pertinent Hx: Per H&P on 2-9-2020 of Dre Mcneal MD: The pt is a 58 yo female who came to the ED from her LTC facility due to altered MS associated with abd pain. PMHx: anemia, arth, bipolar, cirrhosis, liver transplant, COPD, depression, DM, hepatic encephalopathy, HTN, neuropathy, post-garcia syndrome, seizures, sleep apnea, R BKA, sleeve gastroplasty  Response To Previous Treatment: Not applicable  Family / Caregiver Present: No  Referring Practitioner: Subhash Worrell MD  Referral Date : 02/11/20  Diagnosis: Acute Metabolic Encephalopathy - possible hepatic  Follows Commands: Within Functional Limits  Subjective  Subjective: The pt found to be laying in her bed with her lunch spilled on her chest; the pt reporting varying c/o and repeatedly reports she wants to be [facility] to [facility]  Orientation  Overall Orientation Status: Impaired  Orientation Level: Oriented to person;Disoriented to situation;Oriented to place; Disoriented to time(not oriented to own age)  Social/Functional History  Social/Functional History  Type of Home: Facility(LTC, [facility])  Home Equipment: Wheelchair-electric  ADL Assistance: Needs assistance(the pt reports she is indep in showering, dressing and toileting but doubtful if this is accurate)  Homemaking Responsibilities: No  Ambulation Assistance: Needs assistance(non-ambulatory since her BKA at least 5 years ago; uses elec w/c)  Transfer Assistance: Independent(the pt reports she is indep with [facility] transfer to the w/c; doubtful if this is true)  Active : No  Additional

## 2020-02-12 NOTE — PROGRESS NOTES
Received call from [facility] stating patient's urine culture came back POSITIVE for ESBL, perfect serve message sent to hospitalist.

## 2020-02-12 NOTE — CONSULTS
Infectious Diseases Inpatient Consult Note      Reason for Consult:  UTI, ESBL encephalopathy, S/P Liver transplant and immune suppressed 2008     Requesting Physician:       Primary Care Physician:  Chelsey Gil III, DO    History Obtained From:  Pt and Medical records     CHIEF COMPLAINT:     Chief Complaint   Patient presents with    Altered Mental Status     elevated ammonia, increased confusion, abd pain         HISTORY OF PRESENT ILLNESS:  59 y.o. with a history of chronic liver disease status post liver transplantation 2008, requiring hospital admission for hepatic encephalopathy, bipolar disorder 1, osteomyelitis of the right BKA, COPD, diabetes mellitus, seizures admitted with change in mental status with elevated ammonia and hepatic encephalopathy. He was brought in from nursing home for change in mental status. Blood cultures on admission negative. Urine analysis abnormal urine culture growing Klebsiella pneumonia ESBL. CBC and platelets are low secondary to immunosuppression chronic liver disease. CT brain negative. Given the complicated infection issues we are consulted for antibiotic recommendations.         Past Medical History:    Past Medical History:   Diagnosis Date    Anemia     Arthritis     Bipolar 1 disorder ([address]     Causalgia of lower limb     Cellulitis     Chronic pain syndrome     Chronic post-operative pain     Cirrhosis of liver (HCC)     Complications of transplanted liver     Convulsion ([address]     COPD (chronic obstructive pulmonary disease) (HCC)     Depression     Dermatophytosis of nail     Diabetes mellitus (HCC)     Diarrhea     Dry eyes     ESBL (extended spectrum beta-lactamase) producing bacteria infection 02/09/2020    urine    Fungal nail infection     Generalized abdominal pain     Generalized osteoarthritis     GERD (gastroesophageal reflux disease)     Hepatic encephalopathy (HCC)     Hyperlipidemia     Hypertension ? borderline    Hypothyroidism     Hypoxemia     Immune system disorder (HCC)     Liver disease     liver transplant 2008    [facility]     Localization-related epilepsy ([facility]     Low back pain     Lumbar post-laminectomy syndrome     Morbid obesity (HCC)     Neuropathy, diabetic ([facility]     On home oxygen therapy     4 liters cont.  cpap  at sleep/ night     Other disorders of kidney and ureter     Peripheral neuropathy     Phantom limb (HCC)     Pneumonia     Polyneuritis     Restrictive lung disease     Seizures (HCC)     Sleep apnea     cpap machine- d/t CO2 problem     SVT (supraventricular tachycardia) (HCC)     Thyroid mass     UTI (lower urinary tract infection)        Past Surgical History:    Past Surgical History:   Procedure Laterality Date    ABDOMEN SURGERY      multiple    APPENDECTOMY      CHOLECYSTECTOMY      HERNIA REPAIR      multiple     HYSTERECTOMY      JOINT REPLACEMENT      right    JOINT REPLACEMENT      right after fall out of bed    LEG AMPUTATION BELOW KNEE      right    LEG AMPUTATION BELOW KNEE      LIVER TRANSPLANT  01/03/2008    from [address] [place] [address] Sachin Londono      allergy to pain pump machine, had removed d/t huge seroma     SLEEVE GASTROPLASTY  1985    UPPER GASTROINTESTINAL ENDOSCOPY N/A 11/1/2019    ESOPHAGOGASTRODUODENOSCOPY WITH MAC ANESTHESIA performed by Elizabeth Hurley MD at [facility] ENDOSCOPY       Current Medications:    Outpatient Medications Marked as Taking for the 2/9/20 encounter [facility] Encounter)   Medication Sig Dispense Refill    insulin glargine (BASAGLAR KWIKPEN) 100 UNIT/ML injection pen Inject 10 Units into the skin nightly      levETIRAcetam (KEPPRA) 500 MG tablet Take 500 mg by mouth 2 times daily      nystatin (MYCOSTATIN) 230777 UNIT/GM powder Apply topically 3 times daily Apply topically 3 times daily as needed for redness and irritation      OLANZapine (ZYPREXA) Primidone; Reglan [metoclopramide]; Tricyclic antidepressants; and Clindamycin    Immunizations :   Immunization History   Administered Date(s) Administered    Pneumococcal Polysaccharide ([id number]) 12/12/2012         Social History:    Social History     Tobacco Use    Smoking status: Never Smoker    Smokeless tobacco: Never Used   Substance Use Topics    Alcohol use: No    Drug use: No     Social History     Tobacco Use   Smoking Status Never Smoker   Smokeless Tobacco Never Used      Family History   Problem Relation Age of Onset    Cancer Father         REVIEW OF SYSTEMS:    No fever / chills / sweats. No weight loss. No visual change, eye pain, eye discharge. No oral lesion, sore throat, dysphagia. Denies cough / sputum/Sob   Denies chest pain, palpitations/ dizziness  Denies nausea/ vomiting/abdominal pain/diarrhea. Denies dysuria or change in urinary function. Denies joint swelling or pain. No myalgia, arthralgia. No rashes, skin lesions   Denies focal weakness, sensory change or other neurologic symptoms  No lymph node swelling or tenderness.     Change in mentation, leukopenia, abd distension,     PHYSICAL EXAM:      Vitals:    /73   Pulse 96   Temp 98.3 °F (36.8 °C) (Oral)   Resp 17   Wt 164 lb 10.9 oz (74.7 kg)   SpO2 96%   BMI 28.27 kg/m²     General Appearance: alert,in some acute distress, + pallor, no icterus chronic ill appearing woman,   Skin: warm and dry, no rash or erythema  Head: normocephalic and atraumatic  Eyes: pupils equal, round, and reactive to light, conjunctivae normal  ENT: tympanic membrane, external ear and ear canal normal bilaterally, nose without deformity, nasal mucosa and turbinates normal without polyps  Neck: supple and non-tender without mass, no thyromegaly  no cervical lymphadenopathy  Pulmonary/Chest: clear to auscultation bilaterally- no wheezes, rales or rhonchi, normal air movement, no respiratory distress  Cardiovascular: normal rate, regular rhythm, normal S1 and S2, no murmurs, rubs, clicks, or gallops, no carotid bruits  Abdomen: soft, ++ -tender, + distended, normal bowel sounds, no masses or organomegaly +   Capute medusae+   Extremities: no cyanosis, clubbing or edema  Musculoskeletal: normal range of motion, no joint swelling, deformity or tenderness  Neurologic: reflexes normal and symmetric, no cranial nerve deficit  Psych:  Orientation, sensorium, mood normal  Lines:  IV  Rt BKA status+         DATA:    Lab Results   Component Value Date    WBC 3.2 (L) 02/12/2020    HGB 11.2 (L) 02/12/2020    HCT 32.4 (L) 02/12/2020    MCV 90.8 02/12/2020    PLT 58 (L) 02/12/2020     Lab Results   Component Value Date    CREATININE 1.2 02/12/2020    BUN 13 02/12/2020     02/12/2020    K 4.2 02/12/2020     02/12/2020    CO2 18 (L) 02/12/2020       Hepatic Function Panel:   Lab Results   Component Value Date    ALKPHOS 185 02/12/2020    ALT 22 02/12/2020    AST 27 02/12/2020    PROT 8.0 02/12/2020    PROT 7.3 07/11/2010    BILITOT 1.1 02/12/2020    BILIDIR 0.3 02/12/2020    IBILI 0.8 02/12/2020    LABALBU 4.0 02/12/2020     UA:  Lab Results   Component Value Date    COLORU YELLOW 02/09/2020    CLARITYU CLOUDY 02/09/2020    GLUCOSEU Negative 02/09/2020    GLUCOSEU NEGATIVE 07/11/2010    BILIRUBINUR Negative 02/09/2020    BILIRUBINUR NEGATIVE 07/11/2010    KETUA Negative 02/09/2020    SPECGRAV 1.014 02/09/2020    BLOODU Negative 02/09/2020    PHUR 5.0 02/09/2020    PROTEINU Negative 02/09/2020    UROBILINOGEN 0.2 02/09/2020    NITRU Negative 02/09/2020    LEUKOCYTESUR MODERATE 02/09/2020    LABMICR YES 02/09/2020    URINETYPE NotGiven 02/09/2020      Urine Microscopic:   Lab Results   Component Value Date    LABCAST 20-40 Hyaline 01/30/2015    BACTERIA 3+ 02/09/2020    HYALCAST 5 02/09/2020    WBCUA 131 02/09/2020    RBCUA 1 02/09/2020    EPIU 1 02/09/2020         Scheduled Meds:   meropenem  500 mg Intravenous Q6H    OLANZapine  10 mg Oral

## 2020-02-12 NOTE — PROGRESS NOTES
Hospitalist Progress Note      PCP: Yesenia Tapia III,     Date of Admission: 2/9/2020    Chief Complaint: [address] Course: The patient is a 60 y. o. female with hx chronic liver disease s/p liver transplantation in 2008, recurrent admission for hepatic encephalopathy, Bipolar 1 Disorder, osteomyelitis with remote R BKA, COPD, type 2 DM, seizure disorder, obstructive sleep apnea on CPAP who presents to [facility] with altered mental status    Subjective:       Confused overnight but overall improved. Medications:  Reviewed    Infusion Medications    dextrose       Scheduled Medications    meropenem  500 mg Intravenous Q6H    OLANZapine  10 mg Oral Nightly    levothyroxine  112 mcg Oral Daily    lactulose  20 g Oral TID    tacrolimus  0.5 mg Oral Daily    lamoTRIgine  100 mg Oral BID    insulin glargine  15 Units Subcutaneous BID    rifaximin  550 mg Oral BID    sodium chloride flush  10 mL Intravenous 2 times per day    sodium chloride (PF)  10 mL Intravenous Daily    pantoprazole  40 mg Intravenous Daily    And    sodium chloride (PF)  10 mL Intravenous Daily    insulin lispro  0-6 Units Subcutaneous TID WC    thiamine (VITAMIN B1) IVPB  200 mg Intravenous BID     PRN Meds: sodium chloride flush, acetaminophen, glucose, dextrose, glucagon (rDNA), dextrose      Intake/Output Summary (Last 24 hours) at 2/12/2020 1241  Last data filed at 2/12/2020 0859  Gross per 24 hour   Intake 1080 ml   Output 1950 ml   Net -870 ml       Physical Exam Performed:    /71   Pulse 99   Temp 97.8 °F (36.6 °C) (Oral)   Resp 18   Wt 164 lb 10.9 oz (74.7 kg)   SpO2 97%   BMI 28.27 kg/m²     General appearance: No apparent distress appears stated age and confused. HEENT Normal cephalic, atraumatic without obvious deformity.  Pupils equal, round, and reactive to light.  Extra ocular muscles intact.  Conjunctivae/corneas clear. Neck: Supple, No jugular venous distention/bruits.  Trachea midline without thyromegaly or adenopathy with full range of motion. Lungs: Clear to auscultation, bilaterally without Rales/Wheezes/Rhonchi with good respiratory effort. Heart: Regular rate and rhythm with Normal S1/S2 without murmurs, rubs or gallops, point of maximum impulse non-displaced  Abdomen: +Distended, non-tender, without rigidity or guarding and positive bowel sounds all four quadrants. Extremities: R leg BKA, no clubbing, cyanosis, or edema bilaterally.  Full range of motion. Skin: Skin color, texture, turgor normal.  No rashes or lesions.   Neurologic: Alert and oriented x4 neurovascularly intact with sensory/motor intact upper extremities/lower extremities, bilaterally.  Cranial nerves: II-XII intact, grossly non-focal.  Mental status: Alert, oriented x4 today  Capillary Refill: Acceptable  < 3 seconds  Peripheral Pulses: +3 Easily felt, not easily obliterated with pressure        Labs:   Recent Labs     02/10/20  0540 02/11/20  0537 02/12/20  0656   WBC 2.3* 2.5* 3.2*   HGB 10.3* 10.5* 11.2*   HCT 30.0* 30.5* 32.4*   PLT 52* 52* 58*     Recent Labs     02/10/20  0540 02/11/20  0537 02/12/20  0656   * 144 141   K 4.3 4.1 4.2    110 108   CO2 19* 20* 18*   BUN 34* 22* 13   CREATININE 1.8* 1.4* 1.2   CALCIUM 9.5 9.5 9.8   PHOS 4.4 3.2 2.7     Recent Labs     02/10/20  0540 02/11/20  0537 02/12/20  0656   AST 22 22 27   ALT 19 20 22   BILIDIR 0.4* 0.4* 0.3   BILITOT 1.2* 0.9 1.1*   ALKPHOS 169* 173* 185*     Recent Labs     02/10/20  0057   INR 1.60*     Recent Labs     02/09/20  1820 02/10/20  0540 02/11/20  0537 02/12/20  0656   CKTOTAL  --  27 33 35   TROPONINI <0.01  --   --   --        Urinalysis:      Lab Results   Component Value Date    NITRU Negative 02/09/2020    WBCUA 131 02/09/2020    BACTERIA 3+ 02/09/2020    RBCUA 1 02/09/2020    BLOODU Negative 02/09/2020    SPECGRAV 1.014 02/09/2020    GLUCOSEU Negative 02/09/2020    GLUCOSEU NEGATIVE 07/11/2010 Radiology:  CT Head WO Contrast   Final Result   No acute intracranial abnormality. XR CHEST PORTABLE   Final Result   No acute process. Bibasilar hypoaeration                 Assessment/Plan:    Active Hospital Problems    Diagnosis    DM2 (diabetes mellitus, type 2) ([facility] [E11.9]    Chronic pain syndrome [G89.4]    Hepatic encephalopathy ([facility] [K72.90]    Acquired hypothyroidism [E03.9]         Acute Metabolic Encephalopathy   - possible hepatic, albeit less likely sole culprit this particular admission. I suspect Bipolar contributory to recurrent bouts of mental status changes. ESBL infection possibly contributory as well. - lactulose schedule TID and titrate to 2-6 BM daily  - continue PO Rifaximin BID  - mental status slowly improving.            Advanced liver cirrhosis s/p Hx of liver transplantation   - resumed prograf 0.5 daily  - Prograf level - 8.8 normal last admit on this dose.          ALLIE - Appears prerenal - resolved with IVF - IVF has now been stopped. Aldactone and torsemide on hold. Cr down to 1.2       Pancytopenia - likely as a result of chronic liver disease  -continue to monitor   -trend CBC daily        Bipolar 1 Disorder  -resumed zyprexa present dose 10 nightly        Seizure Disorder  -resumed lamictal dose decreased to 100BID.         Type 2 DM -   ISS. Resumed lantus 15BID.         Obstructive sleep apnea  -CPAP nightly        Phantom limb pain s/p Right BKA remotely. -continue home meds         UTI on rocephin. Culture with ESBL -    - stopped rocephin   - started merrem 2/12/2020   - ID to eval (pt on prograf).       DVT Prophylaxis: SCDs, hold PPX due to thrombocytopenia  Diet: DIET CARB CONTROL; Carb Control: 4 carb choices (60 gms)/meal  Code Status: Full Code     PT/OT Eval Status: ordered     Dispo - continue care.  Not ready for discharge - ESBL      Aram Guan MD

## 2020-02-12 NOTE — PLAN OF CARE
Problem: Falls - Risk of:  Goal: Will remain free from falls  Description  Will remain free from falls  2/12/2020 1051 by Cade Armstrong RN  Outcome: Ongoing  2/11/2020 2217 by Abdulkadir Epperson RN  Outcome: Ongoing  Goal: Absence of physical injury  Description  Absence of physical injury  2/12/2020 1051 by Cade Armstrong RN  Outcome: Ongoing  2/11/2020 2217 by Abdulkadir Epperson RN  Outcome: Ongoing     Problem: Pain Control  Goal: Maintain pain level at or below patient's acceptable level (or 5 if patient is unable to determine acceptable level)  2/12/2020 1051 by Cade Armstrong RN  Outcome: Ongoing  2/11/2020 2217 by Abdulkadir Epperson RN  Outcome: Ongoing  Goal: Improvement in pain related behaviors BP/HR WNL  2/12/2020 1051 by Cade Armstrong RN  Outcome: Ongoing  2/11/2020 2217 by Abdulkadir Epperson RN  Outcome: Ongoing     Problem: GI  Goal: No bowel complications  7/84/5995 1051 by Cade Armstrong RN  Outcome: Ongoing  2/11/2020 2217 by Abdulkadir Epperson RN  Outcome: Ongoing  Goal: Bowel movement at least every other day  2/12/2020 1051 by Cade Armstrong RN  Outcome: Ongoing  2/11/2020 2217 by Abdulkadir Epperson RN  Outcome: Ongoing     Problem:   Goal: Adequate urinary output  2/12/2020 1051 by Cade Armstrong RN  Outcome: Ongoing  2/11/2020 2217 by Abdulkadir Epperson RN  Outcome: Ongoing  Goal: No urinary complication  6/41/6977 [id number] by Cade Armstrong RN  Outcome: Ongoing  2/11/2020 2217 by Abdulkadir Epperson RN  Outcome: Ongoing     Problem: Nutrition  Goal: Optimal nutrition therapy  2/12/2020 1051 by Cade Armstrong RN  Outcome: Ongoing  2/11/2020 2217 by Abdulkadir Epperson RN  Outcome: Ongoing  Goal: Understanding of nutritional guidelines  2/12/2020 1051 by Cade Armstrong RN  Outcome: Ongoing  2/11/2020 2217 by Abdulkadir Epperson RN  Outcome: Ongoing     Problem: Skin Integrity/Risk  Goal: No skin breakdown during hospitalization  2/12/2020 1051 by Cade Armstrong RN  Outcome: Ongoing  2/11/2020 2217 by Abdulkadir Epperson RN  Outcome: Ongoing  Goal: Wound healing  2/12/2020 1051 by Cade Armstrong RN  Outcome: Ongoing  2/11/2020 2217 by Abdulkadir Epperson RN  Outcome: Ongoing     Problem: Risk for Impaired Skin Integrity  Goal: Tissue integrity - skin and mucous membranes  Description  Structural intactness and normal physiological function of skin and  mucous membranes.   2/12/2020 1051 by Cade Armstrong RN  Outcome: Ongoing  2/11/2020 2217 by Abdulkadir Epperson RN  Outcome: Ongoing

## 2020-02-12 NOTE — PLAN OF CARE
Problem: Falls - Risk of:  Goal: Will remain free from falls  Description  Will remain free from falls  Outcome: Ongoing     Problem: Pain Control  Goal: Maintain pain level at or below patient's acceptable level (or 5 if patient is unable to determine acceptable level)  Outcome: Ongoing     Problem: GI  Goal: No bowel complications  Outcome: Ongoing     Problem:   Goal: No urinary complication  Outcome: Ongoing     Problem: Nutrition  Goal: Optimal nutrition therapy  Outcome: Ongoing     Problem: Skin Integrity/Risk  Goal: No skin breakdown during hospitalization  Outcome: Ongoing

## 2020-02-13 NOTE — PROGRESS NOTES
Physical Therapy  Co-Treat with OT  Daily Treatment Note    Patient Name: Araceli De Luna  Medical Record Number: [id number]  Room Number: B0A-9579/2177-78    ASSESSMENT: The pt is a 60 yo female who came to the ED from her LTC facility due to altered MS associated with abd pain and ongoing hepatic encephalopathy. The pt is a poor historian and unable to give accurate PLOF info, but is functioning far below her functional status from last admission. I anticipate that patient would benefit from continued skilled PT 3-5x/wk at discharge to optimize her functional mobility. Discharge Recommendations: ECF with PT   Equipment Needs: Equipment Needed: No    Admission Date: 2/9/2020  6:11 PM    Additional Pertinent Hx: Per H&P on 2-9-2020 of Parag Mullins MD: The pt is a 60 yo female who came to the ED from her LTC facility due to altered MS associated with abd pain.    PMHx: anemia, arth, bipolar, cirrhosis, liver transplant, COPD, depression, DM, hepatic encephalopathy, HTN, neuropathy, post-garcia syndrome, seizures, sleep apnea, R BKA, sleeve gastroplasty  Diagnosis: Acute Metabolic Encephalopathy - possible hepatic  Restrictions/Precautions: Fall Risk Other position/activity restrictions: h/o of R BKA     PMHx:  has a past medical history of Anemia, Arthritis, Bipolar 1 disorder ([address] Causalgia of lower limb, Cellulitis, Chronic pain syndrome, Chronic post-operative pain, Cirrhosis of liver ([address] Complications of transplanted liver, Convulsion ([address] COPD (chronic obstructive pulmonary disease) ([address] Depression, Dermatophytosis of nail, Diabetes mellitus ([address] Diarrhea, Dry eyes, ESBL (extended spectrum beta-lactamase) producing bacteria infection, Fungal nail infection, Generalized abdominal pain, Generalized osteoarthritis, GERD (gastroesophageal reflux disease), Hepatic encephalopathy ([address] Hyperlipidemia, Hypertension, Hypothyroidism, Hypoxemia, Immune system disorder ([address] Liver disease, Liver transplanted [facility]), Localization-related epilepsy ([facility] Low back pain, Lumbar post-laminectomy syndrome, Morbid obesity ([address] Neuropathy, diabetic ([address] On home oxygen therapy, Other disorders of kidney and ureter, Peripheral neuropathy, Phantom limb ([address] Pneumonia, Polyneuritis, Restrictive lung disease, Seizures ([address] Sleep apnea, SVT (supraventricular tachycardia) ([facility] Thyroid mass, and UTI (lower urinary tract infection).   PSgHx:   Past Surgical History:   Procedure Laterality Date    ABDOMEN SURGERY      multiple    APPENDECTOMY      CHOLECYSTECTOMY      HERNIA REPAIR      multiple     HYSTERECTOMY      JOINT REPLACEMENT      right    JOINT REPLACEMENT      right after fall out of bed    LEG AMPUTATION BELOW KNEE      right    LEG AMPUTATION BELOW KNEE      LIVER TRANSPLANT  01/03/2008    from [address] [place] [address] [facility]      allergy to pain pump machine, had removed d/t huge seroma     SLEEVE GASTROPLASTY  1985    UPPER GASTROINTESTINAL ENDOSCOPY N/A 11/1/2019    ESOPHAGOGASTRODUODENOSCOPY WITH MAC ANESTHESIA performed by Stephan Pena MD at [address] [place] [address] / Functional History  Social/Functional History  Type of Home: Facility(LT, [facility])  Home Equipment: Wheelchair-electric  ADL Assistance: Needs assistance(the pt reports she is indep in showering, dressing and toileting but doubtful if this is accurate)  Homemaking Responsibilities: No  Ambulation Assistance: Needs assistance(non-ambulatory since her BKA at least 5 years ago; uses elec w/c)  Transfer Assistance: Independent(the pt reports she is indep with [facility] transfer to the w/c; doubtful if this is true)  Active : No  Additional Comments: he pt denies recent falls; the pt is a poor historian and doubtful if information given per the pt is accurate    Restrictions  Restrictions/Precautions: Fall Risk  Other position/activity restrictions: h/o of R

## 2020-02-13 NOTE — PROGRESS NOTES
with Acute Metabolic Encephalopathy-possible hepatic. Family / Caregiver Present: No  Referring Practitioner: Jada Contreras MD  Diagnosis: Acute Metabolic Encephalopathy-possible hepatic  Subjective  Subjective: Pt met b/s for OT tx. Pt seated EOB working with PT on arrival, had episode of emesis and gown and bedsheets soiled with emesis. Pt confused, not resistant to participating in therapy. Orientation  Orientation  Overall Orientation Status: Impaired     Objective    ADL  UE Dressing: Dependent/Total(gown soiled with emesis, seated EOB with total A of OT to change gown while PT provided max A for sitting balance)  Toileting: (catheter)     Balance  Sitting Balance: Dependent/Total(seated EOB with max A/total A d/t R and posterior lean, pt with another episode of emesis into emesis basin when seated EOB)  Standing Balance: Unable to assess(comment)(unsafe to attempt d/t decreased cognition and poor sitting balance)    Bed mobility  Rolling to Left: Dependent/Total  Rolling to Right: Dependent/Total  Supine to Sit: Dependent/Total  Sit to Supine: Dependent/Total;2 Person assistance  Scooting: Dependent/Total     Transfers  Transfer Comments: unsafe to attempt this date d/t decreased cognition and poor sitting balance     Cognition  Overall Cognitive Status: Exceptions  Arousal/Alertness: Inconsistent responses to stimuli  Following Commands: Inconsistently follows commands  Attention Span: Difficulty attending to directions; Difficulty dividing attention  Memory: Decreased recall of biographical Information;Decreased recall of precautions;Decreased recall of recent events;Decreased short term memory  Safety Judgement: Decreased awareness of need for safety  Problem Solving: Decreased awareness of errors;Assistance required to implement solutions;Assistance required to identify errors made;Assistance required to generate solutions;Assistance required to correct errors made  Insights: Decreased awareness of deficits  Initiation: Requires cues for all  Sequencing: Requires cues for all       Plan   Plan  Times per week: 3-5  Times per day: Daily  Current Treatment Recommendations: Strengthening, ROM, Balance Training, Functional Mobility Training, Endurance Training, Modalities (comment), Safety Education & Training, Self-Care / ADL, Cognitive Reorientation, Cognitive/Perceptual Training, Wheelchair Mobility Training    AM-PAC Score        AM-PAC Inpatient Daily Activity Raw Score: 8 (02/13/20 1050)  AM-PAC Inpatient ADL T-Scale Score : 22.86 (02/13/20 1050)  ADL Inpatient CMS 0-100% Score: 85.69 (02/13/20 1050)  ADL Inpatient CMS G-Code Modifier : CM (02/13/20 1050)    Goals  Short term goals  Time Frame for Short term goals: Prior to d/c: STATUS GOALS 2/13: ALL GOALS ONGOING  Short term goal 1: Pt will complete UB bathing/dressing with set-up/SBA. Short term goal 2: Pt will complete grooming with set-up/supervision. Short term goal 3: Pt will complete fxl pivot transfer to/from ADL surfaces with CGA/min A. Short term goal 4: Pt will tolerate standing >30 seconds in preparation for toileting hygiene with min assistance for balance  Long term goals  Time Frame for Long term goals : STG=LTG  Patient Goals   Patient goals : pt unable to verbalize personal therapy goal d/t decreased cognition. Therapy Time   Individual Concurrent Group Co-treatment   Time In 1020         Time Out 1045         Minutes 25               This note to serve as OT d/c summary if pt is d/c-ed prior to next therapy session.     Vidhya Goss, OTR/L [id number]

## 2020-02-13 NOTE — PROGRESS NOTES
[metoclopramide]; Tricyclic antidepressants; and Clindamycin    Immunizations :   Immunization History   Administered Date(s) Administered    Pneumococcal Polysaccharide ([id number]) 12/12/2012       Social History:    Social History     Tobacco Use    Smoking status: Never Smoker    Smokeless tobacco: Never Used   Substance Use Topics    Alcohol use: No    Drug use: No     Social History     Tobacco Use   Smoking Status Never Smoker   Smokeless Tobacco Never Used      Family History   Problem Relation Age of Onset    Cancer Father          REVIEW OF SYSTEMS:    No fever / chills / sweats. No weight loss. No visual change, eye pain, eye discharge. No oral lesion, sore throat, dysphagia. Denies cough / sputum/Sob   Denies chest pain, palpitations/ dizziness  Denies nausea/ vomiting/abdominal pain/diarrhea. Denies dysuria or change in urinary function. Denies joint swelling or pain. No myalgia, arthralgia. No rashes, skin lesions   Denies focal weakness, sensory change or other neurologic symptoms  No lymph node swelling or tenderness.     Change in mentation, leukopenia, abd distension    PHYSICAL EXAM:      Vitals:    /74   Pulse 98   Temp 98.2 °F (36.8 °C) (Oral)   Resp 18   Wt 161 lb 13.1 oz (73.4 kg)   SpO2 96%   BMI 27.78 kg/m²   General Appearance: awake confused and ,in some acute distress, + pallor, no icterus chronic ill appearing woman,   Skin: warm and dry, no rash or erythema  Head: normocephalic and atraumatic  Eyes: pupils equal, round, and reactive to light, conjunctivae normal  ENT: tympanic membrane, external ear and ear canal normal bilaterally, nose without deformity, nasal mucosa and turbinates normal without polyps  Neck: supple and non-tender without mass, no thyromegaly  no cervical lymphadenopathy  Pulmonary/Chest: clear to auscultation bilaterally- no wheezes, rales or rhonchi, normal air movement, no respiratory distress  Cardiovascular: normal rate, regular (rDNA), dextrose      Assessment:     Patient Active Problem List   Diagnosis    [facility]    Acquired hypothyroidism    Arthritis    History of liver transplant ([facility]    Altered mental state    Partial epilepsy with impairment of consciousness ([address]    Metabolic encephalopathy    Chronic pain syndrome    Primary insomnia    Recurrent major depressive disorder, in remission ([address]    Obesity    Phantom limb pain (HCC)    Other chest pain    Hx of right BKA ([address]    Immunosuppressed status ([address]    Tachycardia    Type 1 diabetes mellitus with other specified complication (HCC)    Polypharmacy    SVT (supraventricular tachycardia) (HCC)    Hyperglycemia    Acute chest pain    Nausea    S/P catheter ablation of slow pathway    Chest pain    GERD (gastroesophageal reflux disease)    Hypothyroid    Failure to thrive (0-17)    Knee pain    Anemia    Hypoglycemia    DM2 (diabetes mellitus, type 2) (HCC)    Anemia, chronic disease    Acute hepatic encephalopathy       Change in Mentation  Hepatic Encephalopathy  Cirrhosis of liver  Splenomegaly   S/p Liver transplant in 2008  Immune suppressed  UTI  ESBL on urine cx   DM  Bi polar disorder  Rt BKA status  Ammonia not much elevation on this admit  ALLIE on admit      SUSPECT infection, Hepatic encephalopathy with underlying Bi polar disorder all contributing to change in [address], Microbiology, Radiology and all the pertinent results from current hospitalization and  care every where were reviewed  by me as a part of the evaluation   Plan:   1. Cont IV Meropenem as in patient  2. Will be able to choose IV Ertapenem at d/c  3. Anticipate x 10 days of IV abx therapy   4. Immune suppressed from Transplant Meds  5. Check Ammonia level      Discussed with patient/Family and Nursing   Risk of Complications/Morbidity: High      · Illness(es)/ Infection present that pose threat to bodily function.    · There is potential for severe exacerbation of infection/side effects of treatment. · Therapy requires intensive monitoring for antimicrobial agent toxicity       Discussed with patient/Family and Nursing staff     Thanks for allowing me to participate in your patient's care and please call me with any questions or concerns.     Tam Chopra MD  Infectious Disease  [facility] Physician  Phone: [phone]   Fax : [phone]

## 2020-02-13 NOTE — DISCHARGE INSTR - COC
Continuity of Care Form    Patient Name: Karissa Roberts   :  1955  MRN:  [id number]    Admit date:  2020  Discharge date:  2020      Code Status Order: Full Code   Advance Directives:     Admitting Physician:  Raymond Lomas MD  PCP: Oliver Santos III, DO    Discharging Nurse: Electronically signed by Aline Holguin RN on 2020 at 2:52 PM    [address] Unit/Room#: S8B-7084/9607-52  Discharging Unit Phone Number: [phone]    Emergency Contact:   Extended Emergency Contact Information  Primary Emergency Contact: [address] Phone: [phone]  Mobile Phone: [phone]  Relation: Brother/Sister  Secondary Emergency Contact: [address] Phone: [phone]  Relation: Other    Past Surgical History:  Past Surgical History:   Procedure Laterality Date    ABDOMEN SURGERY      multiple    APPENDECTOMY      CHOLECYSTECTOMY      HERNIA REPAIR      multiple     HYSTERECTOMY      JOINT REPLACEMENT      right    JOINT REPLACEMENT      right after fall out of bed    LEG AMPUTATION BELOW KNEE      right    LEG AMPUTATION BELOW KNEE      [facility]      allergy to pain pump machine, had removed d/t huge seroma     SLEEVE GASTROPLASTY      UPPER GASTROINTESTINAL ENDOSCOPY N/A 2019    ESOPHAGOGASTRODUODENOSCOPY WITH MAC ANESTHESIA performed by Darcy Lozano MD at [facility] ENDOSCOPY       Immunization History:   Immunization History   Administered Date(s) Administered    Pneumococcal Polysaccharide ([id number]) 2012       Active Problems:  Patient Active Problem List   Diagnosis Code    Hepatic encephalopathy ([facility] K72.90    Acquired hypothyroidism E03.9    Arthritis M19.90    History of liver transplant ([facility] Z94.4    Altered mental state R41.82    Partial epilepsy with impairment of consciousness ([facility] W94.881    Metabolic encephalopathy T79.39  Chronic pain syndrome G89.4    Primary insomnia F51.01    Recurrent major depressive disorder, in remission ([facility]) F33.40    Obesity E66.9    Phantom limb pain (HCC) G54.6    Other chest pain R07.89    Hx of right BKA ([address] Z89.511    Immunosuppressed status ([facility]) D89.9    Tachycardia R00.0    Type 1 diabetes mellitus with other specified complication ([facility]) W27.29    Polypharmacy Z79.899    SVT (supraventricular tachycardia) ([facility]) I47.1    Hyperglycemia R73.9    Acute chest pain R07.9    Nausea R11.0    S/P catheter ablation of slow pathway Z98.890, Z86.79    Chest pain R07.9    GERD (gastroesophageal reflux disease) K21.9    Hypothyroid E03.9    Failure to thrive (0-17) R62.51    Knee pain M25.569    Anemia D64.9    Hypoglycemia E16.2    DM2 (diabetes mellitus, type 2) ([facility]) E11.9    Anemia, chronic disease D63.8    Acute hepatic encephalopathy K72.00       Isolation/Infection:   Isolation          Contact        Patient Infection Status     Infection Onset Added Last Indicated Last Indicated By Review Planned Expiration Resolved Resolved By    ESBL (Extended Spectrum Beta Lactamase) 02/09/20 02/12/20 02/09/20 Urine Culture        2/9/20 urine    MDRO (multi-drug resistant organism) 02/09/20 02/12/20 02/09/20 Urine Culture        2/9/20 urine          Nurse Assessment:  Last Vital Signs: /74   Pulse 98   Temp 98.2 °F (36.8 °C) (Oral)   Resp 18   Wt 161 lb 13.1 oz (73.4 kg)   SpO2 96%   BMI 27.78 kg/m²     Last documented pain score (0-10 scale): Pain Level: 6  Last Weight:   Wt Readings from Last 1 Encounters:   02/13/20 161 lb 13.1 oz (73.4 kg)     Mental Status:  oriented, alert, coherent, logical, thought processes intact and able to concentrate and follow conversation    IV Access:  - PICC - site  R Upper Arm, insertion date: ***    Nursing Mobility/ADLs:  Walking   Dependent  Transfer  Dependent  Bathing  Dependent  Dressing  Dependent  Toileting  Dependent  Feeding 2/17/2020 at 2:36 PM    PHYSICIAN SECTION    Prognosis: Good    Condition at Discharge: Stable    Rehab Potential (if transferring to Rehab): Good    Recommended Labs or Other Treatments After Discharge:  IV Ertapenem x 1 gm x Q 24 hrs stop date  2/24  CBC with diff, BMP WEEKLY  FAX RESULTS to   No Follow up  PICC     Saritha English MD  Infectious [address] Physician  Phone: [phone]   Fax : [phone]      Physician Certification: I certify the above information and transfer of Paula Rodriguez  is necessary for the continuing treatment of the diagnosis listed and that she requires [place] for less 30 days.      Update Admission H&P: No change in H&P see dc summary    PHYSICIAN SIGNATURE:  Electronically signed by Murray Sampson DO on 2/17/20 at 1:00 PM

## 2020-02-13 NOTE — PROGRESS NOTES
Hospitalist Progress Note      PCP: Tremaine Self III, DO    Date of Admission: 2/9/2020    Chief Complaint: [address] Course: The patient is a 60 y. o. female with hx chronic liver disease s/p liver transplantation in 2008, recurrent admission for hepatic encephalopathy, Bipolar 1 Disorder, osteomyelitis with remote R BKA, COPD, type 2 DM, seizure disorder, obstructive sleep apnea on CPAP who presents to [facility] with altered mental status    Subjective:     Hallucinating this morning. Medications:  Reviewed    Infusion Medications    dextrose       Scheduled Medications    lactulose  20 g Oral 6 times per day    sodium bicarbonate  650 mg Oral 4x Daily    meropenem  500 mg Intravenous Q6H    lidocaine  1 patch Transdermal Daily    OLANZapine  10 mg Oral Nightly    levothyroxine  112 mcg Oral Daily    tacrolimus  0.5 mg Oral Daily    lamoTRIgine  100 mg Oral BID    insulin glargine  15 Units Subcutaneous BID    rifaximin  550 mg Oral BID    sodium chloride flush  10 mL Intravenous 2 times per day    sodium chloride (PF)  10 mL Intravenous Daily    pantoprazole  40 mg Intravenous Daily    And    sodium chloride (PF)  10 mL Intravenous Daily    insulin lispro  0-6 Units Subcutaneous TID WC    thiamine (VITAMIN B1) IVPB  200 mg Intravenous BID     PRN Meds: ondansetron, sodium chloride flush, acetaminophen, glucose, dextrose, glucagon (rDNA), dextrose      Intake/Output Summary (Last 24 hours) at 2/13/2020 1259  Last data filed at 2/13/2020 0851  Gross per 24 hour   Intake 300 ml   Output 1000 ml   Net -700 ml       Physical Exam Performed:    /74   Pulse 98   Temp 98.2 °F (36.8 °C) (Oral)   Resp 18   Wt 161 lb 13.1 oz (73.4 kg)   SpO2 96%   BMI 27.78 kg/m²     General appearance: No apparent distress appears stated age and confused.   HEENT Normal cephalic, atraumatic without obvious deformity.  Pupils equal, round, and reactive to light.  Extra ocular muscles intact.  Conjunctivae/corneas clear. Neck: Supple, No jugular venous distention/bruits.  Trachea midline without thyromegaly or adenopathy with full range of motion. Lungs: Clear to auscultation, bilaterally without Rales/Wheezes/Rhonchi with good respiratory effort. Heart: Regular rate and rhythm with Normal S1/S2 without murmurs, rubs or gallops, point of maximum impulse non-displaced  Abdomen: +Distended, non-tender, without rigidity or guarding and positive bowel sounds all four quadrants. Extremities: R leg BKA, no clubbing, cyanosis, or edema bilaterally.  Full range of motion. Skin: Skin color, texture, turgor normal.  No rashes or lesions. Neurologic: Alert and oriented x4 neurovascularly intact with sensory/motor intact upper extremities/lower extremities, bilaterally.  Cranial nerves: II-XII intact, grossly non-focal.  Mental status: Alert, oriented x4 today  Capillary Refill: Acceptable  < 3 seconds  Peripheral Pulses: +3 Easily felt, not easily obliterated with pressure        Labs:   Recent Labs     02/11/20  0537 02/12/20  0656 02/13/20  0610   WBC 2.5* 3.2* 3.1*   HGB 10.5* 11.2* 11.0*   HCT 30.5* 32.4* 32.3*   PLT 52* 58* 54*     Recent Labs     02/11/20  0537 02/12/20  0656 02/13/20  0610    141 143   K 4.1 4.2 4.0    108 111*   CO2 20* 18* 16*   BUN 22* 13 13   CREATININE 1.4* 1.2 1.1   CALCIUM 9.5 9.8 9.7   PHOS 3.2 2.7 2.8     Recent Labs     02/11/20  0537 02/12/20  0656 02/13/20  0610   AST 22 27 18   ALT 20 22 21   BILIDIR 0.4* 0.3 0.5*   BILITOT 0.9 1.1* 1.4*   ALKPHOS 173* 185* 184*     No results for input(s): INR in the last 72 hours.   Recent Labs     02/11/20 0537 02/12/20  0656 02/13/20  0610   CKTOTAL 33 35 21*       Urinalysis:      Lab Results   Component Value Date    NITRU Negative 02/09/2020    WBCUA 131 02/09/2020    BACTERIA 3+ 02/09/2020    RBCUA 1 02/09/2020    BLOODU Negative 02/09/2020    SPECGRAV 1.014 02/09/2020    GLUCOSEU Negative 02/09/2020    GLUCOSEU NEGATIVE 07/11/2010       Radiology:  CT Head WO Contrast   Final Result   No acute intracranial abnormality. XR CHEST PORTABLE   Final Result   No acute process. Bibasilar hypoaeration                 Assessment/Plan:    Active Hospital Problems    Diagnosis    DM2 (diabetes mellitus, type 2) ([facility] [E11.9]    Chronic pain syndrome [G89.4]    Hepatic encephalopathy ([facility] [K72.90]    Acquired hypothyroidism [E03.9]         Acute Metabolic Encephalopathy   - possible hepatic, albeit less likely sole culprit this particular admission. I suspect Bipolar contributory to recurrent bouts of mental status changes. ESBL infection possibly contributory as well. - lactulose increase to 4 times daily. - continue PO Rifaximin BID           Advanced liver cirrhosis s/p Hx of liver transplantation   - resumed prograf 0.5 daily  - Prograf level - 8.8 normal last admit on this dose.          ALLIE - Appears prerenal - resolved with IVF - IVF has now been stopped. Aldactone and torsemide on hold. Cr down to 1.1       Pancytopenia - likely as a result of chronic liver disease  - continue to monitor   - trend CBC daily        Bipolar 1 Disorder  -resumed zyprexa - present dose 10 nightly  - hallucinating today.         Seizure Disorder  - resumed lamictal dose decreased to 100BID.         Type 2 DM -   ISS. Resumed lantus 15BID.         Obstructive sleep apnea  -CPAP nightly        Phantom limb pain s/p Right BKA remotely. -continue home meds         UTI on rocephin. Culture with ESBL -    - stopped rocephin   - started merrem 2/12/2020   - ID to eval (pt on prograf). - plan for PICC and IV ertapenem on discharge.         DVT Prophylaxis: SCDs, hold PPX due to thrombocytopenia  Diet: DIET CARB CONTROL; Carb Control: 4 carb choices (60 gms)/meal  Code Status: Full Code     PT/OT Eval Status: ordered     Dispo - continue care.  Not ready for discharge - ESBL, actively hallucinating.       Karsten Napoleon Alvarenga MD

## 2020-02-14 NOTE — PROGRESS NOTES
Dr. Ernie Wray on the unit, state that pt is able to sign her own consent for PICC line. Consent obtained for PiCC line. DIT called.

## 2020-02-14 NOTE — PLAN OF CARE
Problem: Falls - Risk of:  Goal: Will remain free from falls  Description  Will remain free from falls  Outcome: Ongoing  Goal: Absence of physical injury  Description  Absence of physical injury  Outcome: Ongoing     Problem: GI  Goal: No bowel complications  Outcome: Ongoing  Goal: Bowel movement at least every other day  Outcome: Ongoing  Note:   Pt having frequent bowel movements as she is on scheduled lactulose.       Problem: Nutrition  Goal: Optimal nutrition therapy  2/14/2020 1330 by Jesús Garcia, RD, LD  Outcome: Ongoing

## 2020-02-14 NOTE — PROGRESS NOTES
Pt. Tolerated PICC placement well, no difficulty accessing basilic vein and 3CG technology used to verify PICC tip placement. Positive P wave with no negative deflection. Printed wave form and placed In chart.  Reported off to [employer]

## 2020-02-14 NOTE — PROGRESS NOTES
Nutrition Assessment    Type and Reason for Visit: Initial(LOS)    Nutrition Recommendations:   Continue Carb control diet. Start Glucerna BID. Nutrition Assessment: Pt presented with hepatic encephalopathy. Pt with varied PO intakes during admission. Pt with DM and chronic liver disease s/p liver transplant. Pt was hallucinating this AM. Pt with hx of paracentesis, so wt fluctuations noted. Will trial supplement. Malnutrition Assessment:  · Malnutrition Status: At risk for malnutrition  · Context: Chronic illness  · Findings of the 6 clinical characteristics of malnutrition (Minimum of 2 out of 6 clinical characteristics is required to make the diagnosis of moderate or severe Protein Calorie Malnutrition based on AND/ASPEN Guidelines):  1. Energy Intake-Less than or equal to 75% of estimated energy requirement, Greater than or equal to 5 days    2. Weight Loss-Unable to assess(d/t fluid accumulation),    3. Fat Loss-No significant subcutaneous fat loss,    4. Muscle Loss-No significant muscle mass loss,    5. Fluid Accumulation-No significant fluid accumulation,    6.  Strength-Not measured    Nutrition Risk Level:  Moderate    Nutrient Needs:  · Estimated Daily Total Kcal: 0146-6714 kcla (20-25 kcal/kg CBW)  · Estimated Daily Protein (g): 67-78 gm (1.3-1.5 gkm/kg IBW)  · Estimated Daily Total Fluid (ml/day): 1 mL/kcal    Nutrition Diagnosis:   · Problem: Inadequate oral intake  · Etiology: related to Cognitive or neurological impairment     Signs and symptoms:  as evidenced by Diet history of poor intake    Objective Information:  · Wound Type: None  · Current Nutrition Therapies:  · Oral Diet Orders: Carb Control 4 Carbs/Meal   · Oral Diet intake: 0%, 1-25%, 26-50%, 51-75%, %  · Oral Nutrition Supplement (ONS) Orders: None  · Anthropometric Measures:  · Ht: 5' 4\" (162.6 cm)   · Current Body Wt: 156 lb (70.8 kg)  · % Weight Change:   wt fluctuations d/t paracentesis  · Ideal Body Wt: 116 lb (52.6 kg)   · body weight adjusted for BKA(right)  · BMI Classification: BMI 25.0 - 29.9 Overweight    Nutrition Interventions:   Continue current diet, Start ONS  Continued Inpatient Monitoring    Nutrition Evaluation:   · Evaluation: Goals set   · Goals: consume >50% of meals and supplement during admission.     · Monitoring: Meal Intake, Supplement Intake, Diet Tolerance, Skin Integrity, Mental Status/Confusion, Weight, Pertinent Labs      Electronically signed by Wali Lopez RD, LD on 2/14/20 at 1:29 PM    Contact Number: [phone]

## 2020-02-14 NOTE — PROGRESS NOTES
Pt's friend Andrew Plunkett at bedside. States pt is still confused. Andrew Plunkett is her caregiver at her home assisted living facility. Andrew Plunkett states pt needs to be independent with her ADLs and medications before she can return. Pt is appears very happy to have Andrew Plunkett visit.

## 2020-02-14 NOTE — PROGRESS NOTES
normal rate, regular rhythm, normal S1 and S2, no murmurs, rubs, clicks, or gallops, no carotid bruits  Abdomen: soft, ++ -tender, + distended, normal bowel sounds, no masses or organomegaly +   Capute medusae+   Extremities: no cyanosis, clubbing or edema  Musculoskeletal: normal range of motion, no joint swelling, deformity or tenderness  Neurologic: reflexes normal and symmetric, no cranial nerve deficit  Psych:  Orientation, sensorium, mood normal  Lines: PICC  Rt BKA status+                   Data Review:    Lab Results   Component Value Date    WBC 3.6 (L) 02/14/2020    HGB 11.0 (L) 02/14/2020    HCT 32.3 (L) 02/14/2020    MCV 91.3 02/14/2020    PLT 53 (L) 02/14/2020     Lab Results   Component Value Date    CREATININE 1.1 02/14/2020    BUN 18 02/14/2020     02/14/2020    K 4.0 02/14/2020     (H) 02/14/2020    CO2 18 (L) 02/14/2020       Hepatic Function Panel:   Lab Results   Component Value Date    ALKPHOS 190 02/14/2020    ALT 21 02/14/2020    AST 20 02/14/2020    PROT 7.5 02/14/2020    PROT 7.3 07/11/2010    BILITOT 1.4 02/14/2020    BILIDIR 0.5 02/14/2020    IBILI 0.9 02/14/2020    LABALBU 4.0 02/14/2020       UA:  Lab Results   Component Value Date    COLORU YELLOW 02/09/2020    CLARITYU CLOUDY 02/09/2020    GLUCOSEU Negative 02/09/2020    GLUCOSEU NEGATIVE 07/11/2010    BILIRUBINUR Negative 02/09/2020    BILIRUBINUR NEGATIVE 07/11/2010    KETUA Negative 02/09/2020    SPECGRAV 1.014 02/09/2020    BLOODU Negative 02/09/2020    PHUR 5.0 02/09/2020    PROTEINU Negative 02/09/2020    UROBILINOGEN 0.2 02/09/2020    NITRU Negative 02/09/2020    LEUKOCYTESUR MODERATE 02/09/2020    LABMICR YES 02/09/2020    URINETYPE NotGiven 02/09/2020      Urine Microscopic:   Lab Results   Component Value Date    LABCAST 20-40 Hyaline 01/30/2015    BACTERIA 3+ 02/09/2020    HYALCAST 5 02/09/2020    WBCUA 131 02/09/2020    RBCUA 1 02/09/2020    EPIU 1 02/09/2020     CREAT  1.7  DOWN TO  1.2      Ck  27      MICRO: cultures reviewed and updated by me          Urine Culture [[id number]] (Abnormal)  Collected: 02/09/20 2057   Order Status: Completed Specimen: Urine, clean catch Updated: 02/12/20 7464    Organism Klebsiella pneumoniae ESBLAbnormal     Urine Culture, Routine --Abnormal     >100,000 CFU/ml   CONTACT PRECAUTIONS INDICATED   Carbapenem antibiotics are the best option for infections caused   by ESBL-producing organisms.  Other antibiotic classes are   likely to result in treatment failure, even for organisms   demonstrating in vitro susceptibility. Abnormal    Narrative:     ORDER#: [id number]                          ORDERED BY: ROBERT Vasquez  SOURCE: Urine Clean Catch                  COLLECTED:  02/09/20 20:57  ANTIBIOTICS AT [facility]. :                      RECEIVED :  02/09/20 20:57  CALL  [facility]  [id number] tel. [phone],  Microbiology results called to and read back by Norma Cao RN, 02/12/2020  06:12, by [facility]  Performed at:  [facility]  [employer] [facility] [address]   Phone [phone]   Culture Blood #2 [[id number]] Collected: 02/09/20 1936   Order Status: Completed Specimen: Blood Updated: 02/10/20 2215    Culture, Blood 2 No Growth to date.  Any change in status will be called. Narrative:     ORDER#: [id number]                          ORDERED BY: ROBERT Vasquez  SOURCE: Blood                              COLLECTED:  02/09/20 19:36  ANTIBIOTICS AT [facility]. :                      RECEIVED :  02/09/20 19:36  If child <=2 yrs old please draw pediatric bottle. ~Blood Culture #2  Performed at:  [facility]  [address]  [employer]    Phone [phone]   Culture Blood #1 [[id number]] Collected: 02/09/20 1926   Order Status: Completed Specimen: Blood Updated: 02/10/20 2015    Blood Culture, Routine No Growth to date.  Any change in status will be called.    Narrative:     ORDER#: [id number]                          ORDERED BY: elevated yesterday and with lactulose BM+   6. Raymundo Barrow for d/c when ok with primary   7. ALAYNA done      Discussed with patient/Family and Nursing        Discussed with patient/Family and Nursing staff     Thanks for allowing me to participate in your patient's care and please call me with any questions or concerns.     Stacey Rice MD  Infectious Disease  [facility]) Physician  Phone: [phone]   Fax : [phone]

## 2020-02-14 NOTE — PROGRESS NOTES
responses to stimuli  Following Commands: Follows one step commands with increased time  Attention Span: Attends with cues to redirect  Memory: Decreased recall of biographical Information;Decreased recall of precautions;Decreased recall of recent events;Decreased short term memory  Safety Judgement: Decreased awareness of need for safety  Problem Solving: Decreased awareness of errors;Assistance required to implement solutions;Assistance required to identify errors made;Assistance required to generate solutions;Assistance required to correct errors made  Insights: Decreased awareness of deficits  Initiation: Requires cues for some  Sequencing: Requires cues for some                                         Plan   Plan  Times per week: 3-5  Times per day: Daily  Current Treatment Recommendations: Strengthening, ROM, Balance Training, Functional Mobility Training, Endurance Training, Modalities (comment), Safety Education & Training, Self-Care / ADL, Cognitive Reorientation, Cognitive/Perceptual Training, Wheelchair Mobility Training           AM-PAC Score        [facility] Inpatient Daily Activity Raw Score: 13 (02/14/20 1541)  AM-PAC Inpatient ADL T-Scale Score : 32.03 (02/14/20 1541)  ADL Inpatient CMS 0-100% Score: 63.03 (02/14/20 1541)  ADL Inpatient CMS G-Code Modifier : CL (02/14/20 1541)        Goals  Short term goals  Time Frame for Short term goals: Prior to d/c: STATUS GOALS 2/14: ALL GOALS ONGOING  Short term goal 1: Pt will complete UB bathing/dressing with set-up/SBA. Short term goal 2: Pt will complete grooming with set-up/supervision. Short term goal 3: Pt will complete fxl pivot transfer to/from ADL surfaces with CGA/min A.    Short term goal 4: Pt will tolerate standing >30 seconds in preparation for toileting hygiene with min assistance for balance  Long term goals  Time Frame for Long term goals : STG=LTG  Patient Goals   Patient goals : pt unable to verbalize personal therapy goal d/t decreased

## 2020-02-14 NOTE — PROGRESS NOTES
Physical Therapy    Facility/Department: [facility] 4W MED SURG  Daily Note  (cotx)  If pt. is D/C'd prior to next visit please refer back to last daily progress note for D/C status. NAME: Erlinda Moyer  : 1955  MRN: [id number]    Date of Service: 2020    Discharge Recommendations:  ECF with PT   Erlinda Moyer scored a 10/24 on the AM-PAC short mobility form. Current research shows that an AM-PAC score of 17 or less is typically not associated with a discharge to the patient's home setting. Based on the patients AM-PAC score and their current functional mobility deficits, it is recommended that the patient have 3-5 sessions per week of Physical Therapy at d/c to increase the patients independence. PT Equipment Recommendations  Equipment Needed: No    Assessment   Assessment: The pt is a 58 yo female who came to the ED from her LTC facility due to altered MS associated with abd pain and ongoing hepatic encephalopathy. The pt is a poor historian and unable to give accurate PLOF info. Today, the pt in a good mood and was able to follow directions better. She complete supine > sit with max A of 1, sat EOB with CGA and came to standing x 3 ttrials with min/mod A of 2. The stedy was used to get her safely to the chair and she stood x 3 trials for less than 10 seconds each time with min A of 2. I anticipate that patient would benefit from continued skilled PT 3-5x/wk at discharge to optimize her functional mobility. Will con't to follow. Treatment Diagnosis: Decreased activity tolerance  Specific instructions for Next Treatment: cotx  Prognosis: Guarded  PT Education: PT Role;General Safety  Barriers to Learning: cog  REQUIRES PT FOLLOW UP: Yes  Activity Tolerance  Activity Tolerance: Patient limited by cognitive status; Patient Tolerated treatment well       Patient Diagnosis(es): The primary encounter diagnosis was Altered mental status, unspecified altered mental status type.  Diagnoses of Hepatic encephalopathy ([address] ALLIE (acute kidney injury) ([address] and Urinary tract infection without hematuria, site unspecified were also pertinent to this visit. has a past medical history of Anemia, Arthritis, Bipolar 1 disorder ([address] Causalgia of lower limb, Cellulitis, Chronic pain syndrome, Chronic post-operative pain, Cirrhosis of liver (HCC), Complications of transplanted liver, Convulsion (HCC), COPD (chronic obstructive pulmonary disease) ([address] Depression, Dermatophytosis of nail, Diabetes mellitus ([address] Diarrhea, Dry eyes, ESBL (extended spectrum beta-lactamase) producing bacteria infection, Fungal nail infection, Generalized abdominal pain, Generalized osteoarthritis, GERD (gastroesophageal reflux disease), Hepatic encephalopathy ([address] Hyperlipidemia, Hypertension, Hypothyroidism, Hypoxemia, Immune system disorder ([address] Liver disease, Liver transplanted ([address] Localization-related epilepsy ([address] Low back pain, Lumbar post-laminectomy syndrome, Morbid obesity ([address] Neuropathy, diabetic ([address] On home oxygen therapy, Other disorders of kidney and ureter, Peripheral neuropathy, Phantom limb ([address] Pneumonia, Polyneuritis, Restrictive lung disease, Seizures ([address] Sleep apnea, SVT (supraventricular tachycardia) ([address] Thyroid mass, and UTI (lower urinary tract infection). has a past surgical history that includes Liver transplant (01/03/2008); other surgical history; Cholecystectomy; Appendectomy; hernia repair; Abdomen surgery; joint replacement; joint replacement; Leg amputation below knee; Liver transplant; Leg amputation below knee; Hysterectomy; Sleeve Gastroplasty (1985); and Upper gastrointestinal endoscopy (N/A, 11/1/2019). Restrictions  Restrictions/Precautions  Restrictions/Precautions: Fall Risk  Position Activity Restriction  Other position/activity restrictions: h/o of R BKA    Subjective  General  Chart Reviewed:  Yes  Additional Pertinent Hx: Per H&P on 2-9-2020 of Johana Faria MD: The pt is a 60 yo female who came to the ED from her LTC facility due to altered MS associated with abd pain. PMHx: anemia, arth, bipolar, cirrhosis, liver transplant, COPD, depression, DM, hepatic encephalopathy, HTN, neuropathy, post-garcia syndrome, seizures, sleep apnea, R BKA, sleeve gastroplasty  Response To Previous Treatment: Patient unable to report, no changes reported from family or staff  Family / Caregiver Present: No  Referring Practitioner: Fede Ceballos MD  Subjective  Subjective: the pt found to be in the bed and just received good news about her cat and was in a very good mood; the pt agreeable to therapy and getting out of the bed; the pt remains confused        Social/Functional History  Social/Functional History  Type of Home: Facility([facility])  Home Equipment: Wheelchair-electric  ADL Assistance: Needs assistance(the pt reports she is indep in showering, dressing and toileting but doubtful if this is accurate)  Homemaking Responsibilities: No  Ambulation Assistance: Needs assistance(non-ambulatory since her BKA at least 5 years ago; uses elec w/c)  Transfer Assistance: Independent(the pt reports she is indep with pivot transfer to the w/c; doubtful if this is true)  Active : No  Additional Comments: he pt denies recent falls; the pt is a poor historian and doubtful if information given per the pt is accurate  Cognition   Cognition  Overall Cognitive Status: Exceptions  Arousal/Alertness: Delayed responses to stimuli  Following Commands:  Follows one step commands with increased time  Attention Span: Attends with cues to redirect  Memory: Decreased recall of biographical Information;Decreased recall of precautions;Decreased recall of recent events;Decreased short term memory  Safety Judgement: Decreased awareness of need for safety  Problem Solving: Decreased awareness of errors;Assistance required to implement solutions;Assistance required to identify errors made;Assistance required to generate solutions;Assistance required to correct errors made  Insights: Decreased awareness of deficits  Initiation: Requires cues for some  Sequencing: Requires cues for some    Objective  Bed mobility  Rolling to Right: Minimal assistance  Supine to Sit: Maximum assistance  Sit to Supine: Unable to assess(the pt up to the bed)  Transfers  Sit to Stand: Moderate Assistance;Minimal Assistance;2 Person Assistance  Stand to sit: Minimal Assistance; Moderate Assistance;2 Person Assistance  Bed to Chair: Dependent/Total(with stedy)  Ambulation  Ambulation?: No(the pt is non-ambulatory at baseline)     Balance  Comments: seated EOB with CGA and slight lean to the R; a pillow was used in the chair as the pt kept leaning to the R; stood in the stedy x 2 trials for less than 10 seconds each time and then to the stedy bar (turned backward w/ foot on the floor) with min A of 2 for less than 10 secomds        Plan   Plan  Times per week: 3-5x/week  Specific instructions for Next Treatment: cotx  Current Treatment Recommendations: Functional Mobility Training, ROM, Strengthening  Safety Devices  Type of devices: Call light within reach, Chair alarm in place, Gait belt, Patient at risk for falls, Left in chair, Nurse notified(DANIEL Owen notified)      AM-PAC Score  AM-PAC Inpatient Mobility Raw Score : 10 (02/14/20 1540)  AM-PAC Inpatient T-Scale Score : 32.29 (02/14/20 1540)  Mobility Inpatient CMS 0-100% Score: 76.75 (02/14/20 1540)  Mobility Inpatient CMS G-Code Modifier : CL (02/14/20 1540)          Goals  Short term goals  Time Frame for Short term goals: upon d/c  Short term goal 1: Bed mobility with min A. Short term goal 2: Seated balance on the EOB with min A. (met, 2-14)  Short term goal 3: Transfer sit <> stand with min/mod A of 2 to the stedy.  (met, 2-14)  Short term goal 4: Static stance in the stedy x 1 minute with min A of 1-2.    Patient Goals   Patient goals : the pt unable to give an appropriate attainable goal for therapy; her answer to question was to get a prosthesis       Therapy Time   Individual Concurrent Group Co-treatment   Time In 1515         Time Out 1545         Minutes 30               Electronically signed by Fabian Cruz, PT [id number] on 2/14/2020 at 3:50 PM

## 2020-02-14 NOTE — PLAN OF CARE
Problem: Nutrition  Goal: Optimal nutrition therapy  Outcome: Ongoing   Nutrition Problem: Inadequate oral intake  Intervention: Food and/or Nutrient Delivery: Continue current diet, Start ONS  Nutritional Goals: consume >50% of meals and supplement during admission.

## 2020-02-15 NOTE — PROGRESS NOTES
Hypoxemia     Immune system disorder ([facility]     Liver disease     liver transplant 2008    Liver transplanted ([facility]     Localization-related epilepsy ([facility]     Low back pain     Lumbar post-laminectomy syndrome     Morbid obesity (HCC)     Neuropathy, diabetic ([facility]     On home oxygen therapy     4 liters cont. cpap  at sleep/ night     Other disorders of kidney and ureter     Peripheral neuropathy     Phantom limb (HCC)     Pneumonia     Polyneuritis     Restrictive lung disease     Seizures (HCC)     Sleep apnea     cpap machine- d/t CO2 problem     SVT (supraventricular tachycardia) (HCC)     Thyroid mass     UTI (lower urinary tract infection)        Past Surgical History:    Past Surgical History:   Procedure Laterality Date    ABDOMEN SURGERY      multiple    APPENDECTOMY      CHOLECYSTECTOMY      HERNIA REPAIR      multiple     HYSTERECTOMY      JOINT REPLACEMENT      right    JOINT REPLACEMENT      right after fall out of bed    LEG AMPUTATION BELOW KNEE      right    LEG AMPUTATION BELOW KNEE      LIVER TRANSPLANT  01/03/2008    from [address] [place] [address] Sachin Londono      allergy to pain pump machine, had removed d/t huge seroma     SLEEVE GASTROPLASTY  1985    UPPER GASTROINTESTINAL ENDOSCOPY N/A 11/1/2019    ESOPHAGOGASTRODUODENOSCOPY WITH MAC ANESTHESIA performed by Bogdan Soliz MD at [facility] ENDOSCOPY       Current Medications:    Outpatient Medications Marked as Taking for the 2/9/20 encounter [facility] HOSPITAL Encounter)   Medication Sig Dispense Refill    ertapenem [facility]) infusion Infuse 1,000 mg intravenously every 24 hours for 9 days Compound per protocol.  9000 mg 0    insulin glargine (BASAGLAR KWIKPEN) 100 UNIT/ML injection pen Inject 10 Units into the skin nightly      levETIRAcetam (KEPPRA) 500 MG tablet Take 500 mg by mouth 2 times daily      nystatin (MYCOSTATIN) 183408 UNIT/GM powder Apply topically 3 times daily Apply topically 3 times daily as needed for redness and irritation      OLANZapine (ZYPREXA) 10 MG tablet Take 1 tablet by mouth nightly 30 tablet 1    spironolactone (ALDACTONE) 100 MG tablet Take 1 tablet by mouth daily 30 tablet 3    lactulose (CHRONULAC) 10 GM/15ML solution Take 30 mLs by mouth every 6 hours while awake 4 times a day. Titrate to around 4 BM daily. 1 Bottle 3    torsemide (DEMADEX) 20 MG tablet Take 20 mg by mouth daily       lidocaine (LIDODERM) 5 % Place 1 patch onto the skin daily 12 hours on, 12 hours off. (Patient taking differently: Place 1 patch onto the skin 2 times daily ) 30 patch 0    rifaximin (XIFAXAN) 550 MG tablet Take 1 tablet by mouth 2 times daily 60 tablet 1    omeprazole (PRILOSEC) 20 MG delayed release capsule Take 20 mg by mouth daily       insulin lispro (HUMALOG KWIKPEN) 100 UNIT/ML pen Inject 2-12 Units into the skin 3 times daily (before meals) 12 unit max sliding scale      levothyroxine (SYNTHROID) 112 MCG tablet Take 112 mcg by mouth Daily      lamoTRIgine (LAMICTAL) 200 MG tablet Take 200 mg by mouth 2 times daily       tacrolimus (PROGRAF) 0.5 MG capsule Take 0.5 mg by mouth daily       ondansetron (ZOFRAN-ODT) 8 MG disintegrating tablet Take 8 mg by mouth every 8 hours as needed for Nausea or Vomiting      milnacipran HCl (SAVELLA) 100 MG TABS Take 100 mg by mouth three times daily       Multiple Vitamins-Minerals (CENTRUM CARB ASSIST PO) Take 1 tablet by mouth daily          Allergies:  Nitrofurantoin; Abilify [aripiprazole]; Acetaminophen; Acetaminophen; Aspirin; Avelox [moxifloxacin]; Carbamazepine; Contrast [iodides]; Cymbalta [duloxetine hcl]; Doxycycline; Elavil [amitriptyline]; Erythromycin; Flagyl [metronidazole]; Floxin [ofloxacin]; Ibuprofen; Iv contrast [iodides]; Keflex [cephalexin]; Ketorolac; Levaquin [levofloxacin in d5w]; Levofloxacin; Lyrica [pregabalin]; Lyrica [pregabalin]; Macrobid [nitrofurantoin monohyd macro];  Motrin [ibuprofen WBCUA 131 02/09/2020    RBCUA 1 02/09/2020    EPIU 1 02/09/2020     CREAT  1.7  DOWN TO  1.2      Ck  27      MICRO: cultures reviewed and updated by me          Urine Culture [[id number]] (Abnormal)  Collected: 02/09/20 2057   Order Status: Completed Specimen: Urine, clean catch Updated: 02/12/20 2038    Organism Klebsiella pneumoniae ESBLAbnormal     Urine Culture, Routine --Abnormal     >100,000 CFU/ml   CONTACT PRECAUTIONS INDICATED   Carbapenem antibiotics are the best option for infections caused   by ESBL-producing organisms.  Other antibiotic classes are   likely to result in treatment failure, even for organisms   demonstrating in vitro susceptibility. Abnormal    Narrative:     ORDER#: [id number]                          ORDERED BY: ROBERT Maza  SOURCE: Urine Clean Catch                  COLLECTED:  02/09/20 20:57  ANTIBIOTICS AT [facility]. :                      RECEIVED :  02/09/20 20:57  CALL  Sanchez  [id number] tel. [phone],  Microbiology results called to and read back by Aurea Serna RN, 02/12/2020  06:12, [place] [facility]  Performed at:  [facility]  [address] [facility]   Phone [phone]   Culture Blood #2 [[id number]] Collected: 02/09/20 1936   Order Status: Completed Specimen: Blood Updated: 02/10/20 2215    Culture, Blood 2 No Growth to date.  Any change in status will be called. Narrative:     ORDER#: [id number]                          ORDERED BY: ROBERT Maza  SOURCE: Blood                              COLLECTED:  02/09/20 19:36  ANTIBIOTICS AT [facility]. :                      RECEIVED :  02/09/20 19:36  If child <=2 yrs old please draw pediatric bottle. ~Blood Culture #2  Performed at:  [facility]  [address] [facility] [address]   Phone [phone]   Culture Blood #1 [[id number]] Collected: 02/09/20 1926   Order Status: Completed Specimen: Blood Updated: 02/10/20 2015    Blood Culture, Routine No Growth to date.  Any change in status will be called. Narrative:     ORDER#: [id number]                          ORDERED BY: ROBERT NORIEGA  SOURCE: Blood                              COLLECTED:  02/09/20 19:26  ANTIBIOTICS AT [facility]. :                      RECEIVED :  02/09/20 19:26  If child <=2 yrs old please draw pediatric bottle. ~Blood Culture #1  Performed at:  [facility]  [address] [facility] [address]   Phone [phone]   Body Fluid Culture [[id number]]    Order Status: Sent Specimen: Body Fluid from [facility]    Gram Stain [[id number]]    Order Status: Sent Specimen: [facility]    CULTURE BLOOD #1 [[id number]] Collected: 02/09/20 0000   Order Status: Canceled Specimen: Blood    CULTURE BLOOD #2 [[id number]] Collected: 02/09/20 0000   Order Status: Canceled Specimen: Blood    Urine Culture [[id number]] Collected: 02/09/20 0000   Order Status: Canceled Specimen: Urine, straight catheter            IMAGING:    CT Head WO Contrast   Final Result   No acute intracranial abnormality. XR CHEST PORTABLE   Final Result   No acute process.       Bibasilar hypoaeration               All the pertinent images and reports for the current Hospitalization were reviewed by me     Scheduled Meds:   sodium chloride flush  10 mL Intravenous 2 times per day    furosemide  40 mg Oral BID    lactulose  20 g Oral 6 times per day    sodium bicarbonate  650 mg Oral 4x Daily    meropenem  500 mg Intravenous Q8H    lidocaine  1 patch Transdermal Daily    OLANZapine  10 mg Oral Nightly    levothyroxine  112 mcg Oral Daily    tacrolimus  0.5 mg Oral Daily    lamoTRIgine  100 mg Oral BID    insulin glargine  15 Units Subcutaneous BID    rifaximin  550 mg Oral BID    sodium chloride flush  10 mL Intravenous 2 times per day    sodium chloride (PF)  10 mL Intravenous Daily    pantoprazole  40 mg Intravenous Daily    And    sodium chloride (PF)  10 mL Intravenous Daily    insulin lispro 0-6 Units Subcutaneous TID WC       Continuous Infusions:   dextrose         PRN Meds:  sodium chloride flush, ondansetron, sodium chloride flush, acetaminophen, glucose, dextrose, glucagon (rDNA), dextrose      Assessment:     Patient Active Problem List   Diagnosis    Hepatic encephalopathy ([facility]    Acquired hypothyroidism    Arthritis    History of liver transplant ([facility]    Altered mental state    Partial epilepsy with impairment of consciousness ([facility]    Metabolic encephalopathy    Chronic pain syndrome    Primary insomnia    Recurrent major depressive disorder, in remission ([facility]    Obesity    Phantom limb pain (HCC)    Other chest pain    Hx of right BKA ([facility]    Immunosuppressed status ([facility]    Tachycardia    Type 1 diabetes mellitus with other specified complication (HCC)    Polypharmacy    SVT (supraventricular tachycardia) (HCC)    Hyperglycemia    Acute chest pain    Nausea    S/P catheter ablation of slow pathway    Chest pain    GERD (gastroesophageal reflux disease)    Hypothyroid    Failure to thrive (0-17)    Knee pain    Anemia    Hypoglycemia    DM2 (diabetes mellitus, type 2) (HCC)    Anemia, chronic disease    Acute hepatic encephalopathy       Change in Mentation  Hepatic Encephalopathy  Cirrhosis of liver  Splenomegaly   S/p Liver transplant in 2008  Immune suppressed  UTI  ESBL on urine cx   DM  Bi polar disorder  Rt BKA status  Ammonia not much elevation on this admit  ALLIE on admit      SUSPECT infection, Hepatic encephalopathy with underlying Bi polar disorder all contributing to change in Mentation    Now improving slowly and PICC placed for IV abx and ok for d/c planning  Once IV abx are arranged now that mentation almost back to base line     Labs, Microbiology, Radiology and all the pertinent results from current hospitalization and  care every where were reviewed  by me as a part of the evaluation   Plan:   1. Cont IV Meropenem as in patient  2.  Will be able to choose IV Ertapenem at d/c stop date 2/24   3. Anticipate x 10 days of IV abx therapy   4. Immune suppressed from Transplant Meds  5. Ammonia level CAME BACK elevated  and with lactulose BM+ mentation improving  6. Marene Colon for d/c when ok with primary   7. ALAYNA done will sign off d/w RN     Discussed with patient/Family and Nursing        Discussed with patient/Family and Nursing staff     Thanks for allowing me to participate in your patient's care and please call me with any questions or concerns.     Sindhu Saini MD  Infectious Disease  [facility]) Physician  Phone: [phone]   Fax : [phone]

## 2020-02-15 NOTE — PROGRESS NOTES
Telecam available for patient at this time, as patient was found on the side of the bed with bed alarm ringing. Patient needs constant reinforcement of fall prevention measures.  Bed alarm remains in place and telecam activated Electronically signed by Karina Vasquez RN on 2/15/2020 at 4:19 PM

## 2020-02-15 NOTE — PROGRESS NOTES
Hospitalist Progress Note      PCP: Daryl Lua III,     Date of Admission: 2/9/2020    Chief Complaint: [address] Course: The patient is a 60 y. o. female with hx chronic liver disease s/p liver transplantation in 2008, recurrent admission for hepatic encephalopathy, Bipolar 1 Disorder, osteomyelitis with remote R BKA, COPD, type 2 DM, seizure disorder, obstructive sleep apnea on CPAP who presents to [facility] with altered mental status    Subjective:     Lactulose starting to work - having loose BM and mental status a lot better today.          Medications:  Reviewed    Infusion Medications    dextrose       Scheduled Medications    sodium chloride flush  10 mL Intravenous 2 times per day    furosemide  40 mg Oral BID    lactulose  20 g Oral 6 times per day    sodium bicarbonate  650 mg Oral 4x Daily    meropenem  500 mg Intravenous Q8H    lidocaine  1 patch Transdermal Daily    OLANZapine  10 mg Oral Nightly    levothyroxine  112 mcg Oral Daily    tacrolimus  0.5 mg Oral Daily    lamoTRIgine  100 mg Oral BID    insulin glargine  15 Units Subcutaneous BID    rifaximin  550 mg Oral BID    sodium chloride flush  10 mL Intravenous 2 times per day    sodium chloride (PF)  10 mL Intravenous Daily    pantoprazole  40 mg Intravenous Daily    And    sodium chloride (PF)  10 mL Intravenous Daily    insulin lispro  0-6 Units Subcutaneous TID      PRN Meds: sodium chloride flush, ondansetron, sodium chloride flush, acetaminophen, glucose, dextrose, glucagon (rDNA), dextrose      Intake/Output Summary (Last 24 hours) at 2/15/2020 1654  Last data filed at 2/15/2020 0840  Gross per 24 hour   Intake 820 ml   Output 1650 ml   Net -830 ml       Physical Exam Performed:    /73   Pulse 93   Temp 97.9 °F (36.6 °C) (Oral)   Resp 16   Ht 5' 4\" (1.626 m)   Wt 156 lb 15.5 oz (71.2 kg)   SpO2 98%   BMI 26.94 kg/m²       General appearance: No apparent distress appears stated age and Diane Robertson 994 NEGATIVE 07/11/2010       Radiology:  CT Head WO Contrast   Final Result   No acute intracranial abnormality. XR CHEST PORTABLE   Final Result   No acute process. Bibasilar hypoaeration                 Assessment/Plan:    Active Hospital Problems    Diagnosis    DM2 (diabetes mellitus, type 2) ([facility] [E11.9]    Chronic pain syndrome [G89.4]    Hepatic encephalopathy ([facility] [K72.90]    Acquired hypothyroidism [E03.9]         Acute Metabolic Encephalopathy   - possible hepatic, albeit less likely sole culprit this particular admission. I suspect Bipolar contributory to recurrent bouts of mental status changes. ESBL infection possibly contributory as well. - lactulose increased to every 4 hrs - having loose stool now - decrease lactulose to TID. - continue PO Rifaximin BID           Advanced liver cirrhosis s/p Hx of liver transplantation   - resumed prograf 0.5 daily  - Prograf level - 8.8 normal last admit on this dose.            ALLIE - Appears prerenal - resolved with IVF - IVF has now been stopped. Aldactone and torsemide on hold. Cr down to 1.1  Lasix 40 PO BID to start today.          Pancytopenia - likely as a result of chronic liver disease  - continue to monitor   - trend CBC daily        Bipolar 1 Disorder  -resumed zyprexa - present dose 10 nightly  - hallucinating and confused.         Seizure Disorder  - resumed lamictal dose decreased to 100BID.         Type 2 DM -   ISS. Resumed lantus 15BID.         Obstructive sleep apnea  -CPAP nightly        Phantom limb pain s/p Right BKA remotely. -continue home meds         UTI on rocephin. Culture with ESBL -    - stopped rocephin   - started merrem 2/12/2020   - ID to eval (pt on prograf). - PICC placed.     - IV ertapenem on discharge.           DVT Prophylaxis: SCDs, hold PPX due to thrombocytopenia  Diet: DIET CARB CONTROL; Carb Control: 4 carb choices (60 gms)/meal  Code Status: Full Code     PT/OT Eval Status:

## 2020-02-16 NOTE — PROGRESS NOTES
720 ml   Output 1725 ml   Net -1005 ml       Physical Exam Performed:    /69   Pulse 91   Temp 98.2 °F (36.8 °C) (Oral)   Resp 16   Ht 5' 4\" (1.626 m)   Wt 164 lb 10.9 oz (74.7 kg)   SpO2 97%   BMI 28.27 kg/m²       General appearance: No apparent distress appears stated age and confused. HEENT Normal cephalic, atraumatic without obvious deformity.  Pupils equal, round, and reactive to light.  Extra ocular muscles intact.  Conjunctivae/corneas clear. Neck: Supple, No jugular venous distention/bruits.  Trachea midline without thyromegaly or adenopathy with full range of motion. Lungs: Clear to auscultation, bilaterally without Rales/Wheezes/Rhonchi with good respiratory effort. Heart: Regular rate and rhythm with Normal S1/S2 without murmurs, rubs or gallops, point of maximum impulse non-displaced  Abdomen: +Distended, non-tender, without rigidity or guarding and positive bowel sounds all four quadrants. Extremities: R leg BKA, no clubbing, cyanosis, or edema bilaterally.  Full range of motion. Skin: Skin color, texture, turgor normal.  No rashes or lesions. Neurologic: Alert and oriented x4 neurovascularly intact with sensory/motor intact upper extremities/lower extremities, bilaterally.  Cranial nerves: II-XII intact, grossly non-focal.  Mental status: Alert, oriented x4 today  Capillary Refill: Acceptable  < 3 seconds  Peripheral Pulses: +3 Easily felt, not easily obliterated with pressure          Labs:   Recent Labs     02/14/20  0642 02/16/20  0603   WBC 3.6* 3.5*   HGB 11.0* 10.9*   HCT 32.3* 32.0*   PLT 53* 50*     Recent Labs     02/14/20  0642 02/16/20  0603    140   K 4.0 3.7   * 105   CO2 18* 21   BUN 18 18   CREATININE 1.1 1.0   CALCIUM 9.7 9.3   PHOS 3.3  --      Recent Labs     02/14/20  0642   AST 20   ALT 21   BILIDIR 0.5*   BILITOT 1.4*   ALKPHOS 190*     No results for input(s): INR in the last 72 hours.   Recent Labs     02/14/20  0642   CKTOTAL 24*       Urinalysis: Disorder  - resumed lamictal dose decreased to 100BID.         Type 2 DM -   ISS. Resumed lantus 15BID - increase to 25BID. Eating better now - will need to uptitrate insulin regimen.          Obstructive sleep apnea  -CPAP nightly        Phantom limb pain s/p Right BKA remotely. -continue home meds  - resumed PRN oxycodone IR       UTI on rocephin. Culture with ESBL -    - stopped rocephin   - started merrem 2/12/2020   - ID to eval (pt on prograf). - PICC placed. - IV ertapenem on discharge.           DVT Prophylaxis: SCDs, hold PPX due to thrombocytopenia  Diet: DIET CARB CONTROL; Carb Control: 4 carb choices (60 gms)/meal  Code Status: Full Code     PT/OT Eval Status: ordered     Dispo - ongoing diarrhea and also left foot findings - new today - discharge on hold.    From infection and mental status perspective she is doing much better.        Guerrero Anton MD

## 2020-02-16 NOTE — PLAN OF CARE
infection  2/16/2020 1009 by Gauri Contreras RN  Outcome: Ongoing     Problem: Pain:  Goal: Pain level will decrease  Description  Pain level will decrease  2/16/2020 1009 by Gauri Contreras RN  Outcome: Ongoing

## 2020-02-16 NOTE — CONSULTS
VASCULAR SURGERY CONSULTATION    Josse Storey is a 59 y.o. female admitted with hepatic encephalopathy. Noted painless L 2nd toe discoloration. C/O L foot pain centered at ankle with foot coldness. Nonambulatory S/P R BKA and knee osteoarthritis    Past Medical History:   Diagnosis Date    Anemia     Arthritis     Bipolar 1 disorder (HCC)     Causalgia of lower limb     Cellulitis     Chronic pain syndrome     Chronic post-operative pain     Cirrhosis of liver (HCC)     Complications of transplanted liver     Convulsion (HCC)     COPD (chronic obstructive pulmonary disease) (HCC)     Depression     Dermatophytosis of nail     Diabetes mellitus (HCC)     Diarrhea     Dry eyes     ESBL (extended spectrum beta-lactamase) producing bacteria infection 02/09/2020    urine    Fungal nail infection     Generalized abdominal pain     Generalized osteoarthritis     GERD (gastroesophageal reflux disease)     Hepatic encephalopathy (HCC)     Hyperlipidemia     Hypertension     ? borderline    Hypothyroidism     Hypoxemia     Immune system disorder ([address]     Liver disease     liver transplant 2008    Liver transplanted ([address]     Localization-related epilepsy ([address]     Low back pain     Lumbar post-laminectomy syndrome     Morbid obesity (HCC)     Neuropathy, diabetic ([address]     On home oxygen therapy     4 liters cont.  cpap  at sleep/ night     Other disorders of kidney and ureter     Peripheral neuropathy     Phantom limb (HCC)     Pneumonia     Polyneuritis     Restrictive lung disease     Seizures (HCC)     Sleep apnea     cpap machine- d/t CO2 problem     SVT (supraventricular tachycardia) (HCC)     Thyroid mass     UTI (lower urinary tract infection)      Past Surgical History:   Procedure Laterality Date    ABDOMEN SURGERY      multiple    APPENDECTOMY      CHOLECYSTECTOMY      HERNIA REPAIR      multiple     HYSTERECTOMY      JOINT REPLACEMENT      right    JOINT mg at 02/16/20 0857    spironolactone (ALDACTONE) tablet 25 mg  25 mg Oral Daily Antwan Hilario MD   25 mg at 02/16/20 0857    insulin glargine (LANTUS) injection vial 25 Units  25 Units Subcutaneous BID Antwan Hilario MD   25 Units at 02/16/20 0859    insulin lispro (HUMALOG) injection vial 5 Units  5 Units Subcutaneous TID WC Antwan Hilario MD   5 Units at 02/16/20 1204    lactulose (CHRONULAC) 10 GM/15ML solution 20 g  20 g Oral TID Antwan Hilario MD   20 g at 02/16/20 1204    oxyCODONE (ROXICODONE) immediate release tablet 5 mg  5 mg Oral Q4H PRN Antwan Hilario MD   5 mg at 02/16/20 0933    sodium chloride flush 0.9 % injection 10 mL  10 mL Intravenous 2 times per day Antwan Hilario MD   10 mL at 02/16/20 0858    sodium chloride flush 0.9 % injection 10 mL  10 mL Intravenous PRN Antwan Hilario MD        sodium bicarbonate tablet 650 mg  650 mg Oral 4x Daily Antwan Hilario MD   650 mg at 02/16/20 1204    ondansetron (ZOFRAN) injection 4 mg  4 mg Intravenous Q4H PRN Antwan Hilario MD   4 mg at 02/13/20 1046    lidocaine 4 % external patch 1 patch  1 patch Transdermal Daily DELORES Castillo - CNP   1 patch at 02/16/20 0857    OLANZapine (ZYPREXA) tablet 10 mg  10 mg Oral Nightly Antwan Hilario MD   10 mg at 02/15/20 2036    levothyroxine (SYNTHROID) tablet 112 mcg  112 mcg Oral Daily Antwan Hilario MD   112 mcg at 02/16/20 0647    tacrolimus (proGRAF) capsule 0.5 mg  0.5 mg Oral Daily Antwan Hilario MD   0.5 mg at 02/16/20 0856    lamoTRIgine (LAMICTAL) tablet 100 mg  100 mg Oral BID Antwan Hilario MD   100 mg at 02/16/20 0857    rifaximin (XIFAXAN) tablet 550 mg  550 mg Oral BID Nehemiah Kraus MD   550 mg at 02/16/20 0857    sodium chloride flush 0.9 % injection 10 mL  10 mL Intravenous 2 times per day Nehemiah Kraus MD   10 mL at 02/16/20 0856    sodium chloride flush 0.9 % injection 10 mL 10 mL Intravenous PRN Juliet Sosa MD        acetaminophen (TYLENOL) tablet 1,000 mg  1,000 mg Oral Q12H PRN Juliet Sosa MD        glucose (GLUTOSE) 40 % oral gel 15 g  15 g Oral PRN Juliet Sosa MD        dextrose 50 % IV solution  12.5 g Intravenous PRN Juliet Sosa MD        glucagon (rDNA) injection 1 mg  1 mg Intramuscular PRN Juliet Sosa MD        dextrose 5 % solution  100 mL/hr Intravenous PRN Juliet Sosa MD        sodium chloride (PF) 0.9 % injection 10 mL  10 mL Intravenous Daily Juliet Sosa MD   10 mL at 02/16/20 0859    pantoprazole (PROTONIX) injection 40 mg  40 mg Intravenous Daily Juliet Sosa MD   40 mg at 02/16/20 0720    And    sodium chloride (PF) 0.9 % injection 10 mL  10 mL Intravenous Daily Juliet Sosa MD   10 mL at 02/16/20 0859    insulin lispro (HUMALOG) injection vial 0-6 Units  0-6 Units Subcutaneous TID WC Juliet Sosa MD   6 Units at 02/16/20 1204     Allergies   Allergen Reactions    Nitrofurantoin Hives    Abilify [Aripiprazole]     Acetaminophen Other (See Comments)     Avoid due to liver    Acetaminophen Other (See Comments)     Liver transplant restriction    Aspirin     Avelox [Moxifloxacin]     Carbamazepine     Contrast [Iodides] Hives     Contrast dye    Cymbalta [Duloxetine Hcl] Other (See Comments)     Pt. Starts hallucinating     Doxycycline     Elavil [Amitriptyline] Hives    Erythromycin     Flagyl [Metronidazole]     Floxin [Ofloxacin]     Ibuprofen Other (See Comments)     Liver transplant    Iv Contrast [Iodides] Hives    Keflex [Cephalexin]      Tolerates Ceftriaxone.      Ketorolac Hives    Levaquin [Levofloxacin In D5w]     Levofloxacin Hives    Lyrica [Pregabalin]     Lyrica [Pregabalin] Other (See Comments)     hallucinations    Macrobid [Nitrofurantoin Monohyd Macro]     Motrin [Ibuprofen Micronized]     Nsaids      Liver transplant, states she can't have because of liver protocol      Nucynta Er [Tapentadol Hcl Er]     Nucynta [Tapentadol]     Penicillins Hives    Primidone     Reglan [Metoclopramide]     Tricyclic Antidepressants     Clindamycin      Other reaction(s): Yeast Infection       Review of Systems  Pertinent items are noted in HPI. Objective:     /69   Pulse 91   Temp 98.2 °F (36.8 °C) (Oral)   Resp 16   Ht 5' 4\" (1.626 m)   Wt 164 lb 10.9 oz (74.7 kg)   SpO2 97%   BMI 28.27 kg/m²     General:  alert, appears stated age and cooperative   Skin:  normal   Eyes: conjunctivae/corneas clear. PERRL, EOM's intact. Fundi benign. Mouth: poor dentition   Lymph Nodes:  Cervical, supraclavicular, and axillary nodes normal.   Lungs:  clear to auscultation bilaterally   Heart:  regular rate and rhythm, S1, S2 normal, no murmur, click, rub or gallop   Abdomen: soft, non-tender; bowel sounds normal; no masses,  no organomegaly; large surgical scar   CVA:  absent   Genitourinary: defer exam   Extremities:  Well healed R BKA  L 2nd toe on dorsal periungual - discolored and ecchymotic - mo breakdown; L foot warm and pink  Pulses:   R bruit  L bruit   2   carotid 2    2   brachial 2    2   radial 2    2   femoral 2    BKA   popliteal 2       posterior tibial 2       dorsalis pedis 2       bypass graft        Neurologic:  negative   Psychiatric:  non focal       Assessment:      No signs of limb threatening ischemia L foot - likely local trauma with bruising. Healing well. Rec:      No plans for further diagnostics or intervention. Reassured pt that it should heal in time. Weight bearing as tolerated. Will see as needed. Please call for any further questions or problems.     Derl Selfridge

## 2020-02-16 NOTE — PLAN OF CARE
Problem: Falls - Risk of:  Goal: Will remain free from falls  Description  Will remain free from falls  Outcome: Ongoing  Goal: Absence of physical injury  Description  Absence of physical injury  Outcome: Ongoing     Problem: Pain Control  Goal: Maintain pain level at or below patient's acceptable level (or 5 if patient is unable to determine acceptable level)  Outcome: Ongoing  Goal: Improvement in pain related behaviors BP/HR WNL  Outcome: Ongoing     Problem: GI  Goal: No bowel complications  Outcome: Ongoing  Goal: Bowel movement at least every other day  Outcome: Ongoing

## 2020-02-16 NOTE — PLAN OF CARE
Flexyseal removed per MD request, no complications. Pt is satisfied at this time, will continue to monitor.  Electronically signed by Sara Longoria RN on 2/16/2020 at 10:04 AM

## 2020-02-17 NOTE — PROGRESS NOTES
Occupational Therapy  Facility/Department: [address] MED SURG  Daily Treatment Note  Let this serve as discharge note if patient is discharged prior to next OT session    NAME: Olu Zee  : 1955  MRN: [id number]    Date of Service: 2020    Discharge Recommendations:  ECF with OT    AM-PAC  Olu Zee scored a 14/24 on the AM-PAC ADL Inpatient form. Current research shows that an AM-PAC score of 17 or less is typically not associated with a discharge to the patient's home setting. Based on the patients AM-PAC score and their current ADL deficits, it is recommended that the patient have 3-5 sessions per week of Occupational Therapy at d/c to increase the patients independence. Assessment   Performance deficits / Impairments: Decreased functional mobility ; Decreased ADL status; Decreased endurance;Decreased balance;Decreased cognition;Decreased safe awareness;Decreased strength  Assessment: Patient is pleasant and cooperative. Patient completed 2 sit-stand-sit from recliner to bed rail with CGA. Patient then completed stand-pivot recliner-chair with arms-recliner with Min A. Patient does better if she transfers to her left side. Cont per POC. Prognosis: Fair  OT Education: OT Role;Plan of Care;Transfer Training;Orientation; ADL Adaptive Strategies  REQUIRES OT FOLLOW UP: Yes  Activity Tolerance: Patient Tolerated treatment well;Patient limited by fatigue  Safety Devices in place: Yes  Type of devices: Call light within reach; Chair alarm in place; Left in chair;Gait belt;Nurse notified(DANIEL mai, amberly encarnacion present )     Patient Diagnosis(es): The primary encounter diagnosis was Altered mental status, unspecified altered mental status type. Diagnoses of Hepatic encephalopathy ([address] ALLIE (acute kidney injury) ([address] and Urinary tract infection without hematuria, site unspecified were also pertinent to this visit.       has a past medical history of Anemia, Arthritis, Bipolar 1 disorder ([address] Causalgia of lower limb, Cellulitis, Chronic pain syndrome, Chronic post-operative pain, Cirrhosis of liver (HCC), Complications of transplanted liver, Convulsion (HCC), COPD (chronic obstructive pulmonary disease) ([address] Depression, Dermatophytosis of nail, Diabetes mellitus ([address] Diarrhea, Dry eyes, ESBL (extended spectrum beta-lactamase) producing bacteria infection, Fungal nail infection, Generalized abdominal pain, Generalized osteoarthritis, GERD (gastroesophageal reflux disease), Hepatic encephalopathy ([address] Hyperlipidemia, Hypertension, Hypothyroidism, Hypoxemia, Immune system disorder ([address] Liver disease, Liver transplanted ([address] Localization-related epilepsy ([address] Low back pain, Lumbar post-laminectomy syndrome, Morbid obesity ([address] Neuropathy, diabetic ([address] On home oxygen therapy, Other disorders of kidney and ureter, Peripheral neuropathy, Phantom limb ([address] Pneumonia, Polyneuritis, Restrictive lung disease, Seizures ([address] Sleep apnea, SVT (supraventricular tachycardia) ([address] Thyroid mass, and UTI (lower urinary tract infection). has a past surgical history that includes Liver transplant (01/03/2008); other surgical history; Cholecystectomy; Appendectomy; hernia repair; Abdomen surgery; joint replacement; joint replacement; Leg amputation below knee; Liver transplant; Leg amputation below knee; Hysterectomy; Sleeve Gastroplasty (1985); and Upper gastrointestinal endoscopy (N/A, 11/1/2019). Restrictions  Restrictions/Precautions: Fall Risk  Other position/activity restrictions: h/o of R BKA    Subjective  Chart Reviewed:  Yes  Additional Pertinent Hx: Per Anabel Goodwin MD's note 2/10: \"The patient is a 59 y.o. female with hx chronic liver disease s/p liver transplantation in 2008, recurrent admission for hepatic encephalopathy, Bipolar 1 Disorder, osteomyelitis with remote R BKA, COPD, type 2 DM, seizure disorder, obstructive sleep apnea on CPAP who presents to [facility] with altered mental status\" Pt admitted with Acute Metabolic Encephalopathy-possible hepatic. Response to previous treatment: Patient with no complaints from previous session  Family / Caregiver Present: No  Referring Practitioner: Raquel Mason MD  Diagnosis: Acute Metabolic Encephalopathy-possible hepatic  Subjective: Patient met b/s, sitting in recliner, pleasant and agreeable to OT treat    Objective  Balance  Standing Balance: Contact guard assistance(2 stands at bed rail x 20 seconds each time )    Transfers  Stand Pivot Transfers: Minimal assistance(recliner-chair with arms-recliner with Min A, initially completed recliner-chair with arms going to left side and was Min A, attempted to return to recliner going to the right side, yet was not able, then patient reports she does better if facing straight on the recliner, yet was not successful in completing transfer this way either, repositioned for patient to transfer back to recliner going to her left side and was successful with Min A)  Sit to stand: Contact guard assistance  Stand to sit: Contact guard assistance  Transfer Comments: patient completed transfers to bed rail with CGA     Cognition  Cognition Comment: patient following directions, very talkative this date, will continue to assess     Plan  Times per week: 3-5  Times per day: Daily  Current Treatment Recommendations: Strengthening, ROM, Balance Training, Functional Mobility Training, Endurance Training, Modalities (comment), Safety Education & Training, Self-Care / ADL, Cognitive Reorientation, Cognitive/Perceptual Training, Wheelchair Mobility Training    Goals  Short term goals  Time Frame for Short term goals: Prior to d/c: status as of 2/17/20: goals ongoing   Short term goal 1: Pt will complete UB bathing/dressing with set-up/SBA. Short term goal 2: Pt will complete grooming with set-up/supervision.   Short term goal 3: Pt will complete fxl pivot transfer to/from ADL surfaces with CGA/min A. Short term goal 4: Pt will tolerate standing >30 seconds in preparation for toileting hygiene with min assistance for balance  Long term goals  Time Frame for Long term goals : STG=LTG  Patient Goals   Patient goals : pt unable to verbalize personal therapy goal d/t decreased cognition.        Therapy Time   Individual Concurrent Group Co-treatment   Time In 1503         Time Out 1531         Minutes 28         Timed Code Treatment Minutes: [address], [id number] [place] [id number]

## 2020-02-17 NOTE — PROGRESS NOTES
Sunday 2/16      Pancytopenia - likely as a result of chronic liver disease - stable  - continue to monitor   - trend CBC daily     Bipolar 1 Disorder  - resumed zyprexa - present dose 10 nightly  - hallucinating and confused - resolved.       Seizure Disorder  - resumed lamictal dose decreased to 100BID.       Type 2 DM -   ISS. Resumed lantus 15BID - increase to 25BID. Eating better now - will need to uptitrate insulin regimen. Obstructive sleep apnea  -CPAP nightly      Phantom limb pain s/p Right BKA remotely. -continue home meds  - resumed PRN oxycodone IR     UTI   Culture with ESBL    - started merrem 2/12/2020   - ID to eval (pt on prograf). - PICC placed.     - IV ertapenem on discharge        DVT Prophylaxis: SCDs, hold PPX due to thrombocytopenia  Diet: DIET CARB CONTROL; Carb Control: 4 carb choices (60 gms)/meal  Code Status: Full Code     PT/OT Eval Status: ordered     Dispo - ready for discharge

## 2020-02-17 NOTE — PLAN OF CARE
RN  Outcome: Ongoing  2/16/2020 2346 by Amanda Coates RN  Outcome: Ongoing     Problem: Risk for Impaired Skin Integrity  Goal: Tissue integrity - skin and mucous membranes  Description  Structural intactness and normal physiological function of skin and  mucous membranes. 2/17/2020 0824 by Josy Stubbs RN  Outcome: Ongoing  2/16/2020 2346 by Amanda Coates RN  Outcome: Ongoing     Problem: Physical Regulation:  Goal: Prevent transmision of infection  Description  Prevent transmision of infection  2/17/2020 0824 by Josy Stubbs RN  Outcome: Ongoing  2/16/2020 2346 by Amanda Coates RN  Outcome: Ongoing     Problem: Pain:  Goal: Pain level will decrease  Description  Pain level will decrease  2/17/2020 0824 by Josy Stubbs RN  Outcome: Ongoing  2/16/2020 2346 by Amanda Coates RN  Outcome: Ongoing  Goal: Control of acute pain  Description  Control of acute pain  2/17/2020 0824 by Josy Stubbs RN  Outcome: Ongoing  2/16/2020 2346 by Amanda Coates RN  Outcome: Ongoing  Goal: Control of chronic pain  Description  Control of chronic pain  2/17/2020 0824 by Josy Stubbs RN  Outcome: Ongoing  2/16/2020 2346 by Amanda Coates RN  Outcome: Ongoing     Problem: Mental Status - Impaired:  Goal: Mental status will improve  Description  Mental status will improve  Outcome: Ongoing     Problem: SAFETY  Goal: Free from accidental physical injury  Outcome: Ongoing  Goal: Free from intentional harm  Outcome: Ongoing     Problem: DAILY CARE  Goal: Daily care needs are met  Outcome: Ongoing     Problem: PAIN  Goal: Patient's pain/discomfort is manageable  Outcome: Ongoing     Problem: KNOWLEDGE DEFICIT  Goal: Patient/S.O. demonstrates understanding of disease process, treatment plan, medications, and discharge instructions.   Outcome: Ongoing     Problem: DISCHARGE BARRIERS  Goal: Patient's continuum of care needs are met  Outcome: Ongoing

## 2020-02-17 NOTE — DISCHARGE SUMMARY
Hospital Medicine Discharge Summary    Patient: Mick Ordonez     Gender: female  : 1955   Age: 59 y.o. MRN: [id number]    Code Status: Full Code     Primary Care Provider: Aleta Ledesma DO    Admit Date: 2020   Discharge Date:   2020    Admitting Physician: Nehemiah Kraus MD  Discharge Physician: Luisa Kraus DO     Discharge Diagnoses: Active Hospital Problems    Diagnosis Date Noted    Toe cyanosis [R23.0]     DM2 (diabetes mellitus, type 2) ([address] [E11.9] 2018    Chronic pain syndrome [G89.4] 2016    Hepatic encephalopathy ([address] [K72.90] 2015    Acquired hypothyroidism [E03.9]        Hospital Course: The patient is a 60 y. o. female with hx chronic liver disease s/p liver transplantation in , recurrent admission for hepatic encephalopathy, Bipolar 1 Disorder, osteomyelitis with remote R BKA, COPD, type 2 DM, seizure disorder, obstructive sleep apnea on CPAP admitted with altered mental status. She was also found to have a uti with e coli esbl, and will be on ertapenem for a total of 14 days as she is on chronic tacrolimus. Left Foot 2nd and 5th toe hyperemesis  XR: Chronic hammertoe deformities of the 2nd through 5th digits, no acute pathology. Vascular surgery consulted: this is likely due to local trauma with bruising, no intervention needed     Acute Metabolic Encephalopathy - improved  - probable hepatic, albeit less likely sole culprit this particular admission. ESBL infection possibly contributory as well.    decreased lactulose to TID due to diarrhea, will continue on her home dosing  - continue PO Rifaximin BID      Advanced liver cirrhosis s/p Hx of liver transplantation   - on prograf 0.5 daily  - Prograf level - 8.8 normal last admit on this dose.      ALLIE - Appears prerenal - resolved with IVF   Cr down to 1  Lasix 40 PO BID started Saturday 2/15  Aldactone added       Pancytopenia - likely as a result of chronic liver disease - stable  - no bleeding or bruising     Bipolar 1 Disorder  - resumed zyprexa - present dose 10 nightly  - hallucinating and confused - resolved.       Seizure Disorder  - resumed lamictal, dose decreased to 100BID.       Type 2 DM -   ISS. lantus increase to 25BID       Obstructive sleep apnea  -CPAP nightly      Phantom limb pain s/p Right BKA remotely. -continue home meds  - resumed PRN oxycodone IR     UTI   Culture with ESBL               - started merrem 2/12/2020              - ID consulted              - PICC placed. - IV ertapenem on discharge for 9 more days    Disposition:  Home    Exam:     BP (!) 107/56   Pulse 96   Temp 97 °F (36.1 °C) (Oral)   Resp 16   Ht 5' 4\" (1.626 m)   Wt 163 lb 12.8 oz (74.3 kg)   SpO2 98%   BMI 28.12 kg/m²     General appearance:  No apparent distress, appears older stated age  HEENT:  Normal cephalic, atraumatic without obvious deformity. Pupils equal, round, and reactive to light. Extra ocular muscles intact. Conjunctivae/corneas clear. Neck: Supple, with full range of motion. No jugular venous distention. Trachea midline. Respiratory:  Normal respiratory effort. Clear to auscultation, bilaterally without Rales/Wheezes/Rhonchi. Cardiovascular:  Regular rate and rhythm with normal S1/S2 without murmurs, rubs or gallops. Abdomen: Soft, non-tender, non-distended with normal bowel sounds. Musculoskeletal:  No clubbing, cyanosis or edema bilaterally. Rt bka   Skin: Skin color, texture, turgor normal.  No rashes or lesions. Neurologic:  Neurovascularly intact without any focal sensory/motor deficits. Cranial nerves: II-XII intact, grossly non-focal.  Psychiatric:  Alert and oriented, thought content appropriate, normal insight    Consults:     IP CONSULT TO HOSPITALIST  IP CONSULT TO HOSPITALIST  IP CONSULT TO INFECTIOUS DISEASES  IP CONSULT TO PHARMACY  IP CONSULT TO VASCULAR SURGERY    Labs:  For convenience and continuity at follow-up the following most recent labs are provided:    Lab Results   Component Value Date    WBC 3.0 02/17/2020    HGB 9.6 02/17/2020    HCT 28.2 02/17/2020    MCV 91.8 02/17/2020    PLT 47 02/17/2020     02/17/2020    K 4.2 02/17/2020     02/17/2020    CO2 21 02/17/2020    BUN 22 02/17/2020    CREATININE 1.2 02/17/2020    CALCIUM 9.1 02/17/2020    PHOS 3.3 02/14/2020    ALKPHOS 190 02/14/2020    ALT 21 02/14/2020    AST 20 02/14/2020    BILITOT 1.4 02/14/2020    BILIDIR 0.5 02/14/2020    LABALBU 4.0 02/14/2020    LDLCALC 35 01/30/2018    TRIG 120 05/22/2018     Lab Results   Component Value Date    INR 1.60 (H) 02/10/2020    INR 1.63 (H) 01/27/2020    INR 1.46 (H) 12/16/2018       Radiology:  XR FOOT LEFT (MIN 3 VIEWS)   Preliminary Result   1. No acute osseous abnormality of the left foot. 2. Chronic hammertoe deformities of the 2nd through 5th digits. 3. Nonspecific chronic 4 mm thin radiopaque foreign body retained within the   plantar soft tissues of the great toe, unchanged from 5/3/2018. Clinical   correlation is advised. CT Head WO Contrast   Final Result   No acute intracranial abnormality. XR CHEST PORTABLE   Final Result   No acute process. Bibasilar hypoaeration             Discharge Medications:   Current Discharge Medication List      START taking these medications    Details   insulin lispro (HUMALOG) 100 UNIT/ML injection vial Inject 5 Units into the skin 3 times daily (with meals)  Qty: 1 vial, Refills: 3      furosemide (LASIX) 40 MG tablet Take 1 tablet by mouth daily  Qty: 60 tablet, Refills: 3      ertapenem (INVANZ) infusion Infuse 1,000 mg intravenously every 24 hours for 9 days Compound per protocol.   Qty: 9000 mg, Refills: 0           Current Discharge Medication List      CONTINUE these medications which have CHANGED    Details   lamoTRIgine (LAMICTAL) 100 MG tablet Take 1 tablet by mouth 2 times daily  Qty: 30 tablet, Refills: 3      insulin glargine (LANTUS) 100 UNIT/ML injection vial Inject 25 Units into the skin 2 times daily  Qty: 1 vial, Refills: 3      spironolactone (ALDACTONE) 25 MG tablet Take 1 tablet by mouth daily  Qty: 30 tablet, Refills: 3           Current Discharge Medication List      CONTINUE these medications which have NOT CHANGED    Details   nystatin (MYCOSTATIN) 411206 UNIT/GM powder Apply topically 3 times daily Apply topically 3 times daily as needed for redness and irritation      OLANZapine (ZYPREXA) 10 MG tablet Take 1 tablet by mouth nightly  Qty: 30 tablet, Refills: 1      lidocaine (LIDODERM) 5 % Place 1 patch onto the skin daily 12 hours on, 12 hours off. Qty: 30 patch, Refills: 0      rifaximin (XIFAXAN) 550 MG tablet Take 1 tablet by mouth 2 times daily  Qty: 60 tablet, Refills: 1      omeprazole (PRILOSEC) 20 MG delayed release capsule Take 20 mg by mouth daily       levothyroxine (SYNTHROID) 112 MCG tablet Take 112 mcg by mouth Daily      tacrolimus (PROGRAF) 0.5 MG capsule Take 0.5 mg by mouth daily       ondansetron (ZOFRAN-ODT) 8 MG disintegrating tablet Take 8 mg by mouth every 8 hours as needed for Nausea or Vomiting      milnacipran HCl (SAVELLA) 100 MG TABS Take 100 mg by mouth three times daily       Multiple Vitamins-Minerals (CENTRUM CARB ASSIST PO) Take 1 tablet by mouth daily       CPAP Machine MISC by Does not apply route           Current Discharge Medication List      STOP taking these medications       levETIRAcetam (KEPPRA) 500 MG tablet Comments:   Reason for Stopping:         lactulose (CHRONULAC) 10 GM/15ML solution Comments:   Reason for Stopping:         Insulin Degludec (TRESIBA) 100 UNIT/ML SOLN Comments:   Reason for Stopping:         torsemide (DEMADEX) 20 MG tablet Comments:   Reason for Stopping:         nystatin (MYCOSTATIN) 014462 UNIT/GM cream Comments:   Reason for Stopping:         insulin lispro (HUMALOG KWIKPEN) 100 UNIT/ML pen Comments:   Reason for Stopping:                Follow-up appointments:  one week    Provider Follow-up:    pcp    Condition at Discharge:  Stable    The patient was seen and examined on day of discharge and this discharge summary is in conjunction with any daily progress note from day of discharge. Time Spent on discharge is 45 minutes  in the examination, evaluation, counseling and review of medications and discharge plan. Signed:    Glenn Ruano DO   2/17/2020      Thank you Jocelyn Aragon III, DO for the opportunity to be involved in this patient's care. If you have any questions or concerns please feel free to contact me at [phone].

## 2020-02-18 NOTE — FLOWSHEET NOTE
Pt up with deann x 1 to bathroom. Urine and stool occurrence. Oxycodone given for R stump pain 9/10. Assisted back to chair per deann. Ambulance to arrive at any moment to take pt to [facility]. Call light in reach.

## 2020-02-23 NOTE — ED PROVIDER NOTES
Triage note: I evaluated this patient to initiate their ED workup in an expeditious manner. Please see notes from other ED providers regarding comprehensive evaluation including full history, physical exam, interpretation of results, and medical decision making/disposition.       Edna Luna, [address]  02/23/20 6513

## 2020-02-23 NOTE — ED PROVIDER NOTES
All other components within normal limits    Narrative:     Performed at:  [facility]  [address] [place] [address]   Phone [phone]   POCT GLUCOSE - Abnormal; Notable for the following components:    POC Glucose 412 (*)     All other components within normal limits    Narrative:     Performed at:  [facility]  [address] [place] [address]   Phone [phone]   POCT GLUCOSE - Abnormal; Notable for the following components:    POC Glucose 375 (*)     All other components within normal limits    Narrative:     Performed at:  [facility]  [address] [place] [address]   Phone [phone]   POCT GLUCOSE - Abnormal; Notable for the following components:    POC Glucose 245 (*)     All other components within normal limits    Narrative:     Performed at:  [address] [employer] [address]   Phone [phone]   POCT GLUCOSE - Normal   POCT GLUCOSE - Normal   CULTURE, URINE   LIPASE    Narrative:     Performed at:  [address] [facility] [employer] [facility] [address]   Phone [phone]   BLOOD GAS, VENOUS    Narrative:     Performed at:  [address] [employer] [address]   Phone [phone]   BETA-HYDROXYBUTYRATE    Narrative:     Performed at:  [facility]  [address] [place] [address] [employer] [address]   Phone [phone]       All other labs were within normal range or not returned as of this dictation.     EMERGENCY DEPARTMENT COURSE and DIFFERENTIAL DIAGNOSIS/MDM:   Vitals:    Vitals:    02/23/20 1745 02/23/20 1820 02/23/20 1834 02/23/20 1935   BP: 108/62 (!) 104/51 (!) 101/49 (!) 101/51   Pulse: 97 93 92 89   Resp: 19 20 16 15   Temp:       TempSrc:       SpO2: 100% 100% 98%    Weight: 8546

## 2020-02-23 NOTE — ED PROVIDER NOTES
[address] [address]  Dept: [phone]  Loc: [address] ENCOUNTER        This patient was seen and evaluated per myself in conjunction with ED attending Dr. Antonietta Hardy. CHIEF COMPLAINT    Chief Complaint   Patient presents with    Hyperglycemia     590 at nursing home. given insulin. hx of dm. HPI    Erlinda Moyer is a 59 y.o. female who presents with complaints of elevated blood glucose at the extended care facility where she lives. No associated symptoms. States that she is being treated currently for a urinary tract infection. She states that she also has a history of liver transplant. She has chronic right amputation BKA pain. The context is that patient is an insulin dependent diabetic. The blood sugar measured pre-hospital was 590. The pain severity is 9/10. REVIEW OF SYSTEMS    Neurologic: No loss of consciousness  Cardiac: No Chest Pain  Respiratory: No new cough or difficulty breathing  GI: no abdominal pain, no Diarrhea  : No Dysuria or Hematuria  General: No Fever  All other systems reviewed and are negative.     PAST MEDICAL & SURGICAL HISTORY    Past Medical History:   Diagnosis Date    Anemia     Arthritis     Bipolar 1 disorder ([address]     Causalgia of lower limb     Cellulitis     Chronic pain syndrome     Chronic post-operative pain     Cirrhosis of liver (HCC)     Complications of transplanted liver     Convulsion ([address]     COPD (chronic obstructive pulmonary disease) (HCC)     Depression     Dermatophytosis of nail     Diabetes mellitus (HCC)     Diarrhea     Dry eyes     ESBL (extended spectrum beta-lactamase) producing bacteria infection 02/09/2020    urine    Fungal nail infection     Generalized abdominal pain     Generalized osteoarthritis     GERD (gastroesophageal reflux disease)     Hepatic encephalopathy ([address]     UNIT/ML injection vial Inject 28 Units into the skin 2 times daily 1 vial 3    ertapenem (INVANZ) infusion Infuse 1,000 mg intravenously every 24 hours for 9 days Compound per protocol. 9000 mg 0    nystatin (MYCOSTATIN) 382396 UNIT/GM powder Apply topically 3 times daily Apply topically 3 times daily as needed for redness and irritation      OLANZapine (ZYPREXA) 10 MG tablet Take 1 tablet by mouth nightly 30 tablet 1    lidocaine (LIDODERM) 5 % Place 1 patch onto the skin daily 12 hours on, 12 hours off. (Patient taking differently: Place 1 patch onto the skin 2 times daily ) 30 patch 0    rifaximin (XIFAXAN) 550 MG tablet Take 1 tablet by mouth 2 times daily 60 tablet 1    omeprazole (PRILOSEC) 20 MG delayed release capsule Take 20 mg by mouth daily       levothyroxine (SYNTHROID) 112 MCG tablet Take 112 mcg by mouth Daily      CPAP Machine MISC by Does not apply route      tacrolimus (PROGRAF) 0.5 MG capsule Take 0.5 mg by mouth daily       ondansetron (ZOFRAN-ODT) 8 MG disintegrating tablet Take 8 mg by mouth every 8 hours as needed for Nausea or Vomiting      milnacipran HCl (SAVELLA) 100 MG TABS Take 100 mg by mouth three times daily       Multiple Vitamins-Minerals (CENTRUM CARB ASSIST PO) Take 1 tablet by mouth daily          ALLERGIES    Allergies   Allergen Reactions    Nitrofurantoin Hives    Abilify [Aripiprazole]     Acetaminophen Other (See Comments)     Avoid due to liver    Acetaminophen Other (See Comments)     Liver transplant restriction    Aspirin     Avelox [Moxifloxacin]     Carbamazepine     Contrast [Iodides] Hives     Contrast dye    Cymbalta [Duloxetine Hcl] Other (See Comments)     Pt. Starts hallucinating     Doxycycline     Elavil [Amitriptyline] Hives    Erythromycin     Flagyl [Metronidazole]     Floxin [Ofloxacin]     Ibuprofen Other (See Comments)     Liver transplant    Iv Contrast [Iodides] Hives    Keflex [Cephalexin]      Tolerates Ceftriaxone.      Ketorolac Hives    Levaquin [Levofloxacin In D5w]     Levofloxacin Hives    Lyrica [Pregabalin]     Lyrica [Pregabalin] Other (See Comments)     hallucinations    Macrobid [Nitrofurantoin Monohyd Macro]     Motrin [Ibuprofen Micronized]     Nsaids      Liver transplant, states she can't have because of liver protocol      Nucynta Er [Tapentadol Hcl Er]     Nucynta [Tapentadol]     Penicillins Hives    Primidone     Reglan [Metoclopramide]     Tricyclic Antidepressants     Clindamycin      Other reaction(s): Yeast Infection       SOCIAL & FAMILY HISTORY    Social History     Socioeconomic History    Marital status:       Spouse name: None    Number of children: None    Years of education: None    Highest education level: None   Occupational History    None   Social Needs    Financial resource strain: None    Food insecurity:     Worry: None     Inability: None    Transportation needs:     Medical: None     Non-medical: None   Tobacco Use    Smoking status: Never Smoker    Smokeless tobacco: Never Used   Substance and Sexual Activity    Alcohol use: No    Drug use: No    Sexual activity: Yes     Partners: Male   Lifestyle    Physical activity:     Days per week: None     Minutes per session: None    Stress: None   Relationships    Social connections:     Talks on phone: None     Gets together: None     Attends Worship service: None     Active member of club or organization: None     Attends meetings of clubs or organizations: None     Relationship status: None    Intimate partner violence:     Fear of current or ex partner: None     Emotionally abused: None     Physically abused: None     Forced sexual activity: None   Other Topics Concern    None   Social History Narrative    ** Merged History Encounter **          Family History   Problem Relation Age of Onset    Cancer Father        PHYSICAL EXAM    VITAL SIGNS: /62   Pulse 97   Temp 98.3 °F (36.8 °C) (Oral)   Resp 19   Ht 5' 6\" (1.676 m)   Wt 182 lb 8.7 oz (82.8 kg)   SpO2 100%   BMI 29.46 kg/m²    Constitutional:  Well developed, well nourished, no acute distress   Eyes: No periorbital redness or swelling, sclera nonicteric  HENT:  Atraumatic, moist mucus membranes  Neck: supple, no JVD   Respiratory:  Lungs clear to auscultation bilaterally, no retractions   Cardiovascular:  regular rate, no murmurs  GI:  Soft and rounded, no abdominal tenderness, normal bowel sounds  Musculoskeletal:  No edema, no acute deformities  Integument:  Well hydrated, no petechiae   Neurologic:  Awake, alert and oriented, CN II-XII intact, normal finger to nose test bilaterally, 5/5 strength in all 4 extremities  Vascular: Radial pulses 2+ equal bilaterally    RADIOLOGY  XR CHEST PORTABLE   Final Result   Right PICC line with catheter tip in the mid SVC. Lungs appear grossly clear.            LABS  Results for orders placed or performed during the hospital encounter of 02/23/20   CBC Auto Differential   Result Value Ref Range    WBC 2.2 (L) 4.0 - 11.0 K/uL    RBC 3.20 (L) 4.00 - 5.20 M/uL    Hemoglobin 10.3 (L) 12.0 - 16.0 g/dL    Hematocrit 29.9 (L) 36.0 - 48.0 %    MCV 93.2 80.0 - 100.0 fL    MCH 32.1 26.0 - 34.0 pg    MCHC 34.4 31.0 - 36.0 g/dL    RDW 15.4 12.4 - 15.4 %    Platelets 79 (L) 743 - 450 K/uL    MPV 9.1 5.0 - 10.5 fL    PLATELET SLIDE REVIEW Decreased     SLIDE REVIEW see below     Neutrophils % 76.7 %    Lymphocytes % 14.0 %    Monocytes % 8.9 %    Eosinophils % 0.1 %    Basophils % 0.3 %    Neutrophils Absolute 1.7 1.7 - 7.7 K/uL    Lymphocytes Absolute 0.3 (L) 1.0 - 5.1 K/uL    Monocytes Absolute 0.2 0.0 - 1.3 K/uL    Eosinophils Absolute 0.0 0.0 - 0.6 K/uL    Basophils Absolute 0.0 0.0 - 0.2 K/uL   Comprehensive Metabolic Panel w/ Reflex to MG   Result Value Ref Range    Sodium 132 (L) 136 - 145 mmol/L    Potassium reflex Magnesium 4.5 3.5 - 5.1 mmol/L    Chloride 94 (L) 99 - 110 mmol/L    CO2 23 21 - 32 mmol/L    Anion Gap 15 3 - 16    Glucose 500 (H) 70 - 99 mg/dL    BUN 13 7 - 20 mg/dL    CREATININE 0.9 0.6 - 1.2 mg/dL    GFR Non-African American >60 >60    GFR African American >60 >60    Calcium 9.8 8.3 - 10.6 mg/dL    Total Protein 7.5 6.4 - 8.2 g/dL    Alb 3.8 3.4 - 5.0 g/dL    Albumin/Globulin Ratio 1.0 (L) 1.1 - 2.2    Total Bilirubin 0.8 0.0 - 1.0 mg/dL    Alkaline Phosphatase 251 (H) 40 - 129 U/L    ALT 25 10 - 40 U/L    AST 35 15 - 37 U/L    Globulin 3.7 g/dL   Lipase   Result Value Ref Range    Lipase 43.0 13.0 - 60.0 U/L   Urinalysis Reflex to Culture   Result Value Ref Range    Color, UA YELLOW Straw/Yellow    Clarity, UA CLOUDY (A) Clear    Glucose, Ur >=1000 (A) Negative mg/dL    Bilirubin Urine Negative Negative    Ketones, Urine Negative Negative mg/dL    Specific Gravity, UA 1.011 1.005 - 1.030    Blood, Urine TRACE (A) Negative    pH, UA 6.0 5.0 - 8.0    Protein, UA Negative Negative mg/dL    Urobilinogen, Urine 0.2 <2.0 E.U./dL    Nitrite, Urine Negative Negative    Leukocyte Esterase, Urine SMALL (A) Negative    Microscopic Examination YES     Urine Type not given     Urine Reflex to Culture Yes    Blood Gas, Venous   Result Value Ref Range    pH, Gold 7.366 7.350 - 7.450    pCO2, Gold 46.8 40.0 - 50.0 mmHg    pO2, Gold 57 Not Established mmHg    HCO3, Venous 27 23 - 29 mmol/L    Base Excess, Gold 1.1 Not Established mmol/L    O2 Sat, Gold 89 Not Established %    Carboxyhemoglobin 1.9 %    MetHgb, Gold 0.5 <1.5 %    TC02 (Calc), Gold 28 Not Established mmol/L    O2 Content, Gold 13 Not Established mL/dL    O2 Therapy Unknown    Beta-Hydroxybutyrate   Result Value Ref Range    Beta-Hydroxybutyrate 0.14 0.00 - 0.27 mmol/L   Microscopic Urinalysis   Result Value Ref Range    RBC, UA 0-2 0 - 4 /HPF    Yeast, UA Present (A) None Seen /HPF    Urinalysis Comments see below     Hyaline Casts, UA 0 0 - 8 /LPF    WBC, UA 32 (H) 0 - 5 /HPF    Epi Cells 1 0 - 5 /HPF   POCT Glucose   Result Value Ref Range    QC OK? ok    POCT

## 2020-03-21 PROBLEM — G93.40 ENCEPHALOPATHY: Status: ACTIVE | Noted: 2020-01-01

## 2020-03-21 NOTE — CONSULTS
Labs     03/21/20  1541   WBC 3.6*       Recent Labs     03/21/20  1541   BUN 20   CREATININE 1.2       No intake or output data in the 24 hours ending 03/21/20 1737    Culture Results:       Ht Readings from Last 1 Encounters:   03/21/20 5' 6\" (1.676 m)        Wt Readings from Last 1 Encounters:   03/21/20 182 lb 15.7 oz (83 kg)         Estimated Creatinine Clearance: 51 mL/min (based on SCr of 1.2 mg/dL). Assessment/Plan:  Will initiate vancomycin 1500 mg IV for 1 dose. Will await further orders. Thank you for the consult. Will continue to follow.   Sagar Braswell, [facility]

## 2020-03-21 NOTE — ED PROVIDER NOTES
[address] [facility]        Pt Name: Sd Smith  MRN: [id number]  [place] 1955  Date of evaluation: 3/21/2020  Provider: Pan Dixon MD  PCP: Aida Cardona DO      CHIEF COMPLAINT       Chief Complaint   Patient presents with    Altered Mental Status     started 20 mins pta       HISTORY OFPRESENT ILLNESS   (Location/Symptom, Timing/Onset, Context/Setting, Quality, Duration, Modifying Factors,Severity)  Note limiting factors. Sd Smith is a 59 y.o. female with history of cirrhosis status post liver transplant on tacrolimus, insulin-dependent diabetes, SVT status post ablation, COPD, presenting due to altered mental status noted at home, difficult for family to characterize and unclear time of onset. History limited from patient. She is oriented to person but not place or time. Nursing Notes were all reviewed and agreed with or any disagreements were addressed  in the HPI.     REVIEW OF SYSTEMS    (2-9 systems for level 4, 10 or more for level 5)     Review of Systems   Unable to perform ROS: Mental status change         PAST MEDICAL HISTORY     Past Medical History:   Diagnosis Date    Anemia     Arthritis     Bipolar 1 disorder ([address]     Causalgia of lower limb     Cellulitis     Chronic pain syndrome     Chronic post-operative pain     Cirrhosis of liver (HCC)     Complications of transplanted liver     Convulsion ([address]     COPD (chronic obstructive pulmonary disease) (HCC)     Depression     Dermatophytosis of nail     Diabetes mellitus (HCC)     Diarrhea     Dry eyes     ESBL (extended spectrum beta-lactamase) producing bacteria infection 02/09/2020    urine    Fungal nail infection     Generalized abdominal pain     Generalized osteoarthritis     GERD (gastroesophageal reflux disease)     Hepatic encephalopathy (HCC)     Hyperlipidemia     Hypertension     ? borderline    Hypothyroidism resource strain: None    Food insecurity     Worry: None     Inability: None    Transportation needs     Medical: None     Non-medical: None   Tobacco Use    Smoking status: Never Smoker    Smokeless tobacco: Never Used   Substance and Sexual Activity    Alcohol use: No    Drug use: No    Sexual activity: Yes     Partners: Male   Lifestyle    Physical activity     Days per week: None     Minutes per session: None    Stress: None   Relationships    Social connections     Talks on phone: None     Gets together: None     Attends Cheondo[employer] service: None     Active member of club or organization: None     Attends meetings of clubs or organizations: None     Relationship status: None    Intimate partner violence     Fear of current or ex partner: None     Emotionally abused: None     Physically abused: None     Forced sexual activity: None   Other Topics Concern    None   Social History Narrative    ** Merged History Encounter **            SCREENINGS             PHYSICAL EXAM    (up to 7 for level 4, 8 or more for level 5)     ED Triage Vitals [03/21/20 1445]   BP Temp Temp Source Pulse Resp SpO2 Height Weight   -- 98.3 °F (36.8 °C) Temporal -- -- -- -- --      height is 5' 6\" (1.676 m) and weight is 182 lb 15.7 oz (83 kg). Her oral temperature is 98.7 °F (37.1 °C). Her blood pressure is 143/79 (abnormal) and her pulse is 99. Her respiration is 16 and oxygen saturation is 97%. Physical Exam  Constitutional: Chronically ill appearing. HENT:   Head: Normocephalic and atraumatic. Eyes: Conjunctivae normal.   Neck: Neck supple. Cardiovascular: Normal rate and intact distal pulses. Pulmonary/Chest: Lungs clear to auscultation. Effort normal. No respiratory distress. Abdominal: Superficial venous vasculature prominent. Distended, soft. Nontender. Exhibits no distension. No guarding or rebound. Neurological: Alert. Oriented to person but not place or time. Not answering any other questions. Moving all extremities spontaneously and to command. Symmetric strength. Asterixis is present. Musculoskeletal: Right BKA. No visible rashes or bony abnormalities visible or palpable. Skin: Jaundiced. Skin is warm and dry. Psychiatric: Normal mood and affect. Behavior is normal.   Nursing note and vitals reviewed.       DIAGNOSTIC RESULTS   LABS:    Results for orders placed or performed during the hospital encounter of 03/21/20   CBC Auto Differential   Result Value Ref Range    WBC 3.6 (L) 4.0 - 11.0 K/uL    RBC 3.74 (L) 4.00 - 5.20 M/uL    Hemoglobin 11.8 (L) 12.0 - 16.0 g/dL    Hematocrit 34.6 (L) 36.0 - 48.0 %    MCV 92.4 80.0 - 100.0 fL    MCH 31.4 26.0 - 34.0 pg    MCHC 34.0 31.0 - 36.0 g/dL    RDW 15.8 (H) 12.4 - 15.4 %    Platelets 64 (L) 496 - 450 K/uL    MPV 9.0 5.0 - 10.5 fL    Neutrophils % 82.0 %    Lymphocytes % 10.7 %    Monocytes % 7.3 %    Eosinophils % 0.0 %    Basophils % 0.0 %    Neutrophils Absolute 3.0 1.7 - 7.7 K/uL    Lymphocytes Absolute 0.4 (L) 1.0 - 5.1 K/uL    Monocytes Absolute 0.3 0.0 - 1.3 K/uL    Eosinophils Absolute 0.0 0.0 - 0.6 K/uL    Basophils Absolute 0.0 0.0 - 0.2 K/uL   Comprehensive Metabolic Panel w/ Reflex to MG   Result Value Ref Range    Sodium 136 136 - 145 mmol/L    Potassium reflex Magnesium 3.4 (L) 3.5 - 5.1 mmol/L    Chloride 101 99 - 110 mmol/L    CO2 18 (L) 21 - 32 mmol/L    Anion Gap 17 (H) 3 - 16    Glucose 326 (H) 70 - 99 mg/dL    BUN 20 7 - 20 mg/dL    CREATININE 1.2 0.6 - 1.2 mg/dL    GFR Non-African American 45 (A) >60    GFR  55 (A) >60    Calcium 9.4 8.3 - 10.6 mg/dL    Total Protein 8.1 6.4 - 8.2 g/dL    Alb 3.6 3.4 - 5.0 g/dL    Albumin/Globulin Ratio 0.8 (L) 1.1 - 2.2    Total Bilirubin 1.1 (H) 0.0 - 1.0 mg/dL    Alkaline Phosphatase 262 (H) 40 - 129 U/L    ALT 24 10 - 40 U/L    AST 21 15 - 37 U/L    Globulin 4.5 g/dL   Lipase   Result Value Ref Range    Lipase 27.0 13.0 - 60.0 U/L   Troponin   Result Value Ref Range    Troponin <0.01 <0.01 ng/mL   Ammonia   Result Value Ref Range    Ammonia 142 (HH) 11 - 51 umol/L   Lactic Acid, Plasma   Result Value Ref Range    Lactic Acid 2.4 (H) 0.4 - 2.0 mmol/L   TSH with Reflex   Result Value Ref Range    TSH 1.09 0.27 - 4.20 uIU/mL   SPECIMEN REJECTION   Result Value Ref Range    Rejected Test PT     Reason for Rejection see below    Magnesium   Result Value Ref Range    Magnesium 2.40 1.80 - 2.40 mg/dL   EKG 12 Lead   Result Value Ref Range    Ventricular Rate 101 BPM    Atrial Rate 101 BPM    P-R Interval 158 ms    QRS Duration 74 ms    Q-T Interval 386 ms    QTc Calculation (Bazett) 500 ms    P Axis 64 degrees    R Axis 17 degrees    T Axis 63 degrees    Diagnosis       Sinus tachycardiaOtherwise normal ECGWhen compared with ECG of 23-FEB-2020 15:22,ST now depressed in Anterior leads       All other labs were within normal range or not returned as of this dictation. EKG: All EKG's are interpreted by the Emergency Department Physician who either signs or co-signs this chart in the absence of a cardiologist.    EKG by my interpretation shows sinus tachycardia with rate of 101, normal axis, QTC of 500, no ST changes indicative of ischemia at this time. Appears to be similar to prior EKG dated February 23 of 2020. RADIOLOGY:   plain film images such as CT, Ultrasound and MRI are read by the radiologist. Plain radiographic images are visualized and preliminarily interpreted by the ED Provider with the below findings:    Xr Chest Portable    Result Date: 3/21/2020  EXAMINATION: ONE XRAY VIEW OF THE CHEST 3/21/2020 3:09 pm COMPARISON: 02/23/2020 HISTORY: ORDERING SYSTEM PROVIDED HISTORY: encephalopathy TECHNOLOGIST PROVIDED HISTORY: Reason for exam:->encephalopathy Reason for Exam: encephalopathy Acuity: Acute Type of Exam: Initial FINDINGS: Single portable frontal view of the chest is submitted for review. The cardiac silhouette is borderline prominent.   Limited low lung volume examination, resulting in crowding of the bronchopulmonary interstitium. No definite focal airspace consolidation, sizeable pleural effusion, or pneumothorax. Trachea is midline. Visualized osseous structures and soft tissues are grossly intact. Limited low lung volume examination. No convincing evidence for acute cardiopulmonary pathology. PROCEDURES   Unless otherwise noted below, none     Procedures    CRITICAL CARE TIME   N/A    CONSULTS:  PHARMACY TO DOSE VANCOMYCIN  IP CONSULT TO HOSPITALIST  IP CONSULT TO INFECTIOUS DISEASES  IP CONSULT TO GI  PHARMACY TO DOSE VANCOMYCIN    EMERGENCY DEPARTMENT COURSE and DIFFERENTIAL DIAGNOSIS/MDM:   Vitals:    Vitals:    03/21/20 1510 03/21/20 1623 03/21/20 1730 03/21/20 1739   BP:       Pulse:  102  99   Resp:  14  16   Temp:       TempSrc:       SpO2:  98%  97%   Weight: 182 lb 15.7 oz (83 kg)      Height:   5' 6\" (1.676 m)        Patient was given the following medications:  Medications   vancomycin (VANCOCIN) 1500 mg in dextrose 5 % 250 mL IVPB (1,500 mg Intravenous New Bag 3/21/20 1831)   0.9 % sodium chloride bolus (0 mLs Intravenous Stopped 3/21/20 1835)   meropenem (MERREM) 1 g in sodium chloride 0.9 % 100 mL IVPB (mini-bag) (0 g Intravenous Stopped 3/21/20 1829)   lactated ringers infusion 1,300 mL (1,300 mLs Intravenous New Bag 3/21/20 1835)         72-year-old female with nonspecific altered mental status. History of cirrhosis status post liver transplant, immunosuppressed with tacrolimus. History of recurrent UTIs as well with multidrug-resistant organisms. Possible infection such as UTI versus hepatic encephalopathy given her asterixis and liver disease history. Also history of hypothyroidism so sending TSH with reflex. --    Labs remarkable for ammonia of 142, significantly elevated compared to prior. Though not a perfect test this is likely confirmatory for hepatic encephalopathy being involved.     She also has a lactic acidosis, 2.4 and is

## 2020-03-21 NOTE — H&P
Hospital Medicine History & Physical      PCP: Dk Escobar III, DO    Date of Admission: 3/21/2020    Date of Service: Pt seen/examined on  03/21/20 and Admitted to Inpatient     Chief Complaint:    Chief Complaint   Patient presents with    Altered Mental Status     started 20 mins pta       History Of Present Illness: The patient is a 59 y.o. female with PMH significant for chronic liver disease s/p liver transplantation in 2008, recurrent admission for hepatic encephalopathy, Bipolar 1 Disorder, osteomyelitis with remote R BKA, COPD, type 2 DM, seizure disorder, obstructive sleep apnea on CPAP admitted with altered mental status. Patient is encephalopathic and only answering \"okay\" to all questions including her name. Called her brother Aida Mireles. He says he hasn't spoken to her in a few years. Her  passed away and he believes she has been living by herself. Unable to provide any other information.      Labs in ED remarkable for LA 2.4, Cr 1.2 from baseline 1, negative trop, elevated alk phos 262, ammonia 142, bili 1.1, pancytopenia (chronic),     Past Medical History:        Diagnosis Date    Anemia     Arthritis     Bipolar 1 disorder (HCC)     Causalgia of lower limb     Cellulitis     Chronic pain syndrome     Chronic post-operative pain     Cirrhosis of liver (HCC)     Complications of transplanted liver     Convulsion (HCC)     COPD (chronic obstructive pulmonary disease) (HCC)     Depression     Dermatophytosis of nail     Diabetes mellitus (HCC)     Diarrhea     Dry eyes     ESBL (extended spectrum beta-lactamase) producing bacteria infection 02/09/2020    urine    Fungal nail infection     Generalized abdominal pain     Generalized osteoarthritis     GERD (gastroesophageal reflux disease)     Hepatic encephalopathy (HCC)     Hyperlipidemia     Hypertension ? borderline    Hypothyroidism     Hypoxemia     Immune system disorder (HCC)     Liver disease     liver transplant 2008    Liver transplanted ([facility]     Localization-related epilepsy ([facility]     Low back pain     Lumbar post-laminectomy syndrome     Morbid obesity (HCC)     Neuropathy, diabetic ([facility]     On home oxygen therapy     4 liters cont. cpap  at sleep/ night     Other disorders of kidney and ureter     Peripheral neuropathy     Phantom limb (HCC)     Pneumonia     Polyneuritis     Restrictive lung disease     Seizures (HCC)     Sleep apnea     cpap machine- d/t CO2 problem     SVT (supraventricular tachycardia) (HCC)     Thyroid mass     UTI (lower urinary tract infection)        Past Surgical History:        Procedure Laterality Date    ABDOMEN SURGERY      multiple    APPENDECTOMY      CHOLECYSTECTOMY      HERNIA REPAIR      multiple     HYSTERECTOMY      JOINT REPLACEMENT      right    JOINT REPLACEMENT      right after fall out of bed    LEG AMPUTATION BELOW KNEE      right    LEG AMPUTATION BELOW KNEE      LIVER TRANSPLANT  01/03/2008    from [address] [place] [address] Sachin Londono      allergy to pain pump machine, had removed d/t huge seroma     SLEEVE GASTROPLASTY  1985    UPPER GASTROINTESTINAL ENDOSCOPY N/A 11/1/2019    ESOPHAGOGASTRODUODENOSCOPY WITH MAC ANESTHESIA performed by Carlo Oliva MD at [facility] END[facility]       Medications Prior to Admission:    Prior to Admission medications    Medication Sig Start Date End Date Taking?  Authorizing Provider   lamoTRIgine (LAMICTAL) 100 MG tablet Take 1 tablet by mouth 2 times daily 2/17/20   Jolene Bautista DO   spironolactone (ALDACTONE) 25 MG tablet Take 1 tablet by mouth daily 2/18/20   Jolene Bautista, DO   furosemide (LASIX) 40 MG tablet Take 1 tablet by mouth daily 2/18/20   Jolene Bautista DO   insulin lispro (HUMALOG) 100 UNIT/ML injection vial Inject 10 Units into the skin 3 times

## 2020-03-22 PROBLEM — E11.65 POORLY CONTROLLED TYPE 2 DIABETES MELLITUS (HCC): Chronic | Status: ACTIVE | Noted: 2018-12-16

## 2020-03-22 NOTE — PROGRESS NOTES
[Patient admitted to room 4261 for encephalopathy, HX liver transplant . Alert but disoriented x4. . She responds to voice and her name. Falls right back to sleep. Respirations normal and unlabored. Skin assessment complete. She has a stage 2 on her buttocks . meplex border applied at this time. Wound care consult will be placed. Patient on room air and tele monitor. Call light with reach. Pt is incontinent. Patient was changed and new linens and pad on. Patient has no obvious distress at this time. Abdomen is round and hard to touch. She is right BKA. Redness noted around samson folds. Patient is NPO at this time . BS will be monitored.  Consults for GI will be placed

## 2020-03-22 NOTE — PROGRESS NOTES
Called and left a message for Kimani Romero, who is listed as an emergency contact, this morning. Waqas Buchanan called back this afternoon. She said that Brittney Mcleod lives with her. They started guardianship paperwork, but it was never completed. She said that Brittney Mcleod does not communicate with her family for many years. She said that on baseline Brittney Mcleod is able to make her own decisions. She stated that on Friday Mariama was functioning at baseline and that they were going to call her liver doctor after her nap, due to the ascites, but she never aroused from her nap. That is when she called EMS.

## 2020-03-22 NOTE — PROGRESS NOTES
NP, Sharan Canseco at bedside to assess pt . New orders for Rectal lactulose ordered, Pharmacy sending a dose. Will continue to monitor. Speech eval also placed for aspiration precautions. I was called regarding lethargy and pain responsive only. She did wake up briefly for me when I called her name. She was awake earlier this evening per nursing staff. Attempted PO lactulose and she appeared to choke on it. NG may be appropriate but we will trial rectal lactulose for now and re-evaluate.    Sharan Canseco, APRN - CNP 3/22/2020 12:36 AM

## 2020-03-22 NOTE — PROGRESS NOTES
on IV vancomycin and meropenem since admission. . ID was consulted for possible infection/sepsis as patient is immunocompromised    -Chronic pancytopenia due to cirrhosis-continue to monitor blood counts. . Hold heparin    -Seizure disorder-stable-continue Lamictal    -Diabetes mellitus type 2 ,with hyper uncontrolled glycemia--insulin-dependent-- will decrease Lantus and cancel mealtime dosing as patient is n.p.o., continue sliding scale    -Bipolar 1 disorder. . Zyprexa held on admission due to encephalopathy    -Obstructive sleep apnea. . On CPAP at bedtime      Patient will need NG tube for feeds and medications if remains drowsy and unable to feed. Lucy Joseph      DVT Prophylaxis: SCD  Diet: Diet NPO Effective Now  Code Status: Full Code        Tramaine Parmar MD

## 2020-03-22 NOTE — VIRTUAL HEALTH
Consults  Patient Location:  [address] [place] [address]    Provider Location ([facility]/State):   [facility]        Infectious Diseases  Teleconsultation note        Admission Date: 3/21/2020  Hospital Day: Hospital Day: 2   Attending: Abner Mccann MD  Date of service: 3/22/20     Reason for admission: Encephalopathy [G93.40]    Chief complaint/ Reason for consult: Altered mental state, concern for sepsis    Microbiology:        I have reviewed allavailable micro lab data and cultures    · Blood culture (2/2) - collected on 3/21/2020: In process  · Urine Legionella and pneumococcal antigens- collected on 3/21/2020: *Negative      Antibiotics and immunizations:       Current antibiotics: All antibiotics and their doses were reviewed by me    Recent Abx Admin                   vancomycin 1000 mg IVPB in 250 mL D5W addavial (mg) 1,000 mg New Bag 03/22/20 1315    meropenem (MERREM) 500 mg IVPB (mini-bag) (mg) 500 mg New Bag 03/22/20 1233     500 mg New Bag  0646     500 mg New Bag  0038    vancomycin (VANCOCIN) 1500 mg in dextrose 5 % 250 mL IVPB (mg) 1,500 mg New Bag 03/21/20 1831    meropenem (MERREM) 1 g in sodium chloride 0.9 % 100 mL IVPB (mini-bag) (g) 1 g New Bag 03/21/20 1747                  Immunization History: All immunization history was reviewed by me today. Immunization History   Administered Date(s) Administered    Pneumococcal Polysaccharide ([id number]) 12/12/2012       Known drug allergies: All allergies were reviewed and updated    Allergies   Allergen Reactions    Nitrofurantoin Hives    Abilify [Aripiprazole]     Acetaminophen Other (See Comments)     Avoid due to liver    Acetaminophen Other (See Comments)     Liver transplant restriction    Aspirin     Avelox [Moxifloxacin]     Carbamazepine     Contrast [Iodides] Hives     Contrast dye    Cymbalta [Duloxetine Hcl] Other (See Comments)     Pt.  Starts hallucinating     Doxycycline     Elavil [Amitriptyline] Hives    Erythromycin     Flagyl [Metronidazole]     Floxin [Ofloxacin]     Ibuprofen Other (See Comments)     Liver transplant    Iv Contrast [Iodides] Hives    Keflex [Cephalexin]      Tolerates Ceftriaxone.  Ketorolac Hives    Levaquin [Levofloxacin In D5w]     Levofloxacin Hives    Lyrica [Pregabalin]     Lyrica [Pregabalin] Other (See Comments)     hallucinations    Macrobid [Nitrofurantoin Monohyd Macro]     Motrin [Ibuprofen Micronized]     Nsaids      Liver transplant, states she can't have because of liver protocol      Nucynta Er [Tapentadol Hcl Er]     Nucynta [Tapentadol]     Penicillins Hives    Primidone     Reglan [Metoclopramide]     Tricyclic Antidepressants     Clindamycin      Other reaction(s): Yeast Infection       Social history:     Social History:  All social andepidemiologic history was reviewed and updated by me today as needed. · Tobacco use:   reports that she has never smoked. She has never used smokeless tobacco.  · Alcohol use:   reports no history of alcohol use. · Currently lives in: [address] [place] [address]  ·  reports no history of drug use.        Assessment:     The patient is a 59 y.o. old female who  has a past medical history of ALLEI (acute kidney injury) ([address] Anemia, Arthritis, Bipolar 1 disorder ([address] Causalgia of lower limb, Cellulitis, Chronic pain syndrome, Chronic post-operative pain, Cirrhosis of liver ([address] Complications of transplanted liver, Convulsion ([address] COPD (chronic obstructive pulmonary disease) ([address] Depression, Dermatophytosis of nail, Diabetes mellitus ([address] Diarrhea, Dry eyes, ESBL (extended spectrum beta-lactamase) producing bacteria infection (02/09/2020), Fungal nail infection, Generalized abdominal pain, Generalized osteoarthritis, GERD (gastroesophageal reflux disease), Hepatic encephalopathy ([address] Hyperlipidemia, Hypertension, Hypothyroidism, Hypoxemia, Immune system disorder ([address] Liver disease, Liver transplanted ([address] Localization-related epilepsy ([facility] Low back pain, Lumbar post-laminectomy syndrome, Morbid obesity ([address] Neuropathy, diabetic ([address] On home oxygen therapy, Other disorders of kidney and ureter, Peripheral neuropathy, Phantom limb ([address] Pneumonia, Polyneuritis, Restrictive lung disease, Seizures ([address] Sleep apnea, SVT (supraventricular tachycardia) ([address] Thyroid mass, and UTI (lower urinary tract infection). with following problems:    · Acute metabolic encephalopathy  · Pancytopenia, chronic  · Lactic acidosis  · Acute kidney injury  · Hyperammonemia  · History of liver cirrhosis status post liver transplantation  · Poorly controlled type 2 diabetes mellitus  · Extended-care facility resident  · Status post right below-knee amputation in the past  · GERD  · Essential hypertension  · Chronic pain syndrome  · History of ESBL  · Spinal cord stimulator in place  · Obstructive sleep apnea on CPAP  · Overweight due to excess calorie intake : Body mass index is 29.53 kg/m². Discussion: This is a teleconsultation note for this patient    I reviewed all of her labs. She has chronic pancytopenia. Her serum lactate was 2.4 on admission with elevated serum creatinine of 1.2. She was tachycardic on admission. Urine analysis reviewed. It was unremarkable. Admission chest x-ray reviewed. It is a rotated film. No obvious lung infiltrate, however, hard to rule out on this rotated film. Ammonia level was elevated at 142 on admission. INR was 1.6 in February        Plan:     Diagnostic Workup:    · Agree with blood cultures  · Will order pro calcitonin level, urine Legionella and pneumococcal antigen  · Nasal MRSA screen  · Check ammonia level and INR tomorrow  · Follow-up on right upper quadrant ultrasound ordered by primary  · Abdomen is distended according to primary note. Paracentesis has been ordered by primary team.  We will follow-up on ascites fluid labs and cultures.   Send ascitic fluid for induration, discharge or tenderness. · As always, continue efforts to minimizetubes/lines/drains as clinically appropriate to reduce chances of line associated infections. Current isolation precautions:    Currently active isolation(s): Contact       Level of complexity of consultation: High     Risk of Complications/Morbidity: High     · Illness(es)/ Infection present that pose threat to bodily function. · There is potential for severe exacerbation of infection/side effects of treatment. · Therapy requires intensive monitoring for antimicrobial agent toxicity. Thank you for involving me in the care of your patient. I will continue to follow. If you have any additional questions, please do not hesitate to contact me. Subjective:     Presenting complaint in ER:     Chief Complaint   Patient presents with    Altered Mental Status     started 20 mins pta        HPI: Raven Keyes is a 59 y.o. female patient, who was seen at the request of Dr. Amanda Aragon MD.    History was obtained from chart review and the RN as patient is encephalopathic. The patient was admitted on 3/21/2020. I have been consulted to see the patient for above mentioned reason(s). The patient has multiple medical comorbidities, and presented to the ER for altered mental state. The patient has history of insulin-dependent type 2 diabetes and liver cirrhosis status post liver transplantation, on tacrolimus and lives alone according to the chart. In the ER, she was noted to have a white cell count of 3600. Blood pressure was okay. She was admitted. Blood cultures have been sent and she has been started empirically on IV vancomycin and meropenem by ER team      I have been asked to see the patient for this complicated infection. Past Medical History: All past medical history reviewed today.     Past Medical History:   Diagnosis Date    ALLIE (acute kidney injury) ([facility]     Anemia     Arthritis     pump machine, had removed d/t huge seroma     SLEEVE GASTROPLASTY  1985    UPPER GASTROINTESTINAL ENDOSCOPY N/A 11/1/2019    ESOPHAGOGASTRODUODENOSCOPY WITH MAC ANESTHESIA performed by Marlene Lester MD at [facility] ENDOSCOPY         Family History: All family history was reviewed today. Problem Relation Age of Onset    Cancer Father          Medications: All current and past medications were reviewed. Medications Prior to Admission: lamoTRIgine (LAMICTAL) 100 MG tablet, Take 1 tablet by mouth 2 times daily  spironolactone (ALDACTONE) 25 MG tablet, Take 1 tablet by mouth daily  furosemide (LASIX) 40 MG tablet, Take 1 tablet by mouth daily  insulin lispro (HUMALOG) 100 UNIT/ML injection vial, Inject 10 Units into the skin 3 times daily (with meals)  insulin glargine (LANTUS) 100 UNIT/ML injection vial, Inject 28 Units into the skin 2 times daily  nystatin (MYCOSTATIN) 126653 UNIT/GM powder, Apply topically 3 times daily Apply topically 3 times daily as needed for redness and irritation  OLANZapine (ZYPREXA) 10 MG tablet, Take 1 tablet by mouth nightly  lidocaine (LIDODERM) 5 %, Place 1 patch onto the skin daily 12 hours on, 12 hours off.  (Patient taking differently: Place 1 patch onto the skin 2 times daily )  rifaximin (XIFAXAN) 550 MG tablet, Take 1 tablet by mouth 2 times daily  omeprazole (PRILOSEC) 20 MG delayed release capsule, Take 20 mg by mouth daily   levothyroxine (SYNTHROID) 112 MCG tablet, Take 112 mcg by mouth Daily  CPAP Machine MISC, by Does not apply route  tacrolimus (PROGRAF) 0.5 MG capsule, Take 0.5 mg by mouth daily   ondansetron (ZOFRAN-ODT) 8 MG disintegrating tablet, Take 8 mg by mouth every 8 hours as needed for Nausea or Vomiting  milnacipran HCl (SAVELLA) 100 MG TABS, Take 100 mg by mouth three times daily   Multiple Vitamins-Minerals (CENTRUM CARB ASSIST PO), Take 1 tablet by mouth daily      sodium chloride flush  10 mL Intravenous 2 times per day    lactulose  20 g Oral 4 Recent Labs     03/21/20  1541 03/22/20  0616   WBC 3.6* 3.0*   RBC 3.74* 3.36*   HGB 11.8* 10.5*   HCT 34.6* 31.4*   PLT 64* 72*   MCV 92.4 93.4   MCH 31.4 31.3   MCHC 34.0 33.6   RDW 15.8* 15.7*        BMP:  Recent Labs     03/21/20  1541 03/22/20  0615    142   K 3.4* 3.5    108   CO2 18* 18*   BUN 20 19   CREATININE 1.2 0.9   CALCIUM 9.4 9.1   GLUCOSE 326* 253*        Hepatic FunctionPanel:   Lab Results   Component Value Date    ALKPHOS 218 03/22/2020    ALT 22 03/22/2020    AST 27 03/22/2020    PROT 6.8 03/22/2020    PROT 7.3 07/11/2010    BILITOT 1.2 03/22/2020    BILIDIR 0.5 02/14/2020    IBILI 0.9 02/14/2020    LABALBU 2.9 03/22/2020       CPK:   Lab Results   Component Value Date    CKTOTAL 24 (L) 02/14/2020     ESR: No results found for: SEDRATE  CRP: No results found for: CRP      Imaging: All pertinent images and reports for the current visit were reviewed by meduring this visit. XR CHEST PORTABLE   Final Result   Limited low lung volume examination. No convincing evidence for acute   cardiopulmonary pathology. US GALLBLADDER RUQ    (Results Pending)   IR US GUIDED PARACENTESIS    (Results Pending)   CT HEAD WO CONTRAST    (Results Pending)       Outside records:    Labs, Microbiology, Radiology and pertinent results from Care everywhere, if available, were reviewed as a part ofthe consultation.       Problem list:       Patient Active Problem List   Diagnosis Code    Hepatic encephalopathy ([facility] K72.90    Acquired hypothyroidism E03.9    Arthritis M19.90    History of liver transplant ([address] Z94.4    Altered mental state R41.82    Partial epilepsy with impairment of consciousness ([facility] V96.624    Metabolic encephalopathy E29.30    Chronic pain syndrome G89.4    Primary insomnia F51.01    Recurrent major depressive disorder, in remission ([facility] F33.40    Obesity E66.9    Phantom limb pain (HCC) G54.6    Other chest pain R07.89    Hx of right BKA ([address] Z89.511   

## 2020-03-23 NOTE — PROGRESS NOTES
Speech Language Pathology  Facility/Department: [address]   CLINICAL BEDSIDE SWALLOW EVALUATION    NAME: Anya Macias  : 1955  MRN: [id number]    ADMISSION DATE: 3/21/2020  ADMITTING DIAGNOSIS: Encephalopathy [G93.40]     Anya Macias has Hepatic encephalopathy ([address] Acquired hypothyroidism; Arthritis; History of liver transplant ([address] Altered mental state; Partial epilepsy with impairment of consciousness ([address] Metabolic encephalopathy; Chronic pain syndrome; Primary insomnia; Recurrent major depressive disorder, in remission ([address] Obesity; Phantom limb pain ([address] Other chest pain; Hx of right BKA ([address] Immunosuppressed status ([address] Tachycardia; Type 1 diabetes mellitus with other specified complication ([address] Polypharmacy; SVT (supraventricular tachycardia) ([address] Hyperglycemia; Acute chest pain; Nausea; S/P catheter ablation of slow pathway; Chest pain; GERD (gastroesophageal reflux disease); Hypothyroid; Failure to thrive (0-17); Knee pain; Anemia; Hypoglycemia; Poorly controlled type 2 diabetes mellitus ([address] Anemia, chronic disease; Acute hepatic encephalopathy; Toe cyanosis; Encephalopathy; Lactic acidosis; Pancytopenia ([address] ALLIE (acute kidney injury) ([address] Hyperammonemia ([address] Cirrhosis of liver without ascites ([address] Essential hypertension; Other chronic pain; ZHANG on CPAP; and Overweight (BMI 25.0-29.9) on their problem list.    ONSET DATE: 3/21/2020    CHART REVIEW:  3/21/2020 admitted with sepsis: The patient is a 59 y.o. female with PMH significant for chronic liver disease s/p liver transplantation in , recurrent admission for hepatic encephalopathy, Bipolar 1 Disorder, osteomyelitis with remote R BKA, COPD, type 2 DM, seizure disorder, obstructive sleep apnea on CPAP admitted with altered mental status. Patient is encephalopathic and only answering \"okay\" to all questions including her name. Called her brother Juliet Christopher. He says he hasn't spoken to her in a few years.  Her  passed away and he believes she has been living by herself. Unable to provide any other information. 3/21/2020 CXR:   Impression   Limited low lung volume examination.  No convincing evidence for acute   cardiopulmonary pathology. 3/22/2020 SLP eval/tx ordered for dysphagia    Date of Eval: 3/23/2020  Evaluating Therapist: Kevin Gregorio    Current Diet level:  Current Diet : NPO  Current Liquid Diet : NPO      Primary Complaint  Patient Complaint: patient denies dysphagia history, but is poor historian; med team concern for dysphagia d/t AMS    Pain:  PAINAD Score: 0    Reason for Referral  Hellen Posadas was referred for a bedside swallow evaluation to assess the efficiency of her swallow function, identify signs and symptoms of aspiration and make recommendations regarding safe dietary consistencies, effective compensatory strategies, and safe eating environment. Impression  Dysphagia Diagnosis: Mild to moderate oral stage dysphagia;Mild pharyngeal stage dysphagia  · Accepted and tolerated evaluation at bedside. · Patient alert and cooperative, follows simple directions; requires extra time to generate simple responses; and is oriented x0. · Mild to moderate oral stage dysphagia characterized by decreased mastication and decreased lingual manipulation. Concern for excess labor for bolus formation and movement with regular solid. Prolonged but adequate bolus formation and movement with soft, moist, puree, and liquid trials. Concern for premature bolus loss to the pharynx with all. · Mild pharyngeal stage dysphagia characterized by delayed swallow and decreased laryngeal elevation without overt sign/symptoms of penetration/aspiration. Dysphagia Outcome Severity Scale: Level 4: Mild moderate dysphagia- Intermittent supervision/cueing.  One - two diet consistencies restricted     Treatment Plan  Requires SLP Intervention: Yes  Duration/Frequency of Treatment: ST to tx 3-5 times per week for dysphagia during acute admission  D/C Recommendations: To be determined    Recommended Diet and Intervention  Diet Solids Recommendation: Dysphagia Soft and Bite-Sized (Dysphagia III)  Liquid Consistency Recommendation: Thin  Recommended Form of Meds: Meds in puree  Compensatory Swallowing Strategies: Upright as possible for all oral intake;Remain upright for 30-45 minutes after meals  Therapeutic Interventions: Diet tolerance monitoring; Therapeutic PO trials with SLP;Patient/Family education    Treatment/Goals  Dysphagia Goals: The patient will tolerate recommended diet without observed clinical signs of aspiration; The patient/caregiver will demonstrate understanding of compensatory strategies for improved swallowing safety. ;The patient will tolerate regular consistency solids 10/10. General  Chart Reviewed: Yes  Subjective: Accepted and tolerated evaluation at bedside. Patient alert and cooperative, follows simple directions; requires extra time to generate simple responses; and is oriented x0. Dentition: Dentures top(missing lower teeth)  Patient Positioning: Upright in bed  Baseline Vocal Quality: Normal  Volitional Cough: Strong  Prior Dysphagia History: last seen 5/2018 with rec for soft/bite-sized  Consistencies Administered: Dysphagia Pureed (Dysphagia I); Honey - cup;Nectar - cup; Thin - cup; Thin - straw;Dysphagia Minced and Moist (Dysphagia II); Dysphagia Soft and Bite-Sized (Dysphagia III); Reg solid    Vision/Hearing  Vision: (adequate for assessment needs)  Hearing: (adequate for assessment needs)    Oral Motor Deficits  Oral Motor: Within functional limits    Oral Phase Dysfunction  Impaired Mastication: Reg Solid; Soft Solid(concern for excess labor with regular)  Decreased Anterior to Posterior Transit: All  Suspected Premature Bolus Loss: All     Indicators of Pharyngeal Phase Dysfunction  Delayed Swallow:  All  Decreased Laryngeal Elevation: All    Prognosis  Prognosis for safe diet advancement: good  Consulted and agree with results and recommendations: Patient;RN    Education  Patient Education: Completed on results/recs/plan  Patient Education Response: No evidence of learning         Therapy Time  SLP Individual Minutes  Time In: 7138  Time Out: 0930  Minutes: [address], [address] [place] [address], [id number]  Speech-Language Pathologist  3/23/2020 9:30 AM

## 2020-03-23 NOTE — PROGRESS NOTES
Pt awake, sitting up in bed. Disoriented x4 but alert. Medications given crushed in apple sauce with no difficulty. IV abx infusing into PIV per order. Bed alarm on and call light within reach.  Electronically signed by Umesh Murphy RN on 3/23/2020 at 10:46 AM

## 2020-03-23 NOTE — PROGRESS NOTES
History     Tobacco Use    Smoking status: Never Smoker    Smokeless tobacco: Never Used   Substance Use Topics    Alcohol use: No    Drug use: No     Social History     Tobacco Use   Smoking Status Never Smoker   Smokeless Tobacco Never Used      Family History   Problem Relation Age of Onset    Cancer Father         REVIEW OF SYSTEMS:    No fever / chills / sweats. No weight loss. No visual change, eye pain, eye discharge. No oral lesion, sore throat, dysphagia. Denies cough / sputum/Sob   Denies chest pain, palpitations/ dizziness  Denies nausea/ vomiting/abdominal pain/diarrhea. Denies dysuria or change in urinary function. Denies joint swelling or pain. No myalgia, arthralgia. No rashes, skin lesions   Denies focal weakness, sensory change or other neurologic symptoms  No lymph node swelling or tenderness.     Change in mentation,     PHYSICAL EXAM:      Vitals:    BP (!) 144/74   Pulse 59   Temp 97.4 °F (36.3 °C) (Axillary)   Resp 16   Ht 5' 6\" (1.676 m)   Wt 186 lb 4.6 oz (84.5 kg)   SpO2 95%   BMI 30.07 kg/m²     General Appearance: awake and in some  acute distress, +  pallor, no icterus chronic ill appearing woman   Skin: warm and dry, no rash or erythema  Head: normocephalic and atraumatic  Eyes: pupils equal, round, and reactive to light, conjunctivae normal  ENT: tympanic membrane, external ear and ear canal normal bilaterally, nose without deformity, nasal mucosa and turbinates normal without polyps  Neck: supple and non-tender without mass, no thyromegaly  no cervical lymphadenopathy  Pulmonary/Chest: clear to auscultation bilaterally- no wheezes, rales or rhonchi, normal air movement, no respiratory distress  Cardiovascular: normal rate, regular rhythm, normal S1 and S2, no murmurs, rubs, clicks, or gallops, no carotid bruits  Abdomen: soft, non-tender,  distended, normal bowel sounds, no masses or organomegaly  Scar from previous surgery   Extremities: no cyanosis, clubbing or pneumophila. Normal Range: Presumptive Negative    Narrative:     ORDER#: [id number]                          ORDERED BY: SADE Wright  SOURCE: Urine Clean Catch                  COLLECTED:  03/21/20 19:55  ANTIBIOTICS AT [facility]. :                      RECEIVED :  03/21/20 21:15  Performed at:  [facility]  [address], [address] [place] [address]   Phone [phone]   Strep Pneumoniae Antigen [[id number]] Collected: 03/1955   Order Status: Completed Specimen: Urine, clean catch Updated: 03/22/20 1100    STREP PNEUMONIAE ANTIGEN, URINE --    Presumptive Negative   Presumptive negative suggests no current or recent   pneumococcal infection. Infection due to Strep pneumoniae   cannot be ruled out since the antigen present in the sample   may be below the detection limit of the test.   Normal Range:Presumptive Negative    Narrative:     ORDER#: [id number]                          ORDERED BY: Ben Silveira  SOURCE: Urine Clean Catch                  COLLECTED:  03/21/20 19:55  ANTIBIOTICS AT [facility]. :                      RECEIVED :  03/21/20 21:15  Performed at:  [address] [employer] [address]   Phone [phone]   Culture, Body Fluid [[id number]]    Order Status: No result Specimen: Body Fluid from Ascitic Fluid    MRSA DNA Probe, Nasal [[id number]]    Order Status: Sent Specimen: Nasal from Nares    Culture, Respiratory [[id number]]    Order Status: No result Specimen: Sputum Expectorated          IMAGING:    CT HEAD WO CONTRAST   Final Result   No acute intracranial abnormality. XR CHEST PORTABLE   Final Result   Limited low lung volume examination. No convincing evidence for acute   cardiopulmonary pathology.          US GALLBLADDER RUQ    (Results Pending)   IR US GUIDED PARACENTESIS    (Results Pending)   [address]    (Results Pending)         All the pertinent images and reports for the current Cirrhosis of liver without ascites (HCC)    Essential hypertension    Other chronic pain    ZHANG on CPAP    Overweight (BMI 25.0-29. 9)     Hepatic Encephalopathy  High ammonia  Change in Mentation  Liver transplant for Cirrhosis of liver  Recurrent admit for Hepatic Encephalopathy  Bi polar  Rt BKA       Mentation seems to be improving after correction of her high ammonia and Blood cx in process no ascites on exam USG abd results noted. Suspicion for acute bacterial infection is low and changes seems to be mostly from Metabolic issues     Labs, Microbiology, Radiology and all the pertinent results from current hospitalization and  care every where were reviewed  by me as a part of the evaluation   Plan:   1. D/C iv Vancomycin   2. D/C Meropenem  3. Correct high Ammonia  4. May need placement to complete IV abx therapy   5. Given COVID-19 out break no active MDRO infection will d/c isolation to conserve PPE   6. Will sign off ok for d/c planning may need placement     Discussed with patient/Family and Nursing staff     Thanks for allowing me to participate in your patient's care and please call me with any questions or concerns.     Missael Welch MD  Infectious Disease  [facility]) Physician  Phone: [phone]   Fax : [phone]

## 2020-03-23 NOTE — PROGRESS NOTES
Hospitalist Progress Note      PCP: Leonarda Santana III, DO    Date of Admission: 3/21/2020    Chief Complaint: [address] Course: Admitted with elev ammonia, lactic acid, suspected sepsis, empirically started on vanc/merrem    Subjective: Patient is awake, alert, disoriented, denies any pain, no respiratory distress on room air      Medications:  Reviewed    Infusion Medications    dextrose       Scheduled Medications    insulin lispro  0-6 Units Subcutaneous TID WC    insulin lispro  0-3 Units Subcutaneous Nightly    sodium chloride flush  10 mL Intravenous 2 times per day    lactulose  20 g Oral 4 times per day    insulin glargine  10 Units Subcutaneous Nightly    lamoTRIgine  100 mg Oral BID    levothyroxine  112 mcg Oral Daily    miconazole   Topical BID    pantoprazole  40 mg Oral Daily with breakfast    rifaximin  550 mg Oral BID    spironolactone  25 mg Oral Daily    tacrolimus  0.5 mg Oral Daily    meropenem  500 mg Intravenous Q6H     PRN Meds: glucose, dextrose, glucagon (rDNA), dextrose, sodium chloride flush, acetaminophen **OR** acetaminophen      Intake/Output Summary (Last 24 hours) at 3/23/2020 1328  Last data filed at 3/23/2020 1320  Gross per 24 hour   Intake 600 ml   Output --   Net 600 ml       Physical Exam Performed:    BP (!) 146/78   Pulse 84   Temp 98.9 °F (37.2 °C) (Oral)   Resp 16   Ht 5' 6\" (1.676 m)   Wt 186 lb 4.6 oz (84.5 kg)   SpO2 96%   BMI 30.07 kg/m²     General appearance: No apparent distress, appears stated age    HEENT: Pupils equal, round, and reactive to light. Conjunctivae/corneas clear. Neck: Supple, with full range of motion. No jugular venous distention. Trachea midline. Respiratory:  Normal respiratory effort. Clear to auscultation, bilaterally without Rales/Wheezes/Rhonchi. Cardiovascular: Regular rate and rhythm with normal S1/S2 without murmurs, rubs or gallops.   Abdomen: Soft, non-tender, non-distended with normal bowel

## 2020-03-23 NOTE — PLAN OF CARE
Problem: Falls - Risk of:  Goal: Will remain free from falls  Description: Will remain free from falls  Outcome: Ongoing  Goal: Absence of physical injury  Description: Absence of physical injury  3/23/2020 1051 by Chuck Sena RN  Outcome: Ongoing       Problem: Mental Status - Impaired:  Goal: Mental status will improve  Description: Mental status will improve  3/23/2020 1051 by Chuck Sena RN  Outcome: Ongoing

## 2020-03-24 NOTE — PROGRESS NOTES
Dr. Carolyn Coates, next week  Encourage compliance with lactulose and rifaximin    If you have any questions or need any further information, please feel free to contact us [phone]. Thank you for allowing us to participate in the care of Leonie Herrera. The note was completed using Dragon voice recognition transcription. Every effort was made to ensure accuracy; however, inadvertent transcription errors may be present despite my best efforts to edit errors.     Tatiana CARRANZA

## 2020-03-24 NOTE — DISCHARGE SUMMARY
[facility] [place]   Discharge Summary    Patient:  Elizabeth Urbina  YOB: 1955    MRN: [id number]   Acct: [de-identified]    Primary Care Physician: Wicho Londono DO    Admit date:  3/21/2020    Discharge date:   3/24/2020      Discharge Diagnoses: Active Problems:    Poorly controlled type 2 diabetes mellitus (HCC)    Hepatic Encephalopathy    Lactic acidosis    Pancytopenia (HCC)    ALLIE (acute kidney injury) ([address]    Hyperammonemia (HCC)    Cirrhosis of liver without ascites (HCC)    Essential hypertension    Other chronic pain    ZHANG on CPAP    Overweight (BMI 25.0-29. 9)     No sepsis      Admitted for: [facility] Course:    Admitted with elev ammonia, ALLIE, lactic acidosis, suspected sepsis, empirically started on vanc/merrem. Cultures remain negative, patient has remained stable on room air. Ammonia has continued to trend down. Patient remains unable to recall medications and schedule required to return to prior living arrangement. She remains hemodynamically stable, afebrile with discontinuation of antibiotics. Consultants:  TON Ernandez     Discharge Medications:       Medication List      START taking these medications    lactulose 10 GM/15ML solution  Commonly known as:  CHRONULAC  Take 30 mLs by mouth every 6 hours        CHANGE how you take these medications    insulin glargine 100 UNIT/ML injection vial  Commonly known as:  LANTUS  Inject 16 Units into the skin nightly  What changed:    · how much to take  · when to take this     lidocaine 5 %  Commonly known as:  Lidoderm  Place 1 patch onto the skin daily 12 hours on, 12 hours off.   What changed:    · when to take this  · additional instructions        CONTINUE taking these medications    CENTRUM CARB ASSIST PO     CPAP Machine Misc     insulin lispro 100 UNIT/ML injection vial  Commonly known as:  HUMALOG  Inject 10 Units into the skin 3 times daily (with meals)     lamoTRIgine 100 MG tablet  Commonly known as: Range    Troponin <0.01 <0.01 ng/mL   Ammonia   Result Value Ref Range    Ammonia 142 (HH) 11 - 51 umol/L   Lactic Acid, Plasma   Result Value Ref Range    Lactic Acid 2.4 (H) 0.4 - 2.0 mmol/L   TSH with Reflex   Result Value Ref Range    TSH 1.09 0.27 - 4.20 uIU/mL   SPECIMEN REJECTION   Result Value Ref Range    Rejected Test PT     Reason for Rejection see below    Blood Gas, Venous   Result Value Ref Range    pH, Ogld 7.465 (H) 7.350 - 7.450    pCO2, Gold 31.2 (L) 40.0 - 50.0 mmHg    pO2, Gold 49 Not Established mmHg    HCO3, Venous 22 (L) 23 - 29 mmol/L    Base Excess, Gold -0.7 Not Established mmol/L    O2 Sat, Gold 87 Not Established %    Carboxyhemoglobin 0.9 %    MetHgb, Gold 0.3 <1.5 %    TC02 (Calc), Gold 23 Not Established mmol/L    O2 Content, Gold 14 Not Established mL/dL    O2 Therapy Unknown    Magnesium   Result Value Ref Range    Magnesium 2.40 1.80 - 2.40 mg/dL   Lactic Acid, Plasma   Result Value Ref Range    Lactic Acid 2.0 0.4 - 2.0 mmol/L   Tacrolimus Level   Result Value Ref Range    Tacrolimus Lvl 2.3 (L) 5.0 - 20.0 ng/mL   Lactate, Sepsis   Result Value Ref Range    Lactic Acid, Sepsis 2.0 (H) 0.4 - 1.9 mmol/L   Lactate, Sepsis   Result Value Ref Range    Lactic Acid, Sepsis 1.6 0.4 - 1.9 mmol/L   CBC   Result Value Ref Range    WBC 3.0 (L) 4.0 - 11.0 K/uL    RBC 3.36 (L) 4.00 - 5.20 M/uL    Hemoglobin 10.5 (L) 12.0 - 16.0 g/dL    Hematocrit 31.4 (L) 36.0 - 48.0 %    MCV 93.4 80.0 - 100.0 fL    MCH 31.3 26.0 - 34.0 pg    MCHC 33.6 31.0 - 36.0 g/dL    RDW 15.7 (H) 12.4 - 15.4 %    Platelets 72 (L) 658 - 450 K/uL    MPV 9.6 5.0 - 10.5 fL   Comprehensive Metabolic Panel   Result Value Ref Range    Sodium 142 136 - 145 mmol/L    Potassium 3.5 3.5 - 5.1 mmol/L    Chloride 108 99 - 110 mmol/L    CO2 18 (L) 21 - 32 mmol/L    Anion Gap 16 3 - 16    Glucose 253 (H) 70 - 99 mg/dL    BUN 19 7 - 20 mg/dL    CREATININE 0.9 0.6 - 1.2 mg/dL    GFR Non-African American >60 >60    GFR  >60 >60 Calcium 9.1 8.3 - 10.6 mg/dL    Total Protein 6.8 6.4 - 8.2 g/dL    Alb 2.9 (L) 3.4 - 5.0 g/dL    Albumin/Globulin Ratio 0.7 (L) 1.1 - 2.2    Total Bilirubin 1.2 (H) 0.0 - 1.0 mg/dL    Alkaline Phosphatase 218 (H) 40 - 129 U/L    ALT 22 10 - 40 U/L    AST 27 15 - 37 U/L    Globulin 3.9 g/dL   Magnesium   Result Value Ref Range    Magnesium 2.40 1.80 - 2.40 mg/dL   Procalcitonin   Result Value Ref Range    Procalcitonin 0.12 0.00 - 0.15 ng/mL   Lactate, Sepsis   Result Value Ref Range    Lactic Acid, Sepsis 1.5 0.4 - 1.9 mmol/L   Lactate, Sepsis   Result Value Ref Range    Lactic Acid, Sepsis 1.4 0.4 - 1.9 mmol/L   Lactate, Sepsis   Result Value Ref Range    Lactic Acid, Sepsis 1.4 0.4 - 1.9 mmol/L   Procalcitonin   Result Value Ref Range    Procalcitonin 0.10 0.00 - 0.15 ng/mL   CBC   Result Value Ref Range    WBC 2.3 (L) 4.0 - 11.0 K/uL    RBC 3.68 (L) 4.00 - 5.20 M/uL    Hemoglobin 11.6 (L) 12.0 - 16.0 g/dL    Hematocrit 33.9 (L) 36.0 - 48.0 %    MCV 92.2 80.0 - 100.0 fL    MCH 31.4 26.0 - 34.0 pg    MCHC 34.1 31.0 - 36.0 g/dL    RDW 15.4 12.4 - 15.4 %    Platelets 69 (L) 425 - 450 K/uL    MPV 9.5 5.0 - 10.5 fL   Comprehensive Metabolic Panel   Result Value Ref Range    Sodium 148 (H) 136 - 145 mmol/L    Potassium 3.2 (L) 3.5 - 5.1 mmol/L    Chloride 111 (H) 99 - 110 mmol/L    CO2 22 21 - 32 mmol/L    Anion Gap 15 3 - 16    Glucose 240 (H) 70 - 99 mg/dL    BUN 15 7 - 20 mg/dL    CREATININE 0.8 0.6 - 1.2 mg/dL    GFR Non-African American >60 >60    GFR African American >60 >60    Calcium 9.2 8.3 - 10.6 mg/dL    Total Protein 7.3 6.4 - 8.2 g/dL    Alb 3.1 (L) 3.4 - 5.0 g/dL    Albumin/Globulin Ratio 0.7 (L) 1.1 - 2.2    Total Bilirubin 1.4 (H) 0.0 - 1.0 mg/dL    Alkaline Phosphatase 241 (H) 40 - 129 U/L    ALT 25 10 - 40 U/L    AST 28 15 - 37 U/L    Globulin 4.2 g/dL   Magnesium   Result Value Ref Range    Magnesium 2.40 1.80 - 2.40 mg/dL   Vancomycin, Trough   Result Value Ref Range    Vancomycin Tr 13.1 10.0 -

## 2020-03-24 NOTE — CARE COORDINATION
Pt agreeable to [facility] for [facility], nurse and pt informed    HENS completed and placed in chart    Report to [phone]  Fax: [phone] w/ [facility] at [facility], facility will continue to work towards obtaining guardian for this pt    Electronically signed by Shelly Browne RN on 3/24/2020 at 10:30 AM

## 2020-06-09 PROBLEM — R18.8 ASCITES: Status: ACTIVE | Noted: 2020-01-01

## 2020-06-09 NOTE — H&P
Hospital Medicine History & Physical      PCP: Juliano Cabrales III, DO    Date of Admission: 6/9/2020    Date of Service: Pt seen/examined on  06/09/20 and Admitted to Inpatient     Chief Complaint:    Chief Complaint   Patient presents with    Fall     patient slid out of chair onto ground. Denies head injury/loss of consciousness       History Of Present Illness: The patient is a 59 y.o. female with PMH significant for liver transplant 2008, recurrent admissions for HE, DM, bipolar, COPD, depression, seizures, BKA who presents to [facility] with multiple falls over last few days, injury to shoulder and can no longer transfer on her own. She says she has fallen a few times in the last 3 days. One time was mechanical due to an issue with her recliner and the other 2 times has been when she was sitting on the toilet. She says she feels like these episodes are related to dizziness more so than weakness. She has not had any loss of consciousness. She also feels like her abdomen has been getting significantly larger over the last 2 weeks and is now quite distended. She says she has had multiple paracenteses performed in the past.  She last saw her liver doctor about 2 months ago and he told her that time she did not require another paracentesis but feels that she needs one now. She denies any fever, cough, shortness of breath, nausea, vomiting, constipation, dysuria. Labs in the ED remarkable for stable pancytopenia, ammonia 101.       Past Medical History:        Diagnosis Date    [facility]) [facility]     Anemia     Arthritis     Bipolar 1 disorder (HCC)     Causalgia of lower limb     Cellulitis     Chronic pain syndrome     Chronic post-operative pain     Cirrhosis of liver (HCC)     Complications of transplanted liver     Convulsion ([facility]     COPD (chronic obstructive pulmonary disease) (HCC)     Depression     Dermatophytosis of nail     Diabetes mellitus ([address]     Diarrhea     Dry eyes     ESBL (extended spectrum beta-lactamase) producing bacteria infection 02/09/2020    urine    Fungal nail infection     Generalized abdominal pain     Generalized osteoarthritis     GERD (gastroesophageal reflux disease)     Hepatic encephalopathy (HCC)     Hyperlipidemia     Hypertension     ? borderline    Hypothyroidism     Hypoxemia     Immune system disorder ([address]     Liver disease     liver transplant 2008    [facility]     Localization-related epilepsy ([facility]     Low back pain     Lumbar post-laminectomy syndrome     Morbid obesity (HCC)     Neuropathy, diabetic ([address]     On home oxygen therapy     4 liters cont.  cpap  at sleep/ night     Other disorders of kidney and ureter     Peripheral neuropathy     Phantom limb (HCC)     Pneumonia     Polyneuritis     Restrictive lung disease     Seizures (HCC)     Sleep apnea     cpap machine- d/t CO2 problem     SVT (supraventricular tachycardia) (HCC)     Thyroid mass     UTI (lower urinary tract infection)        Past Surgical History:        Procedure Laterality Date    ABDOMEN SURGERY      multiple    APPENDECTOMY      CHOLECYSTECTOMY      HERNIA REPAIR      multiple     HYSTERECTOMY      JOINT REPLACEMENT      right    JOINT REPLACEMENT      right after fall out of bed    LEG AMPUTATION BELOW KNEE      right    LEG AMPUTATION BELOW KNEE      LIVER TRANSPLANT  01/03/2008    from [address] [place] [address] Sachin Londono      allergy to pain pump machine, had removed d/t huge seroma     SLEEVE GASTROPLASTY  1985    UPPER GASTROINTESTINAL ENDOSCOPY N/A 11/1/2019    ESOPHAGOGASTRODUODENOSCOPY WITH MAC ANESTHESIA performed by Aiad Hammer MD at [facility] ENDOSCOPY       Medications Prior to Admission:    Prior to Admission medications

## 2020-06-09 NOTE — ED NOTES
Patient presents to the ED via EMS for slipping out of chair today. Patient states she is wheelchair/bed bound and has fallen/slid down 3 times in the past few days. Denies dizziness, fevers, chest pain, shortness of breath. Patient states today she slipped out of chair when her chair tilted forward too much. Denies head injury or loss of consciousness. Patient alert and oriented, respirations even and easy.       Patient to room 7 on arrival, placed into gown for exam.     Hetal Pierre RN  06/09/20 6914

## 2020-06-09 NOTE — ED PROVIDER NOTES
Localization-related epilepsy ([facility]     Low back pain     Lumbar post-laminectomy syndrome     Morbid obesity (HCC)     Neuropathy, diabetic ([facility]     On home oxygen therapy     4 liters cont. cpap  at sleep/ night     Other disorders of kidney and ureter     Peripheral neuropathy     Phantom limb (HCC)     Pneumonia     Polyneuritis     Restrictive lung disease     Seizures (HCC)     Sleep apnea     cpap machine- d/t CO2 problem     SVT (supraventricular tachycardia) (HCC)     Thyroid mass     UTI (lower urinary tract infection)        SURGICAL HISTORY           Procedure Laterality Date    ABDOMEN SURGERY      multiple    APPENDECTOMY      CHOLECYSTECTOMY      HERNIA REPAIR      multiple     HYSTERECTOMY      JOINT REPLACEMENT      right    JOINT REPLACEMENT      right after fall out of bed    LEG AMPUTATION BELOW KNEE      right    LEG AMPUTATION BELOW KNEE      LIVER TRANSPLANT  01/03/2008    from [address] [place], [place] [address]loida Londono      allergy to pain pump machine, had removed d/t huge seroma     SLEEVE GASTROPLASTY  1985    UPPER GASTROINTESTINAL ENDOSCOPY N/A 11/1/2019    ESOPHAGOGASTRODUODENOSCOPY WITH MAC ANESTHESIA performed by Alma Araujo MD at [address] [place] [address]       Previous Medications    CPAP MACHINE MISC    by Does not apply route    INSULIN GLARGINE (LANTUS) 100 UNIT/ML INJECTION VIAL    Inject 16 Units into the skin nightly    INSULIN LISPRO (HUMALOG) 100 UNIT/ML INJECTION VIAL    Inject 10 Units into the skin 3 times daily (with meals)    LACTULOSE (CHRONULAC) 10 GM/15ML SOLUTION    Take 30 mLs by mouth every 6 hours    LAMOTRIGINE (LAMICTAL) 100 MG TABLET    Take 1 tablet by mouth 2 times daily    LEVOTHYROXINE (SYNTHROID) 112 MCG TABLET    Take 112 mcg by mouth Daily    LIDOCAINE (LIDODERM) 5 %    Place 1 patch onto the skin daily 12 hours on, 12 hours off.     MILNACIPRAN HCL (SAVELLA) 100 MG TABS clinical evidence of   constipation. CT Head WO Contrast   Preliminary Result   No evidence of acute intracranial abnormality. XR CHEST STANDARD (2 VW)   Final Result   Cardiomegaly with edema versus atelectasis suspected at the left lung base. XR SHOULDER LEFT (MIN 2 VIEWS)   Final Result   Severe degenerative change in the knee, greatest medially. No acute fracture      No acute fracture left shoulder         XR KNEE LEFT (1-2 VIEWS)   Final Result   Severe degenerative change in the knee, greatest medially.   No acute fracture      No acute fracture left shoulder               LABS:  Labs Reviewed   CBC WITH AUTO DIFFERENTIAL - Abnormal; Notable for the following components:       Result Value    WBC 3.9 (*)     RBC 3.33 (*)     Hemoglobin 10.4 (*)     Hematocrit 31.3 (*)     Platelets 77 (*)     Lymphocytes Absolute 0.4 (*)     All other components within normal limits    Narrative:     Performed at:  [address] [place] [address]   Phone [phone]   COMPREHENSIVE METABOLIC PANEL - Abnormal; Notable for the following components:    Glucose 252 (*)     Alb 3.0 (*)     Albumin/Globulin Ratio 0.7 (*)     Total Bilirubin 1.1 (*)     Alkaline Phosphatase <5 (*)     All other components within normal limits    Narrative:     Performed at:  [address] [place] [address]   Phone [phone]   AMMONIA - Abnormal; Notable for the following components:    Ammonia 101 (*)     All other components within normal limits    Narrative:     Dorys Nguyen,  Chemistry results called to and read back by Junie Marroquin RN,  06/09/2020 16:28, by Blayne Newsome  Performed at:  [address] [place] [address]   Phone [phone]   PROTIME-INR - Abnormal; Notable for the following components:    Protime 17.0 (*)     INR 1.46 (*)     All other components within normal limits    Narrative:     Performed at:  [facility]  [address] [place] [address]   Phone [phone]   LIPASE    Narrative:     Performed at:  [facility]  [address] [place], [place] [address]   Phone [phone]   URINE RT REFLEX TO CULTURE       All other labs were within normal range or not returned as of this dictation. EMERGENCY DEPARTMENT COURSE and DIFFERENTIAL DIAGNOSIS/MDM:   Vitals:    Vitals:    06/09/20 1450   BP: (!) 148/76   Pulse: 92   Resp: 14   Temp: 97.8 °F (36.6 °C)   TempSrc: Oral   SpO2: 97%   Height: 5' 5\" (1.651 m)     Patient has a hard distended abdomen. She is had a bowel movement today and no vomiting today. She has moderate ascites and diffuse anasarca. She is not hypoxic. May be some edema on chest x-ray. She is not tachycardic or febrile. Her LFTs are normal with a bilirubin of 1.1. Her INR is elevated. Her ammonia level is elevated however she is alert and oriented x3. She has a bruise to her left knee and pain with her left shoulder movement because she landed on it last night. Socially she is unable to transfer herself from her wheelchair to the toilet and does not have any home health care after 10 PM.  She states that her facility that she lives at will not accept her back until she is able to transfer. We will consult the hospitalist..    CONSULTS:  IP CONSULT TO HOSPITALIST    PROCEDURES:  Procedures      FINAL IMPRESSION      1. Frequent falls    2. Other ascites    3. Injury of left shoulder, initial encounter    4. Injury of left knee, initial encounter    5. History of amputation below knee, right [facility])          DISPOSITION/PLAN   DISPOSITION Decision To Admit 06/09/2020 06:12:58 PM      PATIENT REFERRED TO:  No follow-up provider specified.     DISCHARGE MEDICATIONS:  New Prescriptions    No medications on file       (Please note that portions of this note were completed with a voice recognition program.  Efforts were made to edit the dictations but occasionally words are mis-transcribed.)    Lucía Chui [place]  06/09/20 4815

## 2020-06-09 NOTE — ED PROVIDER NOTES
Attending Supervisory Note/Shared Visit   I have personally performed a face to face diagnostic evaluation on this patient. I have reviewed the mid-levels findings and agree. History and Exam by me shows an alert white female. She has had 3 falls recently. She lives in a group home situation. She has no caregivers after 10 PM.  She is a right below the knee amputee. She has arthritis in her left knee and her left shoulder. She has trouble transferring herself and has been falling. A contributing factor is her massively distended abdomen. This is been going on for several weeks. He has a history of a liver transplant 12 years ago. He does admit to some pain in her lower abdomen. No vomiting. General: Alert white female no acute distress. Head: Atraumatic and normocephalic. Eyes: Pupils equal round reactive. Extraocular movements are intact. ENT: Kelayres Maul is clear. Oropharynx negative. No facial trauma. Heart: Regular rate and rhythm. No murmurs or gallops noted. Lungs: Breath sounds decreased in the bases. No rales. Abdomen: Massively distended, some moderate diffuse poorly localized lower abdominal tenderness. No palpable masses. Multiple scars over abdomen. No obvious ventral or inguinal hernias. Musculoskeletal: Moderate pain on range of motion of her left knee. No obvious swelling. Right below the knee amputation. Lab reviewed. H&H are 10.4 and 31.3. White blood count 3900. Platelet count is low at 77,000. Electrolytes BUN and creatinine are normal.  Glucose is 252. Lipase is 18. Ammonia levels 101. Chest x-ray: Cardiomegaly with edema versus atelectasis. EKG: Normal sinus rhythm, rate of 94, no acute ST-T wave changes. Rhythm strip shows a sinus rhythm with a rate of 94, NV interval 164 ms, QRS 74 ms with no other ectopy as interpreted by me.   Compared to an EKG dated 3/21/2020, the previously noted sinus tachycardia has resolved, no other significant changes

## 2020-06-10 NOTE — PROGRESS NOTES
that an AM-PAC score of 17 or less is typically not associated with a discharge to the patient's home setting. Based on the patient's AM-PAC score and their current ADL deficits, it is recommended that the patient have 3-5 sessions per week of Occupational Therapy at d/c to increase the patient's independence. At this time, this patient lacks the endurance, and/or tolerance for 3 hours of therapy/day, 5-7x/wk and would benefit most from a follow up treatment frequency of 3-5x/wk. Please see assessment section for further patient specific details. If patient discharges prior to next session this note will serve as a discharge summary. Please see below for the latest assessment towards goals. AM-[facility] Inpatient Daily Activity Raw Score: 15 (06/10/20 1101)  AM-PAC Inpatient ADL T-Scale Score : 34.69 (06/10/20 1101)  ADL Inpatient CMS 0-100% Score: 56.46 (06/10/20 1101)  ADL Inpatient CMS G-Code Modifier : CK (06/10/20 1101)    Goals  Short term goals  Time Frame for Short term goals: prior to d/c  Short term goal 1: Pt will complete stand pivot transfers with min A  Short term goal 2: Pt will complete sit <>  lift equipment with min/mod A  Short term goal 3: Pt will tolerate UE exercises within pain tolerance to increase strength and activity tolerance   Short term goal 4: Pt will tolerate 1+ minute of standing to increase strength and activity for self-care and transfers  Patient Goals   Patient goals : \"to get better\"       Therapy Time   Individual Concurrent Group Co-treatment   Time In       1020   Time Out       1105   Minutes       45   Timed Code Treatment Minutes: 30 Minutes(15 min eval )     If pt is discharged prior to next OT session, this note will serve as the discharge summary.     Carmie Buerger, TEP/A#[id number]

## 2020-06-10 NOTE — PROGRESS NOTES
Hospital Medicine Progress Note      Admit Date: 6/9/2020       CC: F/U for patient slid out of chair onto ground. Denies LOC, head injury. HPI: The patient is a 59 y.o. female with PMH significant for liver transplant 2008, recurrent admissions for HE, DM, bipolar, COPD, depression, seizures, BKA who presents to [facility] with multiple falls over last few days, injury to shoulder and can no longer transfer on her own. She says she has fallen a few times in the last 3 days. One time was mechanical due to an issue with her recliner and the other 2 times has been when she was sitting on the toilet. She says she feels like these episodes are related to dizziness more so than weakness. She has not had any loss of consciousness. She also feels like her abdomen has been getting significantly larger over the last 2 weeks and is now quite distended. She says she has had multiple paracenteses performed in the past.  She last saw her liver doctor about 2 months ago and he told her that time she did not require another paracentesis but feels that she needs one now. She denies any fever, cough, shortness of breath, nausea, vomiting, constipation, dysuria.     Labs in the ED remarkable for stable pancytopenia, ammonia 101. Interval History/Subjective: sleeping when I entered. states she fell 3x for 3 days in a row. Lives in assisted living. Told her they weren't allowed to have her back until she could stand alone. Doesn't know what makes her fall. Feels weak in her legs after she falls. 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 She does not see a neurologist.   Takes oxy 10 chronically by PCP. Denies CP, abd pain, n/v. Has had dizziness in the past, but not recently. Gets around with a power chair     Has had hepatitis in the past - had liver transplant 2010 in [facility]. Trudi Saldivar MD (GI).   C/o abd distention/ascites.  Had paracentesis 5 times in past. Last one several weeks ago here. Liver panel done. She had 3L removed during paracentesis. recheck ammonia level  109 up from admission. Will increase  Lactulose to 30g tid    Per GI: . 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 Titrate lactulose to 3-4 Bm/day. - HCC: RUQ US with doppler 1/27/20 negative. - Ascites: Diuretic refractory, on torsemide/aldactone as outpatient, requires intermittent LVP. Ascitic fluid negative for SBP. Ok to stop antibiotics. - Transplant candidacy: History of OLT in 2008, on prograf (Tacro 8.8 1/28/20). GI RECOMMENDATIONS:   -Continue on Torsemide 20 mg daily/Aldactone 50 mg daily at [place]. Recommend renal panel in 1 week at [place].   -Continue Lactulose 30 gm TID with goal of 3 BMs daily. Continue rifaximin 550 mg BID.  Monitor stool output.    -Recommend follow-up with Dr. Cyndy Gambino at [facility]. Review of Systems:     Abdominal distention  The patient denied headaches, visual changes, LOC, SOB, CP, ABD pain, N/V/D, skin changes, new or worsening weakness or neuromuscular deficits. Comprehensive ROS negative except as mentioned above.     Past Medical History:        Diagnosis Date    ALLIE (acute kidney injury) ([address]     Anemia     Arthritis     Bipolar 1 disorder (HCC)     Causalgia of lower limb     Cellulitis     Chronic pain syndrome     Chronic post-operative pain     Cirrhosis of liver (HCC)     Complications of transplanted liver     Convulsion ([address]     COPD (chronic obstructive pulmonary disease) (HCC)     Depression     Dermatophytosis of nail     Diabetes mellitus (HCC)     Diarrhea     Dry eyes     ESBL (extended spectrum beta-lactamase) producing bacteria infection 02/09/2020    urine    Fungal nail infection     Generalized abdominal pain     Generalized osteoarthritis     GERD (gastroesophageal reflux disease)     Hepatic encephalopathy (HCC)     Hyperlipidemia     Hypertension     ? borderline    Hypothyroidism     Hypoxemia     Immune system disorder (HCC)     Liver disease     liver transplant 2008    [facility]     Localization-related epilepsy ([facility]     Low back pain     Lumbar post-laminectomy syndrome     Morbid obesity (HCC)     Neuropathy, diabetic ([facility]     On home oxygen therapy     4 liters cont. cpap  at sleep/ night     Other disorders of kidney and ureter     Peripheral neuropathy     Phantom limb (HCC)     Pneumonia     Polyneuritis     Restrictive lung disease     Seizures (HCC)     Sleep apnea     cpap machine- d/t CO2 problem     SVT (supraventricular tachycardia) (HCC)     Thyroid mass     UTI (lower urinary tract infection)        Past Surgical History:        Procedure Laterality Date    ABDOMEN SURGERY      multiple    APPENDECTOMY      CHOLECYSTECTOMY      HERNIA REPAIR      multiple     HYSTERECTOMY      JOINT REPLACEMENT      right    JOINT REPLACEMENT      right after fall out of bed    LEG AMPUTATION BELOW KNEE      right    LEG AMPUTATION BELOW KNEE      LIVER TRANSPLANT  01/03/2008    from [address] [place] [address] Sachin Al Nellar      allergy to pain pump machine, had removed d/t huge seroma     SLEEVE GASTROPLASTY  1985    UPPER GASTROINTESTINAL ENDOSCOPY N/A 11/1/2019    ESOPHAGOGASTRODUODENOSCOPY WITH MAC ANESTHESIA performed by Carlo Wang MD at [facility] ENDOSCOPY       Allergies:  Nitrofurantoin; Abilify [aripiprazole]; Acetaminophen; Acetaminophen; Aspirin; Avelox [moxifloxacin]; Carbamazepine; Contrast [iodides]; Cymbalta [duloxetine hcl]; Doxycycline; Elavil [amitriptyline]; Erythromycin; Flagyl [metronidazole]; Floxin [ofloxacin]; Ibuprofen; Iv contrast [iodides]; Keflex [cephalexin]; Ketorolac; Levaquin [levofloxacin in d5w]; Levofloxacin; Lyrica [pregabalin]; Lyrica [pregabalin]; Macrobid [nitrofurantoin monohyd macro];  Motrin [ibuprofen micronized]; doesn't really use it (uses it when she's SOB)      Continue current regimen/therapies. Monitor. Adjust medical regimen as appropriate. Body mass index is 36.76 kg/m². The patient and / or the family were informed of the results of any tests, a time was given to answer questions, a plan was proposed and they agreed with plan.       DVT ppx: lovenox      Diet: DIET LOW SODIUM 2 GM;    Consults:  IP CONSULT TO HOSPITALIST  IP CONSULT TO SPIRITUAL SERVICES    DISPO/placement plan: pending GI plan    Code Status: Full Code      DELORES Heart CNP  06/10/20

## 2020-06-10 NOTE — PLAN OF CARE
Problem: Falls - Risk of:  Goal: Will remain free from falls  Description: Will remain free from falls  Outcome: Ongoing  Note: Fall risk assessment completed. Fall precautions in place. Bed in lowest position, wheels locked, bed/chair exit alarm in place, call light within reach, and non skid footwear on. Walkway free of clutter. Pt alert and oriented and able to make needs known. Pt educated to use call light when needing to get up, and pt utilizes call light to make needs known. Will continue to monitor. Goal: Absence of physical injury  Description: Absence of physical injury  Outcome: Ongoing     Problem: Pain:  Goal: Pain level will decrease  Description: Pain level will decrease  Outcome: Ongoing  Note: Pt assessed for pain. Pt in pain and assessed with 0-10 pain rating scale. Pt given prescribed analgesic for pain. (See eMar) Pt satisfied with pain relief thus far. Will reassess and continue to monitor.      Goal: Control of acute pain  Description: Control of acute pain  Outcome: Ongoing  Goal: Control of chronic pain  Description: Control of chronic pain  Outcome: Ongoing   Electronically signed by Gene Perales RN on 6/10/2020 at 5:27 PM

## 2020-06-10 NOTE — PROGRESS NOTES
Nutrition Assessment    Type and Reason for Visit:  Initial, Positive screen    Nutrition Recommendations: Add CCC to 2 gm sodium diet  Order strawberry Glucerna bid  Monitor meal and supplement intake  Monitor wt, labs, bowels    Nutrition Assessment: Pt admitted after several falls. Pt referred for poor po intake and wound on coccyx. Pt currently on 2 gm  sodium diet , will add CCC diet to order due to previous DM dx. Also adding strawberry Glucerna bid as ordered on previous admission. Wt gain lrelated to ascites. Malnutrition Assessment:  · Malnutrition Status: Insufficient data   · Context: Chronic illness   · Due to current CDC guidelines the physical malnutrition assessment has been deferred.   ·   Nutrition Risk Level: Low    Nutrient Needs:  · Estimated Daily Total Kcal: 4704-5198 (15-18 x ABW)  · Estimated Daily Protein (g):  (1.2-2.0 x IBW)  · Estimated Daily Total Fluid (ml/day): Per MD    Nutrition Diagnosis:   · Problem: Increased nutrient needs  · Etiology: related to Increased demand for energy/nutrients     Signs and symptoms:  as evidenced by Presence of wounds    Objective Information:  · Nutrition-Focused Physical Findings: ascites, no edema, BM 6/10  · Wound Type: Pressure Ulcer  · Current Nutrition Therapies:  · Oral Diet Orders: 2gm Sodium   · Oral Diet intake: Unable to assess  · Oral Nutrition Supplement (ONS) Orders: None  · ONS intake:    · Anthropometric Measures:  · Ht: 5' 5\" (165.1 cm)   · Current Body Wt: 220 lb 14.4 oz (100.2 kg)  · Admission Body Wt: 175 lb 11 oz (79.7 kg)  · Usual Body Wt:    · % Weight Change:  ,  Question wt gain since admission  · Ideal Body Wt: 120 lb (54.4 kg), % Ideal Body    · Adjusted Body Wt:  , body weight adjusted for    · BMI Classification: BMI 35.0 - 39.9 Obese Class II    Nutrition Interventions:   Modify current diet  Continued Inpatient Monitoring    Nutrition Evaluation:   · Evaluation: Goals set   · Goals: >50% of meals and supplement consumed    · Monitoring: Supplement Intake, Meal Intake, Wound Healing, Skin Integrity, Weight, Pertinent Labs, Monitor Bowel Function      Electronically signed by Dominik Myers RD, LD on 6/10/20 at 11:52 AM EDT    Contact Number: [phone]

## 2020-06-10 NOTE — PROGRESS NOTES
In to assess pt. A&Ox4. VSS. Rates pain to right stump, left shoulder and knee 8/10, treated with prn pain med, see MAR. Denies nausea as she is eating breakfast. Abdomen is very distended, educated on paracentesis planned for today, voiced understanding. IV site WDL. Complete bed change done at this time. Voiced no other needs at this time. Call light within reach, safety precautions in place, will continue to monitor.   Electronically signed by Gene Perales RN on 6/10/2020 at 9:38 AM

## 2020-06-10 NOTE — PLAN OF CARE
Problem: Falls - Risk of:  Goal: Will remain free from falls  Description: Will remain free from falls  Outcome: Ongoing  Goal: Absence of physical injury  Description: Absence of physical injury  Outcome: Ongoing   Fall risk assessment completed every shift. All precautions in place. Pt has call light within reach at all times. Room clear of clutter. Pt aware to call for assistance when getting up. Problem: Pain:  Goal: Pain level will decrease  Description: Pain level will decrease  Outcome: Ongoing  Goal: Control of acute pain  Description: Control of acute pain  Outcome: Ongoing  Goal: Control of chronic pain  Description: Control of chronic pain  Outcome: Ongoing   Pain/discomfort being managed with PRN analgesics per MD orders. Pt able to express presence and absence of pain and rate pain appropriately using numerical scale.

## 2020-06-10 NOTE — PROGRESS NOTES
Physical Therapy    Facility/Department: [address] ORTHOPEDICS  Initial Assessment  Co Treat with OT  This note serves as patient discharge summary if pt discharges prior to next PT visit      NAME: Flo Coffman  : 1955  MRN: [id number]    Date of Service: 6/10/2020    Discharge Recommendations:  3-5 sessions per week   Flo Coffman scored a  on the AM-PAC short mobility form. Current research shows that an AM-PAC score of 17 or less is typically not associated with a discharge to the patient's home setting. Based on the patient's AM-PAC score and their current functional mobility deficits, it is recommended that the patient have 3-5 sessions per week of Physical Therapy at d/c to increase the patient's independence. At this time, this patient lacks the endurance, and/or tolerance for 3 hours of therapy/day, 5-7x/wk and would benefit most from a follow up treatment frequency of 3-5x/wk. Please see assessment section for further patient specific details. PT Equipment Recommendations  Other: cont to assess    Assessment   Body structures, Functions, Activity limitations: Decreased functional mobility ; Decreased safe awareness;Decreased endurance;Decreased balance;Decreased cognition  Assessment: Per H and P:  \"The patient is a 59 y.o. female with PMH significant for liver transplant , recurrent admissions for HE, DM, bipolar, COPD, depression, seizures, BKA who presents to [facility] with multiple falls over last few days, injury to shoulder and can no longer transfer on her own. She says she has fallen a few times in the last 3 days. One time was mechanical due to an issue with her recliner and the other 2 times has been when she was sitting on the toilet. She says she feels like these episodes are related to dizziness more so than weakness. She has not had any loss of consciousness. \"  Patient to have paracentesis 6-.   Prior status: living in group home (no staff present

## 2020-06-10 NOTE — CONSULTS
GASTROENTEROLOGY INPATIENT CONSULTATION      IDENTIFYING DATA/REASON FOR CONSULTATION   PATIENT:  Raymond Pedro  MRN:  [id number]  ADMIT DATE: 6/9/2020  TIME OF EVALUATION: 6/10/2020 2:20 PM  HOSPITAL STAY:   LOS: 1 day     REASON FOR CONSULTATION:  Cirrhosis/Ascites     HISTORY OF PRESENT ILLNESS   Rosalina Benitez is a 59 y.o. female who has a past history notable for Cirrhosis 2/2 [facility] s/p OLT, BP, COPD, Right BKA, Hepatic Encephalopathy who presents with frequent falls. We have been consulted regarding ascites. Patient follows with Dr. Emeka Farley. She has a history of OLT at  2/2 to [facility]. She has had recurrent cirrhosis. Her cirrhosis is complicated by ascites and history of hepatic encephalopathy. She was transferred from SNF with increased falls. She has had several recent visits for abdominal distension. She had a recent US not showing any significant ascites. She had a Paracentesis today with 3L removed. She is currently on Torsemide/Aldactone as an outpatient.       PAST MEDICAL, SURGICAL, FAMILY, and SOCIAL HISTORY     Past Medical History:   Diagnosis Date    ALLIE (acute kidney injury) ([address]     Anemia     Arthritis     Bipolar 1 disorder (HCC)     Causalgia of lower limb     Cellulitis     Chronic pain syndrome     Chronic post-operative pain     Cirrhosis of liver (HCC)     Complications of transplanted liver     Convulsion ([address]     COPD (chronic obstructive pulmonary disease) (HCC)     Depression     Dermatophytosis of nail     Diabetes mellitus (HCC)     Diarrhea     Dry eyes     ESBL (extended spectrum beta-lactamase) producing bacteria infection 02/09/2020    urine    Fungal nail infection     Generalized abdominal pain     Generalized osteoarthritis     GERD (gastroesophageal reflux disease)     Hepatic encephalopathy (HCC)     Hyperlipidemia     Hypertension     ? borderline    Hypothyroidism     Hypoxemia     Immune system disorder ([address]     Liver disease     liver transplant 2008    Liver transplanted [facility])     [facility]-related [facility]     Low back pain     Lumbar post-laminectomy syndrome     Morbid obesity (HCC)     Neuropathy, diabetic ([facility]     On home oxygen therapy     4 liters cont. cpap  at sleep/ night     Other disorders of kidney and ureter     Peripheral neuropathy     Phantom limb (HCC)     Pneumonia     Polyneuritis     Restrictive lung disease     Seizures (HCC)     Sleep apnea     cpap machine- d/t CO2 problem     SVT (supraventricular tachycardia) (HCC)     Thyroid mass     UTI (lower urinary tract infection)      Past Surgical History:   Procedure Laterality Date    ABDOMEN SURGERY      multiple    APPENDECTOMY      CHOLECYSTECTOMY      HERNIA REPAIR      multiple     HYSTERECTOMY      JOINT REPLACEMENT      right    JOINT REPLACEMENT      right after fall out of bed    LEG AMPUTATION BELOW KNEE      right    LEG AMPUTATION BELOW KNEE      LIVER TRANSPLANT  01/03/2008    from [address] [place], [place] [address]n Al Nellar      allergy to pain pump machine, had removed d/t huge seroma     SLEEVE GASTROPLASTY  1985    UPPER GASTROINTESTINAL ENDOSCOPY N/A 11/1/2019    ESOPHAGOGASTRODUODENOSCOPY WITH MAC ANESTHESIA performed by Carlo Oliva MD at [facility] ENDOSCOPY     Family History   Problem Relation Age of Onset    Cancer Father      Social History     Socioeconomic History    Marital status:       Spouse name: None    Number of children: None    Years of education: None    Highest education level: None   Occupational History    None   Social Needs    Financial resource strain: None    Food insecurity     Worry: None     Inability: None    Transportation needs     Medical: None     Non-medical: None   Tobacco Use    Smoking status: Never Smoker    Smokeless tobacco: Never Used   Substance and Sexual Activity    Alcohol use: No    Drug use: No    Sexual activity: Yes Hematocrit 28.8 (L) 36.0 - 48.0 %    MCV 92.8 80.0 - 100.0 fL    MCH 31.5 26.0 - 34.0 pg    MCHC 34.0 31.0 - 36.0 g/dL    RDW 15.2 12.4 - 15.4 %    Platelets 75 (L) 444 - 450 K/uL    MPV 8.4 5.0 - 10.5 fL   Magnesium    Collection Time: 06/10/20  5:24 AM   Result Value Ref Range    Magnesium 1.90 1.80 - 2.40 mg/dL   Renal Function Panel    Collection Time: 06/10/20  5:24 AM   Result Value Ref Range    Sodium 137 136 - 145 mmol/L    Potassium 4.1 3.5 - 5.1 mmol/L    Chloride 106 99 - 110 mmol/L    CO2 21 21 - 32 mmol/L    Anion Gap 10 3 - 16    Glucose 320 (H) 70 - 99 mg/dL    BUN 7 7 - 20 mg/dL    CREATININE 0.6 0.6 - 1.2 mg/dL    GFR Non-African American >60 >60    GFR African American >60 >60    Calcium 8.1 (L) 8.3 - 10.6 mg/dL    Phosphorus 1.8 (L) 2.5 - 4.9 mg/dL    Alb 2.8 (L) 3.4 - 5.0 g/dL   Hepatic Function Panel    Collection Time: 06/10/20  5:24 AM   Result Value Ref Range    Total Protein 6.5 6.4 - 8.2 g/dL    Alkaline Phosphatase 257 (H) 40 - 129 U/L    ALT 16 10 - 40 U/L    AST 19 15 - 37 U/L    Total Bilirubin 1.1 (H) 0.0 - 1.0 mg/dL    Bilirubin, Direct 0.5 (H) 0.0 - 0.3 mg/dL    Bilirubin, Indirect 0.6 0.0 - 1.0 mg/dL   POCT Glucose    Collection Time: 06/10/20  6:20 AM   Result Value Ref Range    POC Glucose 304 (H) 70 - 99 mg/dl    Performed on ACCU-CHEK    POCT Glucose    Collection Time: 06/10/20 11:39 AM   Result Value Ref Range    POC Glucose 299 (H) 70 - 99 mg/dl    Performed on ACCU-CHEK      Other Labs      Imaging      ASSESSMENT AND RECOMMENDATIONS   Taiwo Starr is a 59 y.o. female with PMH of  Cirrhosis 2/2 [facility] s/p OLT, BP, COPD, Right BKA, Hepatic Encephalopathy who presents with increased falls and ascites. 1. Cirrhosis 2/2 [facility] s/p OLT in 2008 with recurrent cirrhosis:  - HE: Lactulose and rifaximin. Titrate lactulose to 3-4 Bm/day. - HCC: RUQ US with doppler 1/27/20 negative. - Ascites: Diuretic refractory, on torsemide/aldactone as outpatient, requires intermittent LVP.   - Transplant candidacy: History of OLT in 2008, on prograf (Tacro 8.8 1/28/20). - EV: EGD 11/1/19 - No varices  2.  ZHANG: On CPAP  3. DM-II     RECOMMENDATIONS:   -We will check a KUB to assess for any ileus/constipation contributing to abdominal distension. Paracentesis completed today with 3L removed but abdomen remains distended. Continue on Torsemide 20 mg daily/Aldactone 50 mg daily at [place]. Recommend renal panel in 1 week at [place].   -Continue Lactulose 30 gm TID with goal of 3 BMs daily. Continue rifaximin 550 mg BID. Monitor stool output. -Recommend follow-up with Dr. aLra Hardy at [facility]. If you have any questions or need any further information, please feel free to contact our consult team.  Thank you for allowing us to participate in the care of Hodan He.     Anish Graff MD  [facility]

## 2020-06-11 NOTE — CARE COORDINATION
Patient accepted for admit to [address] [place] [address] (admissions @  [phone]). Notified patient who is in agreement with plan. Will need hens and ambulance transport. Fax: [phone]  Report to [phone]   Will coordinate transfer upon d/c.    Electronically signed by Te Barry on 6/11/2020 at 1:42 PM

## 2020-06-11 NOTE — PROGRESS NOTES
Physical Therapy  Facility/Department: [address] ORTHOPEDICS  Daily Treatment Note  NAME: Ajay Lopez  : 1955  MRN: [id number]    Date of Service: 2020    Discharge Recommendations:  Patient would benefit from continued therapy after discharge, 3-5 sessions per week   PT Equipment Recommendations  Other: cont to assess    Ajay Lopez scored a 824 on the AM-PAC short mobility form. Current research shows that an AM-PAC score of 17 or less is typically not associated with a discharge to the patient's home setting. Based on the patient's AM-PAC score and their current functional mobility deficits, it is recommended that the patient have 3-5 sessions per week of Physical Therapy at d/c to increase the patient's independence. At this time, this patient lacks the endurance, and/or tolerance for 3 hours of therapy/day, 5-7x/wk and would benefit most from a follow up treatment frequency of 3-5x/wk. Please see assessment section for further patient specific details. If patient discharges prior to next session this note will serve as a discharge summary. Please see below for the latest assessment towards goals. Assessment   Body structures, Functions, Activity limitations: Decreased functional mobility ; Decreased safe awareness;Decreased endurance;Decreased balance;Decreased cognition  Assessment: Per H and P:  \"The patient is a 59 y.o. female with PMH significant for liver transplant , recurrent admissions for HE, DM, bipolar, COPD, depression, seizures, BKA who presents to [facility] with multiple falls over last few days, injury to shoulder and can no longer transfer on her own. She says she has fallen a few times in the last 3 days. One time was mechanical due to an issue with her recliner and the other 2 times has been when she was sitting on the toilet. She says she feels like these episodes are related to dizziness more so than weakness.   She has not had any loss of consciousness. \"  Patient to have paracentesis 6-. Prior status: living in group home (no staff present past 10 pm, per chart review), and reports independence in basic grooming. Has assist for bathing (sponge bath?) twice weekly. Patient states needs assist at times for pulling pants up. Mobility via electric WC. Reports independence with transfers, but also multiple recent falls. 6- status:  Up to max assist for bed mobility, modA/Max A of 2 for transfers to [facility] standing lift. Patient reportedly needs to be independent in transfers to stand prior to returning home. AmPac score reflects need for ongoing skilled therapy to optimize strength and functional capabilities, prior to return to home as prior. Will continue to see while in the hospital.   Treatment Diagnosis: Impaired functional mobility  Prognosis: Fair  Decision Making: High Complexity  History: as noted  Clinical Presentation: Unstable  PT Education: Goals;PT Role;Transfer Training;General Safety;Orientation;Plan of Care  REQUIRES PT FOLLOW UP: Yes  Activity Tolerance  Activity Tolerance: Patient limited by endurance; Patient Tolerated treatment well     Patient Diagnosis(es): The primary encounter diagnosis was Frequent falls. Diagnoses of Other ascites, Injury of left shoulder, initial encounter, Injury of left knee, initial encounter, and History of amputation below knee, right ([address] were also pertinent to this visit.      has a past medical history of ALLIE (acute kidney injury) ([address] Anemia, Arthritis, Bipolar 1 disorder ([address] Causalgia of lower limb, Cellulitis, Chronic pain syndrome, Chronic post-operative pain, Cirrhosis of liver (HCC), Complications of transplanted liver, Convulsion ([address] COPD (chronic obstructive pulmonary disease) ([address] Depression, Dermatophytosis of nail, Diabetes mellitus ([address] Diarrhea, Dry eyes, ESBL (extended spectrum beta-lactamase) producing bacteria infection, Fungal nail infection, Generalized goals: By acute DC  -all goals ongoing as of 6/11/20  Short term goal 1: Bed mobility Min-Mod A,  Short term goal 2: Transfers squat pivot Mod A of 2. Patient Goals   Patient goals : \"To get better. \"    Plan    Plan  Times per week: 3-5  Current Treatment Recommendations: Functional Mobility Training, Strengthening, Transfer Training, Cognitive Reorientation  Safety Devices  Type of devices:  All fall risk precautions in place, Call light within reach, Chair alarm in place, Gait belt, Patient at risk for falls, Left in chair, Nurse notified     Therapy Time   Individual Concurrent Group Co-treatment   Time In 1339         Time Out 1403         Minutes 24         Timed Code Treatment Minutes: 24 Minutes       Ava Ba, PT

## 2020-06-11 NOTE — PROGRESS NOTES
Patient in bed, specialty bed has been placed. Patient did sit up in the chair for about 2 hours. Scheduled pain medication given. She did have 3 BMs this shift. Vitals stable.  Call light in reach

## 2020-06-11 NOTE — DISCHARGE INSTR - COC
Beta Lactamase) 02/09/20 02/12/20 02/09/20 Urine Culture        2/9/20 urine          Nurse Assessment:  Last Vital Signs: /71   Pulse 93   Temp 98.4 °F (36.9 °C) (Oral)   Resp 16   Ht 5' 5\" (1.651 m)   Wt 221 lb 9 oz (100.5 kg)   SpO2 98%   Breastfeeding No   BMI 36.87 kg/m²     Last documented pain score (0-10 scale): Pain Level: 8  Last Weight:   Wt Readings from Last 1 Encounters:   06/11/20 221 lb 9 oz (100.5 kg)     Mental Status:  alert, coherent and confused at times    IV Access:  - None    Nursing Mobility/ADLs:  Walking   Dependent  Transfer  Dependent  Bathing  Dependent  Dressing  Dependent  Toileting  Assisted  Feeding  Independent  Med Admin  Independent  Med Delivery   whole    Wound Care Documentation and Therapy:  Wound 06/09/20 Coccyx pilonidal cyst (Active)   Wound Other 6/11/2020 11:00 AM   Dressing Status Clean;Dry; Intact 6/11/2020 11:00 AM   Dressing Changed Changed/New 6/11/2020 10:08 AM   Dressing/Treatment Foam 6/11/2020 11:00 AM   Wound Cleansed Rinsed/Irrigated with saline 6/11/2020 10:08 AM   Dressing Change Due 06/13/20 6/11/2020 11:00 AM   Wound Length (cm) 0.5 cm 6/11/2020 11:00 AM   Wound Width (cm) 0.2 cm 6/11/2020 11:00 AM   Wound Depth (cm) 0.1 cm 6/11/2020 11:00 AM   Wound Surface Area (cm^2) 0.1 cm^2 6/11/2020 11:00 AM   Change in Wound Size % (l*w) 71.43 6/11/2020 11:00 AM   Wound Volume (cm^3) 0.01 cm^3 6/11/2020 11:00 AM   Wound Healing % 86 6/11/2020 11:00 AM   Wound Assessment Pink 6/11/2020 11:00 AM   Drainage Amount None 6/11/2020 11:00 AM   Odor None 6/11/2020 11:00 AM   Camila-wound Assessment Pink 6/11/2020 11:00 AM   Baron%Wound Bed 100 6/11/2020 11:00 AM   Yellow%Wound Bed 25 6/10/2020  8:15 AM   Number of days: 1        Elimination:  Continence:   · Bowel: No  · Bladder: No  Urinary Catheter: None   Colostomy/Ileostomy/Ileal Conduit: No       Date of Last BM: 6/13/2020    Intake/Output Summary (Last 24 hours) at 6/11/2020 1342  Last data filed at 6/11/2020 1232  Gross per 24 hour   Intake 1360 ml   Output 700 ml   Net 660 ml     I/O last 3 completed shifts: In: 840 [P.O.:840]  Out: 3300 [Urine:300; Other:3000]    Safety Concerns: At Risk for Falls    Impairments/Disabilities:      Amputation - R BKA    Nutrition Therapy:  Current Nutrition Therapy:   - Oral Diet:  Carb Control 3 carbs/meal (1500kcals/day)    Routes of Feeding: Oral  Liquids: No Restrictions  Daily Fluid Restriction: no  Last Modified Barium Swallow with Video (Video Swallowing Test): not done    Treatments at the Time of Hospital Discharge:   Respiratory Treatments:   Oxygen Therapy:  is not on home oxygen therapy. Ventilator:    - No ventilator support    Rehab Therapies: Physical Therapy and Occupational Therapy  Weight Bearing Status/Restrictions: No weight bearing restirctions  Other Medical Equipment (for information only, NOT a DME order):  wheelchair  Other Treatments:     Patient's personal belongings (please select all that are sent with patient):  Dentures upper    RN SIGNATURE:  Electronically signed by Tim óGmez RN on 6/13/20 at 10:21 AM EDT    CASE MANAGEMENT/SOCIAL WORK SECTION    Inpatient Status Date: 6/9/20    Readmission Risk Assessment Score:  Readmission Risk              Risk of Unplanned Readmission:        40           Discharging to Facility/ Agency   · Name: [address] [place] [address]  · Address:  · Phone:[phone]  · Fax:    / signature: Electronically signed by Danny Jaime on 6/13/2020 at 4:29 PM      PHYSICIAN SECTION    Prognosis: Fair    Condition at Discharge: Stable    Rehab Potential (if transferring to Rehab): Fair    Recommended Labs or Other Treatments After Discharge: PT/OT/RN consult. BMP in 1 week    Physician Certification: I certify the above information and transfer of Khris Yee  is necessary for the continuing treatment of the diagnosis listed and that she requires Luke Burns for greater 30 days.

## 2020-06-11 NOTE — PROGRESS NOTES
No  Referring Practitioner: DELORES Neri CNP & Aylin Heath MD   Diagnosis: Ascites  Subjective  Subjective: Pt met bedside, agreeable for therapy treatment session and assist off the bed pan. Vital Signs  Patient Currently in Pain: (No complaints of pain at this time - reports improvement in R knee from previous date )      Objective    ADL  Toileting: (Assist for pericare off the bed pan)  Additional Comments: Declined further ADL needs at this time. Balance  Sitting Balance: Contact guard assistance(at EOB )  Standing Balance: Minimal assistance(in stedy )  Standing Balance  Time: ~30 seconds  Activity: Transfers and self-care    Functional Mobility  Functional Mobility Comments: Use of debbie stedy at this time for safety - electric w/c at baseline     Bed mobility  Supine to Sit: Minimal assistance;2 Person assistance  Sit to Supine: Unable to assess(in recliner at end of session)  Scooting: Maximal assistance     Transfers  Transfer Comments: initial max Ax2 for sit <> stand from EOB to debbie stedy with max cues; then completed sit <> stand transfer from recliner chair with mod Ax2 - improved coming upright         Cognition  Overall Cognitive Status: Exceptions  Arousal/Alertness: Delayed responses to stimuli  Following Commands: Follows one step commands with increased time; Follows one step commands with repetition  Memory: Decreased recall of recent events;Decreased short term memory  Safety Judgement: Decreased awareness of need for assistance;Decreased awareness of need for safety  Problem Solving: Assistance required to generate solutions;Assistance required to implement solutions  Insights: Decreased awareness of deficits  Initiation: Requires cues for some  Sequencing: Requires cues for some        Plan   Plan  Times per week: 3-5  Current Treatment Recommendations: Strengthening, Functional Mobility Training, Endurance Training, Balance Training, Wheelchair Mobility Training, Safety

## 2020-06-11 NOTE — PLAN OF CARE
Problem: Falls - Risk of:  Goal: Will remain free from falls  Description: Will remain free from falls  6/10/2020 2259 by Tom Mora RN  Outcome: Ongoing  Note: Fall risk assessment completed. Fall precautions in place. Call light within reach. Pt educated on calling for assistance before getting up. Walkway free of clutter. Will continue to monitor. 6/10/2020 1726 by Hernan Eaton RN  Outcome: Ongoing  Note: Fall risk assessment completed. Fall precautions in place. Bed in lowest position, wheels locked, bed/chair exit alarm in place, call light within reach, and non skid footwear on. Walkway free of clutter. Pt alert and oriented and able to make needs known. Pt educated to use call light when needing to get up, and pt utilizes call light to make needs known. Will continue to monitor. Goal: Absence of physical injury  Description: Absence of physical injury  6/10/2020 2259 by Tom Mora RN  Outcome: Ongoing  Note: Pt is free of injury. No injury noted. Fall precautions in place. Call light within reach. Will monitor. 6/10/2020 1726 by Hernan Eaton RN  Outcome: Ongoing     Problem: Pain:  Goal: Pain level will decrease  Description: Pain level will decrease  6/10/2020 2259 by Tom Mora RN  Outcome: Ongoing  6/10/2020 1726 by Hernan Eaton RN  Outcome: Ongoing  Note: Pt assessed for pain. Pt in pain and assessed with 0-10 pain rating scale. Pt given prescribed analgesic for pain. (See eMar) Pt satisfied with pain relief thus far. Will reassess and continue to monitor. Goal: Control of acute pain  Description: Control of acute pain  6/10/2020 2259 by Tom Mora RN  Outcome: Ongoing  6/10/2020 1726 by Hernan Eaton RN  Outcome: Ongoing  Goal: Control of chronic pain  Description: Control of chronic pain  6/10/2020 2259 by Tom Mora RN  Outcome: Ongoing  Note: Pt assessed for pain. Pt in pain and assessed with 0-10 pain rating scale.  Pt given prescribed analgesic for pain. (See eMar) Pt satisfied with pain relief thus far. Will reassess and continue to monitor.      6/10/2020 1726 by Nicholas Maria RN  Outcome: Ongoing   Electronically signed by Tonny Balderas RN on 6/10/2020 at 10:59 PM

## 2020-06-11 NOTE — PROGRESS NOTES
06/11/2020    LYMPHOPCT 11.3 06/09/2020    RBC 3.15 (L) 06/11/2020    MCH 31.3 06/11/2020    MCHC 34.0 06/11/2020    RDW 14.8 06/11/2020       Lab Results   Component Value Date    CREATININE 0.6 06/11/2020    BUN 6 (L) 06/11/2020     06/11/2020    K 4.0 06/11/2020     06/11/2020    CO2 23 06/11/2020       Lab Results   Component Value Date    MG 1.90 06/11/2020       Lab Results   Component Value Date    ALT 16 06/10/2020    AST 19 06/10/2020    ALKPHOS 257 (H) 06/10/2020    BILITOT 1.1 (H) 06/10/2020        No flowsheet data found. Lab Results   Component Value Date    LABA1C 6.8 06/09/2020       Imaging:  XR ABDOMEN (KUB) (SINGLE AP VIEW)   Final Result   Colonic distension, suggesting a colonic ileus with gas and stool throughout. No significant small bowel distension. US GUIDED PARACENTESIS   Final Result   Successful ultrasound guided paracentesis. CT ABDOMEN PELVIS WO CONTRAST   Final Result   Moderate volume ascites, especially within the pelvis. Diffuse anasarca with   mesenteric edema and diffuse edema within the body wall. Moderate urinary bladder distention. Multiple postsurgical changes. Definite acute bowel abnormality is not   identified, but the bowel is not well visualized without intravenous and oral   contrast.  Furthermore, the ascites further decreases contrast resolution. Moderate to large amount of stool. Correlate with clinical evidence of   constipation. CT Head WO Contrast   Final Result   No evidence of acute intracranial abnormality. XR CHEST STANDARD (2 VW)   Final Result   Cardiomegaly with edema versus atelectasis suspected at the left lung base. XR SHOULDER LEFT (MIN 2 VIEWS)   Final Result   Severe degenerative change in the knee, greatest medially.   No acute fracture      No acute fracture left shoulder         XR KNEE LEFT (1-2 VIEWS)   Final Result   Severe degenerative change in the knee, greatest medially. No acute fracture      No acute fracture left shoulder             Scheduled and prn Medications:    Scheduled Meds:   oxyCODONE HCl  10 mg Oral Q6H    insulin glargine  22 Units Subcutaneous Nightly    insulin lispro  12 Units Subcutaneous TID WC    lactulose  30 g Oral Q6H    lamoTRIgine  100 mg Oral BID    levothyroxine  112 mcg Oral Daily    milnacipran HCl  100 mg Oral TID    OLANZapine  10 mg Oral Nightly    pantoprazole  40 mg Oral QAM AC    rifaximin  550 mg Oral BID    spironolactone  25 mg Oral Daily    sodium chloride flush  10 mL Intravenous 2 times per day    enoxaparin  40 mg Subcutaneous Nightly    insulin lispro  0-6 Units Subcutaneous TID WC    insulin lispro  0-3 Units Subcutaneous Nightly    tacrolimus  0.5 mg Oral BID     Continuous Infusions:   dextrose       PRN Meds:.naloxone, sodium chloride flush, polyethylene glycol, promethazine **OR** ondansetron, potassium chloride **OR** potassium alternative oral replacement **OR** potassium chloride, magnesium sulfate, glucose, dextrose, glucagon (rDNA), dextrose    Assessment & Plan:          Falls, likely mechanical as patient has debility from  worsening weakness - has an amputation, uses wheeled chair to get around. Recurrent falls at home, now no longer able to transfer on her own due to shoulder injury and needs placement. No fractures on imaging.  Thinks falls are due to dizziness and lightheadedness and mostly occur when trying to get off the toilet  - check orthostats     Cirrhosis s/p transplant 2008, ascites  Hx HE with multiple admissions for same. Ascites on exam. Ammonia 101 here.  CT abd/pelvis with moderate ascites, anasarca.  Admitted in the past for hepatic encephalopathy and concern for infection, is currently alert and oriented though with elevated ammonia.  Do not believe she is currently encephalopathic  - continue prograf  - IR paracentesis  -Continue lactulose, rifaximin  - LFTs   - albumen 2.7   -

## 2020-06-11 NOTE — CARE COORDINATION
[address] [id number] Continence Nurse  Consult Note       NAME:  Kym Dietz  MEDICAL RECORD NUMBER:  [id number]  AGE: 59 y.o. GENDER: female  : 1955  TODAY'S DATE:  2020    Subjective   Reason for WOCN Evaluation and Assessment: stage 1 vs 2 on coccyx. t actually has pilonidal cyst    Kym Dietz is a 59 y.o. female referred by:   [] Physician  [x] Nursing  [] Other:     Wound Identification:  Wound Type: non-healing/non-surgical  Contributing Factors: none and pilonidal cyst    Wound History: chronic wound that opens and closes  Current Wound Care Treatment:  foam    Patient Goal of Care:  [x] Wound Healing  [] Odor Control  [] Palliative Care  [] Pain Control   [] Other:         PAST MEDICAL HISTORY        Diagnosis Date    [facility]     Anemia     Arthritis     Bipolar 1 disorder ([facility]     Causalgia of lower limb     Cellulitis     Chronic pain syndrome     Chronic post-operative pain     Cirrhosis of liver (HCC)     Complications of transplanted liver     Convulsion ([facility]     COPD (chronic obstructive pulmonary disease) (HCC)     Depression     Dermatophytosis of nail     Diabetes mellitus ([address]     Diarrhea     Dry eyes     ESBL (extended spectrum beta-lactamase) producing bacteria infection 2020    urine    Fungal nail infection     Generalized abdominal pain     Generalized osteoarthritis     GERD (gastroesophageal reflux disease)     Hepatic encephalopathy ([facility]     Hyperlipidemia     Hypertension     ? borderline    Hypothyroidism     Hypoxemia     Immune system disorder ([address]     Liver disease     liver transplant     Liver transplanted ([facility]     Localization-related epilepsy ([facility]     Low back pain     Lumbar post-laminectomy syndrome     Morbid obesity ([address]     Neuropathy, diabetic ([address]     On home oxygen therapy     4 liters cont.  cpap  at sleep/ night     Other disorders of kidney and ureter     Peripheral neuropathy     Phantom limb (HCC)     Pneumonia     Polyneuritis     Restrictive lung disease     Seizures (HCC)     Sleep apnea     cpap machine- d/t CO2 problem     SVT (supraventricular tachycardia) (HCC)     Thyroid mass     UTI (lower urinary tract infection)        PAST SURGICAL HISTORY    Past Surgical History:   Procedure Laterality Date    ABDOMEN SURGERY      multiple    APPENDECTOMY      CHOLECYSTECTOMY      HERNIA REPAIR      multiple     HYSTERECTOMY      JOINT REPLACEMENT      right    JOINT REPLACEMENT      right after fall out of bed    LEG AMPUTATION BELOW KNEE      right    LEG AMPUTATION BELOW KNEE      LIVER TRANSPLANT  01/03/2008    from [address] [place] [address] Sachin Londono      allergy to pain pump machine, had removed d/t huge seroma     SLEEVE GASTROPLASTY  1985    UPPER GASTROINTESTINAL ENDOSCOPY N/A 11/1/2019    ESOPHAGOGASTRODUODENOSCOPY WITH MAC ANESTHESIA performed by Sheldon Burnham MD at [facility] ENDOSCOPY       FAMILY HISTORY    Family History   Problem Relation Age of Onset    Cancer Father        SOCIAL HISTORY    Social History     Tobacco Use    Smoking status: Never Smoker    Smokeless tobacco: Never Used   Substance Use Topics    Alcohol use: No    Drug use: No       ALLERGIES    Allergies   Allergen Reactions    Nitrofurantoin Hives    Abilify [Aripiprazole]     Acetaminophen Other (See Comments)     Avoid due to liver    Acetaminophen Other (See Comments)     Liver transplant restriction    Aspirin     Avelox [Moxifloxacin]     Carbamazepine     Contrast [Iodides] Hives     Contrast dye    Cymbalta [Duloxetine Hcl] Other (See Comments)     Pt.  Starts hallucinating     Doxycycline     Elavil [Amitriptyline] Hives    Erythromycin     Flagyl [Metronidazole]     Floxin [Ofloxacin]     Ibuprofen Other (See Comments)     Liver transplant    Iv Contrast [Iodides] Hives    Keflex [Cephalexin]

## 2020-06-12 NOTE — PROGRESS NOTES
Pt alert and oriented x3, confused to date/time. VSS. AM meds completed. Pt ate breakfast well. ABD grossly distended and taut. Bowel sounds active. Will monitor.   Electronically signed by Irene Acevedo RN on 6/12/2020 at 9:11 AM

## 2020-06-12 NOTE — PROGRESS NOTES
Hospital Medicine Progress Note      Admit Date: 6/9/2020       CC: F/U for patient slid out of chair onto ground at facility. Couldn't stand. Denies LOC, head injury. + ascites with cirrhosis d/t liver disease from Hep and post liver trx    HPI: The patient is a 60 y. o. female with PMH significant for liver transplant 2008, recurrent admissions for HE, DM, bipolar, COPD, depression, seizures, BKA who presents to [facility] with multiple falls over last few days, injury to shoulder and can no longer transfer on her own.  She says she has fallen a few times in the last 3 days.  One time was mechanical due to an issue with her recliner and the other 2 times has been when she was sitting on the toilet.  She says she feels like these episodes are related to dizziness more so than weakness.  She has not had any loss of consciousness.  She also feels like her abdomen has been getting significantly larger over the last 2 weeks and is now quite distended.  She says she has had multiple paracenteses performed in the past.  She last saw her liver doctor about 2 months ago and he told her that time she did not require another paracentesis but feels that she needs one now. Jeovanny Mackay denies any fever, cough, shortness of breath, nausea, vomiting, constipation, dysuria.     Labs in the ED remarkable for stable pancytopenia, ammonia 101.     The patient states she fell 3x for 3 days in a row. Lives in assisted living. Told her they weren't allowed to have her back until she could stand alone. Doesn't know what makes her fall. Feels weak in her legs after she falls. 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  She does not see a neurologist. Tomas Zakiya oxy 10 chronically by PCP. Denies CP, abd pain, n/v. Has had dizziness in the past, but not recently. Gets around with a power chair      Has had hepatitis in the past - had liver transplant 2010 in [facility].  Sees Megan Montana, MD (GI).   C/o abd distention/ascites. Had paracentesis 5 times in past. Last one several weeks ago here.    Liver panel done.      She had 3L removed during paracentesis. Ammonia level  109 up from admission. Increased Lactulose to 30g tid     Per GI: . 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 Titrate lactulose to 3-4 Bm/day. - HCC: RUQ US with doppler 1/27/20 negative. - Ascites: Diuretic refractory, on torsemide/aldactone as outpatient, requires intermittent LVP. Ascitic fluid negative for SBP. Ok to stop antibiotics. - Transplant candidacy: History of OLT in 2008, on prograf (Tacro 8.8 1/28/20).       GI RECOMMENDATIONS:   -Continue on Torsemide 20 mg daily/Aldactone 50 mg daily at [facility]. Recommend renal panel in 1 week at [facility].   -Continue Lactulose 30 gm TID with goal of 3 BMs daily. Continue rifaximin 550 mg BID.  Monitor stool output.    -Recommend follow-up with Dr. Dayami Louis at [facility].   - Due for  RUQ US 7/2020    KUB 6/11: showed colonic distention with gas and stool throughout. On lactulose to promote bowel movements     Interval History/Subjective:  stomach has been hurting. not feeling much better. Still distended with abdomen. Has had 2-3 BMs yesterday (on increased dose 30mg 4x daily lactulose)       Ammonia level 108. Sitting up in chair. Keeps saying she thinks her A1c is causing this. Explained that it's her cirrhosis. Keeps saying she needs to get placement and can't go back to her facility. We are working on SNF. Has some short term memory issues at baseline that she admits to. Review of Systems:       The patient denied headaches, visual changes, LOC, SOB, CP, ABD pain, N/V/D, skin changes, new or worsening weakness or neuromuscular deficits. Comprehensive ROS negative except as mentioned above.     Past Medical History:        Diagnosis Date    ALLIE (acute kidney injury) ([facility] [address]     Anemia     Arthritis     Bipolar 1 UPPER GASTROINTESTINAL ENDOSCOPY N/A 11/1/2019    ESOPHAGOGASTRODUODENOSCOPY WITH MAC ANESTHESIA performed by Alma Araujo MD at [facility] ENDOSCOPY       Allergies:  Nitrofurantoin; Abilify [aripiprazole]; Acetaminophen; Acetaminophen; Aspirin; Avelox [moxifloxacin]; Carbamazepine; Contrast [iodides]; Cymbalta [duloxetine hcl]; Doxycycline; Elavil [amitriptyline]; Erythromycin; Flagyl [metronidazole]; Floxin [ofloxacin]; Ibuprofen; Iv contrast [iodides]; Keflex [cephalexin]; Ketorolac; Levaquin [levofloxacin in d5w]; Levofloxacin; Lyrica [pregabalin]; Lyrica [pregabalin]; Macrobid [nitrofurantoin monohyd macro]; Motrin [ibuprofen micronized]; Nsaids; Nucynta er [tapentadol hcl er]; Nucynta [tapentadol]; Penicillins; Primidone; Reglan [metoclopramide]; Tricyclic antidepressants; and Clindamycin    Past medical and surgical history reviewed. Any changes have been noted. PHYSICAL EXAM:  BP (!) 148/73   Pulse 96   Temp 97.6 °F (36.4 °C) (Oral)   Resp 14   Ht 5' 5\" (1.651 m)   Wt 221 lb 9 oz (100.5 kg)   SpO2 100%   Breastfeeding No   BMI 36.87 kg/m²       Intake/Output Summary (Last 24 hours) at 6/12/2020 0902  Last data filed at 6/11/2020 2138  Gross per 24 hour   Intake 880 ml   Output 1100 ml   Net -220 ml        General appearance:   No apparent distress, appears stated age. Cooperative. HEENT:  Normocephalic, atraumatic. PERRLA.  EOMi.  Conjunctivae/corneas clear, no icterus, non-injected. Neck: Supple, with full range of motion. No jugular venous distention. Trachea midline. Respiratory:  Normal respiratory effort. Clear to auscultation, bilaterally without Rales/Wheezes/Rhonchi. Cardiovascular:  Regular rate and rhythm without murmurs, rubs or gallops. Abdomen: abdomen distended, hyperactive BS, Soft, non-tender  Musculoskeletal:  Right Below knee amputation, LLE without deformities or edema. Clubbing of fingers.   Skin: Skin color, texture, turgor normal.  No rashes or questions, a plan was proposed and they agreed with plan.       DVT ppx: lovenox  GI ppx: protonix    Diet: Dietary Nutrition Supplements: Diabetic Oral Supplement  DIET LOW SODIUM 2 GM; Carb Control: 4 carb choices (60 gms)/meal    Consults:  IP CONSULT TO HOSPITALIST  IP CONSULT TO SPIRITUAL SERVICES  IP CONSULT TO GI  IP CONSULT TO SOCIAL WORK    DISPO/placement plan: 1-2 days, unless ammonia level continues to drop or other labs ok, then to SNF     Code Status: Full Code      DELORES Miller - MIGDALIA  06/12/20

## 2020-06-12 NOTE — PROGRESS NOTES
INPATIENT CONSULTATION:    IDENTIFYING DATA/REASON FOR CONSULTATION   PATIENT:  Kristin Cowden  MRN:  [id number]  ADMIT DATE: 6/9/2020  TIME OF EVALUATION: 6/12/2020 6:34 AM  HOSPITAL STAY:   LOS: 3 days   CONSULTING PHYSICIAN: Suzanne Hughes MD   REASON FOR CONSULTATION: Cirrhosis, ascites    Subjective:    Patient has no complaints, alert and oriented, no confusion this morning. 5 bowel movements recorded yesterday. Patient denies abdominal pain. Abdomen remains distended. MEDICATIONS   SCHEDULED:  insulin glargine, 30 Units, Nightly  furosemide, 20 mg, Daily  oxyCODONE HCl, 10 mg, Q6H  insulin lispro, 12 Units, TID WC  lactulose, 30 g, Q6H  lamoTRIgine, 100 mg, BID  levothyroxine, 112 mcg, Daily  milnacipran HCl, 100 mg, TID  OLANZapine, 10 mg, Nightly  pantoprazole, 40 mg, QAM AC  rifaximin, 550 mg, BID  spironolactone, 25 mg, Daily  sodium chloride flush, 10 mL, 2 times per day  enoxaparin, 40 mg, Nightly  insulin lispro, 0-6 Units, TID WC  insulin lispro, 0-3 Units, Nightly  tacrolimus, 0.5 mg, BID      FLUIDS/DRIPS:     dextrose       PRNs: naloxone, 0.4 mg, PRN  sodium chloride flush, 10 mL, PRN  polyethylene glycol, 17 g, Daily PRN  promethazine, 12.5 mg, Q6H PRN    Or  ondansetron, 4 mg, Q6H PRN  potassium chloride, 40 mEq, PRN    Or  potassium alternative oral replacement, 40 mEq, PRN    Or  potassium chloride, 10 mEq, PRN  magnesium sulfate, 1 g, PRN  glucose, 15 g, PRN  dextrose, 12.5 g, PRN  glucagon (rDNA), 1 mg, PRN  dextrose, 100 mL/hr, PRN      ALLERGIES:    Allergies   Allergen Reactions    Nitrofurantoin Hives    Abilify [Aripiprazole]     Acetaminophen Other (See Comments)     Avoid due to liver    Acetaminophen Other (See Comments)     Liver transplant restriction    Aspirin     Avelox [Moxifloxacin]     Carbamazepine     Contrast [Iodides] Hives     Contrast dye    Cymbalta [Duloxetine Hcl] Other (See Comments)     Pt.  Starts hallucinating     Doxycycline     Elavil and plans  as written above. I have made appropriate modifications and edited her assessment and plan where needed to reflect my impression and plans for this patient. Duong Daly a 59 y. o. 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 Ascites: S/P Paracentesis. Torsemide 20 mg daily/Aldactone 50 mg daily at DC. KUB performed. Abdominal distension in part related to colonic ileus/stool. Abdominal distension is improved with increase of lactulose. Continue Lactulose/Rifaxan. No asterixis on exam. Daily Ammonia levels can be discontinued. Outpatient follow-up with Dr. Rich Acevedo. Angela Weiner for PA from GI standpoint. Thank you for allowing me to participate in this patient's care. If there are any questions or concerns regarding this patient, or the plan we have set in place, please feel free to contact me at [phone].      Magen Gauthier MD

## 2020-06-12 NOTE — PROGRESS NOTES
dizziness more so than weakness. She has not had any loss of consciousness. She also feels like her abdomen has been getting significantly larger over the last 2 weeks and is now quite distended. She says she has had multiple paracenteses performed in the past.  She last saw her liver doctor about 2 months ago and he told her that time she did not require another paracentesis but feels that she needs one now. She denies any fever, cough, shortness of breath, nausea, vomiting, constipation, dysuria. \"  Response to previous treatment: Patient with no complaints from previous session  Family / Caregiver Present: No  Referring Practitioner: DELROES Torres MD   Diagnosis: Ascites  Subjective  Subjective: Patient supine in bed upon arrival to room with PT. Patient agreeable to therapy. Patient reports no pain      Orientation  Orientation  Overall Orientation Status: Impaired  Orientation Level: Oriented to person;Disoriented to time;Disoriented to person  Objective    ADL  Toileting: Dependent/Total(pure wick not in place, incont )        Balance  Sitting Balance: Contact guard assistance(seated on edge of bed)  Standing Balance: Moderate assistance  Standing Balance  Activity: Static standing in debbie stedy  Functional Mobility  Functional Mobility Comments: Use of debbie Tapactivedy at this time for safety - electric w/c at baseline   Bed mobility  Supine to Sit: Minimal assistance;2 Person assistance  Sit to Supine: Unable to assess  Transfers  Sit to stand: Moderate assistance;Maximum assistance;2 Person assistance  Stand to sit: Moderate assistance;Maximum assistance;2 Person assistance  Transfer Comments: initial max Ax2 for sit <> stand from EOB to debbie stedy with max cues     Cognition  Overall Cognitive Status: Exceptions  Arousal/Alertness: Appropriate responses to stimuli  Following Commands: Follows one step commands with increased time; Follows one step commands with repetition  Memory: Decreased recall of recent events;Decreased short term memory  Safety Judgement: Decreased awareness of need for assistance;Decreased awareness of need for safety  Problem Solving: Assistance required to generate solutions;Assistance required to implement solutions  Insights: Decreased awareness of deficits  Initiation: Requires cues for some  Sequencing: Requires cues for some     Assessment   Performance deficits / Impairments: Decreased functional mobility ; Decreased strength;Decreased endurance;Decreased ADL status; Decreased safe awareness;Decreased cognition;Decreased balance  Assessment: Discussed with OTR am pac score is 15 which indicates need for continued skilled OT to increase [place] and decrease caregiver burden. Patient able to complete bed mobility with Min A of 2. Sit to stand with Mod/Max A of 2 from bed. Transfer from bed to recliner chair per debbie zacarias. Patient is unsafe to return home at this time. Continue with POC. OT Education: OT Role;Precautions;Plan of Care;ADL Adaptive Strategies;Transfer Training  REQUIRES OT FOLLOW UP: Yes  Activity Tolerance  Activity Tolerance: Patient Tolerated treatment well  Safety Devices  Safety Devices in place: Yes  Type of devices: Left in chair;Call light within reach; Chair alarm in place;Gait belt;Nurse notified     Plan   Plan  Times per week: 3-5  Current Treatment Recommendations: Strengthening, Functional Mobility Training, Endurance Training, Balance Training, Wheelchair Mobility Training, Safety Education & Training, Equipment Evaluation, Education, & procurement, Patient/Caregiver Education & Training, Self-Care / ADL    AM-PAC Score        AM-[facility] Inpatient Daily Activity Raw Score: 15 (06/12/20 1117)  AM-PAC Inpatient ADL T-Scale Score : 34.69 (06/12/20 1117)  ADL Inpatient CMS 0-100% Score: 56.46 (06/12/20 1117)  ADL Inpatient CMS G-Code Modifier : CK (06/12/20 1117)    Goals  Short term goals  Time Frame for Short term goals: prior to d/c:

## 2020-06-13 NOTE — PROGRESS NOTES
from NH with recurrent falls. GI consulted regarding ascites     1. Abdominal distention: Likely due to combination of colonic distention and ascites. Paracentesis 6/10 with 3 L removed. KUB yesterday showed colonic distention with gas and stool throughout. On lactulose to promote bowel movements     2.  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 Ammonia level elevated however she is alert and oriented and no asterixis on exam.  On Lactulose and rifaximin.  - HCC: RUQ US with doppler 1/27/20 negative. Due for repeat ultrasound 7/2020  - Ascites: Diuretic refractory, on torsemide/aldactone as outpatient, requires intermittent LVP. - Transplant candidacy: History of OLT in 2008, on prograf (Tacro 8.8 1/28/20). - EV: EGD 11/1/19 - No varices     2.  Elevated ammonia level: Ammonia level 108 today. Patient alert and and oriented, no asterixis on exam.  Having multiple bowel movements. Recommend continuing current lactulose and rifaximin regimen     3. ZHANG: On CPAP     3. DM-II     RECOMMENDATIONS:    -Ascites: S/P Paracentesis. Torsemide 20 mg daily/Aldactone 50 mg daily at DC. Renal function stable. - Abdominal distension in part related to colonic ileus/stool. Patient having adequate bowel movements. Abdominal distension is chronic. Electrolytes ok. Encouraged to get out of bed and rotate in bed to promote passing of gas. -Continue Lactulose. No asterixis on exam. Patient not able to afford Rifaxan recommend continuing on Lactulose and having at least 3-4 BM per day to prevent encephalopathy.   -Outpatient follow-up with Dr. Ernie Moreno. If you have any questions or need any further information, please feel free to contact anyone on our consult team.  Thank you for allowing us to participate in the care of Trudy Maldonado.     Audrey Kwan MD  [facility] [facility]

## 2020-06-13 NOTE — PROGRESS NOTES
Patient A&O but forgetful. Patient tolerating PO intake well. PM medications given without complications. Pain medications given for the complain of pain, Zofran given for nausea. pure wick in place, mepilex applied on coccyx area. Call light within reach, able to make needs known, fall precautions in place. Will monitor. Electronically signed by Edmundo Montana RN on 6/12/2020 at 9:53 PM

## 2020-06-13 NOTE — DISCHARGE SUMMARY
age and cooperative. HEENT:  Normal cephalic, atraumatic without obvious deformity. Pupils equal, round, and reactive to light. Extra ocular muscles intact. Conjunctivae/corneas clear. Neck: Supple, with full range of motion. No jugular venous distention. Trachea midline. Respiratory:  Normal respiratory effort. Clear to auscultation, bilaterally without Rales/Wheezes/Rhonchi. Cardiovascular:  Regular rate and rhythm with normal S1/S2 without murmurs, rubs or gallops. Abdomen: firm to palpation, distended, BS+ve, positive fluid thrill. Prominent vasculature. Musculoskeletal: Right BKA  Neurologic:  Neurovascularly intact without any focal sensory/motor deficits. Cranial nerves: II-XII intact, grossly non-focal.  Psychiatric:  Alert and oriented,   Capillary Refill: Brisk,< 3 seconds       Labs: For convenience and continuity at follow-up the following most recent labs are provided:      CBC:    Lab Results   Component Value Date    WBC 3.2 06/13/2020    HGB 10.0 06/13/2020    HCT 29.3 06/13/2020    PLT 74 06/13/2020       Renal:    Lab Results   Component Value Date     06/13/2020    K 4.3 06/13/2020    K 3.4 03/21/2020     06/13/2020    CO2 23 06/13/2020    BUN 9 06/13/2020    CREATININE 0.7 06/13/2020    CALCIUM 8.5 06/13/2020    PHOS 2.5 06/13/2020         Significant Diagnostic Studies    Radiology:   XR ABDOMEN (KUB) (SINGLE AP VIEW)   Final Result   Colonic distension, suggesting a colonic ileus with gas and stool throughout. No significant small bowel distension. US GUIDED PARACENTESIS   Final Result   Successful ultrasound guided paracentesis. CT ABDOMEN PELVIS WO CONTRAST   Final Result   Moderate volume ascites, especially within the pelvis. Diffuse anasarca with   mesenteric edema and diffuse edema within the body wall. Moderate urinary bladder distention. Multiple postsurgical changes.   Definite acute bowel abnormality is not   identified, but the bowel is not well visualized without intravenous and oral   contrast.  Furthermore, the ascites further decreases contrast resolution. Moderate to large amount of stool. Correlate with clinical evidence of   constipation. CT Head WO Contrast   Final Result   No evidence of acute intracranial abnormality. XR CHEST STANDARD (2 VW)   Final Result   Cardiomegaly with edema versus atelectasis suspected at the left lung base. XR SHOULDER LEFT (MIN 2 VIEWS)   Final Result   Severe degenerative change in the knee, greatest medially. No acute fracture      No acute fracture left shoulder         XR KNEE LEFT (1-2 VIEWS)   Final Result   Severe degenerative change in the knee, greatest medially. No acute fracture      No acute fracture left shoulder                Consults:     IP CONSULT TO HOSPITALIST  IP CONSULT TO SPIRITUAL SERVICES  IP CONSULT TO GI  IP CONSULT TO SOCIAL WORK    Disposition: Discharge to nursing home    Condition at Discharge: Stable    Discharge Instructions/Follow-up: PCP and GI in 1 week. Code Status:  Full Code     Activity: activity as tolerated    Diet: cardiac diet      Discharge Medications:     Current Discharge Medication List           Details   torsemide (DEMADEX) 100 MG tablet Take 0.5 tablets by mouth daily  Qty: 30 tablet, Refills: 3              Details   oxyCODONE HCl (OXY-IR) 10 MG immediate release tablet Take 1 tablet by mouth every 6 hours as needed for Pain for up to 3 days.   Qty: 3 tablet, Refills: 0    Comments: Reduce doses taken as pain becomes manageable  Associated Diagnoses: Chronic pain syndrome      insulin detemir (LEVEMIR) 100 UNIT/ML injection pen Inject 50 Units into the skin nightly  Qty: 5 pen, Refills: 3      insulin lispro (HUMALOG) 100 UNIT/ML injection vial Inject 18 Units into the skin 3 times daily (with meals)  Qty: 1 vial, Refills: 3      lactulose (CHRONULAC) 10 GM/15ML solution Take 30 mLs by mouth every 6 hours Increase dose to

## 2020-06-13 NOTE — PROGRESS NOTES
Assessment complete, see flowsheet. POC reviewed with the patient and mutually agreed upon. VSS. AM meds given per MAR. Patient up to chair. Denies any needs at this time. Chair alarm on and call light within reach. Will monitor. 1720:  Report called to [facility]. Assisted patient with all her belongs. Transport awaiting for patient to finish eating dinner. All questions answered. ALAYNA and AVS will be transported with patient along with narcotic prescription.

## 2020-07-16 NOTE — ED NOTES
ED SBAR report provider to [facility]. Patient to be transported to Room 3103 via stretcher by ED tech. Patient transported with bedside cardiac monitor and with IV medications infusing. IV site clean, dry, and intact. MEWS score and pain assessed  and documented. Updated patient on plan of care.      Yony Tinoco RN  07/16/20 [id number]

## 2020-07-16 NOTE — PROGRESS NOTES
Pt arrived to unit at 1430 . Pt arouses to voice. Pt oriented to unit and to room. Pt oriented to call light and to phone. White board updated. Pt asleep in bed. Right leg partial amputation elevated on pillow. Will continue to monitor and assess.    Electronically signed by Alli Hernandez RN on 7/16/2020 at 2:50 PM

## 2020-07-16 NOTE — CARE COORDINATION
consult by Pharmacist- Heaven Peñaloza reviewed record-  Patient discharged from [facility] 6/13/20 and went to [facility]([phone]),  Patient lives in [facility] - one of the owners [phone] and left message.   Patient came by EMS from [address] [place] [id number]

## 2020-07-16 NOTE — CONSULTS
GASTROENTEROLOGY INPATIENT CONSULTATION      IDENTIFYING DATA/REASON FOR CONSULTATION   PATIENT:  Mejia Esparza  MRN:  [id number]  ADMIT DATE: 7/16/2020  TIME OF EVALUATION: 7/16/2020 4:41 PM  HOSPITAL STAY:   LOS: 0 days     REASON FOR CONSULTATION:  Hepatic encephalopathy    HISTORY OF PRESENT ILLNESS   Mejia Esparza is a 59 y.o. 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 Found to have significantly elevated ammonia level of 232. We have been consulted regarding hepatic encephalopathy. Pt drowsy at time of visits. Opens eyes but provided minimal history. Pt known to GI services. She follows with Dr. Luisa Watson for hx of Cirrhosis d/b HE and ascites. Managed on lactulose and rifaximin. She had diuretic refractory ascites, on torsemide/aldactone, requires intermittent LVP. CT a/p showed no ascites. CT head with no acute findings.     Prior Endoscopic Evaluations:  EGD with Dr. Gardiner [facility] 11/1/2019  Portal gastropathy  No varices - gastric or esophageal  Post gastric sleeve      PAST MEDICAL, SURGICAL, FAMILY, and SOCIAL HISTORY     Past Medical History:   Diagnosis Date    [facility]     Anemia     Arthritis     Bipolar 1 disorder (HCC)     Causalgia of lower limb     Cellulitis     Chronic pain syndrome     Chronic post-operative pain     Cirrhosis of liver (HCC)     Complications of transplanted liver     Convulsion ([facility] [facility]     COPD (chronic obstructive pulmonary disease) (HCC)     Depression     Dermatophytosis of nail     Diabetes mellitus (HCC)     Diarrhea     Dry eyes     ESBL (extended spectrum beta-lactamase) producing bacteria infection 02/09/2020    urine    Fungal nail infection     Generalized abdominal pain     Generalized osteoarthritis     GERD (gastroesophageal reflux disease)     [facility] encephal[facility] [address]     Hyperlipidemia     Hypertension     ? borderline    Hypothyroidism     Hypoxemia     Immune system disorder (HCC)     Liver disease     liver transplant 2008    Liver transplanted ([facility] [facility]     Localization-related epilepsy ([facility] [address] [facility]     Low back pain     Lumbar post-laminectomy syndrome     Morbid obesity (HCC)     Neuropathy, diabetic ([facility] [address] [facility]     On home oxygen therapy     4 liters cont. cpap  at sleep/ night     Other disorders of kidney and ureter     Peripheral neuropathy     Phantom limb (HCC)     Pneumonia     Polyneuritis     Restrictive lung disease     Seizures (HCC)     Sleep apnea     cpap machine- d/t CO2 problem     SVT (supraventricular tachycardia) (HCC)     Thyroid mass     UTI (lower urinary tract infection)      Past Surgical History:   Procedure Laterality Date    ABDOMEN SURGERY      multiple    APPENDECTOMY      CHOLECYSTECTOMY      HERNIA REPAIR      multiple     HYSTERECTOMY      JOINT REPLACEMENT      right    JOINT REPLACEMENT      right after fall out of bed    LEG AMPUTATION BELOW KNEE      right    LEG AMPUTATION BELOW KNEE      LIVER TRANSPLANT  01/03/2008    from [address] [place], [place] [address]n [place]      allergy to pain pump machine, had removed d/t huge seroma     SLEEVE GASTROPLASTY  1985    UPPER GASTROINTESTINAL ENDOSCOPY N/A 11/1/2019    ESOPHAGOGASTRODUODENOSCOPY WITH MAC ANESTHESIA performed by Susan Aguilar MD at [facility] ENDOSCOPY     Family History   Problem Relation Age of Onset    Cancer Father      Social History     Socioeconomic History    Marital status:       Spouse name: None    Number of children: None    Years of education: None    Highest education level: None   Occupational History    None   Social Needs    Financial resource strain: None    Food insecurity     Worry: None     Inability: None    Transportation needs     Medical: None Non-medical: None   Tobacco Use    Smoking status: Never Smoker    Smokeless tobacco: Never Used   Substance and Sexual Activity    Alcohol use: No    Drug use: No    Sexual activity: Yes     Partners: Male   Lifestyle    Physical activity     Days per week: None     Minutes per session: None    Stress: None   Relationships    Social connections     Talks on phone: None     Gets together: None     Attends Orthodoxy service: None     Active member of club or organization: None     Attends meetings of clubs or organizations: None     Relationship status: None    Intimate partner violence     Fear of current or ex partner: None     Emotionally abused: None     Physically abused: None     Forced sexual activity: None   Other Topics Concern    None   Social History Narrative    ** Merged History Encounter **            MEDICATIONS   SCHEDULED:  lactulose, 20 g, Q6H  lamoTRIgine, 100 mg, BID  [START ON 7/17/2020] levothyroxine, 112 mcg, Daily  milnacipran HCl, 100 mg, TID  OLANZapine, 10 mg, Nightly  [START ON 7/17/2020] pantoprazole, 40 mg, QAM AC  rifaximin, 550 mg, BID  [START ON 7/17/2020] spironolactone, 50 mg, Daily  [START ON 7/17/2020] torsemide, 20 mg, Daily  sodium chloride flush, 10 mL, 2 times per day  insulin lispro, 0-12 Units, TID WC  insulin lispro, 0-6 Units, Nightly  insulin glargine, 35 Units, Nightly  tacrolimus, 0.5 mg, BID      FLUIDS/DRIPS:     dextrose       PRNs: sodium chloride flush, 10 mL, PRN  promethazine, 12.5 mg, Q6H PRN    Or  ondansetron, 4 mg, Q6H PRN  glucose, 15 g, PRN  dextrose, 12.5 g, PRN  glucagon (rDNA), 1 mg, PRN  dextrose, 100 mL/hr, PRN      ALLERGIES:  She   Allergies   Allergen Reactions    Nitrofurantoin Hives    Abilify [Aripiprazole]     Acetaminophen Other (See Comments)     Avoid due to liver    Acetaminophen Other (See Comments)     Liver transplant restriction    Aspirin     Avelox [Moxifloxacin]     Carbamazepine     Contrast [Iodides] Hives Contrast dye    Cymbalta [Duloxetine Hcl] Other (See Comments)     Pt. Starts hallucinating     Doxycycline     Elavil [Amitriptyline] Hives    Erythromycin     Flagyl [Metronidazole]     Floxin [Ofloxacin]     Ibuprofen Other (See Comments)     Liver transplant    Iv Contrast [Iodides] Hives    Keflex [Cephalexin]      Tolerates Ceftriaxone.  Ketorolac Hives    Levaquin [Levofloxacin In D5w]     Levofloxacin Hives    Lyrica [Pregabalin]     Lyrica [Pregabalin] Other (See Comments)     hallucinations    Macrobid [Nitrofurantoin Monohyd Macro]     Motrin [Ibuprofen Micronized]     Nsaids      Liver transplant, states she can't have because of liver protocol      Nucynta Er [Tapentadol Hcl Er]     Nucynta [Tapentadol]     Penicillins Hives    Primidone     Reglan [Metoclopramide]     Tricyclic Antidepressants     Clindamycin      Other reaction(s): Yeast Infection       REVIEW OF SYSTEMS   Pertinent ROS noted in HPI    PHYSICAL EXAM     Vitals:    07/16/20 1347 07/16/20 1401 07/16/20 1439 07/16/20 1627   BP: (!) 123/59 (!) 107/53 130/73    Pulse: 94 91 91    Resp: 15 10 12 12   Temp:  98.4 °F (36.9 °C) 97.9 °F (36.6 °C)    TempSrc:  Oral Oral    SpO2: 100% 100% 99% 98%   Weight:   173 lb 1 oz (78.5 kg)    Height:   5' 5\" (1.651 m)        No intake/output data recorded. Physical Exam:  General appearance: lethargic, NAD  Eyes: Anicteric  Head: Normocephalic, without obvious abnormality  Lungs: clear to auscultation bilaterally, Normal Effort  Heart: regular rate and rhythm, normal S1 and S2, no murmurs or rubs  Abdomen: Round, obese, soft, Nondistended. +Caput [place]usa    Extremities: atraumatic, no cyanosis or edema.   +BKA  Skin: warm and dry, no jaundice  Neuro: Alert, disoriented      LABS AND IMAGING   Laboratory   Recent Labs     07/16/20  1200   WBC 3.6*   HGB 10.2*   HCT 30.5*   MCV 90.1   PLT 76*     Recent Labs     07/16/20  1200      K 4.2      CO2 20*   BUN 24* CREATININE 1.1     Recent Labs     07/16/20  1200   AST 22   ALT 26   BILITOT 1.4*   ALKPHOS 301*     No results for input(s): LIPASE, AMYLASE in the last 72 hours. Recent Labs     07/16/20  1200   PROTIME 18.2*   INR 1.56*       Imaging  XR CHEST PORTABLE   Final Result   Cardiomegaly with mild pulmonary vascular congestion, and bibasilar   atelectasis due to low lung volumes         CT ABDOMEN PELVIS WO CONTRAST Additional Contrast? None   Final Result   1. No evidence of ascites in the abdomen or pelvis. 2. Redemonstration of liver cirrhosis and splenomegaly with the spleen   measuring up to 17 cm. 3. Stable left adrenal adenoma. CT Head WO Contrast   Final Result   1. No acute intracranial abnormality. ASSESSMENT AND RECOMMENDATIONS   59 y.o. 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 Found to have significantly elevated ammonia level of 232. We have been consulted regarding hepatic encephalopathy. IMPRESSION:    1. Hepatic encephalopathy:  Ammonia significantly elevated. Reported constipation. Unsure if she has been compliant with lactulose at the nursing home. CT head with no acute findings. CT showed no ascites and pt does not appear septic thus low suspicion for SBP   2. Constipation:  No bowel obstruction or evidence of ileus on CT. ?compliance with lactulose  3. 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 On torsemide and aldactone as outpt   - HE: see above   - EV: EGD 11/1/19 - No varices   - HCC: RUQ US with doppler 1/27/20 negative.  Due for repeat ultrasound 7/2020   - Transplant candidacy: History of OLT in 2008, on prograf (Tacro 8.8 1/28/20).      RECOMMENDATIONS:    Pt seen and examine with Dr. Wilian Bashir lactulose 20 gm TID. Goal is for at least 3 BMs daily. If unable to take lactulose orally, recommend NGT placement to administer  Continue rifaximin BID  Will also add IV flagyl      If you have any questions or need any further information, please feel free to contact our consult team.  Thank you for allowing us to participate in the care of Alan Jamil. The note was completed using Dragon voice recognition transcription. Every effort was made to ensure accuracy; however, inadvertent transcription errors may be present despite my best efforts to edit errors. Mateus Sherwood PA-C     Attending physician addendum:    I have personally seen and examined the patient, reviewed the patient's medical record and pertinent labs and clinical imaging. I have personally staffed the case with Mateus CARRANZA. I agree with her consultation note, exam findings, assessment and plans  as written above. I have made appropriate modifications and edited her assessment and plan where needed to reflect my impression and plans for this patient. Luis Yee a 59 y. o. 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 Patient admitted with encephalopathy suspected to be secondary to noncompliance with lactulose regimen. She has not had a BM in several days. CT on admission with no ascites. CT head negative. Will start on IV Flagyl and start Oral Lactulose will plan to give every 4 hours until start having BMs. RN to contact me if patient not able to take oral lactulose. Thank you for allowing me to participate in this patient's care. If there are any questions or concerns regarding this patient, or the plan we have set in place, please feel free to contact me at [phone].      Darlene Coy MD

## 2020-07-16 NOTE — H&P
Hospital Medicine History & Physical      PCP: Sophia Marie III, DO    Date of Admission: 7/16/2020    Date of Service: Pt seen/examined on 7/16/20 and Admitted to Inpatient    Chief Complaint:  Constipation    History Of Present Illness: The patient is a 59 y.o. female with history of decompensated liver cirrhosis 2/2 AIH s/p OLT in 2008 with recurrent cirrhosis, right BKA who presents to [facility] with constipation and confusion. Patient lives in group home and has had increased confusion and constipation per EMS. Patient is poor historian and unable to answer questions. Workup in ED revealed markedly elevated ammonia level. She also had elevated ALP, low plats, and abnormal INR. She will is unable to perform ROS. Patient will be admitted for hepatic encephalopathy.      Past Medical History:        Diagnosis Date    ALLIE (acute kidney injury) ([address]     Anemia     Arthritis     Bipolar 1 disorder (HCC)     Causalgia of lower limb     Cellulitis     Chronic pain syndrome     Chronic post-operative pain     Cirrhosis of liver (HCC)     Complications of transplanted liver     Convulsion (HCC)     COPD (chronic obstructive pulmonary disease) (HCC)     Depression     Dermatophytosis of nail     Diabetes mellitus (HCC)     Diarrhea     Dry eyes     ESBL (extended spectrum beta-lactamase) producing bacteria infection 02/09/2020    urine    Fungal nail infection     Generalized abdominal pain     Generalized osteoarthritis     GERD (gastroesophageal reflux disease)     Hepatic encephalopathy (HCC)     Hyperlipidemia     Hypertension     ? borderline    Hypothyroidism     Hypoxemia     Immune system disorder ([address]     Liver disease     liver transplant 2008    Liver transplanted ([address]     Localization-related epilepsy ([address]     Low back pain     Lumbar post-laminectomy syndrome     Morbid obesity (HCC)     Neuropathy, diabetic ([address]     On home oxygen therapy     4 liters cont. cpap  at sleep/ night     Other disorders of kidney and ureter     Peripheral neuropathy     Phantom limb (HCC)     Pneumonia     Polyneuritis     Restrictive lung disease     Seizures (HCC)     Sleep apnea     cpap machine- d/t CO2 problem     SVT (supraventricular tachycardia) (HCC)     Thyroid mass     UTI (lower urinary tract infection)        Past Surgical History:        Procedure Laterality Date    ABDOMEN SURGERY      multiple    APPENDECTOMY      CHOLECYSTECTOMY      HERNIA REPAIR      multiple     HYSTERECTOMY      JOINT REPLACEMENT      right    JOINT REPLACEMENT      right after fall out of bed    LEG AMPUTATION BELOW KNEE      right    LEG AMPUTATION BELOW KNEE      LIVER TRANSPLANT  01/03/2008    from [facility]      allergy to pain pump machine, had removed d/t huge seroma     SLEEVE GASTROPLASTY  1985    UPPER GASTROINTESTINAL ENDOSCOPY N/A 11/1/2019    ESOPHAGOGASTRODUODENOSCOPY WITH MAC ANESTHESIA performed by Guerda Dixon MD [facility]COPY       Medications Prior to Admission:    Prior to Admission medications    Medication Sig Start Date End Date Taking?  Authorizing Provider   insulin detemir (LEVEMIR) 100 UNIT/ML injection pen Inject 50 Units into the skin nightly 6/13/20  Yes Janae Mackay MD   insulin lispro (HUMALOG) 100 UNIT/ML injection vial Inject 18 Units into the skin 3 times daily (with meals) 6/13/20  Yes Janae Mackay MD   lactulose ([address]) 10 GM/15ML solution Take 30 mLs by mouth every 6 hours Increase dose to have 3-4 BMs daily 6/13/20  Yes Janae Mackay MD   spironolactone (ALDACTONE) 50 MG tablet Take 1 tablet by mouth daily 6/10/20  Yes DELORES Villegas CNP   torsemide (DEMADEX) 100 MG tablet Take 0.5 tablets by mouth daily 6/10/20  Yes following interpretation: NSR    CBC   Recent Labs     07/16/20  1200   WBC 3.6*   HGB 10.2*   HCT 30.5*   PLT 76*      RENAL  Recent Labs     07/16/20  1200      K 4.2      CO2 20*   BUN 24*   CREATININE 1.1     LFT'S  Recent Labs     07/16/20  1200   AST 22   ALT 26   BILITOT 1.4*   ALKPHOS 301*     COAG  Recent Labs     07/16/20  1200   INR 1.56*     CARDIAC ENZYMES  Recent Labs     07/16/20  1200   TROPONINI <0.01       U/A:    Lab Results   Component Value Date    COLORU YELLOW 07/16/2020    WBCUA 1 06/09/2020    RBCUA 1 06/09/2020    BACTERIA 3+ 02/09/2020    CLARITYU Clear 07/16/2020    SPECGRAV 1.008 07/16/2020    LEUKOCYTESUR Negative 07/16/2020    BLOODU Negative 07/16/2020    GLUCOSEU Negative 07/16/2020    GLUCOSEU NEGATIVE 07/11/2010    AMORPHOUS Rare 01/30/2015       ABG  No results found for: PNG9ZNN, BEART, K1WVXMKM, PHART, THGBART, HPD6NOX, PO2ART, TXF0BGG      PHYSICIANS CERTIFICATION:    I certify that Dheeraj Sparks is expected to be hospitalized for more than 2 midnights based on the following assessment and plan:      ASSESSMENT/PLAN:    Hepatic Encephalopathy  Started on lactulose tid  Rifaximin bid  GI consulted  Constipated lately    Liver Cirrhosis 2/2 to AIH s/p failed transplant  Continue home tacro, pharmacy to dose  Not in acute decompensation  Continue home lasix and aldactone    DM2  Decrease lantus dose with confusion  Med sliding scale  Go back to meal time and 50units nightly lantus once awake and alert    Hypothyroidism  Continue home synthroid    Thrombocytopenia  Likely liver cirrhosis induced      DVT Prophylaxis: SCD  Diet: No diet orders on file  Code Status: Prior  PT/OT Eval Status: Will need    Blossom Quick MD    Thank you Preston Gutierrez III, DO for the opportunity to be involved in this patient's care. If you have any questions or concerns please feel free to contact me at [phone].

## 2020-07-16 NOTE — ED TRIAGE NOTES
Pt to ED via [facility]. Per EMS report, pt lives in a group home and has hx of failed liver transplant. EMS report they were called b/c pt has had increased confusion has not been able to urinate and has not had a BM in \"several days\", unsure exact amount of days. Pt lethargic and confused upon ED arrival, pt states she is here because she is having \"liver problems\". Pt states she urinated today, unsure when her last BM was.

## 2020-07-16 NOTE — ED NOTES
[facility]  Expected date:   Expected time:   Means of arrival: [facility]  Comments:  64F deandre Machuca, RN  07/16/20 3481

## 2020-07-16 NOTE — ED PROVIDER NOTES
EMERGENCY DEPARTMENT PROVIDER NOTE    Patient Identification  Pt Name: Fei Smallwood  MRN: [id number]  [place] 1955  Date of evaluation: 7/16/2020  Provider: Katy Klein DO  PCP: Stephanie Raymond DO    Chief Complaint  Constipation (Pt to ED via [address] Per EMS report, pt lives in a group home and has hx of failed liver transplant. EMS report they were called b/c pt has not been able to urinate and has not had a BM in \"several days\", unsure exact amount of days.  ) and Urinary Retention      HPI  (History provided by patient, EMS)  This is a 59 y.o. female with pertinent past medical history of liver failure, hepatic encephalopathy, diabetes, chronic pain syndrome who was brought in by EMS transportation for altered mental status and constipation. Patient is confused on arrival, she is unable to relate much details of her history. Per EMS patient was sent due to decreased responsiveness. Patient states she was sent to the emergency department because her nurse \"thought I was going to die. \"  Uncertain time of symptom onset. Patient does report generalized abdominal pain, most severe in the lower abdomen, achy in character. ROS    Unable to obtain due to altered mental status. I have reviewed the following nursing documentation:  Allergies: Nitrofurantoin; Abilify [aripiprazole]; Acetaminophen; Acetaminophen; Aspirin; Avelox [moxifloxacin]; Carbamazepine; Contrast [iodides]; Cymbalta [duloxetine hcl]; Doxycycline; Elavil [amitriptyline]; Erythromycin; Flagyl [metronidazole]; Floxin [ofloxacin]; Ibuprofen; Iv contrast [iodides]; Keflex [cephalexin]; Ketorolac; Levaquin [levofloxacin in d5w]; Levofloxacin; Lyrica [pregabalin]; Lyrica [pregabalin]; Macrobid [nitrofurantoin monohyd macro]; Motrin [ibuprofen micronized]; Nsaids; Nucynta er [tapentadol hcl er]; Nucynta [tapentadol]; Penicillins; Primidone; Reglan [metoclopramide];  Tricyclic antidepressants; and Clindamycin    Past medical history:   Past Medical History:   Diagnosis Date    ALLIE (acute kidney injury) ([address]     Anemia     Arthritis     Bipolar 1 disorder (HCC)     Causalgia of lower limb     Cellulitis     Chronic pain syndrome     Chronic post-operative pain     Cirrhosis of liver (HCC)     Complications of transplanted liver     Convulsion ([address]     COPD (chronic obstructive pulmonary disease) (HCC)     Depression     Dermatophytosis of nail     Diabetes mellitus (HCC)     Diarrhea     Dry eyes     ESBL (extended spectrum beta-lactamase) producing bacteria infection 02/09/2020    urine    Fungal nail infection     Generalized abdominal pain     Generalized osteoarthritis     GERD (gastroesophageal reflux disease)     Hepatic encephalopathy (HCC)     Hyperlipidemia     Hypertension     ? borderline    Hypothyroidism     Hypoxemia     Immune system disorder ([address]     Liver disease     liver transplant 2008    Liver transplanted ([address]     Localization-related epilepsy ([address]     Low back pain     Lumbar post-laminectomy syndrome     Morbid obesity (HCC)     Neuropathy, diabetic ([address]     On home oxygen therapy     4 liters cont.  cpap  at sleep/ night     Other disorders of kidney and ureter     Peripheral neuropathy     Phantom limb (HCC)     Pneumonia     Polyneuritis     Restrictive lung disease     Seizures (HCC)     Sleep apnea     cpap machine- d/t CO2 problem     SVT (supraventricular tachycardia) (HCC)     Thyroid mass     UTI (lower urinary tract infection)      Past surgical history:   Past Surgical History:   Procedure Laterality Date    ABDOMEN SURGERY      multiple    APPENDECTOMY      CHOLECYSTECTOMY      HERNIA REPAIR      multiple     HYSTERECTOMY      JOINT REPLACEMENT      right    JOINT REPLACEMENT      right after fall out of bed    LEG AMPUTATION BELOW KNEE      right    LEG AMPUTATION BELOW KNEE      LIVER TRANSPLANT  01/03/[address] from [address] [facility]      allergy to pain pump machine, had removed d/t huge seroma     SLEEVE GASTROPLASTY  1985    UPPER GASTROINTESTINAL ENDOSCOPY N/A 11/1/2019    ESOPHAGOGASTRODUODENOSCOPY WITH MAC ANESTHESIA performed by Lakesha Green MD at [facility] medications:   Previous Medications    CPAP MACHINE MISC    by Does not apply route    INSULIN DETEMIR (LEVEMIR) 100 UNIT/ML INJECTION PEN    Inject 50 Units into the skin nightly    INSULIN LISPRO (HUMALOG) 100 UNIT/ML INJECTION VIAL    Inject 18 Units into the skin 3 times daily (with meals)    LACTULOSE (CHRONULAC) 10 GM/15ML SOLUTION    Take 30 mLs by mouth every 6 hours Increase dose to have 3-4 BMs daily    LAMOTRIGINE (LAMICTAL) 100 MG TABLET    Take 1 tablet by mouth 2 times daily    LEVOTHYROXINE (SYNTHROID) 112 MCG TABLET    Take 112 mcg by mouth Daily    LIDOCAINE (LIDODERM) 5 %    Place 1 patch onto the skin daily 12 hours on, 12 hours off. MILNACIPRAN HCL (SAVELLA) 100 MG TABS    Take 100 mg by mouth three times daily     MULTIPLE VITAMINS-MINERALS (CENTRUM CARB ASSIST PO)    Take 1 tablet by mouth daily     OLANZAPINE (ZYPREXA) 10 MG TABLET    Take 1 tablet by mouth nightly    OMEPRAZOLE (PRILOSEC) 20 MG DELAYED RELEASE CAPSULE    Take 20 mg by mouth daily     ONDANSETRON (ZOFRAN-ODT) 8 MG DISINTEGRATING TABLET    Take 8 mg by mouth every 8 hours as needed for Nausea or Vomiting    RIFAXIMIN (XIFAXAN) 550 MG TABLET    Take 1 tablet by mouth 2 times daily    SPIRONOLACTONE (ALDACTONE) 50 MG TABLET    Take 1 tablet by mouth daily    TACROLIMUS (PROGRAF) 0.5 MG CAPSULE    Take 0.5 mg by mouth 2 times daily     TORSEMIDE (DEMADEX) 100 MG TABLET    Take 0.5 tablets by mouth daily       Social history:  reports that she has never smoked. She has never used smokeless tobacco. She reports that she does not drink alcohol or use drugs.     Family history:    Family History Problem Relation Age of Onset    Cancer Father          Exam  ED Triage Vitals   BP Temp Temp Source Pulse Resp SpO2 Height Weight   07/16/20 1100 07/16/20 1105 07/16/20 1105 07/16/20 1100 07/16/20 1100 07/16/20 1100 07/16/20 1105 07/16/20 1105   (!) 117/51 98.1 °F (36.7 °C) Oral 99 12 100 % 5' 5\" (1.651 m) 172 lb 1.6 oz (78.1 kg)     Nursing note and vitals reviewed. Constitutional: Well developed, well nourished. Chronically ill appearing. HENT:      Head: Normocephalic and atraumatic. Ears: External ears normal.      Nose: Nose normal.     Mouth: Membrane mucosa tacky and pink. Eyes: Anicteric sclera. No discharge. Neck: Supple. Trachea midline. Cardiovascular: Borderline tahcycardia; no murmurs, rubs, or gallops. Pulmonary/Chest: Effort normal. No respiratory distress. CTAB. No stridor. No wheezes. No rales. Abdominal: Softly distended, tenderness elicited with palpation of lower quadrants, no focal right lower quadrant tenderness or Rovsing. No rebound, no rigidity, no guarding. Musculoskeletal: Moves all extremities. Right AKA noted. Neurological: Alert, oriented to person and month, not oriented to day. Not oriented to place. . Face symmetric. Speech is clear. 5/5 motor and sensation grossly intact BUE's and LLE. Skin: Jaundiced. Warm and dry. No rash. Procedures      EKG    EKG was reviewed by emergency department physician in the absence of a cardiologist    Narrow complex sinus rhythm, rate 96, normal axis, normal DE and QRS intervals, borderline prolonged Qtc, no ST elevations or depressions, normal t-wave morphology, impression NSR, no STEMI, no significant change from comparison 6/9/2020      Radiology  XR CHEST PORTABLE   Final Result   Cardiomegaly with mild pulmonary vascular congestion, and bibasilar   atelectasis due to low lung volumes         CT ABDOMEN PELVIS WO CONTRAST Additional Contrast? None   Final Result   1. No evidence of ascites in the abdomen or pelvis. 2. Redemonstration of liver cirrhosis and splenomegaly with the spleen   measuring up to 17 cm. 3. Stable left adrenal adenoma. CT Head WO Contrast   Final Result   1. No acute intracranial abnormality.              Labs  Results for orders placed or performed during the hospital encounter of 07/16/20   Urinalysis Reflex to Culture    Specimen: Urine, clean catch   Result Value Ref Range    Color, UA YELLOW Straw/Yellow    Clarity, UA Clear Clear    Glucose, Ur Negative Negative mg/dL    Bilirubin Urine Negative Negative    Ketones, Urine Negative Negative mg/dL    Specific Gravity, UA 1.008 1.005 - 1.030    Blood, Urine Negative Negative    pH, UA 5.5 5.0 - 8.0    Protein, UA Negative Negative mg/dL    Urobilinogen, Urine 0.2 <2.0 E.U./dL    Nitrite, Urine Negative Negative    Leukocyte Esterase, Urine Negative Negative    Microscopic Examination Not Indicated     Urine Type NotGiven     Urine Reflex to Culture Not Indicated    Lactic Acid, Plasma   Result Value Ref Range    Lactic Acid 2.0 0.4 - 2.0 mmol/L   Protime-INR   Result Value Ref Range    Protime 18.2 (H) 10.0 - 13.2 sec    INR 1.56 (H) 0.86 - 1.14   SPECIMEN REJECTION   Result Value Ref Range    Rejected Test cbcwd,cmp,nh3     Reason for Rejection see below    CBC Auto Differential   Result Value Ref Range    WBC 3.6 (L) 4.0 - 11.0 K/uL    RBC 3.39 (L) 4.00 - 5.20 M/uL    Hemoglobin 10.2 (L) 12.0 - 16.0 g/dL    Hematocrit 30.5 (L) 36.0 - 48.0 %    MCV 90.1 80.0 - 100.0 fL    MCH 30.0 26.0 - 34.0 pg    MCHC 33.3 31.0 - 36.0 g/dL    RDW 15.2 12.4 - 15.4 %    Platelets 76 (L) 440 - 450 K/uL    MPV 9.4 5.0 - 10.5 fL    Neutrophils % 76.7 %    Lymphocytes % 11.5 %    Monocytes % 11.7 %    Eosinophils % 0.0 %    Basophils % 0.1 %    Neutrophils Absolute 2.8 1.7 - 7.7 K/uL    Lymphocytes Absolute 0.4 (L) 1.0 - 5.1 K/uL    Monocytes Absolute 0.4 0.0 - 1.3 K/uL    Eosinophils Absolute 0.0 0.0 - 0.6 K/uL    Basophils Absolute 0.0 0.0 - 0.2 K/uL Comprehensive Metabolic Panel   Result Value Ref Range    Sodium 136 136 - 145 mmol/L    Potassium 4.2 3.5 - 5.1 mmol/L    Chloride 101 99 - 110 mmol/L    CO2 20 (L) 21 - 32 mmol/L    Anion Gap 15 3 - 16    Glucose 290 (H) 70 - 99 mg/dL    BUN 24 (H) 7 - 20 mg/dL    CREATININE 1.1 0.6 - 1.2 mg/dL    GFR Non-African American 50 (A) >60    GFR African American >60 >60    Calcium 9.4 8.3 - 10.6 mg/dL    Total Protein 8.1 6.4 - 8.2 g/dL    Alb 3.5 3.4 - 5.0 g/dL    Albumin/Globulin Ratio 0.8 (L) 1.1 - 2.2    Total Bilirubin 1.4 (H) 0.0 - 1.0 mg/dL    Alkaline Phosphatase 301 (H) 40 - 129 U/L    ALT 26 10 - 40 U/L    AST 22 15 - 37 U/L    Globulin 4.6 g/dL   Ammonia   Result Value Ref Range    Ammonia 232 (HH) 11 - 51 umol/L   Troponin   Result Value Ref Range    Troponin <0.01 <0.01 ng/mL       Screenings           MDM and ED Course    Patient afebrile and nontoxic. No distress. EKG is no STEMI, no evidence of ACS or malignant dysrhythmia. Troponin normal.  No clinically significant electrolyte derangements. Patient's renal function is normal.  Neuro exam is nonfocal, CT is out evidence of hemorrhage or mass-effect, CVA/TIA not supported. Nothing to suggest [facility]. Infectious etiology considered, patient's abdomen is benign to palpation, she is afebrile, CBC at baseline, no clear evidence of infection, very low suspicion for SBP especially given no ascites on CT scan. Patient is not septic. Ammonia greatly elevated at 232, suspect patient's confusion is secondary to hepatic encephalopathy. Patient was given lactulose. Case discussed with internal medicine team and will admit for further evaluation and care. Patient hemodynamically stable and in no distress over emergency department course. Final Impression  1. Hepatic encephalopathy (HCC)        Blood pressure (!) 107/53, pulse 91, temperature (P) 98.4 °F (36.9 °C), temperature source (P) Oral, resp.  rate 10, height 5' 5\" (1.651 m), weight 172 lb 1.6 oz (78.1 kg), SpO2 100 %, not currently breastfeeding. Disposition:  DISPOSITION Admitted 07/16/2020 01:31:41 PM      Patient Referrals:  [address] [facility], [address] [facility]  [phone]            Discharge Medications:  New Prescriptions    No medications on file       Discontinued Medications:  Discontinued Medications    No medications on file       This chart was generated using the MessageOne dictation system. I created this record but it may contain dictation errors given the limitations of this technology.     Nayely Bone DO (electronically signed)  Attending Emergency Physician       Nayely Bone DO  07/16/20 1045

## 2020-07-16 NOTE — PROGRESS NOTES
Pt made NPO and speech eval ordered, pt has mostly been non responsive, pt opens her eyes to her name and groans some but then goes right back to sleeping, this RN made MD aware and agreeable to NPO and speech eval, pt given SSI for blood sure of 290, pt placed in contact iso for history of ESBL and MDRO in urine, pt assessment complete, pt has stage two ulcer in her sacrum/coccyx noted on four eyes assessment along with a skin tear and excoriation on right buttocks in gluteal fold. Pt has eyes closed and bed exit in place with call light in reach.  Electronically signed by Reema Lopez RN on 7/16/2020 at 5:50 PM

## 2020-07-17 NOTE — PROGRESS NOTES
Speech Language Pathology  [facility]   CLINICAL BEDSIDE SWALLOW EVALUATION    NAME: Jose Alberto Howell  : 1955  MRN: [id number]    ADMISSION DATE: 2020  ADMITTING DIAGNOSIS: has Hepatic encephalopathy ([address] Acquired hypothyroidism; Arthritis; History of liver transplant ([address] Altered mental state; Partial epilepsy with impairment of consciousness ([address] Metabolic encephalopathy; Chronic pain syndrome; Primary insomnia; Recurrent major depressive disorder, in remission ([address] Obesity; Phantom limb pain ([address] Other chest pain; Hx of right BKA ([address] Immunosuppressed status ([address] Tachycardia; Type 1 diabetes mellitus with other specified complication ([address] Polypharmacy; SVT (supraventricular tachycardia) ([address] Hyperglycemia; Acute chest pain; Nausea; S/P catheter ablation of slow pathway; Chest pain; GERD (gastroesophageal reflux disease); Hypothyroid; Failure to thrive (0-17); Knee pain; Anemia; Hypoglycemia; Poorly controlled type 2 diabetes mellitus ([address] Anemia, chronic disease; Acute hepatic encephalopathy; Toe cyanosis; Encephalopathy; Lactic acidosis; Pancytopenia ([address] ALLIE (acute kidney injury) ([address] Hyperammonemia ([address] Cirrhosis of liver without ascites ([address] Essential hypertension; Other chronic pain; ZHANG on CPAP; Overweight (BMI 25.0-29.9); and Ascites on their problem list.  ONSET DATE: 2020    Recent Chest Xray 2020  Impression    Cardiomegaly with mild pulmonary vascular congestion, and bibasilar    atelectasis due to low lung volumes      History Of Present Illness: The patient is a 59 y.o. female with history of decompensated liver cirrhosis 2/2 AIH s/p OLT in  with recurrent cirrhosis, right BKA who presents to [facility] with constipation and confusion. Patient lives in group home and has had increased confusion and constipation per EMS. Patient is poor historian and unable to answer questions.  Workup in ED revealed markedly elevated ammonia level. She also had elevated ALP, low plats, and abnormal INR. She will is unable to perform ROS. Patient will be admitted for hepatic encephalopathy. Dysphagia Hx/Chart Review:  3/23/2020 [facility]SE at [facility] recommended dysphagia soft/bite sized w thin liquids    Date of Eval: 7/17/2020  Evaluating Therapist: Elsa Rubio    Current Diet level:  Current Diet : NPO  Current Liquid Diet : NPO    Primary Complaint  Patient Complaint: pt unable to generate; nurse reports pt unable to take any PO last evening d/t cog status    Pain:  Pain Assessment  Pain Assessment: FLACC  Pain Level: 0    Reason for Referral  Kale Clarke was referred for a bedside swallow evaluation to assess the efficiency of her swallow function, identify signs and symptoms of aspiration and make recommendations regarding safe dietary consistencies, effective compensatory strategies, and safe eating environment. Impression  Dysphagia Diagnosis: Mild oral stage dysphagia;Mild pharyngeal stage dysphagia  Dysphagia Impression :   · Awake, alert, confused, but cooperative for PO.    · Mild oropharyngeal dysphagia characterized by decreased lip rounding for bolus acceptance via cup, disorganized mastication, delayed swallow initiation and decreased laryngeal elevation. · Cough with thin liquid (consecutive boluses, and with single sip after textured trial). No overt s/s of aspiration with nectar via straw. · Improved bolus acceptance and control with liquid via straw compared to cup. Variable cognition suspected to impact task recognition for PO acceptance and safety. Dysphagia Outcome Severity Scale: Level 4: Mild moderate dysphagia- Intermittent supervision/cueing. One - two diet consistencies restricted     Treatment Plan  Requires SLP Intervention: Yes  Duration/Frequency of Treatment: 3-5x/wk while on acute unit  D/C Recommendations:  To be determined     Recommended Diet and Intervention  Diet Solids Indicators of Pharyngeal Phase Dysfunction   Pharyngeal Phase  Pharyngeal Phase: Exceptions  Indicators of Pharyngeal Phase Dysfunction  Delayed Swallow: All  Decreased Laryngeal Elevation: All  Cough - Immediate: Thin - straw    Prognosis  Prognosis  Prognosis for safe diet advancement: good  Barriers to reach goals: cognitive deficits  Individuals consulted  Consulted and agree with results and recommendations: Patient;RN    Education  Patient Education: results and recs  Patient Education Response: No evidence of learning        Therapy Time  SLP Individual Minutes  Time In: 6781  Time Out: 9961  Minutes: 40     This note serves as a D/C Summary in the event that this patient is discharged prior to the next therapy session.       Marbella Glez MS, CCC-SLP #[id number]  Speech Language Pathologist  7/17/2020 1205 PM

## 2020-07-17 NOTE — PROGRESS NOTES
Hospitalist Progress Note      PCP: Aurea Maradiaga III, DO    Date of Admission: 7/16/2020    Chief Complaint: [address] Course: The patient is a 59 y.o. female with history of decompensated liver cirrhosis 2/2 AIH s/p OLT in 2008 with recurrent cirrhosis, right BKA who presents to [facility] with constipation and confusion. Patient lives in group home and has had increased confusion and constipation per EMS. Patient is poor historian and unable to answer questions. Workup in ED revealed markedly elevated ammonia level. She also had elevated ALP, low plats, and abnormal INR. She will is unable to perform ROS. Patient will be admitted for hepatic encephalopathy. Subjective: Patient seen and examined. Still confused. Switched from oral to enema lactulose. Can give repeated doses until confusion improves. Switched some of her medications to IV from oral. Hopefully can start taking oral once mentation improves.        Medications:  Reviewed    Infusion Medications    dextrose       Scheduled Medications    lactulose enema   Rectal Once    lactulose  20 g Oral Q6H    lamoTRIgine  100 mg Oral BID    levothyroxine  112 mcg Oral Daily    milnacipran HCl  100 mg Oral TID    OLANZapine  10 mg Oral Nightly    pantoprazole  40 mg Oral QAM AC    rifaximin  550 mg Oral BID    spironolactone  50 mg Oral Daily    torsemide  20 mg Oral Daily    sodium chloride flush  10 mL Intravenous 2 times per day    insulin lispro  0-12 Units Subcutaneous TID WC    insulin lispro  0-6 Units Subcutaneous Nightly    insulin glargine  35 Units Subcutaneous Nightly    tacrolimus  0.5 mg Oral BID     PRN Meds: sodium chloride flush, promethazine **OR** ondansetron, glucose, dextrose, glucagon (rDNA), dextrose      Intake/Output Summary (Last 24 hours) at 7/17/2020 0713  Last data filed at 7/16/2020 1755  Gross per 24 hour   Intake 0 ml   Output --   Net 0 ml       Physical Exam Performed:    /75   Pulse 92   Temp 98.7 °F (37.1 °C) (Oral)   Resp 16   Ht 5' 5\" (1.651 m) Comment: per chart review  Wt 164 lb 3.9 oz (74.5 kg)   SpO2 96%   BMI 27.33 kg/m²     General appearance: No apparent distress. HEENT: Pupils equal, round, and reactive to light. Conjunctivae/corneas clear. Neck: Supple, with full range of motion. No jugular venous distention. Trachea midline. Respiratory:  Normal respiratory effort. Clear to auscultation  Cardiovascular: Regular rate and rhythm with normal S1/S2  Abdomen: caput medusa  Musculoskeletal: No clubbing, cyanosis or edema bilaterally. Full range of motion without deformity. Skin: Skin color, texture, turgor normal.  No rashes or lesions. Neurologic:  Neurovascularly intact without any focal sensory/motor deficits. Cranial nerves: II-XII intact, grossly non-focal.  Psychiatric: Alert, not following commands  Capillary Refill: Brisk,< 3 seconds   Peripheral Pulses: +2 palpable, equal bilaterally       Labs:   Recent Labs     07/16/20  1200   WBC 3.6*   HGB 10.2*   HCT 30.5*   PLT 76*     Recent Labs     07/16/20  1200      K 4.2      CO2 20*   BUN 24*   CREATININE 1.1   CALCIUM 9.4     Recent Labs     07/16/20  1200   AST 22   ALT 26   BILITOT 1.4*   ALKPHOS 301*     Recent Labs     07/16/20  1200   INR 1.56*     Recent Labs     07/16/20  1200   TROPONINI <0.01       Urinalysis:      Lab Results   Component Value Date    NITRU Negative 07/16/2020    WBCUA 1 06/09/2020    BACTERIA 3+ 02/09/2020    RBCUA 1 06/09/2020    BLOODU Negative 07/16/2020    SPECGRAV 1.008 07/16/2020    GLUCOSEU Negative 07/16/2020    GLUCOSEU NEGATIVE 07/11/2010       Radiology:  XR CHEST PORTABLE   Final Result   Cardiomegaly with mild pulmonary vascular congestion, and bibasilar   atelectasis due to low lung volumes         CT ABDOMEN PELVIS WO CONTRAST Additional Contrast? None   Final Result   1. No evidence of ascites in the abdomen or pelvis.    2. Redemonstration of liver cirrhosis and splenomegaly with the spleen   measuring up to 17 cm. 3. Stable left adrenal adenoma. CT Head WO Contrast   Final Result   1. No acute intracranial abnormality.                  Assessment/Plan:    Hepatic Encephalopathy  Started on lactulose qid, switch to enema  Rifaximin bid  GI consulted  Constipated lately     Liver Cirrhosis 2/2 to AIH s/p failed transplant  Continue home tacro, pharmacy to dose  Not in acute decompensation  IV lasix, hold aldactone     DM2  Decrease lantus dose with confusion  Med sliding scale  Go back to meal time and 50units nightly lantus once awake and alert     Hypothyroidism  Continue home synthroid, switched to IV meds     Thrombocytopenia  Likely liver cirrhosis induced    DVT Prophylaxis: SCD  Diet: Diet NPO Effective Now  Code Status: Full Code    PT/OT Eval Status: NA    Dispo - Zaid Barr MD

## 2020-07-17 NOTE — PROGRESS NOTES
INPATIENT CONSULTATION:    IDENTIFYING DATA/REASON FOR CONSULTATION   PATIENT:  Debbie Rothman  MRN:  [id number]  ADMIT DATE: 7/16/2020  TIME OF EVALUATION: 7/17/2020 2:46 PM  HOSPITAL STAY:   LOS: 1 day   CONSULTING PHYSICIAN: Emiliana Smith MD   REASON FOR CONSULTATION: hepatic encephalopathy    Subjective:    Per RN patient had a very large bowel movement after lactulose enema. She is more alert this afternoon. MEDICATIONS   SCHEDULED:  [START ON 7/22/2020] Levothyroxine Sodium, 50 mcg, Daily  furosemide, 40 mg, Daily  pantoprazole, 40 mg, Daily  lactulose, 20 g, Q6H  [Held by provider] lamoTRIgine, 100 mg, BID  milnacipran HCl, 100 mg, TID  OLANZapine, 10 mg, Nightly  rifaximin, 550 mg, BID  sodium chloride flush, 10 mL, 2 times per day  insulin lispro, 0-12 Units, TID WC  insulin lispro, 0-6 Units, Nightly  insulin glargine, 35 Units, Nightly  tacrolimus, 0.5 mg, BID      FLUIDS/DRIPS:     dextrose       PRNs: sodium chloride flush, 10 mL, PRN  promethazine, 12.5 mg, Q6H PRN    Or  ondansetron, 4 mg, Q6H PRN  glucose, 15 g, PRN  dextrose, 12.5 g, PRN  glucagon (rDNA), 1 mg, PRN  dextrose, 100 mL/hr, PRN      ALLERGIES:    Allergies   Allergen Reactions    Nitrofurantoin Hives    Abilify [Aripiprazole]     Acetaminophen Other (See Comments)     Avoid due to liver    Acetaminophen Other (See Comments)     Liver transplant restriction    Aspirin     Avelox [Moxifloxacin]     Carbamazepine     Contrast [Iodides] Hives     Contrast dye    Cymbalta [Duloxetine Hcl] Other (See Comments)     Pt. Starts hallucinating     Doxycycline     Elavil [Amitriptyline] Hives    Erythromycin     Flagyl [Metronidazole]     Floxin [Ofloxacin]     Ibuprofen Other (See Comments)     Liver transplant    Iv Contrast [Iodides] Hives    Keflex [Cephalexin]      Tolerates Ceftriaxone.      Ketorolac Hives    Levaquin [Levofloxacin In D5w]     Levofloxacin Hives    Lyrica [Pregabalin]     Lyrica [Pregabalin] Other (See Comments)     hallucinations    Macrobid [Nitrofurantoin Monohyd Macro]     Motrin [Ibuprofen Micronized]     Nsaids      Liver transplant, states she can't have because of liver protocol      Nucynta Er [Tapentadol Hcl Er]     Nucynta [Tapentadol]     Penicillins Hives    Primidone     Reglan [Metoclopramide]     Tricyclic Antidepressants     Clindamycin      Other reaction(s): Yeast Infection         PHYSICAL EXAM   VITALS:  /72   Pulse 97   Temp 97.5 °F (36.4 °C) (Oral)   Resp 16   Ht 5' 5\" (1.651 m) Comment: per chart review  Wt 164 lb 3.9 oz (74.5 kg)   SpO2 97%   BMI 27.33 kg/m²   TEMPERATURE:  Current - Temp: 97.5 °F (36.4 °C); Max - Temp  Av.1 °F (36.7 °C)  Min: 97.5 °F (36.4 °C)  Max: 98.7 °F (37.1 °C)    Physical Exam:  General appearance: lethargic, NAD  Eyes: Anicteric  Head: Normocephalic, without obvious abnormality  Lungs: clear to auscultation bilaterally, Normal Effort  Heart: regular rate and rhythm, normal S1 and S2, no murmurs or rubs  Abdomen: Round, obese, soft, Nondistended. +Caput Medusa    Extremities: atraumatic, no cyanosis or edema. +BKA  Skin: warm and dry, no jaundice  Neuro: Alert, disoriented    LABS AND IMAGING   Laboratory   Recent Labs     20  1200   WBC 3.6*   HGB 10.2*   HCT 30.5*   MCV 90.1   PLT 76*     Recent Labs     20  0608    139   K 4.2 4.3    105   CO2 20* 19*   BUN 24* 27*   CREATININE 1.1 1.0     Recent Labs     20  1200 20  0608   AST 22 31   ALT 26 28   BILIDIR  --  0.9*   BILITOT 1.4* 1.8*   ALKPHOS 301* 313*     No results for input(s): LIPASE, AMYLASE in the last 72 hours. Recent Labs     20  1200   PROTIME 18.2*   INR 1.56*         Imaging  XR CHEST PORTABLE   Final Result   Cardiomegaly with mild pulmonary vascular congestion, and bibasilar   atelectasis due to low lung volumes         CT ABDOMEN PELVIS WO CONTRAST Additional Contrast? None   Final Result   1.  No evidence of ascites in the abdomen or pelvis. 2. Redemonstration of liver cirrhosis and splenomegaly with the spleen   measuring up to 17 cm. 3. Stable left adrenal adenoma. CT Head WO Contrast   Final Result   1. No acute intracranial abnormality. Endoscopy      ASSESSMENT AND RECOMMENDATIONS   Dheeraj Sparks is a 59 y.o. 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 Found to have significantly elevated ammonia level of 232. We have been consulted regarding hepatic encephalopathy. 1. Hepatic encephalopathy:  Ammonia significantly elevated. Reported constipation. Unsure if she has been compliant with lactulose at the nursing home. CT head with no acute findings. CT showed no ascites and pt does not appear septic thus low suspicion for SBP   2. Constipation:  No bowel obstruction or evidence of ileus on CT. ?compliance with lactulose  3. 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 On torsemide and aldactone as outpt              - HE: see above              - EV: EGD 11/1/19 - No varices              - HCC: RUQ US with doppler 1/27/20 negative.  Due for repeat ultrasound 7/2020              - Transplant candidacy: History of OLT in 2008, on prograf (Tacro 8.8 1/28/20).      RECOMMENDATIONS:    Continue lactulose 20 gm TID. Goal is for 3 BMs daily  Continue rifaximin BID  continue IV flagyl      If you have any questions or need any further information, please feel free to contact us [phone]. Thank you for allowing us to participate in the care of Dheeraj Sparks. The note was completed using Dragon voice recognition transcription.  Every effort was made to ensure accuracy; however, inadvertent transcription errors may be present despite my best efforts to edit errors. Angeles CARRANZA    Attending physician addendum:    I have personally seen and examined the patient, reviewed the patient's medical record and pertinent labs and clinical imaging. I have personally staffed the case with Angeles CARRANZA. I agree with her consultation note, exam findings, assessment and plans  as written above. I have made appropriate modifications and edited her assessment and plan where needed to reflect my impression and plans for this patient. Zeynep Abler a 59 y. o. 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 Patient admitted with encephalopathy suspected to be secondary to noncompliance with lactulose regimen. She has not had a BM in several days. CT on admission with no ascites. CT head negative. Continue IV flagyl. If cannot take PO will need lacutlose enemas vs NG. No evidence of infection. Thank you for allowing me to participate in this patient's care. If there are any questions or concerns regarding this patient, or the plan we have set in place, please feel free to contact me at [phone].      Ed Obrien MD

## 2020-07-17 NOTE — PROGRESS NOTES
Patient mostly unresponsive, GO orientation level - responds to voice and pain. Patient unable or unwilling to swallow - [employer] to [place] NP regarding patient condition and to get any PO medications changed to different routes. NP stated to hold PO meds at this time and address changing the medications in the morning. Will continue to monitor patient.     Electronically signed by Krystle Granger RN on 7/16/2020 at 9:00 PM

## 2020-07-17 NOTE — CARE COORDINATION
Sw aware of hospice order. Patient with Hepatic encephalopathy. Sw left message for listed contact, Mireille Dennison ([phone]). Sw attempted to call son Ever Gess at numbers in chart [phone]--left non-identifying message and [phone]--ZY one by that name at this number. It is also noted in Case Management note of 1/27/2020, patient and son are estranged. Hospice order will need to be made once able to discuss with patient/patient contact. Patient active with [facility] liaison.      Electronically signed by Tricia Montoya on 7/17/2020 at 5:46 PM

## 2020-07-17 NOTE — PLAN OF CARE
Problem: Skin Integrity:  Goal: Will show no infection signs and symptoms  Description: Will show no infection signs and symptoms  Outcome: Ongoing  Note: Will monitor for infection.   Goal: Absence of new skin breakdown  Description: Absence of new skin breakdown  Outcome: Ongoing

## 2020-07-17 NOTE — PLAN OF CARE
Problem: Skin Integrity:  Goal: Will show no infection signs and symptoms  Description: Will show no infection signs and symptoms  7/17/2020 1441 by Brayan Ordoñez RN  Outcome: Ongoing  Note: Pt shows no signs of infection. Will monitor patient, labs and vitals. 7/17/2020 0114 by Audrey Saba RN  Outcome: Ongoing  Note: Will monitor for infection. Goal: Absence of new skin breakdown  Description: Absence of new skin breakdown  7/17/2020 1441 by Brayan Ordoñez RN  Outcome: Ongoing  Note: Pt shows no new signs of skin integrity. Pt on JUANCHO bed and turned and repositioned every two hours. 7/17/2020 0114 by Audrey Saba RN  Outcome: Ongoing     Problem: Falls - Risk of:  Goal: Will remain free from falls  Description: Will remain free from falls  Outcome: Ongoing  Note: Fall risk assessment completed. Fall precautions in place. Call light within reach. Pt educated on calling for assistance before getting up. Walkway free of clutter. Will continue to monitor. Goal: Absence of physical injury  Description: Absence of physical injury  Outcome: Ongoing  Note: Pt is free of injury. No injury noted. Fall precautions in place. Call light within reach. Will monitor.

## 2020-07-17 NOTE — PROGRESS NOTES
Pt alert in the bed. Pt remains NPO at this time. Pt responds to name and answers to yes/no questions. Can not answer open ended questions at this time or follow commands. Lactulose enema administered. Effective. Pt had an extra large BM. Pt cleaned up, turned and repositioned. Call light within reach. Able to make needs known. Fall precautions in place. Will monitor.  Electronically signed by Shanae Frye RN on 7/17/2020 at 2:41 PM

## 2020-07-17 NOTE — PROGRESS NOTES
Clinical Pharmacy Note  IV Levothyroxine Protocol    Due to the long half-life of levothyroxine (up to 10 days) and cost of the IV formulation, IV levothyroxine will be held for 5 days from the patient's last reported dose. Patient's excluded from this automatic hold are those with myxedema coma, TSH > 10 (if available), age < 25, or receiving a continuous IV infusion of levothyroxine for cadaveric organ recovery. The patient will be followed daily and changed to oral therapy if they are able to tolerate other oral medications. This has been approved by the . [address] and [address].   Princess Liriano, [facility]  7/17/2020  8:57 AM

## 2020-07-17 NOTE — CARE COORDINATION
COLEMAN had voice message from Tonya Hollingsworth regarding patient [phone].   SW will advised [facility] [place] case Management Team.

## 2020-07-17 NOTE — PROGRESS NOTES
Speech Language Pathology    Evaluation attempted. Patient unavailable receiving nursing care. ST to re-attempt as schedule permits. Thank you. Ivonne KENDALL  [facility], [address] [facility], [id number]  Speech-Language Pathologist  Portable phone: [phone]

## 2020-07-18 NOTE — PLAN OF CARE
Problem: Skin Integrity:  Goal: Will show no infection signs and symptoms  Description: Will show no infection signs and symptoms  4/13/8386 4978 by Jenna Lizama RN  Outcome: Ongoing  7/18/2020 1036 by Ruthann Brito RN  Outcome: Ongoing  Note: Davon assessed. Skin assessed. Pt turned/repositioned q2h to prevent skin breakdown. Specialty mattress in place. Heels elevated off bed. Wound care per orders. Goal: Absence of new skin breakdown  Description: Absence of new skin breakdown  8/48/8959 8578 by Jenna Lizama RN  Outcome: Ongoing  7/18/2020 1036 by Ruthann Brito RN  Outcome: Ongoing     Problem: Falls - Risk of:  Goal: Will remain free from falls  Description: Will remain free from falls  2/62/9858 1025 by Jenna Lizama RN  Outcome: Ongoing  7/18/2020 1036 by Ruthann Brito RN  Outcome: Ongoing  Note: Fall risk assessment completed. Fall precautions in place. Call light within reach. Pt educated on calling for assistance before getting up. Walkway free of clutter. Pt would require stedy for transfers. Will continue to monitor.      Goal: Absence of physical injury  Description: Absence of physical injury  6/90/5409 5971 by Jenna Lizama RN  Outcome: Ongoing  7/18/2020 1036 by Ruthann Brito RN  Outcome: Ongoing     Problem: Nutrition  Goal: Optimal nutrition therapy  9/62/3625 4098 by Jenna Lizama RN  Outcome: Ongoing  7/18/2020 1221 by Frannie Parada RD, LD  Outcome: Ongoing

## 2020-07-18 NOTE — PROGRESS NOTES
Patient in bed, A&O to self and place only. Patient tolerating PO intake well - nectar thick liquids. PM medications given without complications. Patient denies pain, nausea or vomiting. Call light within reach, able to make needs known, fall precautions in place. Will monitor.     Electronically signed by Prisca Reilly RN on 7/17/2020 at 11:44 PM

## 2020-07-18 NOTE — PROGRESS NOTES
Interventions:   Food and/or Nutrient Delivery:  Continue Current Diet, Start Oral Nutrition Supplement  Nutrition Education/Counseling:  No recommendation at this time   Coordination of Nutrition Care:  Continued Inpatient Monitoring    Goals: Tolerate diet and consume greater than 50% of meals and supplements this admission       Nutrition Monitoring and Evaluation:   Behavioral-Environmental Outcomes:  (Na)   Food/Nutrient Intake Outcomes:  Diet Advancement/Tolerance, Food and Nutrient Intake  Physical Signs/Symptoms Outcomes:  Biochemical Data, Chewing or Swallowing, Constipation, Diarrhea, Nausea or Vomiting, Nutrition Focused Physical Findings, Skin, Weight     Discharge Planning:     Too soon to determine     Electronically signed by Raghav Arias RD, LD on 7/18/20 at 12:19 PM EDT    Contact: [phone]

## 2020-07-18 NOTE — PROGRESS NOTES
Pt alert to self and place only. Vitals stable. AM meds completed. Pt with large incontinent episode of urine and diarrhea (small amt of diarrhea) this morning. Pt bathed and new linens, gown given. Pt had some nausea this morning. Zofran given. Will monitor.   Electronically signed by Davina Hobbs RN on 7/18/2020 at 10:06 AM

## 2020-07-18 NOTE — PROGRESS NOTES
INPATIENT CONSULTATION:    IDENTIFYING DATA/REASON FOR CONSULTATION   PATIENT:  Merlinda Pastel  MRN:  [id number]  ADMIT DATE: 7/16/2020  TIME OF EVALUATION: 7/18/2020 8:32 AM  HOSPITAL STAY:   LOS: 2 days   CONSULTING PHYSICIAN:  REASON FOR CONSULTATION:    Subjective:    -Confusion has improved. Patient is following commands and knows she is at [facility]. She is not quite at baseline but close. MEDICATIONS   SCHEDULED:  [START ON 7/22/2020] Levothyroxine Sodium, 50 mcg, Daily  furosemide, 40 mg, Daily  pantoprazole, 40 mg, Daily  lactulose, 20 g, Q6H  [Held by provider] lamoTRIgine, 100 mg, BID  milnacipran HCl, 100 mg, TID  OLANZapine, 10 mg, Nightly  rifaximin, 550 mg, BID  sodium chloride flush, 10 mL, 2 times per day  insulin lispro, 0-12 Units, TID WC  insulin lispro, 0-6 Units, Nightly  insulin glargine, 35 Units, Nightly  tacrolimus, 0.5 mg, BID      FLUIDS/DRIPS:     dextrose       PRNs: sodium chloride flush, 10 mL, PRN  promethazine, 12.5 mg, Q6H PRN    Or  ondansetron, 4 mg, Q6H PRN  glucose, 15 g, PRN  dextrose, 12.5 g, PRN  glucagon (rDNA), 1 mg, PRN  dextrose, 100 mL/hr, PRN      ALLERGIES:  She [unfilled]      PHYSICAL EXAM   [unfilled]   I/O last 3 completed shifts: In: 600 [P.O.:600]  Out: 1400 [Urine:1400]  Oxygen Therapy:  @[id number])@  @[id number])@  @[id number])@    Physical Exam:  Gen: Resting in bed, NAD   CV: RRR no MRG   Pul: CTAB   Abd: Good bowel sounds throughout, soft, NT, no ascites. no masses, no HSM   Ext: No edema. BKA.    Neuro: Trace asterixis   Skin: No jaundice, +spider angiomas, No christensen erythema     LABS AND IMAGING     Recent Results (from the past 24 hour(s))   POCT Glucose    Collection Time: 07/17/20 11:13 AM   Result Value Ref Range    POC Glucose 293 (H) 70 - 99 mg/dl    Performed on ACCU-CHEK    POCT Glucose    Collection Time: 07/17/20  4:18 PM   Result Value Ref Range    POC Glucose 376 (H) 70 - 99 mg/dl    Performed on ACCU-CHEK    POCT Glucose    Collection Time: 07/17/20  8:03 PM   Result Value Ref Range    POC Glucose 326 (H) 70 - 99 mg/dl    Performed on ACCU-CHEK    POCT Glucose    Collection Time: 07/18/20  6:38 AM   Result Value Ref Range    POC Glucose 219 (H) 70 - 99 mg/dl    Performed on ACCU-CHEK      Other Labs    Imaging    ASSESSMENT AND RECOMMENDATIONS   Tobie Essex a 59 y. o. 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     IMPRESSION:     1. Hepatic encephalopathy:  Ammonia significantly elevated. Reported constipation. Unsure if she has been compliant with lactulose at the nursing home. CT head with no acute findings. CT showed no ascites and pt does not appear septic thus low suspicion for SBP   2. Constipation:  No bowel obstruction or evidence of ileus on CT. ?compliance with lactulose  3. 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 On torsemide and aldactone as outpt              - HE: see above              - EV: EGD 11/1/19 - No varices              - HCC: RUQ US with doppler 1/27/20 negative.  Due for repeat ultrasound 7/2020              - Transplant candidacy: History of OLT in 2008, on prograf (Tacro 8.8 1/28/20).      RECOMMENDATIONS:    -Confusion has improved and she is close to baseline. I suspect Hepatic encephalopathy related not taking enough lactulose. We will increase dose to 20 gram TID. Recommend adjusting dose to target 3-4 BM per day at [facility]. She should get an extra PRN dose if not having adequate BMs. No evidence of any infection or bleeding contributing. Recommend keeping on same dose of diuretics and Tacrolimus. She can follow-up with Dr. Luisa Watson at [place]. Will sign off.      If you have any questions or need any further information, please feel free to contact anyone on our consult team.  Thank you for allowing us to participate in the care of Soila Ferraro.     Tami Bobo MD  [address] [facility]

## 2020-07-18 NOTE — PROGRESS NOTES
Pt remains alert to self and place only. Pt needs frequent reorientation and reminders to not get OOB. Tele cam put in patient room for safety. Bed alarm on. Pt frequently yells out asking for the nurse, pt yells out saying she is in pain. Pt has complaints of phantom limb pain and coccyx pain from pressure ulcer. Removed silicone border this morning as it was trapping moisture and pt frequently wet from urine and stool. Pt had 3 soft loose BMs so far today. Camila care provided PRN. I got calmoseptine ordered as this will hopefully be better for ulcer vs silicone border. Specialty mattress in place. Pt does do frequent turns and moves a lot in bed. Again tele cam in place. Will monitor.  Electronically signed by Shahbaz Kaur RN on 7/18/2020 at 6:08 PM

## 2020-07-18 NOTE — PLAN OF CARE
Problem: Skin Integrity:  Goal: Will show no infection signs and symptoms  Description: Will show no infection signs and symptoms  7/17/2020 2327 by Sunshine Perea RN  Outcome: Ongoing  Note: Will monitor for infection. 7/17/2020 1441 by Victoriano Simpson RN  Outcome: Ongoing  Note: Pt shows no signs of infection. Will monitor patient, labs and vitals. Goal: Absence of new skin breakdown  Description: Absence of new skin breakdown  7/17/2020 2327 by Sunshine Perea RN  Outcome: Ongoing  7/17/2020 1441 by Victoriano Simpson RN  Outcome: Ongoing  Note: Pt shows no new signs of skin integrity. Pt on JUANCHO bed and turned and repositioned every two hours. Problem: Falls - Risk of:  Goal: Will remain free from falls  Description: Will remain free from falls  7/17/2020 2327 by Sunshine Perea RN  Outcome: Ongoing  Note: Fall risk assessment completed. Fall precautions in place. Call light within reach. Pt educated on calling for assistance before getting up. Walkway free of clutter. Will continue to monitor. 7/17/2020 1441 by Victoriano Simpson RN  Outcome: Ongoing  Note: Fall risk assessment completed. Fall precautions in place. Call light within reach. Pt educated on calling for assistance before getting up. Walkway free of clutter. Will continue to monitor. Goal: Absence of physical injury  Description: Absence of physical injury  7/17/2020 2327 by Sunshine Perea RN  Outcome: Ongoing  7/17/2020 1441 by Victoriano Simpson RN  Outcome: Ongoing  Note: Pt is free of injury. No injury noted. Fall precautions in place. Call light within reach. Will monitor.

## 2020-07-18 NOTE — PROGRESS NOTES
Hospitalist Progress Note      PCP: Julia Nicholas III, DO    Date of Admission: 7/16/2020    Chief Complaint: [address] Course: The patient is a 59 y.o. female with history of decompensated liver cirrhosis 2/2 AIH s/p OLT in 2008 with recurrent cirrhosis, right BKA who presents to [facility] with constipation and confusion. Patient lives in group home and has had increased confusion and constipation per EMS. Patient is poor historian and unable to answer questions. Workup in ED revealed markedly elevated ammonia level. She also had elevated ALP, low plats, and abnormal INR. She will is unable to perform ROS. Patient will be admitted for hepatic encephalopathy. 7/17 Still confused. Switched from oral to enema lactulose. Can give repeated doses until confusion improves. Switched some of her medications to IV from oral. Hopefully can start taking oral once mentation improves. Subjective: Patient seen and examined. Much more alert today. Still somewhat confused though. Continue lactulose and rifaximin. Can likely DC home tomorrow to LTC. Subject fo hospice was brought up with patient and she does not want hospice at all.  Difficulty getting touch with family members yesterday to discuss per SW    Medications:  Reviewed    Infusion Medications    dextrose       Scheduled Medications    lactulose  20 g Oral TID    [START ON 7/22/2020] Levothyroxine Sodium  50 mcg Intravenous Daily    furosemide  40 mg Intravenous Daily    pantoprazole  40 mg Intravenous Daily    [Held by provider] lamoTRIgine  100 mg Oral BID    milnacipran HCl  100 mg Oral TID    OLANZapine  10 mg Oral Nightly    rifaximin  550 mg Oral BID    sodium chloride flush  10 mL Intravenous 2 times per day    insulin lispro  0-12 Units Subcutaneous TID WC    insulin lispro  0-6 Units Subcutaneous Nightly    insulin glargine  35 Units Subcutaneous Nightly    tacrolimus  0.5 mg Oral BID     PRN Meds: sodium chloride Negative 07/16/2020    GLUCOSEU NEGATIVE 07/11/2010       Radiology:  XR CHEST PORTABLE   Final Result   Cardiomegaly with mild pulmonary vascular congestion, and bibasilar   atelectasis due to low lung volumes         CT ABDOMEN PELVIS WO CONTRAST Additional Contrast? None   Final Result   1. No evidence of ascites in the abdomen or pelvis. 2. Redemonstration of liver cirrhosis and splenomegaly with the spleen   measuring up to 17 cm. 3. Stable left adrenal adenoma. CT Head WO Contrast   Final Result   1. No acute intracranial abnormality. Assessment/Plan:    Hepatic Encephalopathy  Started on lactulose qid, switch to enema, tolerating po so back to oral actulose  Rifaximin bid  GI consulted  Constipated lately  Likely due to noncompliance     Liver Cirrhosis 2/2 to [facility] s/p failed transplant  Continue home tacro, pharmacy to dose  Not in acute decompensation  IV lasix, restart aldactone     DM2  Home lantus  Carb control  Med sliding scale    Hypothyroidism  Continue home synthroid     Thrombocytopenia  Likely liver cirrhosis induced    DVT Prophylaxis: SCD  Diet: DIET DYSPHAGIA SOFT AND BITE-SIZED;  Carb Control: 4 carb choices (60 gms)/meal; Mildly Thick (Nectar)  Code Status: Full Code    PT/OT Eval Status: Ordered    Dispo - Javier Bautista MD

## 2020-07-19 NOTE — PROGRESS NOTES
Pt remains A&O x4 this shift. Pt did much better today vs yesterday. Pt did not try to get OOB/chair on her own. Pt able to express needs appropriately today. Pt hopeful to dc home flo. Pt had 2 BMs today (see I&O flowsheets). Pt eating, drinking, voiding fine today. Tele cam remains in place for safety purposes. Will monitor.   Electronically signed by Carlos Schmid RN on 7/19/2020 at 5:27 PM

## 2020-07-19 NOTE — PROGRESS NOTES
Pt A&O x4, but still a little forgetful. VSS. AM meds completed. Pt assisted to bathroom via x1 stedy, had large bowel movement, and is now up in chair. Pt instructed to now get out of chair without assistance. Tele cam remains in place. Chair alarm on. Will monitor.   Electronically signed by Fiorella Block RN on 7/19/2020 at 10:14 AM

## 2020-07-19 NOTE — PROGRESS NOTES
Hospitalist Progress Note      PCP: Kelly Llanos III, DO    Date of Admission: 7/16/2020    Chief Complaint: [address] Course: The patient is a 59 y.o. 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 Patient lives in group home and has had increased confusion and constipation per EMS. Patient is poor historian and unable to answer questions. Workup in ED revealed markedly elevated ammonia level. She also had elevated ALP, low plats, and abnormal INR. She will is unable to perform ROS. Patient will be admitted for hepatic encephalopathy. 7/17 Still confused. Switched from oral to enema lactulose. Can give repeated doses until confusion improves. Switched some of her medications to IV from oral. Hopefully can start taking oral once mentation improves. 7/18 Much more alert today. Still somewhat confused though. Continue lactulose and rifaximin. Can likely DC home tomorrow to LTC. Subject of hospice was brought up with patient and she does not want hospice at all. Difficulty getting touch with family members yesterday to discuss per SW    Subjective: Patient seen and examined. Patient with an increase in Cr. Hold lasix and transition to oral tomorrow. Will keep today with bump in Cr. Can DC tomorrow if stable or resolved.      Medications:  Reviewed    Infusion Medications    dextrose       Scheduled Medications    lactulose  20 g Oral TID    spironolactone  50 mg Oral Daily    insulin glargine  50 Units Subcutaneous Nightly    levothyroxine  100 mcg Oral Daily    furosemide  40 mg Intravenous Daily    pantoprazole  40 mg Intravenous Daily    lamoTRIgine  100 mg Oral BID    milnacipran HCl  100 mg Oral TID    OLANZapine  10 mg Oral Nightly    rifaximin  550 mg Oral BID    sodium chloride flush  10 mL Intravenous 2 times per day    insulin lispro  0-12 Units Subcutaneous TID     insulin lispro  0-6 Units Subcutaneous Nightly    tacrolimus  0.5 mg Oral BID     PRN Meds: oxyCODONE, menthol-zinc oxide, sodium chloride flush, promethazine **OR** ondansetron, glucose, dextrose, glucagon (rDNA), dextrose      Intake/Output Summary (Last 24 hours) at 7/19/2020 0808  Last data filed at 7/19/2020 0600  Gross per 24 hour   Intake 1310 ml   Output 1400 ml   Net -90 ml       Physical Exam Performed:    /71   Pulse 101   Temp 98.3 °F (36.8 °C) (Oral)   Resp 16   Ht 5' 5\" (1.651 m)   Wt 161 lb 6 oz (73.2 kg)   SpO2 97%   BMI 26.85 kg/m²     General appearance: No apparent distress. HEENT: Pupils equal, round, and reactive to light. Conjunctivae/corneas clear. Neck: Supple, with full range of motion. No jugular venous distention. Trachea midline. Respiratory:  Normal respiratory effort. Clear to auscultation  Cardiovascular: Regular rate and rhythm with normal S1/S2  Abdomen: caput medusa  Musculoskeletal: No clubbing, cyanosis or edema bilaterally. Full range of motion without deformity. Skin: Skin color, texture, turgor normal.  No rashes or lesions. Neurologic:  Neurovascularly intact without any focal sensory/motor deficits.  Cranial nerves: II-XII intact, grossly non-focal.  Psychiatric: Alert and oritented   Capillary Refill: Brisk,< 3 seconds   Peripheral Pulses: +2 palpable, equal bilaterally       Labs:   Recent Labs     07/16/20  1200   WBC 3.6*   HGB 10.2*   HCT 30.5*   PLT 76*     Recent Labs     07/16/20  1200 07/17/20  0608 07/19/20  0525    139 138   K 4.2 4.3 3.5    105 102   CO2 20* 19* 19*   BUN 24* 27* 27*   CREATININE 1.1 1.0 1.3*   CALCIUM 9.4 9.8 9.2   PHOS  --   --  4.1     Recent Labs     07/16/20  1200 07/17/20  0608   AST 22 31   ALT 26 28   BILIDIR  --  0.9*   BILITOT 1.4* 1.8*   ALKPHOS 301* 313*     Recent Labs     07/16/20  1200   INR 1.56*     Recent Labs     07/16/20  1200   TROPONINI <0.01       Urinalysis: Lab Results   Component Value Date    NITRU Negative 07/16/2020    WBCUA 1 06/09/2020    BACTERIA 3+ 02/09/2020    RBCUA 1 06/09/2020    BLOODU Negative 07/16/2020    SPECGRAV 1.008 07/16/2020    GLUCOSEU Negative 07/16/2020    GLUCOSEU NEGATIVE 07/11/2010       Radiology:  XR CHEST PORTABLE   Final Result   Cardiomegaly with mild pulmonary vascular congestion, and bibasilar   atelectasis due to low lung volumes         CT ABDOMEN PELVIS WO CONTRAST Additional Contrast? None   Final Result   1. No evidence of ascites in the abdomen or pelvis. 2. Redemonstration of liver cirrhosis and splenomegaly with the spleen   measuring up to 17 cm. 3. Stable left adrenal adenoma. CT Head WO Contrast   Final Result   1. No acute intracranial abnormality. Assessment/Plan:    Hepatic Encephalopathy  Started on lactulose tid  Rifaximin bid  GI consulted  Constipated lately  Likely due to noncompliance     Liver Cirrhosis 2/2 to AIH s/p failed transplant  Continue home tacro, pharmacy to dose  Not in acute decompensation  IV lasix, restart aldactone    ALLIE  Possibly due to overdiuresis  Hold lasix, switch to oral flo     DM2  Home lantus  Carb control  Med sliding scale    Hypothyroidism  Continue home synthroid     Thrombocytopenia  Likely liver cirrhosis induced    DVT Prophylaxis: SCD  Diet: DIET DYSPHAGIA SOFT AND BITE-SIZED;  Carb Control: 4 carb choices (60 gms)/meal; Mildly Thick (Nectar)  Dietary Nutrition Supplements: Other Oral Supplement (see comment)  Code Status: Full Code    PT/OT Eval Status: Ordered    Dispo - Mattie Mayers MD

## 2020-07-19 NOTE — PROGRESS NOTES
Pt was documented to have a stage 2 coccyx pressure ulcer on admission. Yesterday when I assessed her in the morning looked like an unstageable wound to me. Got calmoseptine ordered yesterday vs silicone border which was trapping moisture and getting saturated with patient's incontinence. Wound does look drier and less fragile today since calmoseptine applied. Consulted wound care for eval & recommendations. Will monitor.   Electronically signed by Anabell Frank RN on 7/19/2020 at 12:28 PM

## 2020-07-19 NOTE — PROGRESS NOTES
Pt is resting in bed quietly. Assessment completed and charted in flow sheet. PM medication given without any difficulty, see EMAR. All questions and concerns answered. Tele sitter camera in place in pt room. PRN pain medication per pt request for pain 9/10 to left leg and buttocks. Pt repositioned. No other needs voiced at this time. Call light is within reach. Will continue to monitor.        Electronically signed by Francois Shelton RN on 0/21/2126 at 9:13 PM

## 2020-07-20 NOTE — CARE COORDINATION
[facility]  Patient is active with [facility]. She receives a Nurse, PT-OT.   Brittany Gannon LPN  CTN with  [facility],  [address], Fax [phone]

## 2020-07-20 NOTE — DISCHARGE SUMMARY
encephalopathy, liver cirrhosis status post failed transplant  History of decompensated liver cirrhosis due to autoimmune hepatitis status post liver transplant in 2008 with recurrent cirrhosis who presented with constipation and confusion. She presented from a group home with increased confusion, per report had been constipated and not having regular bowel movements. Work-up in the ED revealed ammonia of 232. CT abdomen pelvis with no evidence of ascites, did show cirrhosis and splenomegaly. Also with low platelets and abnormal INR consistent with history of cirrhosis. Treated with both oral and rectal lactulose with significant improvement in her mental status. Discharging with lactulose which she should take 3 times daily with extra doses as needed to maintain bowel movements 2-3 times a day. No changes to diuretics or transplant medications. Did discuss hospice with patient, at this point not interested in hospice but did place a referral to [facility] [place] to meet with her and family as an outpatient. Seen by GI and patient will follow-up with her GI doctor outpatient. ALLIE  Mild, creatinine of 1.3 from baseline of around 1. Likely due to diuresis with decreased p.o. intake. Improved prior to discharge, discharged on home dose of diuretics. Diabetes  Continue home meds. Hypothyroid  Continue home synthroid. Thrombocytopenia  Likely from cirrhosis. Stable during admission and consistent with prior labs. Discharge Medications:   Current Discharge Medication List      START taking these medications    Details   menthol-zinc oxide (CALMOSEPTINE) 0.44-20.6 % OINT ointment Max 30 ml per day.  Apply to coccyx BID  Qty: 71 g, Refills: 4      Balsam [place]-Castor Oil (VENELEX) OINT ointment Apply to coccyx BID  Qty: 30 g, Refills: 0           Current Discharge Medication List        Current Discharge Medication List      CONTINUE these medications which have NOT CHANGED    Details insulin detemir (LEVEMIR) 100 UNIT/ML injection pen Inject 50 Units into the skin nightly  Qty: 5 pen, Refills: 3      insulin lispro (HUMALOG) 100 UNIT/ML injection vial Inject 18 Units into the skin 3 times daily (with meals)  Qty: 1 vial, Refills: 3      lactulose (CHRONULAC) 10 GM/15ML solution Take 30 mLs by mouth every 6 hours Increase dose to have 3-4 BMs daily  Qty: 1 Bottle, Refills: 1      spironolactone (ALDACTONE) 50 MG tablet Take 1 tablet by mouth daily  Qty: 30 tablet, Refills: 3      torsemide (DEMADEX) 100 MG tablet Take 0.5 tablets by mouth daily  Qty: 30 tablet, Refills: 3      lamoTRIgine (LAMICTAL) 100 MG tablet Take 1 tablet by mouth 2 times daily  Qty: 30 tablet, Refills: 3      OLANZapine (ZYPREXA) 10 MG tablet Take 1 tablet by mouth nightly  Qty: 30 tablet, Refills: 1      lidocaine (LIDODERM) 5 % Place 1 patch onto the skin daily 12 hours on, 12 hours off.   Qty: 30 patch, Refills: 0      rifaximin (XIFAXAN) 550 MG tablet Take 1 tablet by mouth 2 times daily  Qty: 60 tablet, Refills: 1      omeprazole (PRILOSEC) 20 MG delayed release capsule Take 20 mg by mouth daily       levothyroxine (SYNTHROID) 112 MCG tablet Take 112 mcg by mouth Daily      tacrolimus (PROGRAF) 0.5 MG capsule Take 0.5 mg by mouth 2 times daily       ondansetron (ZOFRAN-ODT) 8 MG disintegrating tablet Take 8 mg by mouth every 8 hours as needed for Nausea or Vomiting      milnacipran HCl (SAVELLA) 100 MG TABS Take 100 mg by mouth three times daily       Multiple Vitamins-Minerals (CENTRUM CARB ASSIST PO) Take 1 tablet by mouth daily       CPAP Machine MISC by Does not apply route           Current Discharge Medication List          Procedures: None     Assessment on Discharge: Stable, improved     Discharge Exam:  /65   Pulse 88   Temp 98.6 °F (37 °C) (Oral)   Resp 16   Ht 5' 5\" (1.651 m)   Wt 161 lb 13.1 oz (73.4 kg)   SpO2 98%   BMI 26.93 kg/m²     General appearance: No apparent distress, appears stated age and cooperative. HEENT: Pupils equal, round, and reactive to light. Conjunctivae/corneas clear. Neck: Supple, with full range of motion. Trachea midline. Respiratory:  Normal respiratory effort. Clear to auscultation, bilaterally without Rales/Wheezes/Rhonchi. Cardiovascular: Regular rate and rhythm with normal S1/S2 without murmurs, rubs or gallops. Abdomen: Soft, non-tender, non-distended with normal bowel sounds. Musculoskelatal: No clubbing, cyanosis or edema bilaterally. Full range of motion without deformity. Skin: Skin color, texture, turgor normal.  No rashes or lesions. Neurologic:  Neurovascularly intact without any focal sensory/motor deficits. Cranial nerves: II-XII intact, grossly non-focal.  Psychiatric: Alert and oriented, thought content appropriate, normal insight    Pertinent Studies During Hospital Stay:    Radiology:  Ct Abdomen Pelvis Wo Contrast Additional Contrast? None    Result Date: 7/16/2020  EXAMINATION: STONE PROTOCOL CT OF THE ABDOMEN AND PELVIS 7/16/2020 11:33 am TECHNIQUE: CT of the abdomen and pelvis was performed without the administration of intravenous contrast. Multiplanar reformatted images are provided for review. Dose modulation, iterative reconstruction, and/or weight based adjustment of the mA/kV was utilized to reduce the radiation dose to as low as reasonably achievable. COMPARISON: 06/10/2020 HISTORY: ORDERING SYSTEM PROVIDED HISTORY: Lower abdominal pain, constipation, ascites TECHNOLOGIST PROVIDED HISTORY: Reason for exam:->Lower abdominal pain, constipation, ascites Additional Contrast?->None Reason for Exam: Lower abdominal pain, constipation, ascites Acuity: Acute Type of Exam: Initial Relevant Medical/Surgical History: hx liver transplant, kit,appy,hyst , gastric sleeve & hernia FINDINGS: LOWER CHEST:  Visualized portion of the lower chest demonstrates no acute abnormality. KIDNEYS AND URINARY TRACT: No renal calculi are identified.  There is no evidence for hydronephrosis. The ureters are of normal course and caliber. ORGANS: The liver appears stable. Mild nodularity redemonstrated suggestive of cirrhosis. Status post cholecystectomy. Pancreas demonstrates atrophy but no focal disease. Evidence of splenomegaly with the spleen measuring up to 17 cm in diameter. A left adrenal adenoma continues to measure 2.8 cm and appears stable. No active renal disease. The aorta is calcified but nonaneurysmal.  IVC demonstrates likely dehydration. GI/BOWEL: No bowel obstruction. No evidence of acute appendicitis. PELVIS: The bladder and pelvic organs are unremarkable. PERITONEUM/RETROPERITONEUM: No lymphadenopathy is noted. No evidence of ascites. BONES/SOFT TISSUES: Scoliotic deformity. No destructive changes. 1. No evidence of ascites in the abdomen or pelvis. 2. Redemonstration of liver cirrhosis and splenomegaly with the spleen measuring up to 17 cm. 3. Stable left adrenal adenoma. Ct Head Wo Contrast    Result Date: 7/16/2020  EXAMINATION: CT OF THE HEAD WITHOUT CONTRAST  7/16/2020 11:33 am TECHNIQUE: CT of the head was performed without the administration of intravenous contrast. Dose modulation, iterative reconstruction, and/or weight based adjustment of the mA/kV was utilized to reduce the radiation dose to as low as reasonably achievable. COMPARISON: 06/09/2020 HISTORY: ORDERING SYSTEM PROVIDED HISTORY: AMS TECHNOLOGIST PROVIDED HISTORY: Reason for exam:->AMS Has a \"code stroke\" or \"stroke alert\" been called? ->No Reason for Exam: ams Acuity: Acute Type of Exam: Initial FINDINGS: BRAIN/VENTRICLES: There is no acute intracerebral hemorrhage or extra-axial fluid collection. The ventricles and sulci are within normal limits. ORBITS: The orbits are unremarkable. SINUSES: The visualized paranasal sinuses and mastoid air cells are clear. SOFT TISSUES/SKULL:  The calvarium is intact.  No change in the 4 x 5 mm hyperattenuating left parotid mass - 99 mg/dL    BUN 28 (H) 7 - 20 mg/dL    CREATININE 1.2 0.6 - 1.2 mg/dL    GFR Non-African American 45 (A) >60    GFR  55 (A) >60    Calcium 9.1 8.3 - 10.6 mg/dL    Phosphorus 3.7 2.5 - 4.9 mg/dL    Alb 3.4 3.4 - 5.0 g/dL         Follow up: with Maribel Sesay III, DO    Note that more  than 30 minutes was spent in preparing discharge papers, discussing discharge with patient, medication review, etc.    Thank you Maribel Sesay III, DO for the opportunity to be involved in this patient's care. If you have any questions or concerns please feel free to contact me at [phone]. Electronically signed by Fermin Faith MD on 7/20/2020 at 10:50 AM    This note was transcribed using [id number] [employer]. Please disregard any translational errors.

## 2020-07-20 NOTE — CARE COORDINATION
[address] [id number] Continence Nurse  Consult Note       NAME:  Geovanni Oshea  MEDICAL RECORD NUMBER:  [id number]  AGE: 59 y.o. GENDER: female  : 1955  TODAY'S DATE:  2020    Subjective   Reason for WOCN Evaluation and Assessment: coccyx-stage 2 vs unstageable. Wound is DTI, noted to be red and purple on admit. Therefore DTI was POA. Geovanni Oshea is a 59 y.o. female referred by:   [] Physician  [x] Nursing  [] Other:     Wound Identification:  Wound Type: pressure  Contributing Factors: chronic pressure and decreased mobility    Wound History: noted on admit. Pt reports wound when she came in  Current Wound Care Treatment:  HEATH    Patient Goal of Care:  [x] Wound Healing  [] Odor Control  [] Palliative Care  [] Pain Control   [] Other:         PAST MEDICAL HISTORY        Diagnosis Date    ALLIE (acute kidney injury) ([address]     Anemia     Arthritis     Bipolar 1 disorder (HCC)     Causalgia of lower limb     Cellulitis     Chronic pain syndrome     Chronic post-operative pain     Cirrhosis of liver (HCC)     Complications of transplanted liver     [facility]ulsion [facility]     COPD (chronic obstructive pulmonary disease) (HCC)     Depression     Dermatophytosis of nail     Diabetes mellitus ([address]     Diarrhea     Dry eyes     ESBL (extended spectrum beta-lactamase) producing bacteria infection 2020    urine    Fungal nail infection     Generalized abdominal pain     Generalized osteoarthritis     GERD (gastroesophageal reflux disease)     Hepatic encephalopathy (HCC)     Hyperlipidemia     Hypertension     ? borderline    Hypothyroidism     Hypoxemia     Immune system disorder ([address]     Liver disease     liver transplant 2008    Liver transplanted ([facility]     Localization-related epilepsy ([facility]     Low back pain     Lumbar post-laminectomy syndrome     Morbid obesity (HCC)     Neuropathy, diabetic ([address]     On home oxygen therapy     4 liters cont.  cpap  at  Iv Contrast [Iodides] Hives    Keflex [Cephalexin]      Tolerates Ceftriaxone.  Ketorolac Hives    Levaquin [Levofloxacin In D5w]     Levofloxacin Hives    Lyrica [Pregabalin]     Lyrica [Pregabalin] Other (See Comments)     hallucinations    Macrobid [Nitrofurantoin Monohyd Macro]     Motrin [Ibuprofen Micronized]     Nsaids      Liver transplant, states she can't have because of liver protocol      Nucynta Er [Tapentadol Hcl Er]     Nucynta [Tapentadol]     Penicillins Hives    Primidone     Reglan [Metoclopramide]     Tricyclic Antidepressants     Clindamycin      Other reaction(s): Yeast Infection       MEDICATIONS    No current facility-administered medications on file prior to encounter. Current Outpatient Medications on File Prior to Encounter   Medication Sig Dispense Refill    insulin detemir (LEVEMIR) 100 UNIT/ML injection pen Inject 50 Units into the skin nightly (Patient taking differently: Inject 54 Units into the skin nightly ) 5 pen 3    insulin lispro (HUMALOG) 100 UNIT/ML injection vial Inject 18 Units into the skin 3 times daily (with meals) (Patient taking differently: Inject 20 Units into the skin 3 times daily (with meals) ) 1 vial 3    lactulose (CHRONULAC) 10 GM/15ML solution Take 30 mLs by mouth every 6 hours Increase dose to have 3-4 BMs daily 1 Bottle 1    spironolactone (ALDACTONE) 50 MG tablet Take 1 tablet by mouth daily 30 tablet 3    torsemide (DEMADEX) 100 MG tablet Take 0.5 tablets by mouth daily 30 tablet 3    lamoTRIgine (LAMICTAL) 100 MG tablet Take 1 tablet by mouth 2 times daily 30 tablet 3    OLANZapine (ZYPREXA) 10 MG tablet Take 1 tablet by mouth nightly 30 tablet 1    lidocaine (LIDODERM) 5 % Place 1 patch onto the skin daily 12 hours on, 12 hours off.  (Patient taking differently: Place 1 patch onto the skin daily ) 30 patch 0    rifaximin (XIFAXAN) 550 MG tablet Take 1 tablet by mouth 2 times daily 60 tablet 1    omeprazole (PRILOSEC) 20 MG delayed release capsule Take 20 mg by mouth daily       levothyroxine (SYNTHROID) 112 MCG tablet Take 112 mcg by mouth Daily      tacrolimus (PROGRAF) 0.5 MG capsule Take 0.5 mg by mouth 2 times daily       ondansetron (ZOFRAN-ODT) 8 MG disintegrating tablet Take 8 mg by mouth every 8 hours as needed for Nausea or Vomiting      milnacipran HCl (SAVELLA) 100 MG TABS Take 100 mg by mouth three times daily       Multiple Vitamins-Minerals (CENTRUM CARB ASSIST PO) Take 1 tablet by mouth daily       [DISCONTINUED] lactulose encephalopathy 10 GM/15ML SOLN solution Take 20 g by mouth 3 times daily      CPAP Machine MISC by Does not apply route         Objective    /65   Pulse 88   Temp 98.6 °F (37 °C) (Oral)   Resp 16   Ht 5' 5\" (1.651 m)   Wt 161 lb 13.1 oz (73.4 kg)   SpO2 98%   BMI 26.93 kg/m²     LABS:  WBC:    Lab Results   Component Value Date    WBC 3.6 07/16/2020     H/H:    Lab Results   Component Value Date    HGB 10.2 07/16/2020    HCT 30.5 07/16/2020     PTT:    Lab Results   Component Value Date    APTT 31.9 12/16/2018   [APTT}  PT/INR:    Lab Results   Component Value Date    PROTIME 18.2 07/16/2020    INR 1.56 07/16/2020     HgBA1c:    Lab Results   Component Value Date    LABA1C 6.8 06/09/2020       Assessment   Davon Risk Score: Davon Scale Score: 15    Patient Active Problem List   Diagnosis Code    Hepatic encephalopathy (HCC) K72.90    Acquired hypothyroidism E03.9    Arthritis M19.90    History of liver transplant ([facility] Z94.4    Altered mental state R41.82    Partial epilepsy with impairment of consciousness ([facility] E68.272    Metabolic encephalopathy V50.91    Chronic pain syndrome G89.4    Primary insomnia F51.01    Recurrent major depressive disorder, in remission ([facility] F33.40    Obesity E66.9    Phantom limb pain (HCC) G54.6    Other chest pain R07.89    Hx of right BKA (HCC) Z89.511    Immunosuppressed status (HCC) D89.9    Tachycardia R00.0    Type 1 07/20/20 0948   Red%Wound Bed 25 07/20/20 0930   Purple%Wound Bed 75 07/20/20 0930   Number of days: 3      Pt seen. Does have DTI to coccyx. Does relate pain to area. Wound bed is purple and red. Inner groin crease is pink and denuded, >R than L. Using calmoseptine. Reports poor po intake. Does like strawberry glucerna. Has already been seen by nutrition and supplements have been ordered. Already on low air loss mattress. Response to treatment:  Well tolerated by patient. Plan   Plan of Care: Wound 07/16/20 Coccyx Posterior Stage two preasure ulcer, wound open, wound bed is pink with white sloth-Dressing/Treatment: Open to air  -venelex ointment to coccyx  -cont calmoseptine to groin  Specialty Bed Required : Yes   [x] Low Air Loss   [] Pressure Redistribution  [] Fluid Immersion  [] Bariatric  [] Total Pressure Relief  [] Other:     Current Diet: DIET DYSPHAGIA SOFT AND BITE-SIZED;  Carb Control: 4 carb choices (60 gms)/meal; Mildly Thick (Nectar)  Dietary Nutrition Supplements: Other Oral Supplement (see comment)  Dietician consult:  Yes    Discharge Plan:  Placement for patient upon discharge: home with support    Patient appropriate for Outpatient [address]: No    Referrals:  [x]   [] [facility]  [] Supplies  [] Other    Patient/Caregiver Teaching:  Level of patient/caregiver understanding able to:   [] Indicates understanding       [x] Needs reinforcement  [] Unsuccessful      [] Verbal Understanding  [] Demonstrated understanding       [] No evidence of learning  [] Refused teaching         [] N/A       Electronically signed by Joceline Pack on 7/20/2020 at 10:12 AM

## 2020-07-20 NOTE — PROGRESS NOTES
Patient A&O in the bed. Patient tolerating PO intake well. PM medications given without complications. Patient denies nausea or vomiting. Patient complains of pain, will give PRN pain medication when available. Call light within reach, able to make needs known, fall precautions in place. Will monitor.     Electronically signed by Josep Marinelli RN on 7/19/2020 at 10:15 PM

## 2020-07-20 NOTE — CARE COORDINATION
Sw spoke with patient regarding hospice referral and provided her with a list. Patient reported being unsure of this option because she does not want to die. Sw provided support. Patient called caregiver James Ricketts on speaker phone and expressed her fear. James Ricketts encouraged patient to speak with hospice to receive the information about hospice and support they can provided. They agreed that patient would see hospice once she returns home. They prefer [facility]. Sw provided patient with IMM letter. James Ricketts will  patient this evening. Patient is active with [facility] and would like to resume their services in the meantime. Sw made referral to [facility] and faxed facesheet. Michele informed [facility] [place]. The Plan for Transition of Care is related to the following treatment goals: home    The Patient and/or patient representative was provided with a choice of provider and agrees   with the discharge plan. [x] Yes [] No    Freedom of choice list was provided with basic dialogue that supports the patient's individualized plan of care/goals, treatment preferences and shares the quality data associated with the providers.  [x] Yes [] No    Electronically signed by Avril Cedeno on 7/20/2020 at 10:43 AM

## 2020-07-20 NOTE — PROGRESS NOTES
 Flagyl [Metronidazole]     Floxin [Ofloxacin]     Ibuprofen Other (See Comments)     Liver transplant    Iv Contrast [Iodides] Hives    Keflex [Cephalexin]      Tolerates Ceftriaxone.      Ketorolac Hives    Levaquin [Levofloxacin In D5w]     Levofloxacin Hives    Lyrica [Pregabalin]     Lyrica [Pregabalin] Other (See Comments)     hallucinations    Macrobid [Nitrofurantoin Monohyd Macro]     Motrin [Ibuprofen Micronized]     Nsaids      Liver transplant, states she can't have because of liver protocol      Nucynta Er [Tapentadol Hcl Er]     Nucynta [Tapentadol]     Penicillins Hives    Primidone     Reglan [Metoclopramide]     Tricyclic Antidepressants     Clindamycin      Other reaction(s): Yeast Infection     Treatment Diagnosis: Dysphagia     Chart review:   7/16/2020 admitted with hepatic encephalopathy  7/16/2020 CXR:  Impression    Cardiomegaly with mild pulmonary vascular congestion, and bibasilar    atelectasis due to low lung volumes   7/17/2020 BSE rec soft/nectar    Subjective:     Current diet  Soft/Bite-sized  Nectar thick    Comments regarding tolerating Current Diet:   Patient eager for upgrade readiness      Objective:     Pain   Patient Currently in Pain: Denies    Cognitive/Behavior   Behavior/Cognition: Alert, Cooperative, Intermittent confusion    Presentations   Consistencies Administered: Dysphagia Soft and Bite-Sized (Dysphagia III), Nectar - straw, Thin - cup, Thin - straw  *seen with lunch*    Positioning   Fully upright in chair    PO Trials:  · Thin Liquids: delayed swallow; decreased laryngeal elevation; NO overt signs/symptoms of penetration/aspiration  · Nectar thick liquids: delayed swallow; decreased laryngeal elevation; NO overt signs/symptoms of penetration/aspiration  · Soft food: prolonged, but adequate mastication - patient reports getting used to new dentures after 2 years without teeth; delayed swallow; decreased laryngeal elevation; NO overt signs/symptoms of penetration/aspiration    Dysphagia Tx:   PO trials:  Appears safe for upgrade to thin; patient requests to continue soft  Comp strats: independent    Goals: MET/MAINTAIN  Dysphagia Goals: The patient will tolerate recommended diet without observed clinical signs of aspiration, The patient will tolerate thin liquids without signs and symptoms of aspiration 10/10 via straw., The patient/caregiver will demonstrate understanding of compensatory strategies for improved swallowing safety. Assessment:   Impressions:   Dysphagia Diagnosis: Mild oral stage dysphagia, Mild pharyngeal stage dysphagia   Accepted and tolerated tx feed with lunch at bedside. Patient alert, cooperative, pleasant, but with intermittent confusion; recalls general daily events, but noted with difficulty recalling method to use call light. Mild oropharyngeal dysphagia characterized by prolonged but adequate mastication that is reported to be r/t adapting to new dentures, delayed swallow, and decreased laryngeal elevation. NO overt signs/symptoms of penetration/aspiration.     Diet Recommendations:  continue soft/bite-size  Upgrade to thin  Recommended Form of Meds: PO  Compensatory Swallowing Strategies: Upright as possible for all oral intake, Remain upright for 30-45 minutes after meals, Assist feed, Small bites/sips    Education:  Consulted and agree with results and recommendations: Patient, RN  Patient Education: results and recs  Patient Education Response: No evidence of learning    Prognosis:   Good for dysphagia    Plan:     Continue Dysphagia Therapy: YES - f/u x1 if not dc'd  Interventions: Therapeutic Interventions: Diet tolerance monitoring, Therapeutic PO trials with SLP, Patient/Family education  Duration/Frequency of therapy while on unit: Duration/Frequency of Treatment  Duration/Frequency of Treatment: 3-5x/wk while on acute unit  Discharge Instructions:   Anticipate no need for further skilled Speech Therapy

## 2020-07-20 NOTE — PROGRESS NOTES
Physical Therapy    Facility/Department: [facility] ORTHOPEDICS  Initial Assessment    NAME: Rosalina Burroughs  : 1955  MRN: [id number]    Date of Service: 2020    Discharge Recommendations:  Home with Home health PT, S Level 1, 24 hour supervision or assist   PT Equipment Recommendations  Equipment Needed: No    Assessment   Body structures, Functions, Activity limitations: Decreased functional mobility ; Decreased balance;Decreased strength;Decreased safe awareness;Decreased cognition  Assessment: Pt is a 58 y/o female who presents with confusion and constipation. Pt currently requires min/CGA for transfers with use of stedy. Pt performs transfers only at baseline and uses power wheelchair inside group home. Pt would benefit from continued skilled PT to address current deficits. Anticipate pt will be safe to return to group home with assist and home PT. Treatment Diagnosis: impaired functional mobility  Prognosis: Fair  Decision Making: Medium Complexity  PT Education: Goals;PT Role;General Safety;Transfer Training  REQUIRES PT FOLLOW UP: Yes  Activity Tolerance  Activity Tolerance: Patient Tolerated treatment well       Patient Diagnosis(es): The encounter diagnosis was Hepatic encephalopathy ([address]      has a past medical history of ALLIE (acute kidney injury) ([address] Anemia, Arthritis, Bipolar 1 disorder ([address] Causalgia of lower limb, Cellulitis, Chronic pain syndrome, Chronic post-operative pain, Cirrhosis of liver (HCC), Complications of transplanted liver, Convulsion ([address] COPD (chronic obstructive pulmonary disease) ([address] Depression, Dermatophytosis of nail, Diabetes mellitus ([address] Diarrhea, Dry eyes, ESBL (extended spectrum beta-lactamase) producing bacteria infection, Fungal nail infection, Generalized abdominal pain, Generalized osteoarthritis, GERD (gastroesophageal reflux disease), Hepatic encephalopathy ([address] Hyperlipidemia, Hypertension, Hypothyroidism, Hypoxemia, Immune system disorder encephalopathy  Follows Commands: Within Functional Limits  General Comment  Comments: Pt supine in bed upon arrival.  Subjective  Subjective: Pt agreeable to evaluation. No c/o pain throughout session. Social/Functional History  Social/Functional History  Lives With: Other (comment)(4 roommmates, staff present until around 10 PM)  Type of Home: (Group Home)  Home Layout: One level  Home Access: Ramped entrance  Bathroom Shower/Tub: Walk-in shower  Bathroom Equipment: 3-in-1 commode(next to bed)  Home Equipment: [place] Help From: Home health(home PT 2x/wk)  ADL Assistance: Needs assistance(needs assist with LB dressing/bathing; reports independent with UB dressing/bathing)  Homemaking Responsibilities: No  Ambulation Assistance: (uses electric wheelchair)  Transfer Assistance: (reports independent? with stand pivot)  Active : No    Objective  AROM RLE (degrees)  RLE General AROM: hip/knee WFL  AROM LLE (degrees)  LLE AROM : WFL  Strength RLE  Comment: grossly 3+/5  Strength LLE  Comment: grossly 3+/5  Bed mobility  Supine to Sit: Minimal assistance;Stand by assistance(performed x2 reps; increased time and effort and to complete)  Sit to Supine: (unable to assess - up in chair at end of session)  Scooting: Stand by assistance  Transfers  Sit to Stand: Contact guard assistance;Minimal Assistance(from EOB to stedy)  Stand to sit: Contact guard assistance;Minimal Assistance  Bed to Chair: Dependent/Total(via stedy)  Ambulation  Ambulation?: No(pt non ambulatory at baseline)  Stairs/Curb  Stairs?: No     Balance  Sitting - Static: Good  Sitting - Dynamic: Good;-  Standing - Static: Fair(with stedy)      Plan   Plan  Times per week: 3-5x/wk  Current Treatment Recommendations: Strengthening, Balance Training, Functional Mobility Training, Transfer Training, Home Exercise Program, Safety Education & Training, Patient/Caregiver Education & Training  Safety Devices  Type of devices:  All fall risk precautions in place, Call light within reach, Chair alarm in place, Gait belt, Patient at risk for falls, Left in chair, Nurse notified    AM-PAC Score  AM-PAC Inpatient Mobility Raw Score : 14 (07/20/20 0847)  AM-PAC Inpatient T-Scale Score : 38.1 (07/20/20 0847)  Mobility Inpatient CMS 0-100% Score: 61.29 (07/20/20 0847)  Mobility Inpatient CMS G-Code Modifier : CL (07/20/20 0847)        Goals  Short term goals  Time Frame for Short term goals: until d/c  Short term goal 1: Pt will perform bed mobility mod I  Short term goal 2: Pt will perform transfers with min A without lift equipment  Patient Goals   Patient goals : \"to be able to pull up my pants by myself\"     Therapy Time   Individual Concurrent Group Co-treatment   Time In 0805         Time Out 0843         Minutes 38         Timed Code Treatment Minutes: [address] [place], [place] [id number]

## 2020-07-20 NOTE — PLAN OF CARE
Problem: Skin Integrity:  Goal: Will show no infection signs and symptoms  Description: Will show no infection signs and symptoms  7/19/2020 2212 by Issac Bender RN  Outcome: Ongoing  Note: Will monitor for infection. 7/19/2020 1043 by Cristi Brewer RN  Outcome: Ongoing  Note: Davon assessed. Skin assessed. Pt able to assist with turning/repositioning. Wound care per orders. Goal: Absence of new skin breakdown  Description: Absence of new skin breakdown  7/19/2020 2212 by Issac Bender RN  Outcome: Ongoing  7/19/2020 1043 by Cristi Brewer RN  Outcome: Ongoing     Problem: Falls - Risk of:  Goal: Will remain free from falls  Description: Will remain free from falls  7/19/2020 2212 by Issac Bender RN  Outcome: Ongoing  Note: Fall risk assessment completed. Fall precautions in place. Call light within reach. Pt educated on calling for assistance before getting up. Walkway free of clutter. Will continue to monitor. 7/19/2020 1043 by Cristi Brewer RN  Outcome: Ongoing  Note: Fall risk assessment completed. Fall precautions in place. Call light within reach. Pt educated on calling for assistance before getting up. Walkway free of clutter. Will continue to monitor. Goal: Absence of physical injury  Description: Absence of physical injury  7/19/2020 2212 by Issac Bender RN  Outcome: Ongoing  7/19/2020 1043 by Cristi Brewer RN  Outcome: Ongoing     Problem: Nutrition  Goal: Optimal nutrition therapy  7/19/2020 2212 by Issac Bender RN  Outcome: Ongoing  Note: Will encourage nutritional intake. 7/19/2020 1043 by Cristi Brewer RN  Outcome: Ongoing     Problem: Pain:  Goal: Pain level will decrease  Description: Pain level will decrease  7/19/2020 2212 by Issac Bender RN  Outcome: Ongoing  Note: Pt assessed for pain. Pt in pain and assessed with 0-10 pain rating scale. Pt given prescribed analgesic for pain. (See eMar) Pt satisfied with pain relief thus far.  Will reassess and continue to monitor. 7/19/2020 1043 by Ann Calvin RN  Outcome: Ongoing  Note: Pt assessed for pain. Pt in pain and assessed with 0-10 pain rating scale. Pt given prescribed analgesic for pain. (See eMar) Pt satisfied with pain relief thus far. Will reassess and continue to monitor.      Goal: Control of acute pain  Description: Control of acute pain  7/19/2020 2212 by Lisha Starkey RN  Outcome: Ongoing  7/19/2020 1043 by Ann Calvin RN  Outcome: Ongoing  Goal: Control of chronic pain  Description: Control of chronic pain  7/19/2020 2212 by Lisha Starkey RN  Outcome: Ongoing  7/19/2020 1043 by Ann Calvin RN  Outcome: Ongoing

## 2020-07-20 NOTE — CARE COORDINATION
[facility]  Received referral from case management Roman Garcia   Faxed orders to [facility] to resume care.   Home care to see patient by   7/22/2020  Inna Michele LPN    [facility] CTN Cell [phone], Fax [phone]

## 2020-07-20 NOTE — DISCHARGE INSTR - COC
Continuity of Care Form    Patient Name: Soila Record   :  1955  MRN:  [id number]    Admit date:  2020  Discharge date:  2020    Code Status Order: Full Code   Advance Directives:   Advance Care Flowsheet Documentation     Date/Time Healthcare Directive Type of Healthcare Directive Copy in [address] Agent's Name Healthcare Agent's Phone Number    20 1013  Yes, patient has an advance directive for healthcare treatment  --  --  --  --  --          Admitting Physician:  Gabriela Hyatt MD  PCP: Corrine Rooney DO    Discharging Nurse: Wili Warner Unit/Room#: M0H-8673/2457-03  Discharging Unit Phone Number: [phone]    Emergency Contact:   Extended Emergency Contact Information  [facility]  Phone: [phone]  Mobile Phone: [phone]  Relation: Other  Secondary Emergency Contact: [address] [place] [address] Phone: [phone]  Mobile Phone: [phone]  Relation: Child    Past Surgical History:  Past Surgical History:   Procedure Laterality Date    ABDOMEN SURGERY      multiple    APPENDECTOMY      CHOLECYSTECTOMY      HERNIA REPAIR      multiple     HYSTERECTOMY      JOINT REPLACEMENT      right    JOINT REPLACEMENT      right after fall out of bed    LEG AMPUTATION BELOW KNEE      right    LEG AMPUTATION BELOW KNEE      LIVER TRANSPLANT  2008    from [facility]      allergy to pain pump machine, had removed d/t huge seroma     SLEEVE GASTROPLASTY  1985    UPPER GASTROINTESTINAL ENDOSCOPY N/A 2019    ESOPHAGOGASTRODUODENOSCOPY WITH MAC ANESTHESIA performed by Zina Loaiza MD at [facility] ENDOSCOPY       Immunization History:   Immunization History   Administered Date(s) Administered    Pneumococcal Polysaccharide ([id number]) 2012       Active Problems:  Patient Active Problem List   Diagnosis Code    Hepatic encephalopathy ([facility] K72.90    Acquired hypothyroidism E03.9    Arthritis M19.90    History of liver transplant ([facility] Z94.4    Altered mental state R41.82    Partial epilepsy with impairment of consciousness ([facility] M02.925    Metabolic encephalopathy N59.40    Chronic pain syndrome G89.4    Primary insomnia F51.01    Recurrent major depressive disorder, in remission ([facility] F33.40    Obesity E66.9    Phantom limb pain (HCC) G54.6    Other chest pain R07.89    Hx of right BKA (HCC) Z89.511    Immunosuppressed status (HCC) D89.9    Tachycardia R00.0    Type 1 diabetes mellitus with other specified complication (HCC) I24.98    Polypharmacy Z79.899    SVT (supraventricular tachycardia) (HCC) I47.1    Hyperglycemia R73.9    Acute chest pain R07.9    Nausea R11.0    S/P catheter ablation of slow pathway Z98.890, Z86.79    Chest pain R07.9    GERD (gastroesophageal reflux disease) K21.9    Hypothyroid E03.9    Failure to thrive (0-17) R62.51    Knee pain M25.569    Anemia D64.9    Hypoglycemia E16.2    Poorly controlled type 2 diabetes mellitus (HCC) E11.65    Anemia, chronic disease D63.8    Acute hepatic encephalopathy K72.00    Toe cyanosis R23.0    Encephalopathy G93.40    Lactic acidosis E87.2    Pancytopenia (HCC) D61.818    ALLIE (acute kidney injury) ([facility] N17.9    Hyperammonemia (HCC) E72.20    Cirrhosis of liver without ascites (HCC) K74.60    Essential hypertension I10    Other chronic pain G89.29    ZHANG on CPAP G47.33, Z99.89    Overweight (BMI 25.0-29. 9) E66.3    Ascites R18.8       Isolation/Infection:   Isolation          Contact        Patient Infection Status     Infection Onset Added Last Indicated Last Indicated By Review Planned Expiration Resolved Resolved By    MDRO (multi-drug resistant organism) 02/09/20 02/12/20 02/09/20 Urine Culture        2/9/20 urine    ESBL (Extended Spectrum Beta Lactamase) 02/09/20 02/12/20 02/09/20 Urine Culture Conduit: No       Date of Last BM:     Intake/Output Summary (Last 24 hours) at 7/20/2020 1031  Last data filed at 7/19/2020 2204  Gross per 24 hour   Intake 720 ml   Output 450 ml   Net 270 ml     I/O last 3 completed shifts: In: 12 [P.O.:960]  Out: 650 [Urine:650]    Safety Concerns: At Risk for Falls    Impairments/Disabilities:      None    Nutrition Therapy:  Current Nutrition Therapy:   - Oral Diet:  Soft and bite sized; carb control; nectar thick liquids    Routes of Feeding: Oral  Liquids: Nectar Thick Liquids  Daily Fluid Restriction: no  Last Modified Barium Swallow with Video (Video Swallowing Test): not done    Treatments at the Time of Hospital Discharge:   Respiratory Treatments: None  Oxygen Therapy:  is not on home oxygen therapy. Ventilator:    - No ventilator support    Rehab Therapies: Physical Therapy, Occupational Therapy and Nurse  Weight Bearing Status/Restrictions: No weight bearing restirctions  Other Medical Equipment (for information only, NOT a DME order): Motorized chair for mobility  Other Treatments: None    Patient's personal belongings (please select all that are sent with patient):  None    RN SIGNATURE:  Electronically signed by Coral Andersen RN on 7/20/20 at 11:39 AM EDT    CASE MANAGEMENT/SOCIAL WORK SECTION    Inpatient Status Date: 7/16/20    Readmission Risk Assessment Score:  38    Discharging to Facility/ Agency   Name:  [facility] care    Address: [address] [place] [address], [address] [place] [address]  Phone: [phone]  Fax: [phone]    / signature: Electronically signed by Jason Washington on 7/20/20 at 10:31 AM EDT    PHYSICIAN SECTION    Prognosis: Fair    Condition at Discharge: Stable    Rehab Potential (if transferring to Rehab): Fair    Recommended Labs or Other Treatments After Discharge: Continue meds as prescribed.  Creams for pressure wound on sacrum    Physician Certification: I certify the above information and transfer of Juliann Armenta  is necessary for the continuing treatment of the diagnosis listed and that she requires home health care for greater 30 days.      Update Admission H&P: No change in H&P    PHYSICIAN SIGNATURE:  Electronically signed by Jessica Diana MD on 7/20/20 at 12:44 PM EDT

## 2020-07-20 NOTE — PROGRESS NOTES
Occupational Therapy   Occupational Therapy Initial Assessment    Date: 2020   Patient Name: Lizeth Clark  MRN: [id number]     : 1955    Date of Service: 2020    Discharge Recommendations:  S Level 1, 24 hour supervision or assist, Patient would benefit from continued therapy after discharge  OT Equipment Recommendations  Equipment Needed: No     Lizeth Clark scored a 16/24 on the AM-PAC ADL Inpatient form. Current research shows that an AM-PAC score of 18 or greater is typically associated with a discharge to the patient's home setting. Based on the patient's AM-PAC score, and their current ADL deficits, it is recommended that the patient have 2-3 sessions per week of Occupational Therapy at d/c to increase the patient's independence. At this time, this patient demonstrates the endurance and safety to discharge home with home OT and a follow up treatment frequency of 2-3x/wk. Please see assessment section for further patient specific details. HOME HEALTH CARE: LEVEL 1 STANDARD     -Initial home health evaluation to occur within 24-48 hours, in patient home    -Home health agency to establish plan of care for patient over 60 day period    -Medication Reconciliation    -PCP Visit scheduled within seven days of discharge    -PT/OT to evaluate with goal of regaining prior level of functioning    -OT to evaluate if patient has [address] needs for personal care       If patient discharges prior to next session this note will serve as a discharge summary. Please see below for the latest assessment towards goals. Assessment   Performance deficits / Impairments: Decreased functional mobility ; Decreased ADL status; Decreased endurance;Decreased safe awareness;Decreased cognition;Decreased balance  Assessment: Pt is a 59 y.o. female admitted with decompensated liver cirrhosis, hepatic encephalopathy. PMH includes deuce BULLARD. PTA, pt lives in group home.  Pt is a poor historian and does report independence with UB ADLs and stand pivot transfers, but unsure of accuracy at this. Anticipate pt may actually be closer to her baseline, anticipate she needs much assist with ADLs and transfers at home. min a bed mobility, min a fxl transfers via Lucianne Colace, assist with ADLs. Due to anticipate pt near baseline, anticipate pt will be safe to d/c home with 24/7 supervision and home OT level 1, services as prior. Will continue to see pt while in acute hospital.  Treatment Diagnosis: impaired ADLs, fxl transfers/mobility, endurance, balance, cognition/safety awareness  Prognosis: Fair  Decision Making: Medium Complexity  History: Pt is a 59 y.o. female admitted with decompensated liver cirrhosis, hepatic encephalopathy. PMH includes deuce BULLARD. PTA, pt lives in group home. Pt is a poor historian and does report independence with UB ADLs and stand pivot transfers, but unsure of accuracy at this. Anticipate pt may actually be closer to her baseline, anticipate she needs much assist with ADLs and transfers at home. Exam: impaired ADLs, fxl transfers/mobility, endurance, balance, cognition/safety awareness  Assistance / Modification: min a bed mobility, min a fxl transfers via Lucianne Colace, assist with ADLs  OT Education: Plan of Care;OT Role;Precautions; ADL Adaptive Strategies;Transfer Training  REQUIRES OT FOLLOW UP: Yes  Activity Tolerance  Activity Tolerance: Patient Tolerated treatment well  Safety Devices  Safety Devices in place: Yes  Type of devices: Call light within reach; Chair alarm in place;Gait belt;Left in chair           Patient Diagnosis(es): The encounter diagnosis was Hepatic encephalopathy ([address]      has a past medical history of ALLIE (acute kidney injury) ([address] Anemia, Arthritis, Bipolar 1 disorder ([address] Causalgia of lower limb, Cellulitis, Chronic pain syndrome, Chronic post-operative pain, Cirrhosis of liver (HCC), Complications of transplanted liver, Convulsion ([address] COPD (chronic obstructive pulmonary disease) ([address] Depression, Dermatophytosis of nail, Diabetes mellitus ([address] Diarrhea, Dry eyes, ESBL (extended spectrum beta-lactamase) producing bacteria infection, Fungal nail infection, Generalized abdominal pain, Generalized osteoarthritis, GERD (gastroesophageal reflux disease), Hepatic encephalopathy ([address] Hyperlipidemia, Hypertension, Hypothyroidism, Hypoxemia, Immune system disorder ([address] Liver disease, Liver transplanted ([address] Localization-related epilepsy ([address] Low back pain, Lumbar post-laminectomy syndrome, Morbid obesity ([address] Neuropathy, diabetic ([address] On home oxygen therapy, Other disorders of kidney and ureter, Peripheral neuropathy, Phantom limb ([address] Pneumonia, Polyneuritis, Restrictive lung disease, Seizures ([address] Sleep apnea, SVT (supraventricular tachycardia) ([address] Thyroid mass, and UTI (lower urinary tract infection). has a past surgical history that includes Liver transplant (01/03/2008); other surgical history; Cholecystectomy; Appendectomy; hernia repair; Abdomen surgery; joint replacement; joint replacement; Leg amputation below knee; Liver transplant; Leg amputation below knee; Hysterectomy; Sleeve Gastroplasty (1985); and Upper gastrointestinal endoscopy (N/A, 11/1/2019).     Treatment Diagnosis: impaired ADLs, fxl transfers/mobility, endurance, balance, cognition/safety awareness      Restrictions  Restrictions/Precautions  Restrictions/Precautions: Contact Precautions, Fall Risk  Position Activity Restriction  Other position/activity restrictions: MDRO and ESBL (urine); stage 2 coccyx pressure ulcer    Subjective   General  Chart Reviewed: Yes, Progress Notes, Previous Admission  Patient assessed for rehabilitation services?: Yes  Additional Pertinent Hx: Per Dr. Romero Soares note 7/19/2020: \"The patient is a 59 y.o. 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 Patient lives in group home and has had increased confusion and constipation per EMS. Patient is poor historian and unable to answer questions. Workup in ED revealed markedly elevated ammonia level. She also had elevated ALP, low plats, and abnormal INR. She will is unable to perform ROS. Patient will be admitted for hepatic encephalopathy. \"  Family / Caregiver Present: No  Referring Practitioner: Dr. Justyn Devine  Diagnosis: hepatic encephalopathy  Subjective  Subjective: Pt met bedside, in bed. Pt pleasant and agreeable to OT and PT co-eval.  General Comment  Comments: Contact precautions for MDRO & ESBL (urine) maintained throughout.     Social/Functional History  Social/Functional History  Lives With: Other (comment)(4 roommmates, staff present until around 10 PM)  Type of Home: (Group Home)  Home Layout: One level  Home Access: Ramped entrance  Bathroom Shower/Tub: Walk-in shower  Bathroom Equipment: 3-in-1 commode(next to bed)  Home Equipment: Advanced Catheter Therapies Help From: Home health(home PT 2x/wk)  ADL Assistance: Needs assistance(needs assist with LB dressing/bathing; reports independent with UB dressing/bathing)  Homemaking Responsibilities: No  Ambulation Assistance: (uses electric wheelchair)  Transfer Assistance: (reports independent? with stand pivot)  Active : No       Objective   Vision: (functional for assessment)  Hearing: Within functional limits            Balance  Sitting Balance: Stand by assistance  Standing Balance: Minimal assistance(in Cindy Dain)    Functional Mobility  Functional Mobility Comments: Use of Cindy Dain - uses electric w/c at baseline     ADL  UE Bathing: Stand by assistance(seated EOB to wipe arms with wet wipe)  UE Dressing: Minimal assistance(assist to manage hospital gown)  LE Dressing: Dependent/Total(dependent to thread new depends, remove soiled depends)  Toileting: Maximum assistance(depends, kathi pads, & bed linens Training, Functional Mobility Training, Endurance Training, Safety Education & Training, Patient/Caregiver Education & Training, Equipment Evaluation, Education, & procurement, Self-Care / ADL         OutComes Score  How much help for putting on and taking off regular lower body clothing?: A Lot  How much help for Bathing?: A Lot  How much help for Toileting?: A Lot  How much help for putting on and taking off regular upper body clothing?: A Little  How much help for taking care of personal grooming?: A Little  How much help for eating meals?: None  AM-PAC Inpatient Daily Activity Raw Score: 16  AM-PAC Inpatient ADL T-Scale Score : 35.96  ADL Inpatient CMS 0-100% Score: 53.32  ADL Inpatient CMS G-Code Modifier : CK                                                  AM-PAC Score        AM-PAC Inpatient Daily Activity Raw Score: 16 (07/20/20 0848)  AM-PAC Inpatient ADL T-Scale Score : 35.96 (07/20/20 0848)  ADL Inpatient CMS 0-100% Score: 53.32 (07/20/20 0848)  ADL Inpatient CMS G-Code Modifier : CK (07/20/20 0848)    Goals  Short term goals  Time Frame for Short term goals: to be met by d/c  Short term goal 1: Pt will complete fxl transfers via stand pivot for ADLs with CGA. Short term goal 2: Pt will complete UB bathing/dressing setup. Short term goal 3: Pt will complete toileting mod a. Short term goal 4: Pt will complete LB bathing/dressing mod a. Short term goal 5: Pt will tolerate BUE HEP x10-15 mins to increase strength, endurance, and activity tolerance for ADLs and fxl transfers.   Long term goals  Time Frame for Long term goals : same as STGs  Patient Goals   Patient goals : \"to pull my pants up\"       Therapy Time   Individual Concurrent Group Co-treatment   Time In     0805     Time Out     0850     Minutes     45     Timed Code Treatment Minutes: 30 Minutes        Let this serve as discharge note if discharged prior to next OT session      Maicol Macias OTR/L #[id number]

## 2020-07-20 NOTE — PLAN OF CARE
Problem: Skin Integrity:  Goal: Will show no infection signs and symptoms  Description: Will show no infection signs and symptoms  7/20/2020 1123 by Meryl Kapoor RN  Outcome: Ongoing  Note: Patient remains free from new signs and symptoms of infection during this shift. Infection prevention measures are in place. Will continue to monitor for alterations in patient condition throughout the shift. 7/19/2020 2212 by Da Szymanski RN  Outcome: Ongoing  Note: Will monitor for infection. Goal: Absence of new skin breakdown  Description: Absence of new skin breakdown  7/20/2020 1123 by Meryl Kapoor RN  Outcome: Ongoing  Note: Patient skin condition and mucus membrane integrity remain unchanged during this shift. Skin breakdown prevention interventions are in place. Will continue to monitor and assess. 7/19/2020 2212 by Da Szymanski RN  Outcome: Ongoing     Problem: Falls - Risk of:  Goal: Will remain free from falls  Description: Will remain free from falls  7/20/2020 1123 by Meryl Kapoor RN  Outcome: Ongoing  Note: Patient remains free from falls during this shift. Fall precautions remain in place. Patient educated on the need to implement call light use prior to ambulation. Will continue to monitor and assess. 7/19/2020 2212 by Da Szymanski RN  Outcome: Ongoing  Note: Fall risk assessment completed. Fall precautions in place. Call light within reach. Pt educated on calling for assistance before getting up. Walkway free of clutter. Will continue to monitor. Goal: Absence of physical injury  Description: Absence of physical injury  7/20/2020 1123 by Meryl Kapoor RN  Outcome: Ongoing  Note: Patient remains free from physical harm during this shift. Will continue to monitor and assess patient.      7/19/2020 2212 by Da Szymanski RN  Outcome: Ongoing     Problem: Nutrition  Goal: Optimal nutrition therapy  7/20/2020 1123 by Meryl Kapoor RN  Outcome: Ongoing  Note: Patient tolerating PO during this shift. Will continue to promote PO intake   7/19/2020 2212 by Kalee De Paz RN  Outcome: Ongoing  Note: Will encourage nutritional intake. Problem: Pain:  Goal: Pain level will decrease  Description: Pain level will decrease  7/20/2020 1123 by Dayan Pitt RN  Outcome: Ongoing  Note: Pain managed with pharmacologic and non-pharmacologic interventions during this shift. Will continue to monitor and assess for needs and change in patient condition. 7/19/2020 2212 by Kalee De Paz RN  Outcome: Ongoing  Note: Pt assessed for pain. Pt in pain and assessed with 0-10 pain rating scale. Pt given prescribed analgesic for pain. (See eMar) Pt satisfied with pain relief thus far. Will reassess and continue to monitor. Goal: Control of acute pain  Description: Control of acute pain  7/20/2020 1123 by Dayan Pitt RN  Outcome: Ongoing  Note: Pain managed with pharmacologic and non-pharmacologic interventions during this shift. Will continue to monitor and assess for needs and change in patient condition. 7/19/2020 2212 by Kalee De Paz RN  Outcome: Ongoing  Goal: Control of chronic pain  Description: Control of chronic pain  7/20/2020 1123 by Dayan Pitt RN  Outcome: Ongoing  Note: Pain managed with pharmacologic and non-pharmacologic interventions during this shift. Will continue to monitor and assess for needs and change in patient condition.      7/19/2020 2212 by Kalee De Paz RN  Outcome: Ongoing

## 2024-06-04 NOTE — PROGRESS NOTES
Occupational Therapy   Occupational Therapy Initial Assessment  Date: 2020   Patient Name: Bolivar Guzman  MRN: [id number]     : 1955    Date of Service: 2020    Discharge Recommendations:    Patient would benefit from continued therapy after discharge, 3-5 sessions per week Bolivar Guzman scored a  on the AM-PAC ADL Inpatient form. Current research shows that an AM-PAC score of 17 or less is typically not associated with a discharge to the patient's home setting. Based on the patients AM-PAC score and their current ADL deficits, it is recommended that the patient have 3-5 sessions per week of Occupational Therapy at d/c to increase the patients independence. Assessment   Performance deficits / Impairments: Decreased endurance;Decreased ADL status; Decreased cognition;Decreased strength  Assessment: Patient functioning below baseline due to the above mentioned deficits. Patient would benefit from ongoing OT in order to increase functional status. Prognosis: Fair  Decision Making: Medium Complexity  OT Education: OT Role;Transfer Training;Plan of Care;Precautions;Orientation  Barriers to Learning: cognition   REQUIRES OT FOLLOW UP: Yes  Activity Tolerance  Activity Tolerance: Treatment limited secondary to decreased cognition  Safety Devices  Type of devices: Left in bed;Call light within reach; Bed alarm in place; All fall risk precautions in place;Nurse notified; Patient at risk for falls           Patient Diagnosis(es): The primary encounter diagnosis was Dehydration. Diagnoses of Hepatic encephalopathy ([address] and Disorientation were also pertinent to this visit.      has a past medical history of Anemia, Arthritis, Bipolar 1 disorder ([address] Causalgia of lower limb, Cellulitis, Chronic pain syndrome, Chronic post-operative pain, Cirrhosis of liver (HCC), Complications of transplanted liver, Convulsion ([address] COPD (chronic obstructive pulmonary disease) ([address] Depression, Dermatophytosis of nail, Diabetes mellitus ([address] Diarrhea, Dry eyes, Fungal nail infection, Generalized abdominal pain, Generalized osteoarthritis, GERD (gastroesophageal reflux disease), Hepatic encephalopathy ([address] Hyperlipidemia, Hypertension, Hypothyroidism, Hypoxemia, Immune system disorder ([address] Liver disease, Liver transplanted ([address] Localization-related epilepsy ([address] Low back pain, Lumbar post-laminectomy syndrome, Morbid obesity ([address] Neuropathy, diabetic ([address] On home oxygen therapy, Other disorders of kidney and ureter, Peripheral neuropathy, Phantom limb ([address] Pneumonia, Polyneuritis, Restrictive lung disease, Seizures ([address] Sleep apnea, SVT (supraventricular tachycardia) ([address] Thyroid mass, and UTI (lower urinary tract infection). has a past surgical history that includes Liver transplant (01/03/2008); other surgical history; Cholecystectomy; Appendectomy; hernia repair; Abdomen surgery; joint replacement; joint replacement; Leg amputation below knee; Liver transplant; Leg amputation below knee; Hysterectomy; Sleeve Gastroplasty (1985); and Upper gastrointestinal endoscopy (N/A, 11/1/2019). Restrictions  Restrictions/Precautions  Restrictions/Precautions: Fall Risk    Subjective   General  Chart Reviewed: Yes  Patient assessed for rehabilitation services?: Yes  Additional Pertinent Hx: hx chronic liver disease s/p liver transplantation in 2008, recurrent admission for hepatic encephalopathy, Bipolar 1 Disorder, osteomyelitis with R BKA, COPD, type 2 DM, seizure disorder, obstructive sleep apnea on CPAP  Family / Caregiver Present: No  Referring Practitioner: Alonso Bautista MD  Diagnosis: AMS   Subjective  Subjective: Pt solement supine in bed and agreeable to OT. Patient unable to state last name or date of birth this date.    General Comment  Comments: RN okay for OT and removal of CPap for therapy      Social/Functional History  Social/Functional History  Type of Home: Facility(? assisted independent